# Patient Record
Sex: FEMALE | Race: WHITE | NOT HISPANIC OR LATINO | Employment: OTHER | URBAN - METROPOLITAN AREA
[De-identification: names, ages, dates, MRNs, and addresses within clinical notes are randomized per-mention and may not be internally consistent; named-entity substitution may affect disease eponyms.]

---

## 2017-03-16 ENCOUNTER — OFFICE VISIT (OUTPATIENT)
Dept: OPHTHALMOLOGY | Facility: CLINIC | Age: 82
End: 2017-03-16
Payer: COMMERCIAL

## 2017-03-16 DIAGNOSIS — H43.813 PVD (POSTERIOR VITREOUS DETACHMENT), BILATERAL: ICD-10-CM

## 2017-03-16 DIAGNOSIS — Z96.1 PSEUDOPHAKIA: ICD-10-CM

## 2017-03-16 DIAGNOSIS — H53.039 STRABISMIC AMBLYOPIA, UNSPECIFIED LATERALITY: ICD-10-CM

## 2017-03-16 DIAGNOSIS — H18.529 ANTERIOR BASEMENT MEMBRANE DYSTROPHY: Primary | ICD-10-CM

## 2017-03-16 DIAGNOSIS — H52.4 PRESBYOPIA: ICD-10-CM

## 2017-03-16 PROCEDURE — 92015 DETERMINE REFRACTIVE STATE: CPT | Performed by: STUDENT IN AN ORGANIZED HEALTH CARE EDUCATION/TRAINING PROGRAM

## 2017-03-16 PROCEDURE — 92014 COMPRE OPH EXAM EST PT 1/>: CPT | Performed by: STUDENT IN AN ORGANIZED HEALTH CARE EDUCATION/TRAINING PROGRAM

## 2017-03-16 ASSESSMENT — REFRACTION_WEARINGRX
OD_SPHERE: -0.50
OS_CYLINDER: +1.00
OD_ADD: +3.00
OS_ADD: +3.00
SPECS_TYPE: PAL
OD_CYLINDER: +1.25
OS_AXIS: 118
OD_AXIS: 120
OS_SPHERE: -0.50

## 2017-03-16 ASSESSMENT — REFRACTION_MANIFEST
OS_CYLINDER: +1.25
OD_AXIS: 117
OS_ADD: +3.00
OS_AXIS: 110
OD_ADD: +3.00
OS_SPHERE: -0.75
OD_SPHERE: -0.50
OD_CYLINDER: +1.25

## 2017-03-16 ASSESSMENT — CUP TO DISC RATIO
OD_RATIO: 0.4
OS_RATIO: 0.4

## 2017-03-16 ASSESSMENT — TONOMETRY
OD_IOP_MMHG: 17
OS_IOP_MMHG: 17

## 2017-03-16 ASSESSMENT — SLIT LAMP EXAM - LIDS
COMMENTS: DERMATOCHALASIS
COMMENTS: DERMATOCHALASIS

## 2017-03-16 ASSESSMENT — EXTERNAL EXAM - LEFT EYE: OS_EXAM: NORMAL

## 2017-03-16 ASSESSMENT — VISUAL ACUITY
METHOD: SNELLEN - LINEAR
OD_CC: 20/20
OS_CC: 20/80-1
OS_CC: J16
OD_CC: J1

## 2017-03-16 ASSESSMENT — EXTERNAL EXAM - RIGHT EYE: OD_EXAM: NORMAL

## 2017-03-16 ASSESSMENT — CONF VISUAL FIELD
OD_NORMAL: 1
OS_NORMAL: 1

## 2017-03-16 NOTE — MR AVS SNAPSHOT
"              After Visit Summary   3/16/2017    Carolin Mcbride    MRN: [id number]           Patient Information     Date Of Birth          9/6/1934        Visit Information        Provider Department      3/16/2017 10:00 AM Nevin Ricci MD [facility]        Today's Diagnoses     Presbyopia    -  1    Anterior basement membrane dystrophy, OU        PVD (posterior vitreous detachment), bilateral        Strabismic amblyopia, unspecified laterality        Pseudophakia OU c YAG OU          Care Instructions    Glasses prescription given - optional     Nevin Ricci MD  [phone]          Follow-ups after your visit        Follow-up notes from your care team     Return in about 1 year (around 3/16/2018) for Complete Exam.      Who to contact     If you have questions or need follow up information about today's clinic visit or your schedule please contact [facility] directly at [phone].  Normal or non-critical lab and imaging results will be communicated to you by MyChart, letter or phone within 4 business days after the clinic has received the results. If you do not hear from us within 7 days, please contact the clinic through Popcutshart or phone. If you have a critical or abnormal lab result, we will notify you by phone as soon as possible.  Submit refill requests through Splice or call your pharmacy and they will forward the refill request to us. Please allow 3 business days for your refill to be completed.          Additional Information About Your Visit        MyChart Information     Splice lets you send messages to your doctor, view your test results, renew your prescriptions, schedule appointments and more. To sign up, go to [email or web address] . Click on \"Log in\" on the left side of the screen, which will take you to the Welcome page. Then click on \"Sign up Now\" on the right side of the page.     You will be asked to enter the access code listed below, as " well as some personal information. Please follow the directions to create your username and password.     Your access code is: [email or web address]  Expires: 3/20/2017 11:50 AM     Your access code will  in 90 days. If you need help or a new code, please call your [place] clinic or [phone].        Care EveryWhere ID     This is your Care EveryWhere ID. This could be used by other organizations to access your [place] medical records  [id number]         Blood Pressure from Last 3 Encounters:   16 139/79   16 135/75   12/14/15 160/82    Weight from Last 3 Encounters:   16 75.2 kg (165 lb 11.2 oz)   16 73.7 kg (162 lb 6.4 oz)   12/14/15 70.7 kg (155 lb 12.8 oz)              Today, you had the following     No orders found for display       Primary Care Provider Office Phone # Fax #    Lela Sneha Townsend [phone]       [facility] [address] [facility] [address]        Thank you!     Thank you for choosing [facility]  for your care. Our goal is always to provide you with excellent care. Hearing back from our patients is one way we can continue to improve our services. Please take a few minutes to complete the written survey that you may receive in the mail after your visit with us. Thank you!             Your Updated Medication List - Protect others around you: Learn how to safely use, store and throw away your medicines at www.disposemymeds.org.          This list is accurate as of: 3/16/17 11:32 AM.  Always use your most recent med list.                   Brand Name Dispense Instructions for use    aspirin 81 MG tablet     90 tablet    Take 1 tablet (81 mg) by mouth daily       atorvastatin 20 MG tablet    LIPITOR    90 tablet    TAKE 1 TABLET EVERY DAY       CALCIUM 500 + D PO      1 tab three times a day       ciprofloxacin 500 MG tablet    CIPRO    14 tablet    Take 1 tablet (500 mg) by mouth 2 times daily       citalopram 20 MG  tablet    celeXA    45 tablet    TAKE 1/2 TABLET EVERY DAY       ipratropium 0.06 % spray    ATROVENT    3 Box    Spray 2 sprays into both nostrils 3 times daily as needed for rhinitis       losartan 50 MG tablet    COZAAR    90 tablet    TAKE 1 TABLET EVERY DAY  (WITH  BREAKFAST)       triamcinolone 0.1 % cream    KENALOG    80 g    Apply 2-3 times a day to affected area.       vitamin D 400 UNITS tablet      1 cap twice a day

## 2017-03-16 NOTE — PROGRESS NOTES
Current Eye Medications: tears prn      Subjective:  Comprehensive eye exam.   Pt reports she is seeing good. Pt states have been getting headaches over the past six months has been getting frequent headache, never really got headaches in the past. Pt also reports she has had floaters for years in her right eye ,they seem to be more  apparent to her lately ,will sometimes interfere with her vision.   Objective:  See Ophthalmology Exam.      Assessment:  Carolin Mcbride is a 82 year old female who presents with:     Anterior basement membrane dystrophy, OU      PVD (posterior vitreous detachment), bilateral      Strabismic amblyopia, left eye      Pseudophakia OU c YAG OU        Plan:  Glasses prescription given - optional     Nevin Ricci MD  [phone]

## 2017-04-10 ENCOUNTER — OFFICE VISIT (OUTPATIENT)
Dept: FAMILY MEDICINE | Facility: CLINIC | Age: 82
End: 2017-04-10
Payer: COMMERCIAL

## 2017-04-10 VITALS
TEMPERATURE: 97.4 F | SYSTOLIC BLOOD PRESSURE: 137 MMHG | HEART RATE: 60 BPM | DIASTOLIC BLOOD PRESSURE: 77 MMHG | HEIGHT: 63 IN | OXYGEN SATURATION: 94 % | WEIGHT: 168.2 LBS | BODY MASS INDEX: 29.8 KG/M2

## 2017-04-10 DIAGNOSIS — I25.10 CORONARY ARTERY DISEASE INVOLVING NATIVE CORONARY ARTERY OF NATIVE HEART WITHOUT ANGINA PECTORIS: Chronic | ICD-10-CM

## 2017-04-10 DIAGNOSIS — N18.30 CKD (CHRONIC KIDNEY DISEASE) STAGE 3, GFR 30-59 ML/MIN (H): Chronic | ICD-10-CM

## 2017-04-10 DIAGNOSIS — J30.1 SEASONAL ALLERGIC RHINITIS DUE TO POLLEN: Primary | ICD-10-CM

## 2017-04-10 DIAGNOSIS — R49.0 HOARSENESS, CHRONIC: ICD-10-CM

## 2017-04-10 DIAGNOSIS — E78.5 HYPERLIPIDEMIA WITH TARGET LDL LESS THAN 100: Chronic | ICD-10-CM

## 2017-04-10 LAB
ALBUMIN SERPL-MCNC: 3.6 G/DL (ref 3.4–5)
ANION GAP SERPL CALCULATED.3IONS-SCNC: 9 MMOL/L (ref 3–14)
BUN SERPL-MCNC: 17 MG/DL (ref 7–30)
CALCIUM SERPL-MCNC: 9.6 MG/DL (ref 8.5–10.1)
CHLORIDE SERPL-SCNC: 106 MMOL/L (ref 94–109)
CO2 SERPL-SCNC: 26 MMOL/L (ref 20–32)
CREAT SERPL-MCNC: 0.9 MG/DL (ref 0.52–1.04)
ERYTHROCYTE [DISTWIDTH] IN BLOOD BY AUTOMATED COUNT: 12.9 % (ref 10–15)
GFR SERPL CREATININE-BSD FRML MDRD: 60 ML/MIN/1.7M2
GLUCOSE SERPL-MCNC: 95 MG/DL (ref 70–99)
HCT VFR BLD AUTO: 38 % (ref 35–47)
HGB BLD-MCNC: 12.5 G/DL (ref 11.7–15.7)
MCH RBC QN AUTO: 29.6 PG (ref 26.5–33)
MCHC RBC AUTO-ENTMCNC: 32.9 G/DL (ref 31.5–36.5)
MCV RBC AUTO: 90 FL (ref 78–100)
PHOSPHATE SERPL-MCNC: 3.6 MG/DL (ref 2.5–4.5)
PLATELET # BLD AUTO: 256 10E9/L (ref 150–450)
POTASSIUM SERPL-SCNC: 4.4 MMOL/L (ref 3.4–5.3)
RBC # BLD AUTO: 4.22 10E12/L (ref 3.8–5.2)
SODIUM SERPL-SCNC: 141 MMOL/L (ref 133–144)
WBC # BLD AUTO: 6.8 10E9/L (ref 4–11)

## 2017-04-10 PROCEDURE — 99214 OFFICE O/P EST MOD 30 MIN: CPT | Performed by: FAMILY MEDICINE

## 2017-04-10 PROCEDURE — 85027 COMPLETE CBC AUTOMATED: CPT | Performed by: FAMILY MEDICINE

## 2017-04-10 PROCEDURE — 80069 RENAL FUNCTION PANEL: CPT | Performed by: FAMILY MEDICINE

## 2017-04-10 PROCEDURE — 36415 COLL VENOUS BLD VENIPUNCTURE: CPT | Performed by: FAMILY MEDICINE

## 2017-04-10 RX ORDER — IPRATROPIUM BROMIDE 42 UG/1
2 SPRAY, METERED NASAL 3 TIMES DAILY PRN
Qty: 3 BOX | Refills: 4 | Status: SHIPPED | OUTPATIENT
Start: 2017-04-10 | End: 2019-10-28

## 2017-04-10 ASSESSMENT — PAIN SCALES - GENERAL: PAINLEVEL: NO PAIN (0)

## 2017-04-10 NOTE — MR AVS SNAPSHOT
After Visit Summary   4/10/2017    Carolin Mcbride    MRN: [id number]           Patient Information     Date Of Birth          9/6/1934        Visit Information        Provider Department      4/10/2017 1:20 PM Lela Townsend MD [facility]        Today's Diagnoses     Seasonal allergic rhinitis due to pollen    -  1    Hyperlipidemia with target LDL less than 100        Coronary artery disease involving native coronary artery of native heart without angina pectoris        CKD (chronic kidney disease) stage 3, GFR 30-59 ml/min        Hoarseness, chronic          Care Instructions      Understanding Nasal Allergies  Nasal allergies (also called allergic rhinitis) are a common health problem. They may be seasonal.This means they cause symptoms only at certain times of the year. Or they may be perennial.This means they cause symptoms all year long. Other health problems, such as asthma, often occur along with allergies as well.    What Is an allergic reaction?  An allergy is a reaction to a substance called an allergen. Common allergens include:    Wind-borne pollen    Mold    Dust mites    Furry and feathered animals    Cockroaches  Normally, allergens are harmless. But when a person has allergies, their body thinks they are harmful. Their body then attacks allergens with antibodies. Antibodies are attached to special cells called mast cells. Allergens stick to the antibodies. This makes the mast cells release histamine and other chemicals. This is an allergic reaction. The chemicals irritate nearby nasal tissue. This causes nasal allergy symptoms.  Common nasal allergy symptoms  Allergies can cause nasal tissue to swell. This makes the air passages smaller. The nose may feel stuffed up. The nose may also make extra mucus, which can plug the nasal passages or drip out of the nose. Mucus can drip down the back of the throat (postnasal drip) as well. Sinus tissue can swell. This  may cause pain and headache. Common allergy symptoms include:    Runny nose with clear, watery discharge    Stuffy nose (nasal congestion)    Drainage down your throat (postnasal drip)    Sneezing    Red, watery eyes    Itchy nose, eyes, ears, and throat    Plugged-up ears (ear congestion)    Sore throat    Coughing    Sinus pain and swelling    Headache  It may not be allergies  Other health problems can cause symptoms like those of nasal allergies. These include:    Nonallergic rhinitis and viruses such as colds    Irritants and pollutants, such as strong odors or smoke    Certain medications    Changes in the weather   Treatment  Your health care provider will evaluate you to find the cause of your symptoms then recommend treatment. You may also be referred to an allergist.     2684-3067 [employer]. [address] [place] [address], [place] [address]. All rights reserved. This information is not intended as a substitute for professional medical care. Always follow your healthcare professional's instructions.              Follow-ups after your visit        Who to contact     If you have questions or need follow up information about today's clinic visit or your schedule please contact [facility] directly at [phone].  Normal or non-critical lab and imaging results will be communicated to you by Pingboardhart, letter or phone within 4 business days after the clinic has received the results. If you do not hear from us within 7 days, please contact the clinic through Spotzott or phone. If you have a critical or abnormal lab result, we will notify you by phone as soon as possible.  Submit refill requests through Redbooth or call your pharmacy and they will forward the refill request to us. Please allow 3 business days for your refill to be completed.          Additional Information About Your Visit        Redbooth Information     Redbooth lets you send messages to your doctor, view your test results,  "renew your prescriptions, schedule appointments and more. To sign up, go to [email or web address] . Click on \"Log in\" on the left side of the screen, which will take you to the Welcome page. Then click on \"Sign up Now\" on the right side of the page.     You will be asked to enter the access code listed below, as well as some personal information. Please follow the directions to create your username and password.     Your access code is: [email or web address]  Expires: 2017  1:42 PM     Your access code will  in 90 days. If you need help or a new code, please call your [place] clinic or [phone].        Care EveryWhere ID     This is your Care EveryWhere ID. This could be used by other organizations to access your [place] medical records  [id number]        Your Vitals Were     Pulse Temperature Pulse Oximetry BMI (Body Mass Index)          60 97.4  F (36.3  C) (Oral) 94% 29.33 kg/m2         Blood Pressure from Last 3 Encounters:   04/10/17 137/77   16 139/79   16 135/75    Weight from Last 3 Encounters:   04/10/17 168 lb 3.2 oz (76.3 kg)   16 165 lb 11.2 oz (75.2 kg)   16 162 lb 6.4 oz (73.7 kg)              We Performed the Following     CBC with platelets     Renal panel          Today's Medication Changes          These changes are accurate as of: 4/10/17  1:42 PM.  If you have any questions, ask your nurse or doctor.               Stop taking these medicines if you haven't already. Please contact your care team if you have questions.     ciprofloxacin 500 MG tablet   Commonly known as:  CIPRO   Stopped by:  Lela Townsend MD                Where to get your medicines      These medications were sent to [facility] - [place], [place] - [address]  [address] [facility] [address]     Phone:  [phone]     ipratropium 0.06 % spray                Primary Care Provider Office Phone # Fax #    Lela Townsend [phone]    "    [facility] [address]  [facility] [address]        Thank you!     Thank you for choosing [facility]  for your care. Our goal is always to provide you with excellent care. Hearing back from our patients is one way we can continue to improve our services. Please take a few minutes to complete the written survey that you may receive in the mail after your visit with us. Thank you!             Your Updated Medication List - Protect others around you: Learn how to safely use, store and throw away your medicines at www.disposemymeds.org.          This list is accurate as of: 4/10/17  1:42 PM.  Always use your most recent med list.                   Brand Name Dispense Instructions for use    aspirin 81 MG tablet     90 tablet    Take 1 tablet (81 mg) by mouth daily       atorvastatin 20 MG tablet    LIPITOR    90 tablet    TAKE 1 TABLET EVERY DAY       CALCIUM 500 + D PO      1 tab three times a day       citalopram 20 MG tablet    celeXA    45 tablet    TAKE 1/2 TABLET EVERY DAY       ipratropium 0.06 % spray    ATROVENT    3 Box    Spray 2 sprays into both nostrils 3 times daily as needed for rhinitis       losartan 50 MG tablet    COZAAR    90 tablet    TAKE 1 TABLET EVERY DAY  (WITH  BREAKFAST)       triamcinolone 0.1 % cream    KENALOG    80 g    Apply 2-3 times a day to affected area.       vitamin D 400 UNITS tablet      1 cap twice a day

## 2017-04-10 NOTE — PATIENT INSTRUCTIONS
Understanding Nasal Allergies  Nasal allergies (also called allergic rhinitis) are a common health problem. They may be seasonal.This means they cause symptoms only at certain times of the year. Or they may be perennial.This means they cause symptoms all year long. Other health problems, such as asthma, often occur along with allergies as well.    What Is an allergic reaction?  An allergy is a reaction to a substance called an allergen. Common allergens include:    Wind-borne pollen    Mold    Dust mites    Furry and feathered animals    Cockroaches  Normally, allergens are harmless. But when a person has allergies, their body thinks they are harmful. Their body then attacks allergens with antibodies. Antibodies are attached to special cells called mast cells. Allergens stick to the antibodies. This makes the mast cells release histamine and other chemicals. This is an allergic reaction. The chemicals irritate nearby nasal tissue. This causes nasal allergy symptoms.  Common nasal allergy symptoms  Allergies can cause nasal tissue to swell. This makes the air passages smaller. The nose may feel stuffed up. The nose may also make extra mucus, which can plug the nasal passages or drip out of the nose. Mucus can drip down the back of the throat (postnasal drip) as well. Sinus tissue can swell. This may cause pain and headache. Common allergy symptoms include:    Runny nose with clear, watery discharge    Stuffy nose (nasal congestion)    Drainage down your throat (postnasal drip)    Sneezing    Red, watery eyes    Itchy nose, eyes, ears, and throat    Plugged-up ears (ear congestion)    Sore throat    Coughing    Sinus pain and swelling    Headache  It may not be allergies  Other health problems can cause symptoms like those of nasal allergies. These include:    Nonallergic rhinitis and viruses such as colds    Irritants and pollutants, such as strong odors or smoke    Certain medications    Changes in the weather    Treatment  Your health care provider will evaluate you to find the cause of your symptoms then recommend treatment. You may also be referred to an allergist.     [id number] [employer]. [address] [place] [address], [place] [address]. All rights reserved. This information is not intended as a substitute for professional medical care. Always follow your healthcare professional's instructions.

## 2017-04-10 NOTE — LETTER
[facility]       [address]  [place] [address]      April 11, 2017      Carolin Mcbride  [address] [place] [address]              Dear Carolin,    Your test results are attached. I am happy to let you know that they are stable and your medications can stay the same    The kidney test is stable. The blood sugar is normal and you do not have diabetes. The complete blood count was normal and you do not have anemia or signs of infection. We can recheck labs in 6 months. Have a great trip and let me know if you are not feeling better.    Please call me if you have any questions about these test results or about your care.    Sincerely,    Lela Townsend MD/cassia  MRN: [id number]                                                      MRN: [id number]  Results for orders placed or performed in visit on 04/10/17   CBC with platelets   Result Value Ref Range    WBC 6.8 4.0 - 11.0 10e9/L    RBC Count 4.22 3.8 - 5.2 10e12/L    Hemoglobin 12.5 11.7 - 15.7 g/dL    Hematocrit 38.0 35.0 - 47.0 %    MCV 90 78 - 100 fl    MCH 29.6 26.5 - 33.0 pg    MCHC 32.9 31.5 - 36.5 g/dL    RDW 12.9 10.0 - 15.0 %    Platelet Count 256 150 - 450 10e9/L   Renal panel   Result Value Ref Range    Sodium 141 133 - 144 mmol/L    Potassium 4.4 3.4 - 5.3 mmol/L    Chloride 106 94 - 109 mmol/L    Carbon Dioxide 26 20 - 32 mmol/L    Anion Gap 9 3 - 14 mmol/L    Glucose 95 70 - 99 mg/dL    Urea Nitrogen 17 7 - 30 mg/dL    Creatinine 0.90 0.52 - 1.04 mg/dL    GFR Estimate 60 (L) >60 mL/min/1.7m2    GFR Estimate If Black 73 >60 mL/min/1.7m2    Calcium 9.6 8.5 - 10.1 mg/dL    Phosphorus 3.6 2.5 - 4.5 mg/dL    Albumin 3.6 3.4 - 5.0 g/dL

## 2017-04-10 NOTE — PROGRESS NOTES
SUBJECTIVE:                                                    Carolin Mcbride is a 82 year old female who presents to clinic today for the following health issues:    Concern - Lightheadedness and headaches last week with increased allergic rhinitis symptoms. Improved this week.      Onset: 2 weeks     Description:   Sick to the stomach not pain, lightheadedness and headaches- frontal area.     Intensity: moderate    Progression of Symptoms:  same and intermittent and mostly in the morning     Accompanying Signs & Symptoms:  Nausea in the morning with more sinus drainage in back of throat. Similar symptoms in past improved by nasal sprays. Tested positive for tree pollens.      Previous history of similar problem:   none    Precipitating factors:   Worsened by: mostly in the morning     Alleviating factors:  Improved by: none        Therapies Tried and outcome: water     Hyperlipidemia Follow-Up      Rate your low fat/cholesterol diet?: good    Taking statin?  Yes, no muscle aches from statin    Other lipid medications/supplements?:  none     Hypertension Follow-up      Outpatient blood pressures are not being checked.    Low Salt Diet: no added salt     Vascular Disease Follow-up:  Coronary Artery Disease (CAD)      Chest pain or pressure, left side neck or arm pain: No    Shortness of breath/increased sweats/nausea with exertion: No    Pain in calves walking 1-2 blocks: No    Worsened or new symptoms since last visit: No    Nitroglycerin use: no    Daily aspirin use: Yes     Chronic Kidney Disease Follow-up      Current NSAID use?  No      Problem list and histories reviewed & adjusted, as indicated.  Additional history: as documented    Patient Active Problem List   Diagnosis     Pseudophakia OU c YAG OU     CAD (coronary artery disease)     Advance care planning     Hyperlipidemia with target LDL less than 100     Osteoporosis     Closed fracture of base of fifth metatarsal bone - right      Metatarsal  fracture - right 4th neck     CKD (chronic kidney disease) stage 3, GFR 30-59 ml/min     Seasonal allergic rhinitis     Diagnostic skin and sensitization tests (aka ALLERGENS)     Pastrana's neuroma     Hoarseness, chronic     Strabismic amblyopia     Anterior basement membrane dystrophy, OU     PVD (posterior vitreous detachment) OU     Granuloma annulare     Major depressive disorder, recurrent episode, mild (H)     Past Surgical History:   Procedure Laterality Date     APPENDECTOMY       CHOLECYSTECTOMY, OPEN       COLONOSCOPY  2009    neg     CYSTOCELE REPAIR  2003    TVT SPARC       Social History   Substance Use Topics     Smoking status: Never Smoker     Smokeless tobacco: Never Used      Comment: Never smoked; nonsmoking household     Alcohol use No      Comment: very rare     Family History   Problem Relation Age of Onset     CANCER Mother      ovarian      HEART DISEASE Sister      CABG x 3     Neurologic Disorder Sister      Fibromyalgia     CANCER Father      stomach/interstines      Musculoskeletal Disorder Brother      back      HEART DISEASE Brother 60     CABG x 4         Current Outpatient Prescriptions   Medication Sig Dispense Refill     ipratropium (ATROVENT) 0.06 % spray Spray 2 sprays into both nostrils 3 times daily as needed for rhinitis 3 Box 4     losartan (COZAAR) 50 MG tablet TAKE 1 TABLET EVERY DAY  (WITH  BREAKFAST) 90 tablet 3     citalopram (CELEXA) 20 MG tablet TAKE 1/2 TABLET EVERY DAY 45 tablet 3     atorvastatin (LIPITOR) 20 MG tablet TAKE 1 TABLET EVERY DAY 90 tablet 3     triamcinolone (KENALOG) 0.1 % cream Apply 2-3 times a day to affected area. 80 g 6     aspirin 81 MG tablet Take 1 tablet (81 mg) by mouth daily 90 tablet 3     VITAMIN D 400 UNIT OR TABS 1 cap twice a day        CALCIUM 500 + D OR 1 tab three times a day        Allergies   Allergen Reactions     Baycol [Cerivastatin Sodium]       muscle mass loss       Crestor [Hmg-Coa-R Inhibitors]      Previously on Baycol and  "affect muscle mass loss.     Darvocet [Acetaminophen]      Severe nausea     Macrobid [Nitrofuran Derivatives]      Hives     Recent Labs   Lab Test  04/10/17   1352  12/20/16   1054   07/13/15   1054  01/12/15   1034  11/17/14   1136  05/19/14   1500  11/11/13   1151   11/06/12   1045   LDL   --   118*   --   74   --   175*   --   166*   < >  160*   HDL   --   63   --   60   --   50*   --   43*   < >  43*   TRIG   --   130   --   60   --   172*   --   171*   < >  160*   ALT   --    --    --    --   29   --   22  24   < >   --    CR  0.90  1.00   < >  0.90   --   0.98  0.99  0.91   < >   --    GFRESTIMATED  60*  53*   < >  60*   --   55*  54*  60*   < >   --    GFRESTBLACK  73  64   < >  73   --   66  65  72   < >   --    POTASSIUM  4.4  4.5   < >  4.2   --   4.6  4.7  4.5   < >   --    TSH   --    --    --    --    --    --   1.56   --    --   1.52    < > = values in this interval not displayed.      BP Readings from Last 3 Encounters:   04/10/17 137/77   12/20/16 139/79   04/18/16 135/75    Wt Readings from Last 3 Encounters:   04/10/17 168 lb 3.2 oz (76.3 kg)   12/20/16 165 lb 11.2 oz (75.2 kg)   04/18/16 162 lb 6.4 oz (73.7 kg)                  Labs reviewed in EPIC    Reviewed and updated as needed this visit by clinical staff  Tobacco  Allergies  Meds       Reviewed and updated as needed this visit by Provider         ROS:  Constitutional, HEENT, cardiovascular, pulmonary, gi and gu systems are negative, except as otherwise noted.    OBJECTIVE:                                                    /77 (BP Location: Right arm, Patient Position: Chair, Cuff Size: Adult Large)  Pulse 60  Temp 97.4  F (36.3  C) (Oral)  Ht 5' 3\" (1.6 m)  Wt 168 lb 3.2 oz (76.3 kg)  SpO2 94%  BMI 29.8 kg/m2  Body mass index is 29.8 kg/(m^2).  GENERAL: elderly, alert, well nourished, well hydrated, no distress  HENT: ear canals- normal; TMs- normal; Nose- normal; Mouth- no ulcers, no lesions, missing dentition  NECK: no " tenderness, no adenopathy, no asymmetry, no masses, no stiffness; thyroid- normal to palpation  RESP: lungs clear to auscultation - no rales, no rhonchi, no wheezes  CV: regular rates and rhythm, normal S1 S2, no S3 or S4 and no murmur, no click or rub, normal pulses  ABDOMEN: soft, no tenderness, no  hepatosplenomegaly, no masses, normal bowel sounds  MS: extremities- no gross deformities noted, no edema  SKIN: no suspicious lesions, no rashes, age related skin changes with seborrheic keratosis and no actinic keratosis.    NEURO: strength and tone- normal, sensory exam- grossly normal, reflexes- symmetric  BACK: no CVA tenderness, no paralumbar tenderness  MENTAL STATUS EXAM:  Appearance/Behavior: no apparent distress, neatly groomed, dressed appropriately for weather, appears stated age and is not frail-appearing  Speech: normal  Mood/Affect: normal affect  Insight: Excellent     Diagnostic Test Results:  Results for orders placed or performed in visit on 04/10/17   CBC with platelets   Result Value Ref Range    WBC 6.8 4.0 - 11.0 10e9/L    RBC Count 4.22 3.8 - 5.2 10e12/L    Hemoglobin 12.5 11.7 - 15.7 g/dL    Hematocrit 38.0 35.0 - 47.0 %    MCV 90 78 - 100 fl    MCH 29.6 26.5 - 33.0 pg    MCHC 32.9 31.5 - 36.5 g/dL    RDW 12.9 10.0 - 15.0 %    Platelet Count 256 150 - 450 10e9/L   Renal panel   Result Value Ref Range    Sodium 141 133 - 144 mmol/L    Potassium 4.4 3.4 - 5.3 mmol/L    Chloride 106 94 - 109 mmol/L    Carbon Dioxide 26 20 - 32 mmol/L    Anion Gap 9 3 - 14 mmol/L    Glucose 95 70 - 99 mg/dL    Urea Nitrogen 17 7 - 30 mg/dL    Creatinine 0.90 0.52 - 1.04 mg/dL    GFR Estimate 60 (L) >60 mL/min/1.7m2    GFR Estimate If Black 73 >60 mL/min/1.7m2    Calcium 9.6 8.5 - 10.1 mg/dL    Phosphorus 3.6 2.5 - 4.5 mg/dL    Albumin 3.6 3.4 - 5.0 g/dL        ASSESSMENT/PLAN:                                                              ICD-10-CM    1. Seasonal allergic rhinitis due to pollen J30.1 ipratropium  (ATROVENT) 0.06 % spray both nostrils as needed for nasal symptoms. May use over the counter steroid nasal spray if needed.    2. Hyperlipidemia with target LDL less than 100 E78.5 stable   3. Coronary artery disease involving native coronary artery of native heart without angina pectoris I25.10 Stable with no recurrent symptoms of angina or dyspnea   4. CKD (chronic kidney disease) stage 3, GFR 30-59 ml/min- renal function stable. N18.3 CBC with platelets     Renal panel   5. Hoarseness, chronic R49.0 Follow up with ENT if not improving.        FUTURE LABS:       - Schedule fasting labs in 6 months  FUTURE APPOINTMENTS:       - Follow-up visit in 3 months or sooner if any questions or concerns.   Work on weight loss  Regular exercise  See Patient Instructions    Lela Townsend MD  [facility]

## 2017-04-11 NOTE — PROGRESS NOTES
Dear Carolin Mcbride,    Your test results are attached. I am happy to let you know that they are stable and your medications can stay the same    The kidney test is stable. The blood sugar is normal and you do not have diabetes. The complete blood count was normal and you do not have anemia or signs of infection. We can recheck labs in 6 months. Have a great trip and let me know if you are not feeling better.    Please call me if you have any questions about these test results or about your care.    Sincerely,    Lela Townsend MD

## 2017-04-16 ASSESSMENT — TONOMETRY: IOP_METHOD: APPLANATION

## 2017-09-12 ENCOUNTER — TELEPHONE (OUTPATIENT)
Dept: FAMILY MEDICINE | Facility: CLINIC | Age: 82
End: 2017-09-12

## 2017-09-12 NOTE — TELEPHONE ENCOUNTER
..Reason for Call:  Other     Detailed comments: Patient would like a script sent to [facility] in [place] if possible. She said she has a cough, sore throat and a cold.    Phone Number Patient can be reached at:[phone]  Best Time: anytime    Can we leave a detailed message on this number? YES    Call taken on 9/12/2017 at 10:12 AM by Leonarda Gunn

## 2017-10-02 ENCOUNTER — ALLIED HEALTH/NURSE VISIT (OUTPATIENT)
Dept: NURSING | Facility: CLINIC | Age: 82
End: 2017-10-02
Payer: COMMERCIAL

## 2017-10-02 DIAGNOSIS — Z23 NEED FOR PROPHYLACTIC VACCINATION AND INOCULATION AGAINST INFLUENZA: Primary | ICD-10-CM

## 2017-10-02 PROCEDURE — 99207 ZZC NO CHARGE NURSE ONLY: CPT

## 2017-10-02 PROCEDURE — G0008 ADMIN INFLUENZA VIRUS VAC: HCPCS

## 2017-10-02 PROCEDURE — 90662 IIV NO PRSV INCREASED AG IM: CPT

## 2017-10-02 NOTE — MR AVS SNAPSHOT
"              After Visit Summary   10/2/2017    Carolin Mcbride    MRN: [id number]           Patient Information     Date Of Birth          9/6/1934        Visit Information        Provider Department      10/2/2017 10:00 AM AN ANCILLARY [facility]        Today's Diagnoses     Need for prophylactic vaccination and inoculation against influenza    -  1       Follow-ups after your visit        Your next 10 appointments already scheduled     Dec 26, 2017  9:30 AM CST   MA SCREENING DIGITAL BILATERAL with [id number]   [facility]    [address] [facility] [address] [facility] [address] [phone]           Do not use any powder, lotion or deodorant under your arms or on your breast. If you do, we will ask you to remove it before your exam.  Wear comfortable, two-piece clothing.  If you have any allergies, tell your care team.  Bring any previous mammograms from other facilities or have them mailed to the breast center. Three-dimensional (3D) mammograms are available at [facility] locations in [facility], [facility], [facility], and [place]. [facility] locations include [facility] in [place]. Benefits of 3D mammograms include: - Improved rate of cancer detection - Decreases your chance of having to go back for more tests, which means fewer: - \"False-positive\" results (This means that there is an abnormal area but it isn't cancer.) - Invasive testing procedures, such as a biopsy or surgery - Can provide clearer images of the breast if you have dense breast tissue. 3D mammography is an optional exam that anyone can have with a 2D mammogram. It doesn't replace or take the place of a 2D mammogram. 2D mammograms remain an effective screening test for all women.  Not all insurance companies cover the cost of a 3D mammogram. Check with your insurance.            Dec 26, 2017 10:00 AM CST " "  PHYSICAL with Lela Townsend MD   [facility]    [address] [facility] [id number] [phone]            Mar 20, 2018 10:00 AM CDT   New Visit with Nevin Ricci MD   [facility]    [address] [place] [address]  [place] [address] [phone]              Who to contact     If you have questions or need follow up information about today's clinic visit or your schedule please contact [facility] directly at [phone].  Normal or non-critical lab and imaging results will be communicated to you by MyChart, letter or phone within 4 business days after the clinic has received the results. If you do not hear from us within 7 days, please contact the clinic through MyChart or phone. If you have a critical or abnormal lab result, we will notify you by phone as soon as possible.  Submit refill requests through [employer] or call your pharmacy and they will forward the refill request to us. Please allow 3 business days for your refill to be completed.          Additional Information About Your Visit        MyChart Information     [employer] lets you send messages to your doctor, view your test results, renew your prescriptions, schedule appointments and more. To sign up, go to [email or web address] . Click on \"Log in\" on the left side of the screen, which will take you to the Welcome page. Then click on \"Sign up Now\" on the right side of the page.     You will be asked to enter the access code listed below, as well as some personal information. Please follow the directions to create your username and password.     Your access code is: [email or web address]  Expires: 2017 10:24 AM     Your access code will  in 90 days. If you need help or a new code, please call your [facility] or [phone].        Care EveryWhere ID     This is your Care EveryWhere ID. This could be used by other " organizations to access your [facility] medical records  [id number]         Blood Pressure from Last 3 Encounters:   04/10/17 137/77   12/20/16 139/79   04/18/16 135/75    Weight from Last 3 Encounters:   04/10/17 168 lb 3.2 oz (76.3 kg)   12/20/16 165 lb 11.2 oz (75.2 kg)   04/18/16 162 lb 6.4 oz (73.7 kg)              We Performed the Following     FLU VACCINE, INCREASED ANTIGEN, PRESV FREE, AGE 65+ [12071]     Vaccine Administration, Initial [68568]        Primary Care Provider Office Phone # Fax #    Lela Sneha Townsend [phone]       [address]  [facility] [address]        Equal Access to Services     [facility] [place] : Hadii rosana nunez hadasho Soomaali, waaxda luqadaha, qaybta kaalmada adeegyada, waxsusan ernandez haypool braga . So [facility] [phone].    ATENCIÓN: Si habla español, tiene a orta disposición servicios gratuitos de asistencia lingüística. Llame al [phone].    We comply with applicable federal civil rights laws and [place] laws. We do not discriminate on the basis of race, color, national origin, age, disability, sex, sexual orientation, or gender identity.            Thank you!     Thank you for choosing [facility]  [facility]. Our goal is always to provide you with excellent care. Hearing back from our patients is one way we can continue to improve our services. Please take a few minutes to complete the written survey that you may receive in the mail after your visit with us. Thank you!             Your Updated Medication List - Protect others around you: Learn how to safely use, store and throw away your medicines at www.disposemymeds.org.          This list is accurate as of: 10/2/17 10:24 AM.  Always use your most recent med list.                   Brand Name Dispense Instructions for use Diagnosis    aspirin 81 MG tablet     90 tablet    Take 1 tablet (81 mg) by mouth daily    Coronary artery disease involving native coronary artery of  native heart without angina pectoris       atorvastatin 20 MG tablet    LIPITOR    90 tablet    TAKE 1 TABLET EVERY DAY    Hyperlipidemia with target LDL less than 100       CALCIUM 500 + D PO      1 tab three times a day        citalopram 20 MG tablet    celeXA    45 tablet    TAKE 1/2 TABLET EVERY DAY    Major depressive disorder, recurrent episode, mild (H)       ipratropium 0.06 % spray    ATROVENT    3 Box    Spray 2 sprays into both nostrils 3 times daily as needed for rhinitis    Seasonal allergic rhinitis due to pollen       losartan 50 MG tablet    COZAAR    90 tablet    TAKE 1 TABLET EVERY DAY  (WITH  BREAKFAST)    Hypertension, benign essential, goal below 140/90       triamcinolone 0.1 % cream    KENALOG    80 g    Apply 2-3 times a day to affected area.    Granuloma annulare       vitamin D 400 UNITS tablet      1 cap twice a day

## 2017-10-02 NOTE — PROGRESS NOTES
Injectable Influenza Immunization Documentation    1.  Is the person to be vaccinated sick today?   No    2. Does the person to be vaccinated have an allergy to a component   of the vaccine?   No    3. Has the person to be vaccinated ever had a serious reaction   to influenza vaccine in the past?   No    4. Has the person to be vaccinated ever had Guillain-Barré syndrome?   No    Form completed by Merry Linder MA

## 2017-12-04 NOTE — NURSING NOTE
"Chief Complaint   Patient presents with     Dizziness     Headache       Initial /77 (BP Location: Right arm, Patient Position: Chair, Cuff Size: Adult Large)  Pulse 60  Temp 97.4  F (36.3  C) (Oral)  Wt 168 lb 3.2 oz (76.3 kg)  SpO2 94%  BMI 29.33 kg/m2 Estimated body mass index is 29.33 kg/(m^2) as calculated from the following:    Height as of 12/20/16: 5' 3.5\" (1.613 m).    Weight as of this encounter: 168 lb 3.2 oz (76.3 kg).  Medication Reconciliation: complete   Keri Flores      "
Abdominal pain

## 2017-12-18 DIAGNOSIS — I10 HYPERTENSION, BENIGN ESSENTIAL, GOAL BELOW 140/90: ICD-10-CM

## 2017-12-18 DIAGNOSIS — F33.0 MAJOR DEPRESSIVE DISORDER, RECURRENT EPISODE, MILD (H): ICD-10-CM

## 2017-12-18 NOTE — TELEPHONE ENCOUNTER
Requested Prescriptions   Pending Prescriptions Disp Refills     losartan (COZAAR) 50 MG tablet [Pharmacy Med Name: LOSARTAN POTASSIUM 50 MG Tablet] 90 tablet 3    Last Written Prescription Date:  12/20/16  Last Fill Quantity: 90,  # refills: 3   Last Office Visit with [facility] or [facility] prescribing provider:  4/10/2017     Future Office Visit:    Next 5 appointments (look out 90 days)     Dec 26, 2017 10:00 AM CST   [facility]    [address] [place] [id number] [phone]                  Sig: TAKE 1 TABLET EVERY DAY  (WITH  BREAKFAST)    Angiotensin-II Receptors Failed    12/18/2017  4:04 PM       Failed - Blood pressure under 140/90 in past 12 months.    BP Readings from Last 3 Encounters:   04/10/17 137/77   12/20/16 139/79   04/18/16 135/75                Passed - Recent or future visit with authorizing provider's specialty    Patient had office visit in the last year or has a visit in the next 30 days with authorizing provider.  See chart review.              Passed - Patient is age 18 or older       Passed - No active pregnancy on record       Passed - Normal serum creatinine on file in past 12 months    Recent Labs   Lab Test  04/10/17   1352   CR  0.90            Passed - Normal serum potassium on file in past 12 months    Recent Labs   Lab Test  04/10/17   1352   POTASSIUM  4.4                   Passed - No positive pregnancy test in past 12 months        citalopram (CELEXA) 20 MG tablet [Pharmacy Med Name: CITALOPRAM HYDROBROMIDE 20 MG Tablet] 45 tablet 3    Last Written Prescription Date:  12/20/16  Last Fill Quantity: 45,  # refills: 3   Last Office Visit with [facility] or [facility] prescribing provider:  4/10/2017     Future Office Visit:    Next 5 appointments (look out 90 days)     Dec 26, 2017 10:00 AM CST   PHYSICAL with Lela Townsend MD   [facility]  ([facility])    [address] [facility] [address]   [phone]                  Sig: TAKE 1/2 TABLET EVERY DAY    SSRIs Protocol Failed    12/18/2017  4:04 PM       Failed - PHQ-9 score less than 5 in past 6 months    Please review PHQ-9 score.          Passed - Medication is NOT Bupropion    If the medication is Bupropion (Wellbutrin), and the patient is taking for smoking cessation; OK to refill.         Passed - Patient is age 18 or older       Passed - No active pregnancy on record       Passed - No positive pregnancy test in last 12 months       Passed - Recent (6 mo) or future visit with authorizing provider's specialty    Patient had office visit in the last 6 months or has a visit in the next 30 days with authorizing provider.  See chart review.

## 2017-12-21 DIAGNOSIS — E78.5 HYPERLIPIDEMIA WITH TARGET LDL LESS THAN 100: Chronic | ICD-10-CM

## 2017-12-21 RX ORDER — CITALOPRAM HYDROBROMIDE 20 MG/1
TABLET ORAL
Qty: 45 TABLET | Refills: 3 | OUTPATIENT
Start: 2017-12-21

## 2017-12-21 RX ORDER — LOSARTAN POTASSIUM 50 MG/1
TABLET ORAL
Qty: 90 TABLET | Refills: 3 | OUTPATIENT
Start: 2017-12-21

## 2017-12-21 NOTE — TELEPHONE ENCOUNTER
Automated refill requests sent per pharmacy. Patient has appointment with Dr. Townsend on 12/26/17 for recheck and discuss meds. Patient uses mail order pharmacy and needing 90 day supply to be covered by insurance. RN [facility] protocol allows 30 day supply josh refill to be given as patient is past due for appointment, but does have one scheduled. 30 day supply will cost patient more money and with mail order, patient may not receive till after the holiday. Call placed to patient to discuss need for medications. Per patient, she will be out of her losartan by next Thursday. All other medications patient has enough for couple weeks. Patient previously was taking losartan 100 mg tablets and has plenty of these on hand so will just cut in half if she runs out of the 50 mg tablets she is currently on. Patient wanting to discuss medications with provider at visit and states she has enough to get her by and will have provider reorder all meds at time of appointment so that all will be delivered to her at same time.  Per patient, disregard this request for refill of losartan and citalopram.    Nedra Charles RN  [facility] Triage

## 2017-12-22 NOTE — TELEPHONE ENCOUNTER
Requested Prescriptions   Pending Prescriptions Disp Refills     atorvastatin (LIPITOR) 20 MG tablet [Pharmacy Med Name: ATORVASTATIN CALCIUM 20 MG Tablet] 90 tablet 3    Last Written Prescription Date:  12/20/16  Last Fill Quantity: 90,  # refills: 3   Last Office Visit with [facility], [facility] or [facility] prescribing provider:  4/10/2017     Future Office Visit:    Next 5 appointments (look out 90 days)     Dec 26, 2017 10:00 AM CST   PHYSICAL with Lela Townsend MD   [facility]    [address] [place] [id number] [phone]                  Sig: TAKE 1 TABLET EVERY DAY    Statins Protocol Failed    12/21/2017  2:15 PM       Failed - LDL on file in past 12 months    Recent Labs   Lab Test  12/20/16   1054   LDL  118*            Passed - No abnormal creatine kinase in past 12 months    No lab results found.         Passed - Recent or future visit with authorizing provider    Patient had office visit in the last year or has a visit in the next 30 days with authorizing provider.  See chart review.              Passed - Patient is age 18 or older       Passed - No active pregnancy on record       Passed - No positive pregnancy test in past 12 months

## 2017-12-26 ENCOUNTER — OFFICE VISIT (OUTPATIENT)
Dept: FAMILY MEDICINE | Facility: CLINIC | Age: 82
End: 2017-12-26
Payer: COMMERCIAL

## 2017-12-26 ENCOUNTER — RADIANT APPOINTMENT (OUTPATIENT)
Dept: MAMMOGRAPHY | Facility: CLINIC | Age: 82
End: 2017-12-26
Payer: COMMERCIAL

## 2017-12-26 VITALS
BODY MASS INDEX: 29.95 KG/M2 | HEIGHT: 63 IN | DIASTOLIC BLOOD PRESSURE: 74 MMHG | WEIGHT: 169 LBS | RESPIRATION RATE: 18 BRPM | OXYGEN SATURATION: 98 % | TEMPERATURE: 98.9 F | SYSTOLIC BLOOD PRESSURE: 150 MMHG | HEART RATE: 69 BPM

## 2017-12-26 DIAGNOSIS — L92.0 GRANULOMA ANNULARE: ICD-10-CM

## 2017-12-26 DIAGNOSIS — Z12.31 VISIT FOR SCREENING MAMMOGRAM: ICD-10-CM

## 2017-12-26 DIAGNOSIS — K59.01 SLOW TRANSIT CONSTIPATION: ICD-10-CM

## 2017-12-26 DIAGNOSIS — I25.10 CORONARY ARTERY DISEASE INVOLVING NATIVE CORONARY ARTERY OF NATIVE HEART WITHOUT ANGINA PECTORIS: Chronic | ICD-10-CM

## 2017-12-26 DIAGNOSIS — I10 HYPERTENSION, BENIGN ESSENTIAL, GOAL BELOW 140/90: ICD-10-CM

## 2017-12-26 DIAGNOSIS — Z91.81 AT RISK FOR FALLING: ICD-10-CM

## 2017-12-26 DIAGNOSIS — E78.5 HYPERLIPIDEMIA WITH TARGET LDL LESS THAN 100: Chronic | ICD-10-CM

## 2017-12-26 DIAGNOSIS — F33.0 MAJOR DEPRESSIVE DISORDER, RECURRENT EPISODE, MILD (H): ICD-10-CM

## 2017-12-26 DIAGNOSIS — Z00.00 NORMAL PHYSICAL EXAM, ROUTINE: Primary | ICD-10-CM

## 2017-12-26 DIAGNOSIS — M81.0 AGE-RELATED OSTEOPOROSIS WITHOUT CURRENT PATHOLOGICAL FRACTURE: Chronic | ICD-10-CM

## 2017-12-26 LAB
ALBUMIN SERPL-MCNC: 3.5 G/DL (ref 3.4–5)
ANION GAP SERPL CALCULATED.3IONS-SCNC: 5 MMOL/L (ref 3–14)
BUN SERPL-MCNC: 17 MG/DL (ref 7–30)
CALCIUM SERPL-MCNC: 9.5 MG/DL (ref 8.5–10.1)
CHLORIDE SERPL-SCNC: 106 MMOL/L (ref 94–109)
CHOLEST SERPL-MCNC: 212 MG/DL
CO2 SERPL-SCNC: 29 MMOL/L (ref 20–32)
CREAT SERPL-MCNC: 0.94 MG/DL (ref 0.52–1.04)
ERYTHROCYTE [DISTWIDTH] IN BLOOD BY AUTOMATED COUNT: 13.1 % (ref 10–15)
GFR SERPL CREATININE-BSD FRML MDRD: 57 ML/MIN/1.7M2
GLUCOSE SERPL-MCNC: 101 MG/DL (ref 70–99)
HCT VFR BLD AUTO: 38.5 % (ref 35–47)
HDLC SERPL-MCNC: 58 MG/DL
HGB BLD-MCNC: 12.3 G/DL (ref 11.7–15.7)
LDLC SERPL CALC-MCNC: 117 MG/DL
MCH RBC QN AUTO: 28.7 PG (ref 26.5–33)
MCHC RBC AUTO-ENTMCNC: 31.9 G/DL (ref 31.5–36.5)
MCV RBC AUTO: 90 FL (ref 78–100)
NONHDLC SERPL-MCNC: 154 MG/DL
PHOSPHATE SERPL-MCNC: 3.5 MG/DL (ref 2.5–4.5)
PLATELET # BLD AUTO: 291 10E9/L (ref 150–450)
POTASSIUM SERPL-SCNC: 4.3 MMOL/L (ref 3.4–5.3)
RBC # BLD AUTO: 4.29 10E12/L (ref 3.8–5.2)
SODIUM SERPL-SCNC: 140 MMOL/L (ref 133–144)
TRIGL SERPL-MCNC: 187 MG/DL
WBC # BLD AUTO: 7.2 10E9/L (ref 4–11)

## 2017-12-26 PROCEDURE — 85027 COMPLETE CBC AUTOMATED: CPT | Performed by: FAMILY MEDICINE

## 2017-12-26 PROCEDURE — 80061 LIPID PANEL: CPT | Performed by: FAMILY MEDICINE

## 2017-12-26 PROCEDURE — 80069 RENAL FUNCTION PANEL: CPT | Performed by: FAMILY MEDICINE

## 2017-12-26 PROCEDURE — G0202 SCR MAMMO BI INCL CAD: HCPCS | Mod: TC

## 2017-12-26 PROCEDURE — 36415 COLL VENOUS BLD VENIPUNCTURE: CPT | Performed by: FAMILY MEDICINE

## 2017-12-26 PROCEDURE — 99397 PER PM REEVAL EST PAT 65+ YR: CPT | Performed by: FAMILY MEDICINE

## 2017-12-26 RX ORDER — TRIAMCINOLONE ACETONIDE 1 MG/G
CREAM TOPICAL
Qty: 80 G | Refills: 6 | Status: SHIPPED | OUTPATIENT
Start: 2017-12-26 | End: 2021-03-15

## 2017-12-26 RX ORDER — POLYETHYLENE GLYCOL 3350 17 G/17G
1 POWDER, FOR SOLUTION ORAL DAILY
Qty: 510 G | Refills: 3 | Status: SHIPPED | OUTPATIENT
Start: 2017-12-26 | End: 2021-03-15

## 2017-12-26 RX ORDER — ATORVASTATIN CALCIUM 20 MG/1
TABLET, FILM COATED ORAL
Qty: 90 TABLET | Refills: 3 | Status: SHIPPED | OUTPATIENT
Start: 2017-12-26 | End: 2019-01-08

## 2017-12-26 RX ORDER — CITALOPRAM HYDROBROMIDE 10 MG/1
10 TABLET ORAL DAILY
Qty: 90 TABLET | Refills: 3 | Status: SHIPPED | OUTPATIENT
Start: 2017-12-26 | End: 2018-11-12

## 2017-12-26 RX ORDER — ATORVASTATIN CALCIUM 20 MG/1
TABLET, FILM COATED ORAL
Qty: 90 TABLET | Refills: 3 | OUTPATIENT
Start: 2017-12-26

## 2017-12-26 RX ORDER — CITALOPRAM HYDROBROMIDE 20 MG/1
TABLET ORAL
Qty: 45 TABLET | Refills: 3 | Status: SHIPPED | OUTPATIENT
Start: 2017-12-26 | End: 2017-12-26

## 2017-12-26 RX ORDER — LOSARTAN POTASSIUM 50 MG/1
TABLET ORAL
Qty: 90 TABLET | Refills: 3 | Status: SHIPPED | OUTPATIENT
Start: 2017-12-26 | End: 2018-11-12

## 2017-12-26 ASSESSMENT — ACTIVITIES OF DAILY LIVING (ADL): CURRENT_FUNCTION: NO ASSISTANCE NEEDED

## 2017-12-26 ASSESSMENT — PATIENT HEALTH QUESTIONNAIRE - PHQ9: SUM OF ALL RESPONSES TO PHQ QUESTIONS 1-9: 0

## 2017-12-26 ASSESSMENT — PAIN SCALES - GENERAL: PAINLEVEL: NO PAIN (0)

## 2017-12-26 NOTE — PROGRESS NOTES
SUBJECTIVE:   Carolin Mcbride is a 83 year old female who presents for Preventive Visit.  Are you in the first 12 months of your Medicare coverage?  No    Physical   Annual:     Getting at least 3 servings of Calcium per day::  Yes    Bi-annual eye exam::  Yes    Dental care twice a year::  Yes    Sleep apnea or symptoms of sleep apnea::  None    Diet::  Regular (no restrictions)    Frequency of exercise::  None (once in a while, but usually pretty active doing daily housework)    Taking medications regularly::  Yes    Medication side effects::  None    Additional concerns today::  YES (Discuss about complications trying to cut the Celexa  in half)    Ability to successfully perform activities of daily living: no assistance needed  Home Safety:  No safety concerns identified  Hearing Impairment: no hearing concerns      Ability to successfully perform activities of daily living: Yes, no assistance needed  Home safety:  none identified   Hearing impairment: None      Fall risk:  Fallen 2 or more times in the past year?: No  Any fall with injury in the past year?: No    click delete button to remove this line now  COGNITIVE SCREEN  1) Repeat 3 items (Banana, Sunrise, Chair)   2) Clock draw: NORMAL  3) 3 item recall: Recalls 3 objects  Results: 3 items recalled: COGNITIVE IMPAIRMENT LESS LIKELY    Mini-CogTM Copyright S Lupe. Licensed by the author for use in [facility]; reprinted with permission (avani@[facility]). All rights reserved.          Reviewed and updated as needed this visit by clinical staffTobacco  Allergies  Meds  Med Hx  Surg Hx  Fam Hx  Soc Hx        Reviewed and updated as needed this visit by Provider        Social History   Substance Use Topics     Smoking status: Never Smoker     Smokeless tobacco: Never Used      Comment: Never smoked; nonsmoking household     Alcohol use No      Comment: very rare       No flowsheet data found.No flowsheet data found.            Hyperlipidemia  Follow-Up      Rate your low fat/cholesterol diet?: good    Taking statin?  Yes, no muscle aches from statin    Other lipid medications/supplements?:  none    Hypertension Follow-up      Outpatient blood pressures are not being checked.    Low Salt Diet: no added salt    Vascular Disease Follow-up:  Coronary Artery Disease (CAD)      Chest pain or pressure, left side neck or arm pain: No    Shortness of breath/increased sweats/nausea with exertion: No    Pain in calves walking 1-2 blocks: No    Worsened or new symptoms since last visit: No    Nitroglycerin use: no    Daily aspirin use: Yes    Osteoporosis with no back pain or injuries. Took 5 years Fosamax and is at end of 2 year holiday break. Due for DEXA scan.             Today's PHQ-2 Score:   PHQ-2 ( 1999 Pfizer) 12/26/2017   Q1: Little interest or pleasure in doing things 0   Q2: Feeling down, depressed or hopeless 0   PHQ-2 Score 0       Do you feel safe in your environment - Yes    Do you have a Health Care Directive?: No: Advance care planning reviewed with patient; information given to patient to review.      Current providers sharing in care for this patient include: Patient Care Team:  Lela Townsend MD as PCP - General (Family Practice)    The following health maintenance items are reviewed in Epic and correct as of today:  Health Maintenance   Topic Date Due     PHQ-9 Q6 MONTHS  06/20/2017     FALL RISK ASSESSMENT  12/20/2017     DEPRESSION ACTION PLAN Q1 YR  12/20/2017     EYE EXAM Q1 YEAR  03/16/2018     ADVANCE DIRECTIVE PLANNING Q5 YRS  12/20/2021     TETANUS IMMUNIZATION (SYSTEM ASSIGNED)  11/06/2022     INFLUENZA VACCINE (SYSTEM ASSIGNED)  Completed     DEXA SCAN SCREENING (SYSTEM ASSIGNED)  Completed     PNEUMOCOCCAL  Completed     Labs reviewed in EPIC  BP Readings from Last 3 Encounters:   12/26/17 150/74   04/10/17 137/77   12/20/16 139/79    Wt Readings from Last 3 Encounters:   12/26/17 169 lb (76.7 kg)   04/10/17 168 lb 3.2 oz (76.3  kg)   12/20/16 165 lb 11.2 oz (75.2 kg)                  Patient Active Problem List   Diagnosis     Pseudophakia OU c YAG OU     CAD (coronary artery disease)     Advance care planning     Hyperlipidemia with target LDL less than 100     Osteoporosis     CKD (chronic kidney disease) stage 3, GFR 30-59 ml/min     Seasonal allergic rhinitis     Diagnostic skin and sensitization tests (aka ALLERGENS)     Pastrana's neuroma     Strabismic amblyopia     Anterior basement membrane dystrophy, OU     PVD (posterior vitreous detachment) OU     Granuloma annulare     Major depressive disorder, recurrent episode, mild (H)     Past Surgical History:   Procedure Laterality Date     APPENDECTOMY       CHOLECYSTECTOMY, OPEN       COLONOSCOPY  2009    neg     CYSTOCELE REPAIR  2003    TVT SPARC       Social History   Substance Use Topics     Smoking status: Never Smoker     Smokeless tobacco: Never Used      Comment: Never smoked; nonsmoking household     Alcohol use No      Comment: very rare     Family History   Problem Relation Age of Onset     CANCER Mother      ovarian      HEART DISEASE Sister      CABG x 3     Neurologic Disorder Sister      Fibromyalgia     CANCER Father      stomach/interstines      Musculoskeletal Disorder Brother      back      HEART DISEASE Brother 60     CABG x 4         Current Outpatient Prescriptions   Medication Sig Dispense Refill     losartan (COZAAR) 50 MG tablet TAKE 1 TABLET EVERY DAY  (WITH  BREAKFAST) 90 tablet 3     triamcinolone (KENALOG) 0.1 % cream Apply 2-3 times a day to affected area. 80 g 6     atorvastatin (LIPITOR) 20 MG tablet TAKE 1 TABLET EVERY DAY 90 tablet 3     aspirin 81 MG tablet Take 1 tablet (81 mg) by mouth daily 90 tablet 3     polyethylene glycol (MIRALAX) powder Take 17 g (1 capful) by mouth daily 510 g 3     citalopram (CELEXA) 10 MG tablet Take 1 tablet (10 mg) by mouth daily 90 tablet 3     ipratropium (ATROVENT) 0.06 % spray Spray 2 sprays into both nostrils 3  times daily as needed for rhinitis 3 Box 4     VITAMIN D 400 UNIT OR TABS 1 cap twice a day        CALCIUM 500 + D OR 1 tab three times a day        [DISCONTINUED] citalopram (CELEXA) 20 MG tablet TAKE 1/2 TABLET EVERY DAY 45 tablet 3     [DISCONTINUED] losartan (COZAAR) 50 MG tablet TAKE 1 TABLET EVERY DAY  (WITH  BREAKFAST) 90 tablet 3     [DISCONTINUED] citalopram (CELEXA) 20 MG tablet TAKE 1/2 TABLET EVERY DAY 45 tablet 3     [DISCONTINUED] atorvastatin (LIPITOR) 20 MG tablet TAKE 1 TABLET EVERY DAY 90 tablet 3     Allergies   Allergen Reactions     Baycol [Cerivastatin Sodium]       muscle mass loss       Crestor [Hmg-Coa-R Inhibitors]      Previously on Baycol and affect muscle mass loss.     Darvocet [Acetaminophen]      Severe nausea     Macrobid [Nitrofuran Derivatives]      Hives     Recent Labs   Lab Test  12/26/17   1030  04/10/17   1352  12/20/16   1054   07/13/15   1054  01/12/15   1034   05/19/14   1500  11/11/13   1151   11/06/12   1045   LDL  117*   --   118*   --   74   --    < >   --   166*   < >  160*   HDL  58   --   63   --   60   --    < >   --   43*   < >  43*   TRIG  187*   --   130   --   60   --    < >   --   171*   < >  160*   ALT   --    --    --    --    --   29   --   22  24   < >   --    CR  0.94  0.90  1.00   < >  0.90   --    < >  0.99  0.91   < >   --    GFRESTIMATED  57*  60*  53*   < >  60*   --    < >  54*  60*   < >   --    GFRESTBLACK  69  73  64   < >  73   --    < >  65  72   < >   --    POTASSIUM  4.3  4.4  4.5   < >  4.2   --    < >  4.7  4.5   < >   --    TSH   --    --    --    --    --    --    --   1.56   --    --   1.52    < > = values in this interval not displayed.              Pneumonia Vaccine:Adults age 65+ who received Pneumovax (PPSV23) at 65 years or older: Should be given PCV13 > 1 year after their most recent PPSV23  Mammogram Screening: Patient over age 75, has elected to continue with mammography screening.Review of Systems  Constitutional, HEENT,  "cardiovascular, pulmonary, gi and gu systems are negative, except as otherwise noted.  Has chronic constipation and normally has very hard ball stools. Needs to take 4 red pills to have stool every day. Does not take soluble fiber.       OBJECTIVE:   /74 (BP Location: Right arm, Patient Position: Chair, Cuff Size: Adult Large)  Pulse 69  Temp 98.9  F (37.2  C) (Oral)  Resp 18  Ht 5' 3.25\" (1.607 m)  Wt 169 lb (76.7 kg)  SpO2 98%  Breastfeeding? No  BMI 29.7 kg/m2 Estimated body mass index is 29.7 kg/(m^2) as calculated from the following:    Height as of this encounter: 5' 3.25\" (1.607 m).    Weight as of this encounter: 169 lb (76.7 kg).  Physical Exam  GENERAL: elderly, alert, well nourished, well hydrated, no distress  HENT: ear canals- normal; TMs- normal; Nose- normal; Mouth- no ulcers, no lesions, missing dentition  NECK: no tenderness, no adenopathy, no asymmetry, no masses, no stiffness; thyroid- normal to palpation  RESP: lungs clear to auscultation - no rales, no rhonchi, no wheezes  CV: regular rates and rhythm, normal S1 S2, no S3 or S4 and no murmur, no click or rub, normal pulses  ABDOMEN: soft, no tenderness, no  hepatosplenomegaly, no masses, normal bowel sounds  MS: extremities- no gross deformities noted, no edema  SKIN: no suspicious lesions, no rashes, age related skin changes with seborrheic keratosis and no actinic keratosis.    NEURO: strength and tone- normal, sensory exam- grossly normal, reflexes- symmetric  BACK: no CVA tenderness, no paralumbar tenderness  MENTAL STATUS EXAM:  Appearance/Behavior: no apparent distress, neatly groomed, dressed appropriately for weather, appears stated age and is not frail-appearing  Speech: normal  Mood/Affect: normal affect  Insight: Excellent     ASSESSMENT / PLAN:       ICD-10-CM    1. Normal physical exam, routine Z00.00 Chronic stable problems. Constipation.    2. Hypertension, benign essential, goal below 140/90 I10 losartan (COZAAR) 50 " "MG tablet     Renal panel     CBC with platelets   3. 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 Hyperlipidemia with target LDL less than 100 E78.5 atorvastatin (LIPITOR) 20 MG tablet     Lipid panel reflex to direct LDL Fasting   5. Major depressive disorder, recurrent episode, mild (H) F33.0 citalopram (CELEXA) 10 MG tablet     DISCONTINUED: citalopram (CELEXA) 20 MG tablet   6. Coronary artery disease involving native coronary artery of native heart without angina pectoris I25.10 aspirin 81 MG tablet   7. Age-related osteoporosis without current pathological fracture M81.0 DX Hip/Pelvis/Spine   8. Slow transit constipation K59.01 polyethylene glycol (MIRALAX) powder   9. At risk for falling Z91.81        End of Life Planning:  Patient currently has an advanced directive: Yes.  Practitioner is supportive of decision.    COUNSELING:  Reviewed preventive health counseling, as reflected in patient instructions       Regular exercise       Healthy diet/nutrition       Aspirin Prophylaxsis       Osteoporosis Prevention/Bone Health       Colon cancer screening    Return to clinic 6 months for follow up.       Estimated body mass index is 29.7 kg/(m^2) as calculated from the following:    Height as of this encounter: 5' 3.25\" (1.607 m).    Weight as of this encounter: 169 lb (76.7 kg).     reports that she has never smoked. She has never used smokeless tobacco.        Appropriate preventive services were discussed with this patient, including applicable screening as appropriate for cardiovascular disease, diabetes, osteopenia/osteoporosis, and glaucoma.  As appropriate for age/gender, discussed screening for colorectal cancer, prostate cancer, breast cancer, and cervical cancer. Checklist reviewing preventive services available has been given to the patient.    Reviewed patients plan of care and provided an AVS. The Basic Care Plan (routine screening as documented in Health Maintenance) for [place]" meets the Care Plan requirement. This Care Plan has been established and reviewed with the Patient.    Counseling Resources:  ATP IV Guidelines  Pooled Cohorts Equation Calculator  Breast Cancer Risk Calculator  FRAX Risk Assessment  ICSI Preventive Guidelines  Dietary Guidelines for Americans, 2010  USDA's MyPlate  ASA Prophylaxis  Lung CA Screening    Lela Townsend MD  [facility]

## 2017-12-26 NOTE — PATIENT INSTRUCTIONS
At [facility], we strive to deliver an exceptional experience to you, every time we see you.  If you receive a survey in the mail, please send us back your thoughts. We really do value your feedback.    Based on your medical history, these are the current health maintenance/preventive care services that you are due for (some may have been done at this visit.)  Health Maintenance Due   Topic Date Due     PHQ-9 Q6 MONTHS  06/20/2017     FALL RISK ASSESSMENT  12/20/2017     DEPRESSION ACTION PLAN Q1 YR  12/20/2017         Suggested websites for health information:  Www.[employer] : Up to date and easily searchable information on multiple topics.  Www.Crashmob.gov : medication info, interactive tutorials, watch real surgeries online  Www.familydoctor.org : good info from the Academy of Family Physicians  Www.cdc.gov : public health info, travel advisories, epidemics (H1N1)  Www.aap.org : children's health info, normal development, vaccinations  Www.health.[facility].[place].us : [place] dept of health, public health issues in [place], N1N1    Your care team:                            Family Medicine Internal Medicine   MD Edgardo Wilhelm MD Shantel Branch-Fleming, MD Katya Georgiev PA-C Nam Ho, MD Pediatrics   KYRA Graham, LUANN HOFFMAN CNP   MD Soumya Yee MD Deborah Mielke, MD Kim Thein, APRN CNP      Clinic hours: Monday - Thursday 7 am-7 pm; Fridays 7 am-5 pm.   Urgent care: Monday - Friday 11 am-9 pm; Saturday and Sunday 9 am-5 pm.  Pharmacy : Monday -Thursday 8 am-8 pm; Friday 8 am-6 pm; Saturday and Sunday 9 am-5 pm.     Clinic: [phone]   Pharmacy: [phone]    Please schedule your DEXA scan by calling [facility] at [phone].      Preventive Health Recommendations  Female Ages 65 +    Yearly exam:     See your health care provider every year in order to  o Review health changes.   o Discuss  preventive care.    o Review your medicines if your doctor has prescribed any.      You no longer need a yearly Pap test unless you've had an abnormal Pap test in the past 10 years. If you have vaginal symptoms, such as bleeding or discharge, be sure to talk with your provider about a Pap test.      Every 1 to 2 years, have a mammogram.  If you are over 69, talk with your health care provider about whether or not you want to continue having screening mammograms.      Every 10 years, have a colonoscopy. Or, have a yearly FIT test (stool test). These exams will check for colon cancer.       Have a cholesterol test every 5 years, or more often if your doctor advises it.       Have a diabetes test (fasting glucose) every three years. If you are at risk for diabetes, you should have this test more often.       At age 65, have a bone density scan (DEXA) to check for osteoporosis (brittle bone disease).    Shots:    Get a flu shot each year.    Get a tetanus shot every 10 years.    Talk to your doctor about your pneumonia vaccines. There are now two you should receive - Pneumovax (PPSV 23) and Prevnar (PCV 13).    Talk to your doctor about the shingles vaccine.    Talk to your doctor about the hepatitis B vaccine.    Nutrition:     Eat at least 5 servings of fruits and vegetables each day.      Eat whole-grain bread, whole-wheat pasta and brown rice instead of white grains and rice.      Talk to your provider about Calcium and Vitamin D.     Lifestyle    Exercise at least 150 minutes a week (30 minutes a day, 5 days a week). This will help you control your weight and prevent disease.      Limit alcohol to one drink per day.      No smoking.       Wear sunscreen to prevent skin cancer.       See your dentist twice a year for an exam and cleaning.      See your eye doctor every 1 to 2 years to screen for conditions such as glaucoma, macular degeneration, cataracts, etc

## 2017-12-26 NOTE — MR AVS SNAPSHOT
After Visit Summary   12/26/2017    Carolin Mcbride    MRN: [id number]           Patient Information     Date Of Birth          9/6/1934        Visit Information        Provider Department      12/26/2017 10:00 AM Lela Townsend MD [facility]        Today's Diagnoses     Hypertension, benign essential, goal below 140/90    -  1    Granuloma annulare        Hyperlipidemia with target LDL less than 100        Major depressive disorder, recurrent episode, mild (H)        Coronary artery disease involving native coronary artery of native heart without angina pectoris        Age-related osteoporosis without current pathological fracture        Slow transit constipation        At risk for falling          Care Instructions    At [facility], we strive to deliver an exceptional experience to you, every time we see you.  If you receive a survey in the mail, please send us back your thoughts. We really do value your feedback.    Based on your medical history, these are the current health maintenance/preventive care services that you are due for (some may have been done at this visit.)  Health Maintenance Due   Topic Date Due     PHQ-9 Q6 MONTHS  06/20/2017     FALL RISK ASSESSMENT  12/20/2017     DEPRESSION ACTION PLAN Q1 YR  12/20/2017         Suggested websites for health information:  Www.SMB Suite : Up to date and easily searchable information on multiple topics.  Www.medlineplus.gov : medication info, interactive tutorials, watch real surgeries online  Www.familydoctor.org : good info from the Academy of Family Physicians  Www.cdc.gov : public health info, travel advisories, epidemics (H1N1)  Www.aap.org : children's health info, normal development, vaccinations  Www.health.state.[place].us : [place] dept of health, public health issues in [place], N1N1    Your care team:                            Family Medicine Internal Medicine   MD Edgardo Wilhelm MD    MD Augusta Prieto PA-C, MD Pediatrics   KYRA Graham, MD Soumya Bauer CNP, MD Deborah Mielke, MD Kim Thein, APRN [facility]      Clinic hours: Monday - Thursday 7 am-7 pm; Fridays 7 am-5 pm.   Urgent care: Monday - Friday 11 am-9 pm; Saturday and Sunday 9 am-5 pm.  Pharmacy : Monday -Thursday 8 am-8 pm; Friday 8 am-6 pm; Saturday and Sunday 9 am-5 pm.     Clinic: [phone]   Pharmacy: [phone]    Please schedule your DEXA scan by calling [facility] at [phone].      Preventive Health Recommendations  Female Ages 65 +    Yearly exam:     See your health care provider every year in order to  o Review health changes.   o Discuss preventive care.    o Review your medicines if your doctor has prescribed any.      You no longer need a yearly Pap test unless you've had an abnormal Pap test in the past 10 years. If you have vaginal symptoms, such as bleeding or discharge, be sure to talk with your provider about a Pap test.      Every 1 to 2 years, have a mammogram.  If you are over 69, talk with your health care provider about whether or not you want to continue having screening mammograms.      Every 10 years, have a colonoscopy. Or, have a yearly FIT test (stool test). These exams will check for colon cancer.       Have a cholesterol test every 5 years, or more often if your doctor advises it.       Have a diabetes test (fasting glucose) every three years. If you are at risk for diabetes, you should have this test more often.       At age 65, have a bone density scan (DEXA) to check for osteoporosis (brittle bone disease).    Shots:    Get a flu shot each year.    Get a tetanus shot every 10 years.    Talk to your doctor about your pneumonia vaccines. There are now two you should receive - Pneumovax (PPSV 23) and Prevnar (PCV 13).    Talk to your doctor about the shingles vaccine.    Talk to  your doctor about the hepatitis B vaccine.    Nutrition:     Eat at least 5 servings of fruits and vegetables each day.      Eat whole-grain bread, whole-wheat pasta and brown rice instead of white grains and rice.      Talk to your provider about Calcium and Vitamin D.     Lifestyle    Exercise at least 150 minutes a week (30 minutes a day, 5 days a week). This will help you control your weight and prevent disease.      Limit alcohol to one drink per day.      No smoking.       Wear sunscreen to prevent skin cancer.       See your dentist twice a year for an exam and cleaning.      See your eye doctor every 1 to 2 years to screen for conditions such as glaucoma, macular degeneration, cataracts, etc           Follow-ups after your visit        Follow-up notes from your care team     Return in about 6 months (around 6/26/2018) for Lab Work, medication follow up, Routine Visit.      Your next 10 appointments already scheduled     Mar 20, 2018 10:00 AM CDT   New Visit with Nevin Ricci MD   [facility] ([facility]    [address] [address] [phone]              Future tests that were ordered for you today     Open Future Orders        Priority Expected Expires Ordered    DX Hip/Pelvis/Spine Routine  12/26/2018 12/26/2017            Who to contact     If you have questions or need follow up information about today's clinic visit or your schedule please contact [facility] directly at [phone].  Normal or non-critical lab and imaging results will be communicated to you by MyChart, letter or phone within 4 business days after the clinic has received the results. If you do not hear from us within 7 days, please contact the clinic through MyChart or phone. If you have a critical or abnormal lab result, we will notify you by phone as soon as possible.  Submit refill requests through [employer] or call your pharmacy and they will forward the  "refill request to us. Please allow 3 business days for your refill to be completed.          Additional Information About Your Visit        "[employer] Information     Slice lets you send messages to your doctor, view your test results, renew your prescriptions, schedule appointments and more. To sign up, go to [email or web address] . Click on \"Log in\" on the left side of the screen, which will take you to the Welcome page. Then click on \"Sign up Now\" on the right side of the page.     You will be asked to enter the access code listed below, as well as some personal information. Please follow the directions to create your username and password.     Your access code is: [email or web address]  Expires: 2017  9:24 AM     Your access code will  in 90 days. If you need help or a new code, please call your [place] clinic or [phone].        Care EveryWhere ID     This is your Care EveryWhere ID. This could be used by other organizations to access your [place] medical records  [id number]        Your Vitals Were     Pulse Temperature Respirations Height Pulse Oximetry Breastfeeding?    69 98.9  F (37.2  C) (Oral) 18 5' 3.25\" (1.607 m) 98% No    BMI (Body Mass Index)                   29.7 kg/m2            Blood Pressure from Last 3 Encounters:   17 150/74   04/10/17 137/77   16 139/79    Weight from Last 3 Encounters:   17 169 lb (76.7 kg)   04/10/17 168 lb 3.2 oz (76.3 kg)   16 165 lb 11.2 oz (75.2 kg)              We Performed the Following     CBC with platelets     Lipid panel reflex to direct LDL Fasting     Renal panel          Today's Medication Changes          These changes are accurate as of: 17 10:25 AM.  If you have any questions, ask your nurse or doctor.               Start taking these medicines.        Dose/Directions    citalopram 10 MG tablet   Commonly known as:  celeXA   Used for:  Major depressive disorder, recurrent episode, mild (H)   Started by:  [place]ary " Lela Hoover MD        Dose:  10 mg   Take 1 tablet (10 mg) by mouth daily   Quantity:  90 tablet   Refills:  3       polyethylene glycol powder   Commonly known as:  MIRALAX   Used for:  Slow transit constipation   Started by:  Lela Townsend MD        Dose:  1 capful   Take 17 g (1 capful) by mouth daily   Quantity:  510 g   Refills:  3            Where to get your medicines      These medications were sent to [facility] - [facility] [address]  [address], [facility] [address]     Phone:  [phone]     aspirin 81 MG tablet    atorvastatin 20 MG tablet    citalopram 10 MG tablet    losartan 50 MG tablet    polyethylene glycol powder    triamcinolone 0.1 % cream                Primary Care Provider Office Phone # Fax #    Lela Townsend [phone] [address] [place]  [facility] [address]        Equal Access to Services     [facility]: Hadii aad ku hadasho Soomaali, waaxda luqadaha, qaybta kaalmada adeegyada, waxay idiin hayaan adeeg kharash la'aan . So [facility] [phone].    ATENCIÓN: Si habla español, tiene a orta disposición servicios gratuitos de asistencia lingüística. [facility] [phone].    We comply with applicable federal civil rights laws and [place] laws. We do not discriminate on the basis of race, color, national origin, age, disability, sex, sexual orientation, or gender identity.            Thank you!     Thank you for choosing [facility]  for your care. Our goal is always to provide you with excellent care. Hearing back from our patients is one way we can continue to improve our services. Please take a few minutes to complete the written survey that you may receive in the mail after your visit with us. Thank you!             Your Updated Medication List - Protect others around you: Learn how to safely use, store and throw away your medicines at www.disposemymeds.org.          This list is accurate  as of: 12/26/17 10:25 AM.  Always use your most recent med list.                   Brand Name Dispense Instructions for use Diagnosis    aspirin 81 MG tablet     90 tablet    Take 1 tablet (81 mg) by mouth daily    Coronary artery disease involving native coronary artery of native heart without angina pectoris       atorvastatin 20 MG tablet    LIPITOR    90 tablet    TAKE 1 TABLET EVERY DAY    Hyperlipidemia with target LDL less than 100       CALCIUM 500 + D PO      1 tab three times a day        citalopram 10 MG tablet    celeXA    90 tablet    Take 1 tablet (10 mg) by mouth daily    Major depressive disorder, recurrent episode, mild (H)       ipratropium 0.06 % spray    ATROVENT    3 Box    Spray 2 sprays into both nostrils 3 times daily as needed for rhinitis    Seasonal allergic rhinitis due to pollen       losartan 50 MG tablet    COZAAR    90 tablet    TAKE 1 TABLET EVERY DAY  (WITH  BREAKFAST)    Hypertension, benign essential, goal below 140/90       polyethylene glycol powder    MIRALAX    510 g    Take 17 g (1 capful) by mouth daily    Slow transit constipation       triamcinolone 0.1 % cream    KENALOG    80 g    Apply 2-3 times a day to affected area.    Granuloma annulare       vitamin D 400 UNITS tablet      1 cap twice a day

## 2017-12-27 NOTE — PROGRESS NOTES
Dear Carolin Mcbride,    Your test results are attached. I am happy to let you know that they are stable and your medications can stay the same.    The blood sugar is normal and you do not have diabetes. The kidney test is stable. The cholesterol looks good also. We can recheck labs in 1 year.     Please call me if you have any questions about these test results or about your care.    Sincerely,    Lela Townsend MD

## 2018-03-21 ENCOUNTER — RADIANT APPOINTMENT (OUTPATIENT)
Dept: BONE DENSITY | Facility: CLINIC | Age: 83
End: 2018-03-21
Attending: FAMILY MEDICINE
Payer: COMMERCIAL

## 2018-03-21 ENCOUNTER — OFFICE VISIT (OUTPATIENT)
Dept: OPHTHALMOLOGY | Facility: CLINIC | Age: 83
End: 2018-03-21
Payer: COMMERCIAL

## 2018-03-21 DIAGNOSIS — H52.223 REGULAR ASTIGMATISM OF BOTH EYES: ICD-10-CM

## 2018-03-21 DIAGNOSIS — M81.0 AGE-RELATED OSTEOPOROSIS WITHOUT CURRENT PATHOLOGICAL FRACTURE: Chronic | ICD-10-CM

## 2018-03-21 DIAGNOSIS — H43.813 PVD (POSTERIOR VITREOUS DETACHMENT), BILATERAL: ICD-10-CM

## 2018-03-21 DIAGNOSIS — H53.039 STRABISMIC AMBLYOPIA, UNSPECIFIED LATERALITY: Primary | ICD-10-CM

## 2018-03-21 DIAGNOSIS — H18.529 ANTERIOR BASEMENT MEMBRANE DYSTROPHY: ICD-10-CM

## 2018-03-21 DIAGNOSIS — Z96.1 PSEUDOPHAKIA: ICD-10-CM

## 2018-03-21 DIAGNOSIS — H52.4 PRESBYOPIA: ICD-10-CM

## 2018-03-21 PROCEDURE — 92015 DETERMINE REFRACTIVE STATE: CPT | Performed by: STUDENT IN AN ORGANIZED HEALTH CARE EDUCATION/TRAINING PROGRAM

## 2018-03-21 PROCEDURE — 77080 DXA BONE DENSITY AXIAL: CPT | Performed by: INTERNAL MEDICINE

## 2018-03-21 PROCEDURE — 92014 COMPRE OPH EXAM EST PT 1/>: CPT | Performed by: STUDENT IN AN ORGANIZED HEALTH CARE EDUCATION/TRAINING PROGRAM

## 2018-03-21 ASSESSMENT — REFRACTION_WEARINGRX
OS_SPHERE: -0.50
SPECS_TYPE: PAL
OS_SPHERE: -0.50
OS_ADD: +3.00
OD_ADD: +2.75
SPECS_TYPE: PAL
OS_ADD: +2.75
OD_SPHERE: -0.50
OD_SPHERE: -1.00
OD_ADD: +3.00
OS_AXIS: 111
OD_CYLINDER: +1.25
OD_AXIS: 120
OS_CYLINDER: +1.25
OS_CYLINDER: +1.00
OD_CYLINDER: +1.25
OS_AXIS: 118
OD_AXIS: 125

## 2018-03-21 ASSESSMENT — CONF VISUAL FIELD
OD_NORMAL: 1
METHOD: COUNTING FINGERS
OS_NORMAL: 1

## 2018-03-21 ASSESSMENT — EXTERNAL EXAM - LEFT EYE: OS_EXAM: NORMAL

## 2018-03-21 ASSESSMENT — VISUAL ACUITY
OS_PH_CC: 20/80-1
OD_CC+: -2
OS_CC: 20/100
CORRECTION_TYPE: GLASSES
OD_CC: 20/30
METHOD: SNELLEN - LINEAR

## 2018-03-21 ASSESSMENT — REFRACTION_MANIFEST
OD_SPHERE: -0.75
OS_CYLINDER: +1.50
OS_AXIS: 107
OS_ADD: +3.00
OS_SPHERE: -1.00
OD_AXIS: 097
OD_CYLINDER: +1.00
OD_ADD: +3.00

## 2018-03-21 ASSESSMENT — CUP TO DISC RATIO
OS_RATIO: 0.4
OD_RATIO: 0.4

## 2018-03-21 ASSESSMENT — TONOMETRY
OD_IOP_MMHG: 17
OS_IOP_MMHG: 17
IOP_METHOD: APPLANATION

## 2018-03-21 ASSESSMENT — EXTERNAL EXAM - RIGHT EYE: OD_EXAM: NORMAL

## 2018-03-21 ASSESSMENT — SLIT LAMP EXAM - LIDS
COMMENTS: DERMATOCHALASIS
COMMENTS: DERMATOCHALASIS

## 2018-03-21 NOTE — LETTER
2018      Carolin Mcbride  [address] [facility] [address]        Dear Carolin JOSUE Reed,    Your test results are attached. I am happy to let you know that they are stable and your medications can stay the same.    The DEXA scan shows osteopenia and this is low calcium in the bones but not osteoporosis. You do not need medication for this. Please continue to take vitamin D 1000 units a day and eat 3-4 servings of dairy a day or take supplements for calcium once a day. Exercise also helps keep bones strong.      Please call me if you have any questions about these test results or about your care.    Sincerely,      Lela Townsend MD/janeth    Resulted Orders   DX Hip/Pelvis/Spine    Narrative    BONE DENSITOMETRY  [address] [address]  3/21/2018      PATIENT: Carolin Mcbride  CHART: [id number]   :  1934  AGE:  83 year old  SEX:  female   REFERRING PROVIDER:  Lela Townsend MD     PROCEDURE:  Bone density scanning was performed using DXA technology of   the lumbar spine and hip.  Scanning was performed on a Lunar Prodigy   scanner.  Reporting is completed in the form of a T-score.  The T-score   represents the standard deviation from peak bone mass based on a young   healthy adult.     REFERENCE T-SCORES:       Normal                -1.0 and greater                                 Osteopenia         Between -1.0 and -2.5                                             Osteoporosis     -2.5 and less                                       RISK FACTORS:  Post-menopausal, Fracture of wrist, follow up osteopenia    CURRENT TREATMENT:  Calcium, Vitamin D, previous Fosamax, stopped      FINDINGS:               Lumbar Spine (L1-L4) T-score:  -0.9, degenerative changes   present               Left Femoral Neck T-score:  -1.9               Right Femoral Neck T-score:  -1.8                              Lumbar (L1-L4) BMD: 1.074            Previous:   0.944         "                                      Total Hip Mean BMD: 0.776            Previous:   0.728     Comparison is made to another DXA performed on the same Lunar Prodigy    machine on 06/11/2013.      IMPRESSION  Osteopenia., Degenerative changes of the lumbar spine which may falsely   elevate results.    There has been significant increase in bone density of the lumbar spine.   There has been significant increase in bone density of the hip(s).    Recommendations include ensuring adequate Calcium and Vitamin D.    The current NOF Guidelines recommend treatment for patients with prior hip   or vertebral fracture, T-score -2.5 or below, or 10 year risk of any major   osteoporotic fracture >20% or 10 year risk of hip fracture >3%, as   calculated using the FRAX calculator (www.shef.ac.uk/FRAX or you can   google \"FRAX\").      This patient's risks based on available information, with the use of FRAX,   are 21 % for major osteoporotic fracture and 5.9 % for hip fracture.   Based on these guidelines, treatment (in addition to calcium and vitamin   D) is recommended for this patient, after ruling out other causes of   osteoporosis.  This is meant as an aid to clinical decision making; one must still use   clinical judgement.      Follow up can be considered in 2 years.   ___________________  Keri Cardoza M.D.  Electronically signed           "

## 2018-03-21 NOTE — PATIENT INSTRUCTIONS
Continue artificial tears up to four times a day both eyes  Consider cleaning eyelashes gently with baby shampoo daily  Glasses prescription given - optional    Nevin Ricci MD  [phone]

## 2018-03-21 NOTE — MR AVS SNAPSHOT
"              After Visit Summary   3/21/2018    Carolin Mcbride    MRN: [id number]           Patient Information     Date Of Birth          9/6/1934        Visit Information        Provider Department      3/21/2018 2:00 PM Nevin Ricci MD [facility]        Today's Diagnoses     Anterior basement membrane dystrophy, OU    -  1    Presbyopia        Regular astigmatism of both eyes        Strabismic amblyopia OS        Pseudophakia OU c YAG OU        PVD (posterior vitreous detachment), bilateral          Care Instructions    Continue artificial tears up to four times a day both eyes  Consider cleaning eyelashes gently with baby shampoo daily  Glasses prescription given - optional    Nevin Ricci MD  [phone]            Follow-ups after your visit        Follow-up notes from your care team     Return in about 1 year (around 3/21/2019) for Complete Exam.      Who to contact     If you have questions or need follow up information about today's clinic visit or your schedule please contact [facility] directly at [phone].  Normal or non-critical lab and imaging results will be communicated to you by [employer], letter or phone within 4 business days after the clinic has received the results. If you do not hear from us within 7 days, please contact the clinic through [employer] or phone. If you have a critical or abnormal lab result, we will notify you by phone as soon as possible.  Submit refill requests through [employer]ity or call your pharmacy and they will forward the refill request to us. Please allow 3 business days for your refill to be completed.          Additional Information About Your Visit        [employer] Information     [employer]ity lets you send messages to your doctor, view your test results, renew your prescriptions, schedule appointments and more. To sign up, go to [email or web address] . Click on \"Log in\" on the left side of the screen, which will take you to the " "Welcome page. Then click on \"Sign up Now\" on the right side of the page.     You will be asked to enter the access code listed below, as well as some personal information. Please follow the directions to create your username and password.     Your access code is: [email or web address]  Expires: 2018  3:03 PM     Your access code will  in 90 days. If you need help or a new code, please call your [place] clinic or [phone].        Care EveryWhere ID     This is your Care EveryWhere ID. This could be used by other organizations to access your [place] medical records  [id number]         Blood Pressure from Last 3 Encounters:   17 150/74   04/10/17 137/77   16 139/79    Weight from Last 3 Encounters:   17 76.7 kg (169 lb)   04/10/17 76.3 kg (168 lb 3.2 oz)   16 75.2 kg (165 lb 11.2 oz)              Today, you had the following     No orders found for display       Primary Care Provider Office Phone # Fax #    Lela Sneha Townsend [phone]       [address] [place]  [facility] [address]        Equal Access to Services     [facility] : Hadii aad ku hadasho Soomaali, waaxda luqadaha, qaybta kaalmada adeegyada, waxay idiin hayedwardon trey braga . So [facility] [phone].    ATENCIÓN: Si habla español, tiene a orta disposición servicios gratuitos de asistencia lingüística. Llame al [phone].    We comply with applicable federal civil rights laws and [place] laws. We do not discriminate on the basis of race, color, national origin, age, disability, sex, sexual orientation, or gender identity.            Thank you!     Thank you for choosing [facility]  for your care. Our goal is always to provide you with excellent care. Hearing back from our patients is one way we can continue to improve our services. Please take a few minutes to complete the written survey that you may receive in the mail after your visit with us. Thank you!             Your Updated " Medication List - Protect others around you: Learn how to safely use, store and throw away your medicines at www.disposemymeds.org.          This list is accurate as of 3/21/18  3:03 PM.  Always use your most recent med list.                   Brand Name Dispense Instructions for use Diagnosis    ARTIFICIAL TEARS OP      Apply 1 drop to eye as needed Both eyes.        aspirin 81 MG tablet     90 tablet    Take 1 tablet (81 mg) by mouth daily    Coronary artery disease involving native coronary artery of native heart without angina pectoris       atorvastatin 20 MG tablet    LIPITOR    90 tablet    TAKE 1 TABLET EVERY DAY    Hyperlipidemia with target LDL less than 100       CALCIUM 500 + D PO      1 tab three times a day        citalopram 10 MG tablet    celeXA    90 tablet    Take 1 tablet (10 mg) by mouth daily    Major depressive disorder, recurrent episode, mild (H)       ipratropium 0.06 % spray    ATROVENT    3 Box    Spray 2 sprays into both nostrils 3 times daily as needed for rhinitis    Seasonal allergic rhinitis due to pollen       losartan 50 MG tablet    COZAAR    90 tablet    TAKE 1 TABLET EVERY DAY  (WITH  BREAKFAST)    Hypertension, benign essential, goal below 140/90       polyethylene glycol powder    MIRALAX    510 g    Take 17 g (1 capful) by mouth daily    Slow transit constipation       triamcinolone 0.1 % cream    KENALOG    80 g    Apply 2-3 times a day to affected area.    Granuloma annulare       vitamin D 400 UNITS tablet      1 cap twice a day

## 2018-03-21 NOTE — LETTER
3/21/2018         RE: Carolin Mcbride  [address] [address]        Dear Colleague,    Thank you for referring your patient, Carolin Mcbride, to the [facility]. Her eye exam is stable.  Please see a copy of my visit note below.     Current Eye Medications:  Artificial tears both eyes.     Subjective:  Complete eye exam. Vision is fine both eyes distance and near, but patient feels it is worse then last year. Patient having crusty matter both eyes, sometimes stuck shut in the morning for this past year - not that bothersome to her. Zero pain both eyes.     Objective:  See Ophthalmology Exam.       Assessment:  Carolin Mcbride is a 83 year old female who presents with:   Encounter Diagnoses   Name Primary?     Strabismic amblyopia OS      Anterior basement membrane dystrophy, OU      Pseudophakia OU c YAG OU      PVD (posterior vitreous detachment), bilateral        Plan:  Continue artificial tears up to four times a day both eyes  Consider cleaning eyelashes gently with baby shampoo daily  Glasses prescription given - optional    Nevin Ricci MD  [phone]        Again, thank you for allowing me to participate in the care of your patient.        Sincerely,        Nevin Ricci MD

## 2018-03-21 NOTE — PROGRESS NOTES
Current Eye Medications:  Artificial tears both eyes.     Subjective:  Complete eye exam. Vision is fine both eyes distance and near, but patient feels it is worse then last year. Patient having crusty matter both eyes, sometimes stuck shut in the morning for this past year - not that bothersome to her. Zero pain both eyes.     Objective:  See Ophthalmology Exam.       Assessment:  Carolin Mcbride is a 83 year old female who presents with:   Encounter Diagnoses   Name Primary?     Strabismic amblyopia OS      Anterior basement membrane dystrophy, OU      Pseudophakia OU c YAG OU      PVD (posterior vitreous detachment), bilateral        Plan:  Continue artificial tears up to four times a day both eyes  Consider cleaning eyelashes gently with baby shampoo daily  Glasses prescription given - optional    Nevin Ricci MD  [phone]

## 2018-03-24 NOTE — PROGRESS NOTES
Dear Carolin Mcbride,    Your test results are attached. I am happy to let you know that they are stable and your medications can stay the same.    The DEXA scan shows osteopenia and this is low calcium in the bones but not osteoporosis. You do not need medication for this. Please continue to take vitamin D 1000 units a day and eat 3-4 servings of dairy a day or take supplements for calcium once a day. Exercise also helps keep bones strong.      Please call me if you have any questions about these test results or about your care.    Sincerely,    Lela Townsend MD

## 2018-08-21 ENCOUNTER — OFFICE VISIT (OUTPATIENT)
Dept: FAMILY MEDICINE | Facility: CLINIC | Age: 83
End: 2018-08-21
Payer: COMMERCIAL

## 2018-08-21 VITALS
BODY MASS INDEX: 28.7 KG/M2 | HEART RATE: 72 BPM | RESPIRATION RATE: 18 BRPM | TEMPERATURE: 98.4 F | HEIGHT: 63 IN | WEIGHT: 162 LBS | DIASTOLIC BLOOD PRESSURE: 72 MMHG | SYSTOLIC BLOOD PRESSURE: 138 MMHG | OXYGEN SATURATION: 97 %

## 2018-08-21 DIAGNOSIS — L30.9 ECZEMA, UNSPECIFIED TYPE: Primary | ICD-10-CM

## 2018-08-21 DIAGNOSIS — I25.10 CORONARY ARTERY DISEASE INVOLVING NATIVE CORONARY ARTERY OF NATIVE HEART WITHOUT ANGINA PECTORIS: ICD-10-CM

## 2018-08-21 DIAGNOSIS — E78.5 HYPERLIPIDEMIA WITH TARGET LDL LESS THAN 100: ICD-10-CM

## 2018-08-21 DIAGNOSIS — N18.30 CKD (CHRONIC KIDNEY DISEASE) STAGE 3, GFR 30-59 ML/MIN (H): ICD-10-CM

## 2018-08-21 PROCEDURE — 99214 OFFICE O/P EST MOD 30 MIN: CPT | Performed by: FAMILY MEDICINE

## 2018-08-21 ASSESSMENT — PAIN SCALES - GENERAL: PAINLEVEL: NO PAIN (0)

## 2018-08-21 NOTE — MR AVS SNAPSHOT
"              After Visit Summary   8/21/2018    Carolin Mcbride    MRN: [id number]           Patient Information     Date Of Birth          9/6/1934        Visit Information        Provider Department      8/21/2018 2:40 PM Lela Townsend MD [facility]        Today's Diagnoses     Eczema, unspecified type    -  1    CKD (chronic kidney disease) stage 3, GFR 30-59 ml/min        Hyperlipidemia with target LDL less than 100        Coronary artery disease involving native coronary artery of native heart without angina pectoris           Follow-ups after your visit        Follow-up notes from your care team     Return in about 6 months (around 2/21/2019) for Lab Work, Physical Exam, BP Recheck, medication follow up.      Who to contact     If you have questions or need follow up information about today's clinic visit or your schedule please contact [facility] directly at [phone].  Normal or non-critical lab and imaging results will be communicated to you by MyChart, letter or phone within 4 business days after the clinic has received the results. If you do not hear from us within 7 days, please contact the clinic through [email or web address] or phone. If you have a critical or abnormal lab result, we will notify you by phone as soon as possible.  Submit refill requests through [employer] or call your pharmacy and they will forward the refill request to us. Please allow 3 business days for your refill to be completed.          Additional Information About Your Visit        MyChart Information     [employer] lets you send messages to your doctor, view your test results, renew your prescriptions, schedule appointments and more. To sign up, go to [email or web address] . Click on \"Log in\" on the left side of the screen, which will take you to the Welcome page. Then click on \"Sign up Now\" on the right side of the page.     You will be asked to enter the access code listed below, as well as " "some personal information. Please follow the directions to create your username and password.     Your access code is: [email or web address]  Expires: 2018  9:00 PM     Your access code will  in 90 days. If you need help or a new code, please call your [place] clinic or [phone].        Care EveryWhere ID     This is your Care EveryWhere ID. This could be used by other organizations to access your [place] medical records  [id number]        Your Vitals Were     Pulse Temperature Respirations Height Pulse Oximetry Breastfeeding?    72 98.4  F (36.9  C) (Oral) 18 5' 3.25\" (1.607 m) 97% No    BMI (Body Mass Index)                   28.47 kg/m2            Blood Pressure from Last 3 Encounters:   18 138/72   17 150/74   04/10/17 137/77    Weight from Last 3 Encounters:   18 162 lb (73.5 kg)   17 169 lb (76.7 kg)   04/10/17 168 lb 3.2 oz (76.3 kg)              Today, you had the following     No orders found for display       Primary Care Provider Office Phone # Fax #    Lela Sneha Townsend [phone]       [address]  [facility] [address]        Equal Access to Services     [facility] Center[facility]AM[facility] : Hadii aad ku hadasho Soomaali, waaxda luqadaha, qaybta kaalmada adeegyada, yefri braga . So [facility] [phone].    ATENCIÓN: Si habla español, tiene a orta disposición servicios gratuitos de asistencia lingüística. Llame al [phone].    We comply with applicable federal civil rights laws and [place] laws. We do not discriminate on the basis of race, color, national origin, age, disability, sex, sexual orientation, or gender identity.            Thank you!     Thank you for choosing [facility]  for your care. Our goal is always to provide you with excellent care. Hearing back from our patients is one way we can continue to improve our services. Please take a few minutes to complete the written survey that you may receive in the " mail after your visit with us. Thank you!             Your Updated Medication List - Protect others around you: Learn how to safely use, store and throw away your medicines at www.disposemymeds.org.          This list is accurate as of 8/21/18  9:00 PM.  Always use your most recent med list.                   Brand Name Dispense Instructions for use Diagnosis    ARTIFICIAL TEARS OP      Apply 1 drop to eye as needed Both eyes.        aspirin 81 MG tablet     90 tablet    Take 1 tablet (81 mg) by mouth daily    Coronary artery disease involving native coronary artery of native heart without angina pectoris       atorvastatin 20 MG tablet    LIPITOR    90 tablet    TAKE 1 TABLET EVERY DAY    Hyperlipidemia with target LDL less than 100       CALCIUM 500 + D PO      1 tab three times a day        citalopram 10 MG tablet    celeXA    90 tablet    Take 1 tablet (10 mg) by mouth daily    Major depressive disorder, recurrent episode, mild (H)       ipratropium 0.06 % spray    ATROVENT    3 Box    Spray 2 sprays into both nostrils 3 times daily as needed for rhinitis    Seasonal allergic rhinitis due to pollen       losartan 50 MG tablet    COZAAR    90 tablet    TAKE 1 TABLET EVERY DAY  (WITH  BREAKFAST)    Hypertension, benign essential, goal below 140/90       polyethylene glycol powder    MIRALAX    510 g    Take 17 g (1 capful) by mouth daily    Slow transit constipation       triamcinolone 0.1 % cream    KENALOG    80 g    Apply 2-3 times a day to affected area.    Granuloma annulare       vitamin D 400 units tablet      1 cap twice a day

## 2018-08-21 NOTE — PROGRESS NOTES
SUBJECTIVE:   Carolin Mcbride is a 83 year old female who presents to clinic today for the following health issues:    Rash  Onset: Seen dermatologist Dr Reece Rodríguez in [place] 1 year ago and was given a 2 Rx but told the stronger medication could thin out skin so stopped using that one. Patient was told by  to return back and see PCP because it keeps coming back and doesn't like the looks of it on her leg    Description:   Location: Right lower leg shin  Character: flakey, red  Itching (Pruritis): YES    Progression of Symptoms:  waxing and waning    Accompanying Signs & Symptoms:  Fever: no   Body aches or joint pain: no   Sore throat symptoms: no   Recent cold symptoms: no     History:   Previous similar rash: YES    Precipitating factors:   Exposure to similar rash: no   New exposures: None   Recent travel: no     Alleviating factors:  Tea tree oil, aloe vera, Triamcinolone cream 1%    Therapies Tried and outcome: Tea tree oil, aloe vera, Triamcinolone cream 1%    Hyperlipidemia Follow-Up      Rate your low fat/cholesterol diet?: good    Taking statin?  Yes, no muscle aches from statin    Other lipid medications/supplements?:  none    Vascular Disease Follow-up:  Coronary Artery Disease (CAD)      Chest pain or pressure, left side neck or arm pain: No    Shortness of breath/increased sweats/nausea with exertion: No    Pain in calves walking 1-2 blocks: No    Worsened or new symptoms since last visit: No    Nitroglycerin use: no    Daily aspirin use: Yes    Chronic Kidney Disease Follow-up      Current NSAID use?  No      Problem list and histories reviewed & adjusted, as indicated.  Additional history: as documented    Patient Active Problem List   Diagnosis     Pseudophakia OU c YAG OU     CAD (coronary artery disease)     Advance care planning     Hyperlipidemia with target LDL less than 100     Osteoporosis     CKD (chronic kidney disease) stage 3, GFR 30-59 ml/min     Seasonal allergic rhinitis      Diagnostic skin and sensitization tests (aka ALLERGENS)     Pastrana's neuroma     Strabismic amblyopia     Anterior basement membrane dystrophy, OU     PVD (posterior vitreous detachment) OU     Granuloma annulare     Past Surgical History:   Procedure Laterality Date     APPENDECTOMY       CHOLECYSTECTOMY, OPEN       COLONOSCOPY  2009    neg     CYSTOCELE REPAIR  2003    TVT SPARC       Social History   Substance Use Topics     Smoking status: Never Smoker     Smokeless tobacco: Never Used      Comment: Never smoked; nonsmoking household     Alcohol use No      Comment: very rare     Family History   Problem Relation Age of Onset     Cancer Mother      ovarian      HEART DISEASE Sister      CABG x 3     Neurologic Disorder Sister      Fibromyalgia     Cancer Father      stomach/interstines      Musculoskeletal Disorder Brother      back      HEART DISEASE Brother 60     CABG x 4     Glaucoma No family hx of      Macular Degeneration No family hx of          Current Outpatient Prescriptions   Medication Sig Dispense Refill     aspirin 81 MG tablet Take 1 tablet (81 mg) by mouth daily 90 tablet 3     atorvastatin (LIPITOR) 20 MG tablet TAKE 1 TABLET EVERY DAY 90 tablet 3     CALCIUM 500 + D OR 1 tab three times a day        citalopram (CELEXA) 10 MG tablet Take 1 tablet (10 mg) by mouth daily 90 tablet 3     Hypromellose (ARTIFICIAL TEARS OP) Apply 1 drop to eye as needed Both eyes.       ipratropium (ATROVENT) 0.06 % spray Spray 2 sprays into both nostrils 3 times daily as needed for rhinitis 3 Box 4     losartan (COZAAR) 50 MG tablet TAKE 1 TABLET EVERY DAY  (WITH  BREAKFAST) 90 tablet 3     polyethylene glycol (MIRALAX) powder Take 17 g (1 capful) by mouth daily 510 g 3     triamcinolone (KENALOG) 0.1 % cream Apply 2-3 times a day to affected area. 80 g 6     VITAMIN D 400 UNIT OR TABS 1 cap twice a day        Allergies   Allergen Reactions     Baycol [Cerivastatin Sodium]       muscle mass loss       Crestor  "[Hmg-Coa-R Inhibitors]      Previously on Baycol and affect muscle mass loss.     Darvocet [Acetaminophen]      Severe nausea     Macrobid [Nitrofuran Derivatives]      Hives     Recent Labs   Lab Test  12/26/17   1030  04/10/17   1352  12/20/16   1054   07/13/15   1054  01/12/15   1034   05/19/14   1500  11/11/13   1151   11/06/12   1045   LDL  117*   --   118*   --   74   --    < >   --   166*   < >  160*   HDL  58   --   63   --   60   --    < >   --   43*   < >  43*   TRIG  187*   --   130   --   60   --    < >   --   171*   < >  160*   ALT   --    --    --    --    --   29   --   22  24   < >   --    CR  0.94  0.90  1.00   < >  0.90   --    < >  0.99  0.91   < >   --    GFRESTIMATED  57*  60*  53*   < >  60*   --    < >  54*  60*   < >   --    GFRESTBLACK  69  73  64   < >  73   --    < >  65  72   < >   --    POTASSIUM  4.3  4.4  4.5   < >  4.2   --    < >  4.7  4.5   < >   --    TSH   --    --    --    --    --    --    --   1.56   --    --   1.52    < > = values in this interval not displayed.      BP Readings from Last 3 Encounters:   08/21/18 138/72   12/26/17 150/74   04/10/17 137/77    Wt Readings from Last 3 Encounters:   08/21/18 162 lb (73.5 kg)   12/26/17 169 lb (76.7 kg)   04/10/17 168 lb 3.2 oz (76.3 kg)                  Labs reviewed in EPIC    Reviewed and updated as needed this visit by clinical staff       Reviewed and updated as needed this visit by Provider         ROS:  Constitutional, HEENT, cardiovascular, pulmonary, gi and gu systems are negative, except as otherwise noted.    OBJECTIVE:     /72 (BP Location: Left arm, Patient Position: Chair, Cuff Size: Adult Regular)  Pulse 72  Temp 98.4  F (36.9  C) (Oral)  Resp 18  Ht 5' 3.25\" (1.607 m)  Wt 162 lb (73.5 kg)  SpO2 97%  Breastfeeding? No  BMI 28.47 kg/m2  Body mass index is 28.47 kg/(m^2).  GENERAL: healthy, alert and no distress  EYES: Eyes grossly normal to inspection, conjunctivae and sclerae normal  MS: no gross " musculoskeletal defects noted, no edema  SKIN: no suspicious lesions, age related skin changes, 5 by 7 cm patch of erythema and lichenification right anterior tibia. Some smaller patches on left leg but very small.   NEURO: Normal strength and tone, gait and speech normal  PSYCH: mentation appears normal, affect normal/bright     Diagnostic Test Results:  Results for orders placed or performed in visit on 18   DX Hip/Pelvis/Spine    Narrative    BONE DENSITOMETRY  [facility]  [address] [place] [id number]  3/21/2018      PATIENT: Carolin Mcbride  CHART: [id number]   :  1934  AGE:  83 year old  SEX:  female   REFERRING PROVIDER:  Lela Townsend MD     PROCEDURE:  Bone density scanning was performed using DXA technology of   the lumbar spine and hip.  Scanning was performed on a Lunar Prodigy   scanner.  Reporting is completed in the form of a T-score.  The T-score   represents the standard deviation from peak bone mass based on a young   healthy adult.     REFERENCE T-SCORES:       Normal                -1.0 and greater                                 Osteopenia         Between -1.0 and -2.5                                             Osteoporosis     -2.5 and less                                       RISK FACTORS:  Post-menopausal, Fracture of wrist, follow up osteopenia    CURRENT TREATMENT:  Calcium, Vitamin D, previous Fosamax, stopped      FINDINGS:               Lumbar Spine (L1-L4) T-score:  -0.9, degenerative changes   present               Left Femoral Neck T-score:  -1.9               Right Femoral Neck T-score:  -1.8                              Lumbar (L1-L4) BMD: 1.074            Previous:   0.944                                              Total Hip Mean BMD: 0.776            Previous:   0.728     Comparison is made to another DXA performed on the same Lunar Prodigy    machine on 2013.      IMPRESSION  Osteopenia., Degenerative changes of the lumbar  "spine which may falsely   elevate results.    There has been significant increase in bone density of the lumbar spine.   There has been significant increase in bone density of the hip(s).    Recommendations include ensuring adequate Calcium and Vitamin D.    The current NOF Guidelines recommend treatment for patients with prior hip   or vertebral fracture, T-score -2.5 or below, or 10 year risk of any major   osteoporotic fracture >20% or 10 year risk of hip fracture >3%, as   calculated using the FRAX calculator (www.shef.ac.uk/FRAX or you can   google \"FRAX\").      This patient's risks based on available information, with the use of FRAX,   are 21 % for major osteoporotic fracture and 5.9 % for hip fracture.   Based on these guidelines, treatment (in addition to calcium and vitamin   D) is recommended for this patient, after ruling out other causes of   osteoporosis.  This is meant as an aid to clinical decision making; one must still use   clinical judgement.      Follow up can be considered in 2 years.   ___________________  Keri Cardoza M.D.  Electronically signed             ASSESSMENT/PLAN:         Tobacco Cessation:   reports that she has never smoked. She has never used smokeless tobacco.      BMI:   Estimated body mass index is 28.47 kg/(m^2) as calculated from the following:    Height as of this encounter: 5' 3.25\" (1.607 m).    Weight as of this encounter: 162 lb (73.5 kg).           ICD-10-CM    1. Eczema, unspecified type L30.9 Large patch of eczema and will resume use of stronger steroid cream given by dermatologist last year twice a day for 2 weeks. Call with name of the steroid cream. Follow up with dermatologist if not improving.    2. CKD (chronic kidney disease) stage 3, GFR 30-59 ml/min N18.3 stable   3. Hyperlipidemia with target LDL less than 100 E78.5 Well controlled on medications    4. Coronary artery disease involving native coronary artery of native heart without angina pectoris I25.10 " No recurrent chest pain.        FUTURE LABS:       - Schedule fasting labs in 6 months  FUTURE APPOINTMENTS:       - Follow-up visit in 6 months or sooner if any questions or concerns.   See Patient Instructions    Lela Townsend MD  [facility]

## 2018-08-22 ASSESSMENT — PATIENT HEALTH QUESTIONNAIRE - PHQ9: SUM OF ALL RESPONSES TO PHQ QUESTIONS 1-9: 0

## 2018-09-13 ENCOUNTER — ALLIED HEALTH/NURSE VISIT (OUTPATIENT)
Dept: NURSING | Facility: CLINIC | Age: 83
End: 2018-09-13
Payer: COMMERCIAL

## 2018-09-13 ENCOUNTER — OFFICE VISIT (OUTPATIENT)
Dept: CARDIOLOGY | Facility: CLINIC | Age: 83
End: 2018-09-13
Payer: COMMERCIAL

## 2018-09-13 VITALS
OXYGEN SATURATION: 97 % | BODY MASS INDEX: 28.47 KG/M2 | HEART RATE: 69 BPM | WEIGHT: 162 LBS | SYSTOLIC BLOOD PRESSURE: 136 MMHG | DIASTOLIC BLOOD PRESSURE: 81 MMHG

## 2018-09-13 DIAGNOSIS — Z23 NEED FOR PROPHYLACTIC VACCINATION AND INOCULATION AGAINST INFLUENZA: Primary | ICD-10-CM

## 2018-09-13 DIAGNOSIS — R94.30 ABNORMAL CARDIOVASCULAR FUNCTION STUDY: Primary | ICD-10-CM

## 2018-09-13 PROCEDURE — 99207 ZZC NO CHARGE NURSE ONLY: CPT

## 2018-09-13 PROCEDURE — G0008 ADMIN INFLUENZA VIRUS VAC: HCPCS

## 2018-09-13 PROCEDURE — 90662 IIV NO PRSV INCREASED AG IM: CPT

## 2018-09-13 PROCEDURE — 99214 OFFICE O/P EST MOD 30 MIN: CPT | Performed by: INTERNAL MEDICINE

## 2018-09-13 NOTE — PROGRESS NOTES
Injectable Influenza Immunization Documentation    1.  Is the person to be vaccinated sick today?   No    2. Does the person to be vaccinated have an allergy to a component   of the vaccine?   No  Egg Allergy Algorithm Link    3. Has the person to be vaccinated ever had a serious reaction   to influenza vaccine in the past?   No    4. Has the person to be vaccinated ever had Guillain-Barré syndrome?   No    Form completed by Elana Bishop RN     Prior to injection verified patient identity using patient's name and date of birth.  Due to injection administration, patient instructed to remain in clinic for 15 minutes  afterwards, and to report any adverse reaction to me immediately.

## 2018-09-13 NOTE — LETTER
9/13/2018      RE: Carolin Mcbride  [address] [facility] [address]       Dear Colleague,    Thank you for the opportunity to participate in the care of your patient, Carolin Mcbride, at the [facility]. Please see a copy of my visit note below.       SUBJECTIVE:  Carolin Mcbride is a 84 year old female who presents for CAD evaluation.    In 2009 had a coronary CTA. Minimal disease. Total calcium score 169.    Very active. No cardiac symptoms.    HTN+. On statin. No other risk factors apart from age.    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 minimally only for Tree pollens (all other environmental allergens NEGATIVE)       Diagnostic skin and sensitization tests (aka ALLERGENS)      Priority: Medium     CKD (chronic kidney disease) stage 3, GFR 30-59 ml/min 05/22/2014     Priority: Medium     Hyperlipidemia with target LDL less than 100 04/08/2013     Priority: Medium     Diagnosis updated by automated process. Provider to review and confirm.       Osteoporosis      Priority: Medium     5 years Fosamax 1-1-2011 to 12-. Medication holiday recommended.         Advance care planning 03/13/2012     Priority: Medium     Advance Care Planning 12/20/2016: ACP Review of Chart / Resources Provided:  Reviewed chart for advance care plan.  Carolin Mcbride has been provided information and resources to begin or update their advance care plan.  Advance Care Planning 03/13/2012: Discussed advance care planning with  patient; information given to patient to review. 3/13/2012 . Lara Roque MA                  CAD (coronary artery disease)      Priority: Medium     asymptomatic, on CT scan       Pseudophakia OU c YAG OU 06/02/2011     Priority: Medium    .  Current Outpatient Prescriptions   Medication Sig     aspirin 81 MG tablet Take 1 tablet (81 mg) by mouth daily     atorvastatin (LIPITOR) 20 MG tablet TAKE 1 TABLET EVERY DAY     CALCIUM 500 + D OR 1 tab three times a day      citalopram (CELEXA) 10 MG tablet Take 1 tablet (10 mg) by mouth daily     ipratropium (ATROVENT) 0.06 % spray Spray 2 sprays into both nostrils 3 times daily as needed for rhinitis     losartan (COZAAR) 50 MG tablet TAKE 1 TABLET EVERY DAY  (WITH  BREAKFAST)     polyethylene glycol (MIRALAX) powder Take 17 g (1 capful) by mouth daily     triamcinolone (KENALOG) 0.1 % cream Apply 2-3 times a day to affected area.     VITAMIN D 400 UNIT OR TABS 1 cap twice a day      Hypromellose (ARTIFICIAL TEARS OP) Apply 1 drop to eye as needed Both eyes.     No current facility-administered medications for this visit.      Past Medical History:   Diagnosis Date     Adjustment reaction with anxiety and depression 2001     CAD (coronary artery disease) 2009    asymptomatic, on CT scan     Cyst, ovarian      Diagnostic skin and sensitization tests (aka ALLERGENS) 9/29/14 IgE tests pos. minimally only for Tree pollens (all other environmental allergens NEGATIVE)     HTN (hypertension)      Hyperlipidemia      Myositis 2001    related to past Baycol use- resolved     Osteoporosis      Seasonal allergic rhinitis     9/29/14 IgE tests pos. minimally only for Tree pollens (all other environmental allergens NEGATIVE)     Past Surgical History:   Procedure Laterality Date     APPENDECTOMY       CHOLECYSTECTOMY, OPEN       COLONOSCOPY  2009    neg     CYSTOCELE REPAIR  2003    TVT SPARC     Allergies   Allergen Reactions     Baycol [Cerivastatin Sodium]       muscle mass loss        Crestor [Hmg-Coa-R Inhibitors]      Previously on Baycol and affect muscle mass loss.     Darvocet [Acetaminophen]      Severe nausea     Macrobid [Nitrofuran Derivatives]      Hives     Social History     Social History     Marital status:      Spouse name: Shashank     Number of children: 3     Years of education: N/A     Occupational History      Retired     Social History Main Topics     Smoking status: Never Smoker     Smokeless tobacco: Never Used      Comment: Never smoked; nonsmoking household     Alcohol use No      Comment: very rare     Drug use: No      Comment: never     Sexual activity: Yes     Partners: Male     Birth control/ protection: None     Other Topics Concern     Parent/Sibling W/ Cabg, Mi Or Angioplasty Before 65f 55m? No      Service No     Blood Transfusions No     Caffeine Concern No     Occupational Exposure No     Hobby Hazards No     Sleep Concern No     Stress Concern No     Weight Concern Yes     Special Diet No     Back Care No     Exercise Yes     Curves     Bike Helmet No     Seat Belt Yes     Self-Exams Yes     Social History Narrative     Family History   Problem Relation Age of Onset     Cancer Mother      ovarian      HEART DISEASE Sister      CABG x 3     Neurologic Disorder Sister      Fibromyalgia     Cancer Father      stomach/interstines      Musculoskeletal Disorder Brother      back      HEART DISEASE Brother 60     CABG x 4     Glaucoma No family hx of      Macular Degeneration No family hx of           REVIEW OF SYSTEMS:  General: negative, fever, chills, night sweats  Skin: negative, acne, rash and scaling  Eyes: negative, double vision, eye pain and contacts  Ears/Nose/Throat: negative, nasal congestion and purulent rhinorrhea  Respiratory: No dyspnea on exertion, No cough, No hemoptysis and negative  Cardiovascular: negative, palpitations, tachycardia, irregular heart beat, chest pain, exertional chest pain or pressure, paroxysmal nocturnal  dyspnea, dyspnea on exertion and orthopnea  Gastrointestinal: negative, dysphagia, nausea and vomiting  Genitourinary: negative, nocturia, dysuria and frequency  Musculoskeletal: negative, fracture, back pain, neck pain and arthritis  Neurologic: negative, headaches, syncope, stroke and seizures  Psychiatric: negative, nervous breakdown, thoughts of self-harm and thoughts of hurting someone else  Hematologic/Lymphatic/Immunologic: negative, bleeding disorder, chills and fever  Endocrine: negative, cold intolerance, heat intolerance and hot flashes       OBJECTIVE:  Blood pressure 136/81, pulse 69, weight 73.5 kg (162 lb), SpO2 97 %, not currently breastfeeding.  General Appearance: healthy, alert, active and no distress  Head: Normocephalic. No masses, lesions, tenderness or abnormalities  Eyes: conjuctiva clear, PERRL, EOM intact  Ears: External ears normal. Canals clear. TM's normal.  Nose: Nares normal  Mouth: normal  Neck: Supple, no cervical adenopathy, no thyromegaly  Lungs: clear to auscultation  Cardiac: regular rate and rhythm, normal S1 and S2, no murmur  Abdomen: Soft, nontender.  Normal bowel sounds.  No hepatosplenomegaly or abnormal masses  Extremities: no peripheral edema, peripheral pulses normal  Musculoskeletal: negative  Neurological: Cranial nerves 2-12 intact, motor strength intact       ASSESSMENT/PLAN:   Very healthy,active asymptomatic 84 year old female.  Minimal CAD by CTA and minimal Calcium score in 2009.  Remain active and asymptomatic.  HTN+. On statin. No other risk factors apart from age.  Will continue with current meds.  Prior echo/EKG reviewed. Normal.  No testing needed at this time as she is active and asymptomatic.  Per orders.   Return to Clinic 2 years.    Please do not hesitate to contact me if you have any questions/concerns.     Sincerely,     VAISHALI Juan MD

## 2018-09-13 NOTE — PATIENT INSTRUCTIONS
Thank you for coming to the [facility]; please note the following instructions:    1. No medication change   2. 2 years follow-up with Dr Peña, clinic will call as time gets closer        If you have any questions regarding your visit please contact your care team:     Cardiology  Telephone Number   Calista CAMACHO, RN  Beau GARCIA, RN   Valeria HARTMANN, EVERTON SERRATO MA   [phone]    *After hours: [phone]   For scheduling appts:     [phone] or    [phone] (select option [phone])    *After hours: [phone]     For the Device Clinic (Pacemakers and ICD's)  RN's :  Arin Cantu   During business hours: [phone]    *After business hours:  [phone] (select option 4)      Normal test result notifications will be released via [employer] or mailed within 7 business days.  All other test results, will be communicated via telephone once reviewed by your cardiologist.    If you need a medication refill please contact your pharmacy.  Please allow 3 business days for your refill to be completed.    As always, thank you for trusting us with your health care needs!

## 2018-09-13 NOTE — PROGRESS NOTES
SUBJECTIVE:  Carolin Mcbride is a 84 year old female who presents for CAD evaluation.    In 2009 had a coronary CTA. Minimal disease. Total calcium score 169.    Very active. No cardiac symptoms.    HTN+. On statin. No other risk factors apart from age.    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 minimally only for Tree pollens (all other environmental allergens NEGATIVE)       Diagnostic skin and sensitization tests (aka ALLERGENS)      Priority: Medium     CKD (chronic kidney disease) stage 3, GFR 30-59 ml/min 05/22/2014     Priority: Medium     Hyperlipidemia with target LDL less than 100 04/08/2013     Priority: Medium     Diagnosis updated by automated process. Provider to review and confirm.       Osteoporosis      Priority: Medium     5 years Fosamax 1-1-2011 to 12-. Medication holiday recommended.         Advance care planning 03/13/2012     Priority: Medium     Advance Care Planning 12/20/2016: ACP Review of Chart / Resources Provided:  Reviewed chart for advance care plan.  Carolin Mcbride has been provided information and resources to begin or update their advance care plan.  Advance Care Planning 03/13/2012: Discussed advance care planning with patient; information given to patient to review. 3/13/2012 . Lara Roque MA                  CAD (coronary artery disease)      Priority: Medium     asymptomatic, on CT scan       Pseudophakia OU c YAG OU 06/02/2011     Priority: Medium    .  Current Outpatient Prescriptions   Medication Sig     aspirin 81 MG tablet Take 1 tablet (81 mg) by mouth  daily     atorvastatin (LIPITOR) 20 MG tablet TAKE 1 TABLET EVERY DAY     CALCIUM 500 + D OR 1 tab three times a day      citalopram (CELEXA) 10 MG tablet Take 1 tablet (10 mg) by mouth daily     ipratropium (ATROVENT) 0.06 % spray Spray 2 sprays into both nostrils 3 times daily as needed for rhinitis     losartan (COZAAR) 50 MG tablet TAKE 1 TABLET EVERY DAY  (WITH  BREAKFAST)     polyethylene glycol (MIRALAX) powder Take 17 g (1 capful) by mouth daily     triamcinolone (KENALOG) 0.1 % cream Apply 2-3 times a day to affected area.     VITAMIN D 400 UNIT OR TABS 1 cap twice a day      Hypromellose (ARTIFICIAL TEARS OP) Apply 1 drop to eye as needed Both eyes.     No current facility-administered medications for this visit.      Past Medical History:   Diagnosis Date     Adjustment reaction with anxiety and depression 2001     CAD (coronary artery disease) 2009    asymptomatic, on CT scan     Cyst, ovarian      Diagnostic skin and sensitization tests (aka ALLERGENS) 9/29/14 IgE tests pos. minimally only for Tree pollens (all other environmental allergens NEGATIVE)     HTN (hypertension)      Hyperlipidemia      Myositis 2001    related to past Baycol use- resolved     Osteoporosis      Seasonal allergic rhinitis     9/29/14 IgE tests pos. minimally only for Tree pollens (all other environmental allergens NEGATIVE)     Past Surgical History:   Procedure Laterality Date     APPENDECTOMY       CHOLECYSTECTOMY, OPEN       COLONOSCOPY  2009    neg     CYSTOCELE REPAIR  2003    TVT SPARC     Allergies   Allergen Reactions     Baycol [Cerivastatin Sodium]       muscle mass loss       Crestor [Hmg-Coa-R Inhibitors]      Previously on Baycol and affect muscle mass loss.     Darvocet [Acetaminophen]      Severe nausea     Macrobid [Nitrofuran Derivatives]      Hives     Social History     Social History     Marital status:      Spouse name: Shashank     Number of children: 3     Years of education: N/A     Occupational  History      Retired     Social History Main Topics     Smoking status: Never Smoker     Smokeless tobacco: Never Used      Comment: Never smoked; nonsmoking household     Alcohol use No      Comment: very rare     Drug use: No      Comment: never     Sexual activity: Yes     Partners: Male     Birth control/ protection: None     Other Topics Concern     Parent/Sibling W/ Cabg, Mi Or Angioplasty Before 65f 55m? No      Service No     Blood Transfusions No     Caffeine Concern No     Occupational Exposure No     Hobby Hazards No     Sleep Concern No     Stress Concern No     Weight Concern Yes     Special Diet No     Back Care No     Exercise Yes     Curves     Bike Helmet No     Seat Belt Yes     Self-Exams Yes     Social History Narrative     Family History   Problem Relation Age of Onset     Cancer Mother      ovarian      HEART DISEASE Sister      CABG x 3     Neurologic Disorder Sister      Fibromyalgia     Cancer Father      stomach/interstines      Musculoskeletal Disorder Brother      back      HEART DISEASE Brother 60     CABG x 4     Glaucoma No family hx of      Macular Degeneration No family hx of           REVIEW OF SYSTEMS:  General: negative, fever, chills, night sweats  Skin: negative, acne, rash and scaling  Eyes: negative, double vision, eye pain and contacts  Ears/Nose/Throat: negative, nasal congestion and purulent rhinorrhea  Respiratory: No dyspnea on exertion, No cough, No hemoptysis and negative  Cardiovascular: negative, palpitations, tachycardia, irregular heart beat, chest pain, exertional chest pain or pressure, paroxysmal nocturnal dyspnea, dyspnea on exertion and orthopnea  Gastrointestinal: negative, dysphagia, nausea and vomiting  Genitourinary: negative, nocturia, dysuria and frequency  Musculoskeletal: negative, fracture, back pain, neck pain and arthritis  Neurologic: negative, headaches, syncope, stroke and seizures  Psychiatric: negative, nervous breakdown, thoughts of  self-harm and thoughts of hurting someone else  Hematologic/Lymphatic/Immunologic: negative, bleeding disorder, chills and fever  Endocrine: negative, cold intolerance, heat intolerance and hot flashes       OBJECTIVE:  Blood pressure 136/81, pulse 69, weight 73.5 kg (162 lb), SpO2 97 %, not currently breastfeeding.  General Appearance: healthy, alert, active and no distress  Head: Normocephalic. No masses, lesions, tenderness or abnormalities  Eyes: conjuctiva clear, PERRL, EOM intact  Ears: External ears normal. Canals clear. TM's normal.  Nose: Nares normal  Mouth: normal  Neck: Supple, no cervical adenopathy, no thyromegaly  Lungs: clear to auscultation  Cardiac: regular rate and rhythm, normal S1 and S2, no murmur  Abdomen: Soft, nontender.  Normal bowel sounds.  No hepatosplenomegaly or abnormal masses  Extremities: no peripheral edema, peripheral pulses normal  Musculoskeletal: negative  Neurological: Cranial nerves 2-12 intact, motor strength intact       ASSESSMENT/PLAN:   Very healthy,active asymptomatic 84 year old female.  Minimal CAD by CTA and minimal Calcium score in 2009.  Remain active and asymptomatic.  HTN+. On statin. No other risk factors apart from age.  Will continue with current meds.  Prior echo/EKG reviewed. Normal.  No testing needed at this time as she is active and asymptomatic.  Per orders.   Return to Clinic 2 years.

## 2018-09-13 NOTE — MR AVS SNAPSHOT
"              After Visit Summary   9/13/2018    Carolin Mcbride    MRN: [id number]           Patient Information     Date Of Birth          9/6/1934        Visit Information        Provider Department      9/13/2018 10:00 AM [facility] ANCILLARY [facility]        Today's Diagnoses     Need for prophylactic vaccination and inoculation against influenza    -  1       Follow-ups after your visit        Your next 10 appointments already scheduled     Sep 13, 2018 10:00 AM CDT   Nurse Only with [facility] ANCILLARY   [facility] ([facility])    [address] [address] [phone]              Future tests that were ordered for you today     Open Future Orders        Priority Expected Expires Ordered    Follow-Up with Cardiologist Routine 9/12/2020 12/11/2020 9/13/2018            Who to contact     If you have questions or need follow up information about today's clinic visit or your schedule please contact [facility] directly at [phone].  Normal or non-critical lab and imaging results will be communicated to you by [employer], letter or phone within 4 business days after the clinic has received the results. If you do not hear from us within 7 days, please contact the clinic through [employer] or phone. If you have a critical or abnormal lab result, we will notify you by phone as soon as possible.  Submit refill requests through [employer] or call your pharmacy and they will forward the refill request to us. Please allow 3 business days for your refill to be completed.          Additional Information About Your Visit        [employer] Information     [employer] lets you send messages to your doctor, view your test results, renew your prescriptions, schedule appointments and more. To sign up, go to [email or web address] . Click on \"Log in\" on the left side of the screen, which will take you to the Welcome page. Then click on \"Sign up Now\" on the right side of the " page.     You will be asked to enter the access code listed below, as well as some personal information. Please follow the directions to create your username and password.     Your access code is: [email or web address]  Expires: 2018  9:00 PM     Your access code will  in 90 days. If you need help or a new code, please call your [place] clinic or [phone].        Care EveryWhere ID     This is your Care EveryWhere ID. This could be used by other organizations to access your [place] medical records  [id number]         Blood Pressure from Last 3 Encounters:   18 136/81   18 138/72   17 150/74    Weight from Last 3 Encounters:   18 73.5 kg (162 lb)   18 73.5 kg (162 lb)   17 76.7 kg (169 lb)              We Performed the Following     ADMIN INFLUENZA (For MEDICARE Patients ONLY) []     FLU VACCINE, INCREASED ANTIGEN, PRESV FREE, AGE 65+ [21041]     Vaccine Administration, Initial [83350]        Primary Care Provider Office Phone # Fax #    Lela Sneha Townsend [phone]       [address] [facility] [address]        Equal Access to Services     AZALEA GARIBAY : Hadii rosana ku hadasho Soomaali, waaxda luqadaha, qaybta kaalmada adeegyada, yefri barnard. So [facility] [phone].    ATENCIÓN: Si habla español, tiene a orta disposición servicios gratuitos de asistencia lingüística. Llame al [phone].    We comply with applicable federal civil rights laws and [place] laws. We do not discriminate on the basis of race, color, national origin, age, disability, sex, sexual orientation, or gender identity.            Thank you!     Thank you for choosing [facility]  for your care. Our goal is always to provide you with excellent care. Hearing back from our patients is one way we can continue to improve our services. Please take a few minutes to complete the written survey that you may receive in the mail after your  visit with us. Thank you!             Your Updated Medication List - Protect others around you: Learn how to safely use, store and throw away your medicines at www.disposemymeds.org.          This list is accurate as of 9/13/18  9:12 AM.  Always use your most recent med list.                   Brand Name Dispense Instructions for use Diagnosis    ARTIFICIAL TEARS OP      Apply 1 drop to eye as needed Both eyes.        aspirin 81 MG tablet     90 tablet    Take 1 tablet (81 mg) by mouth daily    Coronary artery disease involving native coronary artery of native heart without angina pectoris       atorvastatin 20 MG tablet    LIPITOR    90 tablet    TAKE 1 TABLET EVERY DAY    Hyperlipidemia with target LDL less than 100       CALCIUM 500 + D PO      1 tab three times a day        citalopram 10 MG tablet    celeXA    90 tablet    Take 1 tablet (10 mg) by mouth daily    Major depressive disorder, recurrent episode, mild (H)       ipratropium 0.06 % spray    ATROVENT    3 Box    Spray 2 sprays into both nostrils 3 times daily as needed for rhinitis    Seasonal allergic rhinitis due to pollen       losartan 50 MG tablet    COZAAR    90 tablet    TAKE 1 TABLET EVERY DAY  (WITH  BREAKFAST)    Hypertension, benign essential, goal below 140/90       polyethylene glycol powder    MIRALAX    510 g    Take 17 g (1 capful) by mouth daily    Slow transit constipation       triamcinolone 0.1 % cream    KENALOG    80 g    Apply 2-3 times a day to affected area.    Granuloma annulare       vitamin D 400 units tablet      1 cap twice a day

## 2018-09-13 NOTE — MR AVS SNAPSHOT
After Visit Summary   9/13/2018    Carolin Mcbride    MRN: [id number]           Patient Information     Date Of Birth          9/6/1934        Visit Information        Provider Department      9/13/2018 9:00 AM VAISHALI Juan MD [facility]        Care Instructions    Thank you for coming to the [facility]; please note the following instructions:    1. No medication change   2. 2 years follow-up with Dr Peña, clinic will call as time gets closer        If you have any questions regarding your visit please contact your care team:     Cardiology  Telephone Number   Calista CAMACHO, RN  Beau GARCIA, RN   Valeria HARTMANN, EVERTON SERRATO MA   [phone]    *After hours: [phone]   For scheduling appts:     [phone] or    [phone] (select option 1)    *After hours: [phone]     For the Device Clinic (Pacemakers and ICD's)  RN's :  Arin Cantu   During business hours: [phone]    *After business hours:  [phone] (select option 4)      Normal test result notifications will be released via [employer] or mailed within 7 business days.  All other test results, will be communicated via telephone once reviewed by your cardiologist.    If you need a medication refill please contact your pharmacy.  Please allow 3 business days for your refill to be completed.    As always, thank you for trusting us with your health care needs!              Follow-ups after your visit        Your next 10 appointments already scheduled     Sep 13, 2018  9:00 AM CDT   New Visit with VAISHALI Juan MD   [facility] ([facility])    [address] [place] [id number] [phone]              Who to contact     If you have questions or need follow up information about today's clinic visit or your schedule please contact [facility]  "[facility] HEART AT [place] directly at [phone].  Normal or non-critical lab and imaging results will be communicated to you by MyChart, letter or phone within 4 business days after the clinic has received the results. If you do not hear from us within 7 days, please contact the clinic through MyChart or phone. If you have a critical or abnormal lab result, we will notify you by phone as soon as possible.  Submit refill requests through [employer] or call your pharmacy and they will forward the refill request to us. Please allow 3 business days for your refill to be completed.          Additional Information About Your Visit        [employer] Information     [employer] lets you send messages to your doctor, view your test results, renew your prescriptions, schedule appointments and more. To sign up, go to [email or web address] . Click on \"Log in\" on the left side of the screen, which will take you to the Welcome page. Then click on \"Sign up Now\" on the right side of the page.     You will be asked to enter the access code listed below, as well as some personal information. Please follow the directions to create your username and password.     Your access code is: [email or web address]  Expires: 2018  9:00 PM     Your access code will  in 90 days. If you need help or a new code, please call your [place] clinic or [phone].        Care EveryWhere ID     This is your Care EveryWhere ID. This could be used by other organizations to access your [place] medical records  [id number]        Your Vitals Were     Pulse Pulse Oximetry BMI (Body Mass Index)             69 97% 28.47 kg/m2          Blood Pressure from Last 3 Encounters:   18 136/81   18 138/72   17 150/74    Weight from Last 3 Encounters:   18 73.5 kg (162 lb)   18 73.5 kg (162 lb)   17 76.7 kg (169 lb)              Today, you had the following     No orders found for display       Primary Care Provider Office " Phone # Fax #    Lela Sneha Townsend [phone] [address]  [place] [facility] [address]        Equal Access to Services     LUISITO GARIBAY : Hadii aad ku hadismaelo Soomaali, waaxda luqadaha, qaybta kaalmada adeegyada, yefri cox laBreannepool barnard. So [facility] [phone].    ATENCIÓN: Si habla español, tiene a orta disposición servicios gratuitos de asistencia lingüística. Llame al [phone].    We comply with applicable federal civil rights laws and [place] laws. We do not discriminate on the basis of race, color, national origin, age, disability, sex, sexual orientation, or gender identity.            Thank you!     Thank you for choosing [facility] PHYSIC[facility]ANS HEART AT [facility]  for your care. Our goal is always to provide you with excellent care. Hearing back from our patients is one way we can continue to improve our services. Please take a few minutes to complete the written survey that you may receive in the mail after your visit with us. Thank you!             Your Updated Medication List - Protect others around you: Learn how to safely use, store and throw away your medicines at www.disposemymeds.org.          This list is accurate as of 9/13/18  8:53 AM.  Always use your most recent med list.                   Brand Name Dispense Instructions for use Diagnosis    ARTIFICIAL TEARS OP      Apply 1 drop to eye as needed Both eyes.        aspirin 81 MG tablet     90 tablet    Take 1 tablet (81 mg) by mouth daily    Coronary artery disease involving native coronary artery of native heart without angina pectoris       atorvastatin 20 MG tablet    LIPITOR    90 tablet    TAKE 1 TABLET EVERY DAY    Hyperlipidemia with target LDL less than 100       CALCIUM 500 + D PO      1 tab three times a day        citalopram 10 MG tablet    celeXA    90 tablet    Take 1 tablet (10 mg) by mouth daily    Major depressive disorder, recurrent episode, mild (H)       ipratropium  0.06 % spray    ATROVENT    3 Box    Spray 2 sprays into both nostrils 3 times daily as needed for rhinitis    Seasonal allergic rhinitis due to pollen       losartan 50 MG tablet    COZAAR    90 tablet    TAKE 1 TABLET EVERY DAY  (WITH  BREAKFAST)    Hypertension, benign essential, goal below 140/90       polyethylene glycol powder    MIRALAX    510 g    Take 17 g (1 capful) by mouth daily    Slow transit constipation       triamcinolone 0.1 % cream    KENALOG    80 g    Apply 2-3 times a day to affected area.    Granuloma annulare       vitamin D 400 units tablet      1 cap twice a day

## 2018-11-12 DIAGNOSIS — F33.0 MAJOR DEPRESSIVE DISORDER, RECURRENT EPISODE, MILD (H): ICD-10-CM

## 2018-11-12 DIAGNOSIS — I10 HYPERTENSION, BENIGN ESSENTIAL, GOAL BELOW 140/90: ICD-10-CM

## 2018-11-12 NOTE — TELEPHONE ENCOUNTER
"Requested Prescriptions   Pending Prescriptions Disp Refills     losartan (COZAAR) 50 MG tablet [Pharmacy Med Name: LOSARTAN POTASSIUM 50 MG Tablet] 90 tablet 3    Last Written Prescription Date:  12/26/17  Last Fill Quantity: 90,  # refills: 3   Last Office Visit with [facility], [facility] or [facility] prescribing provider:  8/21/2018     Future Office Visit:    Next 5 appointments (look out 90 days)     Jan 04, 2019 11:40 AM CST   PHYSICAL with Lela Townsend MD   [facility] ([facility])    [address] [place] [id number] [phone]                  Sig: TAKE 1 TABLET EVERY DAY  (WITH  BREAKFAST)    Angiotensin-II Receptors Passed    11/12/2018  3:59 PM       Passed - Blood pressure under 140/90 in past 12 months    BP Readings from Last 3 Encounters:   09/13/18 136/81   08/21/18 138/72   12/26/17 150/74                Passed - Recent (12 mo) or future (30 days) visit within the authorizing provider's specialty    Patient had office visit in the last 12 months or has a visit in the next 30 days with authorizing provider or within the authorizing provider's specialty.  See \"Patient Info\" tab in inbasket, or \"Choose Columns\" in Meds & Orders section of the refill encounter.             Passed - Patient is age 18 or older       Passed - No active pregnancy on record       Passed - Normal serum creatinine on file in past 12 months    Recent Labs   Lab Test  12/26/17   1030   CR  0.94            Passed - Normal serum potassium on file in past 12 months    Recent Labs   Lab Test  12/26/17   1030   POTASSIUM  4.3                   Passed - No positive pregnancy test in past 12 months        citalopram (CELEXA) 10 MG tablet [Pharmacy Med Name: CITALOPRAM HYDROBROMIDE 10 MG Tablet] 90 tablet 3    Last Written Prescription Date:  12/26/17  Last Fill Quantity: 90,  # refills: 3   Last Office Visit with [facility], [facility] or [facility] prescribing provider:  8/21/2018     Future " "Office Visit:    Next 5 appointments (look out 90 days)     Jan 04, 2019 11:40 AM CST   PHYSICAL with Lela Townsend MD   [facility]    [address] [place] [id number] [phone]                  Sig: TAKE 1 TABLET (10 MG) BY MOUTH DAILY    SSRIs Protocol Passed    11/12/2018  3:59 PM       Passed - PHQ-9 score less than 5 in past 6 months    Please review last PHQ-9 score.          Passed - Patient is age 18 or older       Passed - No active pregnancy on record       Passed - No positive pregnancy test in last 12 months       Passed - Recent (6 mo) or future (30 days) visit within the authorizing provider's specialty    Patient had office visit in the last 6 months or has a visit in the next 30 days with authorizing provider or within the authorizing provider's specialty.  See \"Patient Info\" tab in inbasket, or \"Choose Columns\" in Meds & Orders section of the refill encounter.              "

## 2018-11-15 RX ORDER — CITALOPRAM HYDROBROMIDE 10 MG/1
TABLET ORAL
Qty: 90 TABLET | Refills: 0 | Status: SHIPPED | OUTPATIENT
Start: 2018-11-15 | End: 2019-03-26

## 2018-11-15 RX ORDER — LOSARTAN POTASSIUM 50 MG/1
TABLET ORAL
Qty: 90 TABLET | Refills: 0 | Status: SHIPPED | OUTPATIENT
Start: 2018-11-15 | End: 2019-06-04

## 2018-11-16 NOTE — TELEPHONE ENCOUNTER
Medication is being filled for 1 time refill only due to:  Patient needs to be seen because due for yearly physical in December 2018.     Next 5 appointments (look out 90 days)     Jan 04, 2019 11:40 AM CST   PHYSICAL with Lela Townsend MD   [facility] ([facility])    [address] [place] [id number]   [phone]                  Edison Connors RN, BSN

## 2019-01-08 DIAGNOSIS — E78.5 HYPERLIPIDEMIA WITH TARGET LDL LESS THAN 100: Chronic | ICD-10-CM

## 2019-01-08 NOTE — TELEPHONE ENCOUNTER
"Requested Prescriptions   Pending Prescriptions Disp Refills     atorvastatin (LIPITOR) 20 MG tablet [Pharmacy Med Name: ATORVASTATIN CALCIUM 20 MG Tablet] 90 tablet 3          Last Written Prescription Date:  12/26/17  Last Fill Quantity: 90,  # refills: 3   Last Office Visit with [facility], P or [facility] prescribing provider:  8/21/18   Future Office Visit:      Sig: TAKE 1 TABLET EVERY DAY    Statins Protocol Failed - 1/8/2019  3:16 PM       Failed - LDL on file in past 12 months    Recent Labs   Lab Test 12/26/17  1030   *            Passed - No abnormal creatine kinase in past 12 months    Recent Labs   Lab Test 01/12/15  1034                  Passed - Recent (12 mo) or future (30 days) visit within the authorizing provider's specialty    Patient had office visit in the last 12 months or has a visit in the next 30 days with authorizing provider or within the authorizing provider's specialty.  See \"Patient Info\" tab in inbasket, or \"Choose Columns\" in Meds & Orders section of the refill encounter.             Passed - Medication is active on med list       Passed - Patient is age 18 or older       Passed - No active pregnancy on record       Passed - No positive pregnancy test in past 12 months              Jonatan Faarax  [facility]  "

## 2019-01-10 RX ORDER — ATORVASTATIN CALCIUM 20 MG/1
TABLET, FILM COATED ORAL
Qty: 90 TABLET | Refills: 0 | Status: SHIPPED | OUTPATIENT
Start: 2019-01-10 | End: 2019-07-09 | Stop reason: SINTOL

## 2019-01-10 NOTE — TELEPHONE ENCOUNTER
Medication is being filled for 1 time refill only due to:  Patient due for FLP and needs appt with PCP by February, per provider plan and last OV.   Lorene refill sent to mail order pharmacy. Needs appt for further refills. (Has one scheduled on 1/22/19)    Nedra Charles RN  [facility]

## 2019-01-31 ENCOUNTER — DOCUMENTATION ONLY (OUTPATIENT)
Dept: OPHTHALMOLOGY | Facility: CLINIC | Age: 84
End: 2019-01-31

## 2019-03-25 ENCOUNTER — OFFICE VISIT (OUTPATIENT)
Dept: OPHTHALMOLOGY | Facility: CLINIC | Age: 84
End: 2019-03-25
Payer: COMMERCIAL

## 2019-03-25 DIAGNOSIS — H52.4 PRESBYOPIA: ICD-10-CM

## 2019-03-25 DIAGNOSIS — H43.813 PVD (POSTERIOR VITREOUS DETACHMENT), BILATERAL: ICD-10-CM

## 2019-03-25 DIAGNOSIS — Z96.1 PSEUDOPHAKIA: ICD-10-CM

## 2019-03-25 DIAGNOSIS — H53.039 STRABISMIC AMBLYOPIA, UNSPECIFIED LATERALITY: Primary | ICD-10-CM

## 2019-03-25 DIAGNOSIS — H18.529 ANTERIOR BASEMENT MEMBRANE DYSTROPHY: ICD-10-CM

## 2019-03-25 PROCEDURE — 92014 COMPRE OPH EXAM EST PT 1/>: CPT | Performed by: STUDENT IN AN ORGANIZED HEALTH CARE EDUCATION/TRAINING PROGRAM

## 2019-03-25 PROCEDURE — 92015 DETERMINE REFRACTIVE STATE: CPT | Performed by: STUDENT IN AN ORGANIZED HEALTH CARE EDUCATION/TRAINING PROGRAM

## 2019-03-25 ASSESSMENT — CUP TO DISC RATIO
OD_RATIO: 0.4
OS_RATIO: 0.4

## 2019-03-25 ASSESSMENT — REFRACTION_WEARINGRX
OD_ADD: +2.75
OD_SPHERE: -0.50
OS_SPHERE: -0.50
OS_ADD: +2.75
OD_CYLINDER: +1.25
OS_CYLINDER: +0.75
OD_AXIS: 127
SPECS_TYPE: PAL
OS_AXIS: 128

## 2019-03-25 ASSESSMENT — CONF VISUAL FIELD
OS_NORMAL: 1
OD_NORMAL: 1

## 2019-03-25 ASSESSMENT — VISUAL ACUITY
OS_CC+: -2
OD_CC: 20/20
OD_CC: J1+-
OS_CC: 20/70
CORRECTION_TYPE: GLASSES
OS_CC: J10
METHOD: SNELLEN - LINEAR

## 2019-03-25 ASSESSMENT — REFRACTION_MANIFEST
OS_SPHERE: -1.00
OS_ADD: +3.00
OS_AXIS: 120
OD_CYLINDER: +1.25
OD_ADD: +3.00
OD_SPHERE: -1.00
OD_AXIS: 080
OS_CYLINDER: +0.50

## 2019-03-25 ASSESSMENT — TONOMETRY
OS_IOP_MMHG: 16
OD_IOP_MMHG: 17
IOP_METHOD: APPLANATION

## 2019-03-25 ASSESSMENT — EXTERNAL EXAM - LEFT EYE: OS_EXAM: NORMAL

## 2019-03-25 ASSESSMENT — EXTERNAL EXAM - RIGHT EYE: OD_EXAM: NORMAL

## 2019-03-25 NOTE — PATIENT INSTRUCTIONS
Glasses prescription given     Continue artificial tears up to four times a day as needed    Nevin Ricci MD  [phone]

## 2019-03-25 NOTE — PROGRESS NOTES
" Current Eye Medications:  AT's as needed daily     Subjective:  Complete eye exam     Patient complains that her eyes matter frequently.  \"Hasn't had glasses changed in a while and feels that they may need a change\".     Objective:  See Ophthalmology Exam.       Assessment:  Carolin Mcbride is a 84 year old female who presents with:   Encounter Diagnoses   Name Primary?     Strabismic amblyopia OS      Pseudophakia OU c YAG OU      Anterior basement membrane dystrophy, OU      PVD (posterior vitreous detachment), bilateral        Plan:  Glasses prescription given   Continue artificial tears up to four times a day as needed    Nevin Ricci MD  [phone]      "

## 2019-03-25 NOTE — LETTER
"  3/25/2019       RE: Carolin Mcbride  [address] [facility] [address]      Dear Dr. Townsend,    Thank you for referring your patient, Carolin Mcbride, to the [facility].     Her eye exam is stable.  Please see a copy of my visit note below.     Current Eye Medications:  AT's as needed daily     Subjective:  Complete eye exam     Patient complains that her eyes matter frequently.  \"Hasn't had glasses changed in a while and feels that they may need a change\".     Objective:  See Ophthalmology Exam.       Assessment:  Carolin Mcbride is a 84 year old female who presents with:   Encounter Diagnoses   Name Primary?     Strabismic amblyopia OS      Pseudophakia OU c YAG OU      Anterior basement membrane dystrophy, OU      PVD (posterior vitreous detachment), bilateral        Plan:  Glasses prescription given   Continue artificial tears up to four times a day as needed    Nevin Ricci MD  [phone]        Again, thank you for allowing me to participate in the care of your patient.        Sincerely,        Nevin Ricci MD    "

## 2019-03-26 DIAGNOSIS — F33.0 MAJOR DEPRESSIVE DISORDER, RECURRENT EPISODE, MILD (H): ICD-10-CM

## 2019-03-26 ASSESSMENT — SLIT LAMP EXAM - LIDS
COMMENTS: 1+ DERMATOCHALASIS
COMMENTS: 1+ DERMATOCHALASIS

## 2019-03-26 NOTE — TELEPHONE ENCOUNTER
"Requested Prescriptions   Pending Prescriptions Disp Refills     citalopram (CELEXA) 10 MG tablet [Pharmacy Med Name: CITALOPRAM HYDROBROMIDE 10 MG Tablet] 90 tablet 3    Last Written Prescription Date:  11/15/18  Last Fill Quantity: 90,  # refills: 0   Last Office Visit with [facility], P or [facility] prescribing provider:  8/21/18   Future Office Visit:      Sig: TAKE 1 TABLET (10 MG) BY MOUTH DAILY    SSRIs Protocol Failed - 3/26/2019  2:46 PM       Failed - PHQ-9 score less than 5 in past 6 months    Please review last PHQ-9 score.          Failed - Recent (6 mo) or future (30 days) visit within the authorizing provider's specialty    Patient had office visit in the last 6 months or has a visit in the next 30 days with authorizing provider or within the authorizing provider's specialty.  See \"Patient Info\" tab in inbasket, or \"Choose Columns\" in Meds & Orders section of the refill encounter.           Passed - Medication is active on med list       Passed - Patient is age 18 or older       Passed - No active pregnancy on record       Passed - No positive pregnancy test in last 12 months          "

## 2019-03-27 RX ORDER — CITALOPRAM HYDROBROMIDE 10 MG/1
TABLET ORAL
Qty: 90 TABLET | Refills: 3 | Status: SHIPPED | OUTPATIENT
Start: 2019-03-27 | End: 2020-03-17

## 2019-03-27 NOTE — TELEPHONE ENCOUNTER
Routing refill request to provider for review/approval because:  Protocol failed  Jennifer Ball RN

## 2019-05-31 ENCOUNTER — TELEPHONE (OUTPATIENT)
Dept: FAMILY MEDICINE | Facility: CLINIC | Age: 84
End: 2019-05-31

## 2019-05-31 NOTE — TELEPHONE ENCOUNTER
Are you able to fit patient in for same day for ear wash?    Routing to PCP to address.  Devin Carmichael MA on 5/31/2019 at 2:17 PM

## 2019-05-31 NOTE — TELEPHONE ENCOUNTER
Reason for Call:  Same Day Appointment, Requested Provider:  Dr. Rose    PCP: Lela Townsend    Reason for visit: ear wax buildup    Duration of symptoms: on going    Have you been treated for this in the past? N/A    Additional comments: Pt calling for she has been dealing with ear wax buildup that has been causing discomfort and would like to see if Dr. Townsend can fit Pt into her 06/04/19 schedule for an ear wash as soon as possible.    Can we leave a detailed message on this number? YES    Phone number patient can be reached at: Home number on file [phone] (home)    Best Time: anytime    Call taken on 5/31/2019 at 2:05 PM by Delvis Hansen

## 2019-06-03 NOTE — TELEPHONE ENCOUNTER
Carolin returned call    Best number to reach caller: Home number on file [phone] (home)    Is it ok to leave a detailed message: YES     Appointment scheduled. 11:20am 06/04

## 2019-06-03 NOTE — TELEPHONE ENCOUNTER
This writer attempted to contact patient  on 06/03/19      Reason for call Dr. Townsend is okay for patient to take a slot for tomorrow, patient will need to call back and schedule the appointment  and left message.      If patient calls back:   Schedule Office Visit appointment tomorrow in any open slot  with PCP for ear wash, document that pt called and close encounter         Tom Calvin

## 2019-06-04 ENCOUNTER — OFFICE VISIT (OUTPATIENT)
Dept: FAMILY MEDICINE | Facility: CLINIC | Age: 84
End: 2019-06-04
Payer: COMMERCIAL

## 2019-06-04 VITALS
TEMPERATURE: 98.4 F | HEART RATE: 70 BPM | DIASTOLIC BLOOD PRESSURE: 80 MMHG | WEIGHT: 172 LBS | RESPIRATION RATE: 16 BRPM | HEIGHT: 63 IN | BODY MASS INDEX: 30.48 KG/M2 | SYSTOLIC BLOOD PRESSURE: 161 MMHG | OXYGEN SATURATION: 94 %

## 2019-06-04 DIAGNOSIS — I10 HYPERTENSION, BENIGN ESSENTIAL, GOAL BELOW 140/90: ICD-10-CM

## 2019-06-04 DIAGNOSIS — H61.23 BILATERAL IMPACTED CERUMEN: Primary | ICD-10-CM

## 2019-06-04 PROCEDURE — 99213 OFFICE O/P EST LOW 20 MIN: CPT | Performed by: FAMILY MEDICINE

## 2019-06-04 RX ORDER — LOSARTAN POTASSIUM 50 MG/1
TABLET ORAL
Qty: 90 TABLET | Refills: 3 | Status: SHIPPED | OUTPATIENT
Start: 2019-06-04 | End: 2019-07-25

## 2019-06-04 ASSESSMENT — MIFFLIN-ST. JEOR: SCORE: 1203.28

## 2019-06-04 ASSESSMENT — PAIN SCALES - GENERAL: PAINLEVEL: NO PAIN (0)

## 2019-06-04 NOTE — PROGRESS NOTES
Subjective     Carolin Mcbride is a 84 year old female who presents to clinic today for the following health issues:    HPI   Concern - clogged ears- felt like water in ears. No tinnitus. Decreased hearing  Onset: 3 weeks     Description:   Patient states that she has been having clogged ears for the past 3 weeks. Feels like when you ride an airplane and experience clogged ear.    Intensity: moderate      Therapies Tried and outcome: none      Patient Active Problem List   Diagnosis     Pseudophakia OU c YAG OU     CAD (coronary artery disease)     Advance care planning     Hyperlipidemia with target LDL less than 100     Osteoporosis     CKD (chronic kidney disease) stage 3, GFR 30-59 ml/min (H)     Seasonal allergic rhinitis     Diagnostic skin and sensitization tests (aka ALLERGENS)     Pastrana's neuroma     Strabismic amblyopia     Anterior basement membrane dystrophy, OU     PVD (posterior vitreous detachment) OU     Granuloma annulare     Past Surgical History:   Procedure Laterality Date     APPENDECTOMY       CHOLECYSTECTOMY, OPEN       COLONOSCOPY  2009    neg     CYSTOCELE REPAIR  2003    TVT SPARC       Social History     Tobacco Use     Smoking status: Never Smoker     Smokeless tobacco: Never Used     Tobacco comment: Never smoked; nonsmoking household   Substance Use Topics     Alcohol use: No     Comment: very rare     Family History   Problem Relation Age of Onset     Cancer Mother         ovarian      Heart Disease Sister         CABG x 3     Neurologic Disorder Sister         Fibromyalgia     Cancer Father         stomach/interstines      Musculoskeletal Disorder Brother         back      Heart Disease Brother 60        CABG x 4     Glaucoma No family hx of      Macular Degeneration No family hx of          Current Outpatient Medications   Medication Sig Dispense Refill     aspirin 81 MG tablet Take 1 tablet (81 mg) by mouth daily 90 tablet 3     atorvastatin (LIPITOR) 20 MG tablet TAKE 1 TABLET  EVERY DAY. ++NEED APPOINTMENT AND LABS FOR FURTHER REFILLS++ 90 tablet 0     CALCIUM 500 + D OR 1 tab three times a day        citalopram (CELEXA) 10 MG tablet TAKE 1 TABLET (10 MG) BY MOUTH DAILY 90 tablet 3     Hypromellose (ARTIFICIAL TEARS OP) Apply 1 drop to eye as needed Both eyes.       ipratropium (ATROVENT) 0.06 % spray Spray 2 sprays into both nostrils 3 times daily as needed for rhinitis 3 Box 4     losartan (COZAAR) 50 MG tablet TAKE 1 TABLET EVERY DAY  (WITH  BREAKFAST) 90 tablet 3     polyethylene glycol (MIRALAX) powder Take 17 g (1 capful) by mouth daily 510 g 3     triamcinolone (KENALOG) 0.1 % cream Apply 2-3 times a day to affected area. 80 g 6     VITAMIN D 400 UNIT OR TABS 1 cap twice a day        Allergies   Allergen Reactions     Baycol [Cerivastatin Sodium]       muscle mass loss       Crestor [Hmg-Coa-R Inhibitors]      Previously on Baycol and affect muscle mass loss.     Darvocet [Acetaminophen]      Severe nausea     Macrobid [Nitrofuran Derivatives]      Hives     Recent Labs   Lab Test 12/26/17  1030 04/10/17  1352 12/20/16  1054  07/13/15  1054 01/12/15  1034  05/19/14  1500 11/11/13  1151  11/06/12  1045   *  --  118*  --  74  --    < >  --  166*   < > 160*   HDL 58  --  63  --  60  --    < >  --  43*   < > 43*   TRIG 187*  --  130  --  60  --    < >  --  171*   < > 160*   ALT  --   --   --   --   --  29  --  22 24   < >  --    CR 0.94 0.90 1.00   < > 0.90  --    < > 0.99 0.91   < >  --    GFRESTIMATED 57* 60* 53*   < > 60*  --    < > 54* 60*   < >  --    GFRESTBLACK 69 73 64   < > 73  --    < > 65 72   < >  --    POTASSIUM 4.3 4.4 4.5   < > 4.2  --    < > 4.7 4.5   < >  --    TSH  --   --   --   --   --   --   --  1.56  --   --  1.52    < > = values in this interval not displayed.      BP Readings from Last 3 Encounters:   06/04/19 161/80   09/13/18 136/81   08/21/18 138/72    Wt Readings from Last 3 Encounters:   06/04/19 78 kg (172 lb)   09/13/18 73.5 kg (162 lb)   08/21/18  "73.5 kg (162 lb)                      Reviewed and updated as needed this visit by Provider         Review of Systems   ROS COMP: Constitutional, HEENT, cardiovascular, pulmonary, gi and gu systems are negative, except as otherwise noted.      Objective    /80 (BP Location: Left arm, Patient Position: Chair, Cuff Size: Adult Regular)   Pulse 70   Temp 98.4  F (36.9  C) (Oral)   Resp 16   Ht 1.607 m (5' 3.25\")   Wt 78 kg (172 lb)   SpO2 94%   BMI 30.23 kg/m    Body mass index is 30.23 kg/m .  Physical Exam   GENERAL: healthy, alert and no distress  EYES: Eyes grossly normal to inspection, conjunctivae and sclerae normal  EARS: occluded with wax bilaterally. Attempted to irrigate both sides unsuccessfully and hearing worse after washing. Unable to remove with curette- too deep.   MS: no gross musculoskeletal defects noted, no edema  SKIN: no suspicious lesions or rashes  NEURO: Normal strength and tone, gait and speech normal  PSYCH: mentation appears normal, affect normal/bright             Assessment & Plan       ICD-10-CM    1. Bilateral impacted cerumen H61.23 OTOLARYNGOLOGY REFERRAL for ear wax removal and ear check hearing loss due to cerumen impaction   2. Hypertension, benign essential, goal below 140/90 I10 losartan (COZAAR) 50 MG tablet refilled.         BMI:   Estimated body mass index is 30.23 kg/m  as calculated from the following:    Height as of this encounter: 1.607 m (5' 3.25\").    Weight as of this encounter: 78 kg (172 lb).           CONSULTATION/REFERRAL to ENT for cerum removal  FUTURE LABS:       - Schedule a fasting blood draw 2-3 weeks  FUTURE APPOINTMENTS:       - Follow-up for annual visit or as needed in 2-3 weeks.   See Patient Instructions    Return in about 2 weeks (around 6/18/2019) for Physical Exam, Lab Work.    Lela Townsend MD  [facility]        "

## 2019-06-04 NOTE — PATIENT INSTRUCTIONS
At [facility], we strive to deliver an exceptional experience to you, every time we see you.  If you receive a survey in the mail, please send us back your thoughts. We really do value your feedback.    Based on your medical history, these are the current health maintenance/preventive care services that you are due for (some may have been done at this visit.)  Health Maintenance Due   Topic Date Due     ZOSTER IMMUNIZATION (2 of 3) 12/15/2009     MICROALBUMIN  12/20/2017     MEDICARE ANNUAL WELLNESS VISIT  12/26/2018     BMP  12/26/2018     LIPID  12/26/2018     PHQ-9  02/21/2019         Suggested websites for health information:  Www.[email or web address].org : Up to date and easily searchable information on multiple topics.  Www.medlineplus.gov : medication info, interactive tutorials, watch real surgeries online  Www.familydoctor.org : good info from the Academy of Family Physicians  Www.cdc.gov : public health info, travel advisories, epidemics (H1N1)  Www.aap.org : children's health info, normal development, vaccinations  Www.health.[facility].[place].us : [place] dept of health, public health issues in [place], N1N1    Your care team:                            Family Medicine Internal Medicine   MD Edgardo Wilhelm MD Shantel Branch-Fleming, MD Katya Georgiev PA-C Nam Ho, MD Pediatrics   KYRA Graham, MD Soumya Bauer CNP, MD Deborah Mielke, MD Kim Thein, APRN [facility]      Clinic hours: Monday - Thursday 7 am-7 pm; Fridays 7 am-5 pm.   Urgent care: Monday - Friday 11 am-9 pm; Saturday and Sunday 9 am-5 pm.  Pharmacy : Monday -Thursday 8 am-8 pm; Friday 8 am-6 pm; Saturday and Sunday 9 am-5 pm.     Clinic: [phone]   Pharmacy: [phone]    Patient Education       Earwax, Home Treatment    Everyone produces earwax from the lining of the ear canal. It serves to lubricate and protect the ear. The wax that forms in the canal  naturally moves toward the outside of the ear and falls out. Sometimes the ear canal may contain too much wax. This can cause a blockage and loss of hearing. Directions are given below for home treatment.  Home care  If your doctor has advised you to remove a wax blockage yourself, follow these directions:    Unless a medicine was prescribed, you may use an over-the-counter product made for clearing earwax. These contain carbamide peroxide. Lie down with the blocked ear facing upward. Apply one dropper full of medicine and wait a few minutes. Grasp the outer ear and wiggle it to help the solution enter the canal.    Lean over a sink or basin with the blocked ear facing downward. Use a bulb syringe filled with warm (not hot or cold) water to rinse the ear several times. Use gentle pressure only.    If you are having trouble draining the water out of your ear canal, put a few drops of rubbing alcohol (isopropyl alcohol) into the ear canal. This will help remove the remaining water.    Repeat this procedure once a day for up to three days, or until your hearing is back to normal. Do not use this treatment for more than three days in a row.  Don ts    Don t use cold water to rinse the ear. This will make you dizzy.    Don t perform this procedure if you have an ear infection.    Don t perform this procedure if you have a ruptured eardrum.    Don t use cotton swabs, matches, hairpins, keys, or other objects to  clean  the ear canal. This can cause infection of the ear canal or rupture the eardrum. Because of their size and shape, cotton swabs can push earwax deeper into the ear canal instead of removing it.  Follow-up care  Follow up with your health care provider if you are not improving after three cleaning attempts, or as advised.  When to seek medical advice  Call your health care provider right away if any of these occur:    Worsening ear pain    Fever of 101 F (38.3 C) or higher, or as directed by your health care  provider    Hearing does not return to normal after three days of treatment    Fluid drainage or bleeding from the ear canal    Swelling, redness, or tenderness of the outer ear    Headache, neck pain, or stiff neck    5742-8399 [employer]. [address] [place] [address], [place] [address]. All rights reserved. This information is not intended as a substitute for professional medical care. Always follow your healthcare professional's instructions.

## 2019-06-04 NOTE — NURSING NOTE
Patient identified using two patient identifiers.  Ear exam showing wax occlusion completed by provider.  H202/H20 was placed in the bilateral ear(s) via irrigation tool: elephant ear. Right ear aborted after 3 attempts. Left ear aborted due to patient complained of pain. Provider verbally informed and went back to see patient before discharge.    Rehan Yin CMA

## 2019-06-13 ENCOUNTER — OFFICE VISIT (OUTPATIENT)
Dept: OTOLARYNGOLOGY | Facility: CLINIC | Age: 84
End: 2019-06-13
Payer: COMMERCIAL

## 2019-06-13 VITALS
HEART RATE: 72 BPM | RESPIRATION RATE: 16 BRPM | DIASTOLIC BLOOD PRESSURE: 79 MMHG | HEIGHT: 63 IN | WEIGHT: 172 LBS | BODY MASS INDEX: 30.48 KG/M2 | SYSTOLIC BLOOD PRESSURE: 156 MMHG | OXYGEN SATURATION: 97 %

## 2019-06-13 DIAGNOSIS — J38.7 PRESBYLARYNGES: ICD-10-CM

## 2019-06-13 DIAGNOSIS — R49.0 HOARSENESS: Primary | ICD-10-CM

## 2019-06-13 DIAGNOSIS — H61.23 BILATERAL IMPACTED CERUMEN: ICD-10-CM

## 2019-06-13 PROCEDURE — 99203 OFFICE O/P NEW LOW 30 MIN: CPT | Mod: 25 | Performed by: OTOLARYNGOLOGY

## 2019-06-13 PROCEDURE — 69210 REMOVE IMPACTED EAR WAX UNI: CPT | Performed by: OTOLARYNGOLOGY

## 2019-06-13 ASSESSMENT — MIFFLIN-ST. JEOR: SCORE: 1203.28

## 2019-06-13 NOTE — PROGRESS NOTES
Chief Complaint - hoarseness and here for ear cleaning.       History of Present Illness - Carolin Mcbride is a 84 year old female who presents with a history of hoarseness since many years ago. It is worsening. She saw Dr. Kitchen who noted presbylarynges on scope exam in 2014, records reviewed. Voice has worsened since. Voice worsens with use. She tried voice therapy, but she didn't think it helped. No pain, hemoptysis. No lumps in the head or neck. Never smoker. The patient notes no reflux symptoms. Denies ear pain, drainage, and infection. Tried debrox prior to visit.     Past Medical History -   Patient Active Problem List   Diagnosis     Pseudophakia OU c YAG OU     CAD (coronary artery disease)     Advance care planning     Hyperlipidemia with target LDL less than 100     Osteoporosis     CKD (chronic kidney disease) stage 3, GFR 30-59 ml/min (H)     Seasonal allergic rhinitis     Diagnostic skin and sensitization tests (aka ALLERGENS)     Pastrana's neuroma     Strabismic amblyopia     Anterior basement membrane dystrophy, OU     PVD (posterior vitreous detachment) OU     Granuloma annulare       Current Medications -   Current Outpatient Medications:      aspirin 81 MG tablet, Take 1 tablet (81 mg) by mouth daily, Disp: 90 tablet, Rfl: 3     atorvastatin (LIPITOR) 20 MG tablet, TAKE 1 TABLET EVERY DAY. ++NEED APPOINTMENT AND LABS FOR FURTHER REFILLS++, Disp: 90 tablet, Rfl: 0     CALCIUM 500 + D OR, 1 tab three times a day , Disp: , Rfl:      citalopram (CELEXA) 10 MG tablet, TAKE 1 TABLET (10 MG) BY MOUTH DAILY, Disp: 90 tablet, Rfl: 3     Hypromellose (ARTIFICIAL TEARS OP), Apply 1 drop to eye as needed Both eyes., Disp: , Rfl:      ipratropium (ATROVENT) 0.06 % spray, Spray 2 sprays into both nostrils 3 times daily as needed for rhinitis, Disp: 3 Box, Rfl: 4     losartan (COZAAR) 50 MG tablet, TAKE 1 TABLET EVERY DAY  (WITH  BREAKFAST), Disp: 90 tablet, Rfl: 3     polyethylene glycol (MIRALAX) powder,  Take 17 g (1 capful) by mouth daily, Disp: 510 g, Rfl: 3     triamcinolone (KENALOG) 0.1 % cream, Apply 2-3 times a day to affected area., Disp: 80 g, Rfl: 6     VITAMIN D 400 UNIT OR TABS, 1 cap twice a day , Disp: , Rfl:     Allergies -   Allergies   Allergen Reactions     Baycol [Cerivastatin Sodium]       muscle mass loss       Crestor [Hmg-Coa-R Inhibitors]      Previously on Baycol and affect muscle mass loss.     Darvocet [Acetaminophen]      Severe nausea     Macrobid [Nitrofuran Derivatives]      Hives       Social History -   Social History     Socioeconomic History     Marital status:      Spouse name: Shashank     Number of children: 3     Years of education: None     Highest education level: None   Occupational History     Employer: RETIRED   Social Needs     Financial resource strain: None     Food insecurity:     Worry: None     Inability: None     Transportation needs:     Medical: None     Non-medical: None   Tobacco Use     Smoking status: Never Smoker     Smokeless tobacco: Never Used     Tobacco comment: Never smoked; nonsmoking household   Substance and Sexual Activity     Alcohol use: No     Comment: very rare     Drug use: No     Comment: never     Sexual activity: Yes     Partners: Male     Birth control/protection: None   Lifestyle     Physical activity:     Days per week: None     Minutes per session: None     Stress: None   Relationships     Social connections:     Talks on phone: None     Gets together: None     Attends Restorationism service: None     Active member of club or organization: None     Attends meetings of clubs or organizations: None     Relationship status: None     Intimate partner violence:     Fear of current or ex partner: None     Emotionally abused: None     Physically abused: None     Forced sexual activity: None   Other Topics Concern     Parent/sibling w/ CABG, MI or angioplasty before 65F 55M? No      Service No     Blood Transfusions No     Caffeine  "Concern No     Occupational Exposure No     Hobby Hazards No     Sleep Concern No     Stress Concern No     Weight Concern Yes     Special Diet No     Back Care No     Exercise Yes     Comment: Curves     Bike Helmet No     Seat Belt Yes     Self-Exams Yes   Social History Narrative     None       Family History -   Family History   Problem Relation Age of Onset     Cancer Mother         ovarian      Heart Disease Sister         CABG x 3     Neurologic Disorder Sister         Fibromyalgia     Cancer Father         stomach/interstines      Musculoskeletal Disorder Brother         back      Heart Disease Brother 60        CABG x 4     Glaucoma No family hx of      Macular Degeneration No family hx of      Review of Systems - As per HPI and PMHx, otherwise 7 system review of the head and neck is negative.    Physical Exam  /79   Pulse 72   Resp 16   Ht 1.607 m (5' 3.25\")   Wt 78 kg (172 lb)   SpO2 97%   BMI 30.23 kg/m    General - The patient is nontoxic.  Alert and oriented to person and place, answers questions and cooperates with examination appropriately.   Voice and Breathing - The patient was breathing comfortably without the use of accessory muscles. There was no wheezing, stridor, or stertor.  The patients voice was coarse. Normal volume.  Eyes - Extraocular movements intact.  Sclera were not icteric or injected, conjunctiva were pink and moist.  Mouth - Examination of the oral cavity showed pink, healthy oral mucosa. No lesions or ulcerations noted.  The tongue was mobile and midline, and the dentition were in good condition.    Throat - The walls of the oropharynx were smooth, pink, moist, symmetric, and had no lesions or ulcerations.  The tonsillar pillars and soft palate were symmetric.  The uvula was midline on elevation.   Neck -  Palpation of the occipital, submental, submandibular, internal jugular chain, and supraclavicular nodes did not demonstrate any abnormal lymph nodes or masses. " Parotids without masses. Palpation of the thyroid was soft and smooth, with no nodules or goiter appreciated.  The trachea was mobile and midline. No pain of the anterior neck.  Neurologic - CN II-XII are grossly intact. No focal neurologic deficits.     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 Scope exam showed this 5 years ago. Has a little worsening due to further aging. We discussed more voice therapy versus vocal cord injection. She prefers therapy.    Also bilateral cerumen impaction. Both ears cleaned today in clinic. Return prn.     Physical Exam and Procedure  Ears - On examination of the ears, I found that both ears were impacted with cerumen.  Therefore, I positioned the patient in the examination chair in a semi-supine position. I used the binocular surgical microscope to perform cerumen removal.  On the right side, I began by using a suction to gently lift the edges of the cerumen mass away from the walls of the external canal.  Once I did this, I was able to suction away fragments of wax and debris. I removed all the wax and debri.  The tympanic membrane was intact, no sign of perforation or middle ear effusion.    I turned my attention to the left side once again using the binocular surgical microscope to perform cerumen removal.  I began by using a suction to gently lift the edges of the cerumen mass away from the walls of the external canal.  Once I did this, I was able to suction away fragments of wax and debris. I removed all the wax and debri. The tympanic membrane was intact, no sign of perforation or middle ear effusion.

## 2019-06-13 NOTE — LETTER
6/13/2019         RE: Carolin Mcbride  [address] [address]        Dear Colleague,    Thank you for referring your patient, Carolin Mcbride, to the [facility]. Please see a copy of my visit note below.    Chief Complaint - hoarseness and here for ear cleaning.       History of Present Illness - Carolin Mcbride is a 84 year old female who presents with a history of hoarseness since many years ago. It is worsening. She saw Dr. Kitchen who noted presbylarynges on scope exam in 2014, records reviewed. Voice has worsened since. Voice worsens with use. She tried voice therapy, but she didn't think it helped. No pain, hemoptysis. No lumps in the head or neck. Never smoker. The patient notes no reflux symptoms. Denies ear pain, drainage, and infection. Tried debrox prior to visit.     Past Medical History -   Patient Active Problem List   Diagnosis     Pseudophakia OU c YAG OU     CAD (coronary artery disease)     Advance care planning     Hyperlipidemia with target LDL less than 100     Osteoporosis     CKD (chronic kidney disease) stage 3, GFR 30-59 ml/min (H)     Seasonal allergic rhinitis     Diagnostic skin and sensitization tests (aka ALLERGENS)     Pastrana's neuroma     Strabismic amblyopia     Anterior basement membrane dystrophy, OU     PVD (posterior vitreous detachment) OU     Granuloma annulare       Current Medications -   Current Outpatient Medications:      aspirin 81 MG tablet, Take 1 tablet (81 mg) by mouth daily, Disp: 90 tablet, Rfl: 3     atorvastatin (LIPITOR) 20 MG tablet, TAKE 1 TABLET EVERY DAY. ++NEED APPOINTMENT AND LABS FOR FURTHER REFILLS++, Disp: 90 tablet, Rfl: 0     CALCIUM 500 + D OR, 1 tab three times a day , Disp: , Rfl:      citalopram (CELEXA) 10 MG tablet, TAKE 1 TABLET (10 MG) BY MOUTH DAILY, Disp: 90 tablet, Rfl: 3     Hypromellose (ARTIFICIAL TEARS OP), Apply 1 drop to eye as needed Both eyes., Disp: , Rfl:      ipratropium (ATROVENT) 0.06 % spray, Spray  2 sprays into both nostrils 3 times daily as needed for rhinitis, Disp: 3 Box, Rfl: 4     losartan (COZAAR) 50 MG tablet, TAKE 1 TABLET EVERY DAY  (WITH  BREAKFAST), Disp: 90 tablet, Rfl: 3     polyethylene glycol (MIRALAX) powder, Take 17 g (1 capful) by mouth daily, Disp: 510 g, Rfl: 3     triamcinolone (KENALOG) 0.1 % cream, Apply 2-3 times a day to affected area., Disp: 80 g, Rfl: 6     VITAMIN D 400 UNIT OR TABS, 1 cap twice a day , Disp: , Rfl:     Allergies -   Allergies   Allergen Reactions     Baycol [Cerivastatin Sodium]       muscle mass loss       Crestor [Hmg-Coa-R Inhibitors]      Previously on Baycol and affect muscle mass loss.     Darvocet [Acetaminophen]      Severe nausea     Macrobid [Nitrofuran Derivatives]      Hives       Social History -   Social History     Socioeconomic History     Marital status:      Spouse name: Shashank     Number of children: 3     Years of education: None     Highest education level: None   Occupational History     Employer: RETIRED   Social Needs     Financial resource strain: None     Food insecurity:     Worry: None     Inability: None     Transportation needs:     Medical: None     Non-medical: None   Tobacco Use     Smoking status: Never Smoker     Smokeless tobacco: Never Used     Tobacco comment: Never smoked; nonsmoking household   Substance and Sexual Activity     Alcohol use: No     Comment: very rare     Drug use: No     Comment: never     Sexual activity: Yes     Partners: Male     Birth control/protection: None   Lifestyle     Physical activity:     Days per week: None     Minutes per session: None     Stress: None   Relationships     Social connections:     Talks on phone: None     Gets together: None     Attends Baptist service: None     Active member of club or organization: None     Attends meetings of clubs or organizations: None     Relationship status: None     Intimate partner violence:     Fear of current or ex partner: None      "Emotionally abused: None     Physically abused: None     Forced sexual activity: None   Other Topics Concern     Parent/sibling w/ CABG, MI or angioplasty before 65F 55M? No      Service No     Blood Transfusions No     Caffeine Concern No     Occupational Exposure No     Hobby Hazards No     Sleep Concern No     Stress Concern No     Weight Concern Yes     Special Diet No     Back Care No     Exercise Yes     Comment: Curves     Bike Helmet No     Seat Belt Yes     Self-Exams Yes   Social History Narrative     None       Family History -   Family History   Problem Relation Age of Onset     Cancer Mother         ovarian      Heart Disease Sister         CABG x 3     Neurologic Disorder Sister         Fibromyalgia     Cancer Father         stomach/interstines      Musculoskeletal Disorder Brother         back      Heart Disease Brother 60        CABG x 4     Glaucoma No family hx of      Macular Degeneration No family hx of      Review of Systems - As per HPI and PMHx, otherwise 7 system review of the head and neck is negative.    Physical Exam  /79   Pulse 72   Resp 16   Ht 1.607 m (5' 3.25\")   Wt 78 kg (172 lb)   SpO2 97%   BMI 30.23 kg/m     General - The patient is nontoxic.  Alert and oriented to person and place, answers questions and cooperates with examination appropriately.   Voice and Breathing - The patient was breathing comfortably without the use of accessory muscles. There was no wheezing, stridor, or stertor.  The patients voice was coarse. Normal volume.  Eyes - Extraocular movements intact.  Sclera were not icteric or injected, conjunctiva were pink and moist.  Mouth - Examination of the oral cavity showed pink, healthy oral mucosa. No lesions or ulcerations noted.  The tongue was mobile and midline, and the dentition were in good condition.    Throat - The walls of the oropharynx were smooth, pink, moist, symmetric, and had no lesions or ulcerations.  The tonsillar pillars and " soft palate were symmetric.  The uvula was midline on elevation.   Neck -  Palpation of the occipital, submental, submandibular, internal jugular chain, and supraclavicular nodes did not demonstrate any abnormal lymph nodes or masses. Parotids without masses. Palpation of the thyroid was soft and smooth, with no nodules or goiter appreciated.  The trachea was mobile and midline. No pain of the anterior neck.  Neurologic - CN II-XII are grossly intact. No focal neurologic deficits.     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 Scope exam showed this 5 years ago. Has a little worsening due to further aging. We discussed more voice therapy versus vocal cord injection. She prefers therapy.    Also bilateral cerumen impaction. Both ears cleaned today in clinic. Return prn.     Physical Exam and Procedure  Ears - On examination of the ears, I found that both ears were impacted with cerumen.  Therefore, I positioned the patient in the examination chair in a semi-supine position. I used the binocular surgical microscope to perform cerumen removal.  On the right side, I began by using a suction to gently lift the edges of the cerumen mass away from the walls of the external canal.  Once I did this, I was able to suction away fragments of wax and debris. I removed all the wax and debri.  The tympanic membrane was intact, no sign of perforation or middle ear effusion.    I turned my attention to the left side once again using the binocular surgical microscope to perform cerumen removal.  I began by using a suction to gently lift the edges of the cerumen mass away from the walls of the external canal.  Once I did this, I was able to suction away fragments of wax and debris. I removed all the wax and debri. The tympanic membrane was intact, no sign of perforation or middle ear effusion.          Again, thank you for allowing me to participate in the care of your patient.         Sincerely,        Garfield Castillo MD

## 2019-06-13 NOTE — PATIENT INSTRUCTIONS
General Scheduling Information  To schedule your CT/MRI scan, please contact [place] [facility] at [phone]   [address] [place] [address]    To schedule your Surgery, please contact our Specialty Schedulers at [phone]    ENT Clinic Locations Clinic Hours Telephone Number     [facility]  [address] [place] [address]   Tuesday:       8:00am -- 4:00pm    Wednesday:  8:00am - 4:00pm   To schedule an appointment with   Dr. Castillo,   please contact our   Specialty Scheduling Department at:     [phone]       [facility]  [address] [place] [address]   Friday:          8:00am - 4:00pm         Urgent Care Locations Clinic Hours Telephone Numbers     [facility]manda Campos  [address] [place] [address]     Monday-Friday:     11:00pm - 9:00pm    Saturday-Sunday:  9:00am - 5:00pm   [phone]     [facility]manda Ching  [address] [place] [address]     Monday-Friday:      5:00pm - 9:00pm     Saturday-Sunday:  9:00am - 5:00pm   [phone]

## 2019-07-05 ENCOUNTER — OFFICE VISIT (OUTPATIENT)
Dept: URGENT CARE | Facility: URGENT CARE | Age: 84
End: 2019-07-05
Payer: COMMERCIAL

## 2019-07-05 ENCOUNTER — TRANSFERRED RECORDS (OUTPATIENT)
Dept: HEALTH INFORMATION MANAGEMENT | Facility: CLINIC | Age: 84
End: 2019-07-05

## 2019-07-05 VITALS
BODY MASS INDEX: 29.74 KG/M2 | TEMPERATURE: 97.7 F | SYSTOLIC BLOOD PRESSURE: 163 MMHG | OXYGEN SATURATION: 97 % | DIASTOLIC BLOOD PRESSURE: 77 MMHG | HEART RATE: 69 BPM | WEIGHT: 169.2 LBS

## 2019-07-05 DIAGNOSIS — R47.9 DIFFICULTY SPEAKING: Primary | ICD-10-CM

## 2019-07-05 LAB
CREAT SERPL-MCNC: 0.95 MG/DL (ref 0.57–1.11)
GFR SERPL CREATININE-BSD FRML MDRD: 56 ML/MIN/1.73M2
GLUCOSE SERPL-MCNC: 97 MG/DL (ref 65–100)
POTASSIUM SERPL-SCNC: 4.6 MMOL/L (ref 3.5–5)

## 2019-07-09 ENCOUNTER — OFFICE VISIT (OUTPATIENT)
Dept: FAMILY MEDICINE | Facility: CLINIC | Age: 84
End: 2019-07-09
Payer: COMMERCIAL

## 2019-07-09 VITALS
DIASTOLIC BLOOD PRESSURE: 82 MMHG | TEMPERATURE: 98.6 F | HEIGHT: 63 IN | SYSTOLIC BLOOD PRESSURE: 138 MMHG | RESPIRATION RATE: 18 BRPM | BODY MASS INDEX: 29.59 KG/M2 | WEIGHT: 167 LBS | OXYGEN SATURATION: 92 % | HEART RATE: 68 BPM

## 2019-07-09 DIAGNOSIS — E78.5 HYPERLIPIDEMIA WITH TARGET LDL LESS THAN 100: ICD-10-CM

## 2019-07-09 DIAGNOSIS — N18.30 CKD (CHRONIC KIDNEY DISEASE) STAGE 3, GFR 30-59 ML/MIN (H): ICD-10-CM

## 2019-07-09 DIAGNOSIS — I25.10 CORONARY ARTERY DISEASE INVOLVING NATIVE CORONARY ARTERY OF NATIVE HEART WITHOUT ANGINA PECTORIS: ICD-10-CM

## 2019-07-09 DIAGNOSIS — G45.9 TIA (TRANSIENT ISCHEMIC ATTACK): Primary | ICD-10-CM

## 2019-07-09 PROCEDURE — 99214 OFFICE O/P EST MOD 30 MIN: CPT | Performed by: FAMILY MEDICINE

## 2019-07-09 ASSESSMENT — PAIN SCALES - GENERAL: PAINLEVEL: NO PAIN (1)

## 2019-07-09 ASSESSMENT — MIFFLIN-ST. JEOR: SCORE: 1180.6

## 2019-07-09 NOTE — PATIENT INSTRUCTIONS
At [facility], we strive to deliver an exceptional experience to you, every time we see you.  If you receive a survey in the mail, please send us back your thoughts. We really do value your feedback.    Based on your medical history, these are the current health maintenance/preventive care services that you are due for (some may have been done at this visit.)  Health Maintenance Due   Topic Date Due     ZOSTER IMMUNIZATION (2 of 3) 12/15/2009     MICROALBUMIN  12/20/2017     MEDICARE ANNUAL WELLNESS VISIT  12/26/2018     BMP  12/26/2018     LIPID  12/26/2018     PHQ-9  02/21/2019         Suggested websites for health information:  Www.[facility].org : Up to date and easily searchable information on multiple topics.  Www.medlineplus.gov : medication info, interactive tutorials, watch real surgeries online  Www.familydoctor.org : good info from the Academy of Family Physicians  Www.cdc.gov : public health info, travel advisories, epidemics (H1N1)  Www.aap.org : children's health info, normal development, vaccinations  Www.health.[facility].[place].us : [place] dept of health, public health issues in [place], N1N1    Your care team:                            Family Medicine Internal Medicine   MD Edgardo Wilhelm MD Shantel Branch-Fleming, MD Katya Georgiev PA-C Nam Ho, MD Pediatrics   KYRA Graham, MD Soumya Bauer CNP, MD Deborah Mielke, MD Kim Thein, APRN [facility]      Clinic hours: Monday - Thursday 7 am-7 pm; Fridays 7 am-5 pm.   Urgent care: Monday - Friday 11 am-9 pm; Saturday and Sunday 9 am-5 pm.  Pharmacy : Monday -Thursday 8 am-8 pm; Friday 8 am-6 pm; Saturday and Sunday 9 am-5 pm.     Clinic: [phone]   Pharmacy: [phone]      Patient Education     Transient Ischemic Attack (TIA)   Your symptoms were caused by a TIA, or mini-stroke. Even though your symptoms have gone away, this condition is as serious as a  full stroke. It means you are more likely to have a full stroke. About 1 in 3 people who have a TIA go on to have a full stroke. And 4% to 10% of those people will have the stroke within 2 days.  A TIA is caused when something decreases or blocks blood flow to a part of your brain. A TIA often happens when a blood clot travels to a blood vessel in the brain. The clot reduces or blocks blood flow. This causes the symptoms you had. After a short while, the clot dissolves. Blood flows again, and the symptoms go away. People with hardening of the arteries (atherosclerosis) are at higher risk for a TIA. So are people who have an irregular heartbeat called atrial fibrillation.  A TIA causes symptoms similar to a stroke, but they last less than 24 hours. A full stroke causes symptoms that last more than 24 hours and may be permanent. But even if your symptoms only lasted a short time, the TIA may have damaged your brain tissue. Once you have had a TIA, you are at risk of having a full stroke. You will need tests to look at the blood flow to your brain. The tests can also rule out other causes of your symptoms. The tests may include an ultrasound of the arteries in your neck and an evaluation of your heart. They may also include a CT scan of your brain, an MRI scan of your brain, or both. If your healthcare provider finds problems, he or she will recommend treatment with medicines, procedures, or both.  Your provider may prescribe medicines to reduce your chance of having another TIA and stroke. These may include medicines that prevent blood clots, such as antiplatelet medicines and blood thinners (anticoagulants). Your doctor may recommend other treatment. This may include a procedure to open up a blocked artery in your neck.  Home care  The following guidelines will help you take care of yourself at home:    Take any medicines your doctor has prescribed as directed. These may include antiplatelet medicines or medicines  for other conditions, such as high blood pressure or high cholesterol.    A TIA is a serious event that puts you at risk of having a full stroke. Because of this, it is important to take steps to help prevent a stroke from happening. Your doctor will look at all of your risk factors when deciding on what other treatment you may need.  Ways to reduce your risk for stroke  High blood pressure, diabetes, high cholesterol, heavy drinking, and smoking are risk factors for stroke and heart disease. You can control these by taking medicines and making diet and lifestyle changes. One way to help prevent a stroke is to take aspirin or a similar medicine every day. But don't take daily aspirin unless your healthcare provider tells you to.  Your provider will work with you to make lifestyle changes to help prevent a stroke.  Diet  Your healthcare provider will give you information about changes you may need to make to your diet. You may need to see a registered dietitian for help with diet changes. Changes may include:    Eating less fat and cholesterol    Eating less salt (sodium). This is especially important if you have high blood pressure.    Eating more fresh fruits and vegetables    Eating lean proteins, such as fish, poultry, beans, and peas    Eating less red meat and processed meats    Using low-fat dairy products    Using vegetable and nut oils in limited amounts    Limiting how many sweets and processed foods such as chips, cookies, and baked goods you eat    Limiting how much alcohol you drink  Physical activity  Your healthcare provider may recommend that you get more exercise if you have not been as active as possible. He or she may suggest that you get 40 minutes of moderate to vigorous physical activity each day. You should do this at least 3 to 4 days a week. A few examples of moderate to vigorous exercise are:    Walking at a brisk pace, about 3 to 4 miles per hour    Jogging or running    Swimming or water  aerobics    Hiking    Dancing    Martial arts    Tennis    Riding a bike  Other ways to reduce your risk    Weight management. If you are overweight or obese, your healthcare provider will work with you to lose weight and lower your body mass index (BMI) to a normal or near-normal level. Making diet changes and increasing physical activity can help.    Smoking. If you smoke, break the habit. Enroll in a stop smoking program to improve your chances of success.    Stress. Learn how to manage your stress. This will help you deal with stress at home and at work.  Follow-up care  Call your doctor for an appointment in the next few days for another evaluation, or as advised. This is to make a plan for preventing another TIA or stroke. You may need to see a neurologist to follow up on your TIA. A neurologist is a doctor who specializes in treating brain and nervous system problems. You may need other tests or procedures.  If you had an X-ray, CT scan, MRI scan, or ECG (electrocardiogram), a specialist will review it. You ll be told of any new findings that will affect your care.  Call 911  Call 911 if any of these occur:    Any of your TIA symptoms return    New problems with speech, vision, walking, or weakness or numbness of the face or on one side of the body    Severe headache, fainting spell, dizziness, or seizure  Date Last Reviewed: 10/1/2016    0550-4837 [employer]. [address] [place] [address]. All rights reserved. This information is not intended as a substitute for professional medical care. Always follow your healthcare professional's instructions.

## 2019-07-09 NOTE — PROGRESS NOTES
Subjective     Carolin Mcbride is a 84 year old female who presents to clinic today for the following health issues:    HPI   ED/UC Followup:    Facility:  [facility]  Date of visit: 7/5/19  Reason for visit: TIA  Current Status: Patient complains of headaches that start in the back going into the frontal. Patient states was told at discharge that PCP will determine if needs to see heart specialist. Patient wondering if able to put a rush so she can see neurology appointment sooner than scheduled. Wondering if okay to drive or wait 1 month and volunteer tomorrow?       Impression and Plan:  Carolin Mcbride is a 84 y.o. female with a largely unremarkable history aside from hypertension who presents emergency department today for evaluation of a less than 5-minute episode of inability to find her words left this evening. See HPI. Patient also complains of a mild headache that is been persistent for 3 days. It is not gotten worse. CBC, BMP, EKG, CT head, MR of the head and neck stroke protocol unremarkable. Patient's vital signs and exam are unremarkable as well. I suspect that patient's inability to find words last night was possibly a transient ischemic attack. There is no evidence of ischemic disease or any acute abnormality on imaging today. Due to the patient being nearly asymptomatic, with normal vital signs, and exhibiting a normal exam, with a normal work-up today, I feel that the patient can be managed on an outpatient basis for the rest of her TIA/stroke work-up. Patient will follow-up Monday morning with her primary care provider and/or neurology. Case management to find an appointment for neurology and contact the patient. I do not feel that it is necessary to investigate further etiologies of her headache due to it being mild, without focal deficits, within normal scans, and without fever. Discussed signs and symptoms that warrant further evaluation emergency department which must return. She showed  good understanding of this. She has no further questions and agrees with plan. She will follow-up with neurology and/or her primary care provider. She was mildly hypertensive in the emergency department, however this resolved upon discharge.    Diagnosis:   ENCOUNTER DIAGNOSES   ICD-10-CM   1. TIA (transient ischemic attack) G45.9 AMB CONSULT TO NEUROLOGY     Imaging:  Ct Head Brain Wo    Result Date: 7/5/2019  Clinical History: Headache. Technique: Noncontrast axial CT images are obtained of the brain. This CT Scan used dose modulation, iterative reconstruction, and/or weight based dosing when appropriate to reduce radiation dose to as low as reasonably achievable. Comparison: None. Findings: There is cortical sulci within normal limits considering patient age. There is low-attenuation of the white matter consistent with moderate to marked chronic small vessel disease. No hemorrhage or blood products. Intracranial structures are otherwise negative. Bone window settings are unremarkable. Sinuses are clear without mucosal thickening or sinus fluid. Impression: 1. Moderate to marked changes of chronic small vessel disease. 2. Clear sinuses. [facility]    MRA  Vertebral Basilar: There is a mild, smooth-appearing stenosis in the distal aspects of the right vertebral artery, the V4 segment. The vertebral arteries are codominant. Impression: 1. No evidence of hemodynamic significant extracranial or intracranial stenosis. No dissection or vascular malformation. 2. Mild left MCA stenoses noted in the M1 segment with similar mild stenoses noted throughout the right posterior cerebral artery. Mild smooth appearing stenosis in the distal aspect of the right vertebral artery. [facility]  1. Brain without acute MR abnormality. 2. Extensive nonspecific T2/FLAIR hyperintensities most likely related to chronic small vessel vascular change. [facility]        Hyperlipidemia Follow-Up      Are you  having any of the following symptoms? (Select all that apply)  No complaints of shortness of breath, chest pain or pressure.  No increased sweating or nausea with activity.  No left-sided neck or arm pain.  No complaints of pain in calves when walking 1-2 blocks.    Are you regularly taking any medication or supplement to lower your cholesterol?   Yes- But this is causing leg cramps and weakness.     Are you having muscle aches or other side effects that you think could be caused by your cholesterol lowering medication?  Yes- would like to do a trial off medication      Hypertension Follow-up      Do you check your blood pressure regularly outside of the clinic? No     Are you following a low salt diet? Yes    Are your blood pressures ever more than 140 on the top number (systolic) OR more   than 90 on the bottom number (diastolic), for example 140/90? No  Cerebrovascular Follow-up      Patient history: Suspected TIA with normal imaging. 5 minute episode of aphasia    Residual symptoms: None    Worsened or new symptoms since last visit: No    Daily aspirin use: Yes    Hypertension controlled: Yes    Depression and Anxiety Follow-Up    How are you doing with your depression since your last visit? No change    How are you doing with your anxiety since your last visit?  No change    Are you having other symptoms that might be associated with depression or anxiety? No    Have you had a significant life event? No     Do you have any concerns with your use of alcohol or other drugs? No    Social History     Tobacco Use     Smoking status: Never Smoker     Smokeless tobacco: Never Used     Tobacco comment: Never smoked; nonsmoking household   Substance Use Topics     Alcohol use: No     Comment: very rare     Drug use: No     Comment: never     PHQ 12/20/2016 12/26/2017 8/21/2018   PHQ-9 Total Score 0 0 0   Q9: Thoughts of better off dead/self-harm past 2 weeks Not at all Not at all Not at all     DELFINO-7 SCORE 3/13/2012  11/11/2013 12/20/2016   Total Score 0 0 -   Total Score - - 0           Suicide Assessment Five-step Evaluation and Treatment (SAFE-T)  Chronic Kidney Disease Follow-up      Current NSAID use?  No      Patient Active Problem List   Diagnosis     Pseudophakia OU c YAG OU     CAD (coronary artery disease)     Advance care planning     Hyperlipidemia with target LDL less than 100     Osteoporosis     CKD (chronic kidney disease) stage 3, GFR 30-59 ml/min (H)     Seasonal allergic rhinitis     Diagnostic skin and sensitization tests (aka ALLERGENS)     Pastrana's neuroma     Strabismic amblyopia     Anterior basement membrane dystrophy, OU     PVD (posterior vitreous detachment) OU     Granuloma annulare     Past Surgical History:   Procedure Laterality Date     APPENDECTOMY       CHOLECYSTECTOMY, OPEN       COLONOSCOPY  2009    neg     CYSTOCELE REPAIR  2003    TVT SPARC       Social History     Tobacco Use     Smoking status: Never Smoker     Smokeless tobacco: Never Used     Tobacco comment: Never smoked; nonsmoking household   Substance Use Topics     Alcohol use: No     Comment: very rare     Family History   Problem Relation Age of Onset     Cancer Mother         ovarian      Heart Disease Sister         CABG x 3     Neurologic Disorder Sister         Fibromyalgia     Cancer Father         stomach/interstines      Musculoskeletal Disorder Brother         back      Heart Disease Brother 60        CABG x 4     Glaucoma No family hx of      Macular Degeneration No family hx of          Current Outpatient Medications   Medication Sig Dispense Refill     aspirin 81 MG tablet Take 1 tablet (81 mg) by mouth daily 90 tablet 3     CALCIUM 500 + D OR 1 tab three times a day        citalopram (CELEXA) 10 MG tablet TAKE 1 TABLET (10 MG) BY MOUTH DAILY 90 tablet 3     Hypromellose (ARTIFICIAL TEARS OP) Apply 1 drop to eye as needed Both eyes.       ipratropium (ATROVENT) 0.06 % spray Spray 2 sprays into both nostrils 3 times  daily as needed for rhinitis 3 Box 4     losartan (COZAAR) 50 MG tablet TAKE 1 TABLET EVERY DAY  (WITH  BREAKFAST) 90 tablet 3     polyethylene glycol (MIRALAX) powder Take 17 g (1 capful) by mouth daily 510 g 3     triamcinolone (KENALOG) 0.1 % cream Apply 2-3 times a day to affected area. 80 g 6     VITAMIN D 400 UNIT OR TABS 1 cap twice a day        Allergies   Allergen Reactions     Baycol [Cerivastatin Sodium]       muscle mass loss       Crestor [Hmg-Coa-R Inhibitors]      Previously on Baycol and affect muscle mass loss.     Darvocet [Acetaminophen]      Severe nausea     Macrobid [Nitrofuran Derivatives]      Hives     Recent Labs   Lab Test 12/26/17  1030 04/10/17  1352 12/20/16  1054  07/13/15  1054 01/12/15  1034  05/19/14  1500 11/11/13  1151  11/06/12  1045   *  --  118*  --  74  --    < >  --  166*   < > 160*   HDL 58  --  63  --  60  --    < >  --  43*   < > 43*   TRIG 187*  --  130  --  60  --    < >  --  171*   < > 160*   ALT  --   --   --   --   --  29  --  22 24   < >  --    CR 0.94 0.90 1.00   < > 0.90  --    < > 0.99 0.91   < >  --    GFRESTIMATED 57* 60* 53*   < > 60*  --    < > 54* 60*   < >  --    GFRESTBLACK 69 73 64   < > 73  --    < > 65 72   < >  --    POTASSIUM 4.3 4.4 4.5   < > 4.2  --    < > 4.7 4.5   < >  --    TSH  --   --   --   --   --   --   --  1.56  --   --  1.52    < > = values in this interval not displayed.      BP Readings from Last 3 Encounters:   07/09/19 138/82   07/05/19 163/77   06/13/19 156/79    Wt Readings from Last 3 Encounters:   07/09/19 75.8 kg (167 lb)   07/05/19 76.7 kg (169 lb 3.2 oz)   06/13/19 78 kg (172 lb)                    Reviewed and updated as needed this visit by Provider         Review of Systems   ROS COMP: Constitutional, HEENT, cardiovascular, pulmonary, gi and gu systems are negative, except as otherwise noted.      Objective    /82 (BP Location: Right arm, Patient Position: Chair, Cuff Size: Adult Large)   Pulse 68   Temp 98.6  F  "(37  C) (Oral)   Resp 18   Ht 1.607 m (5' 3.25\")   Wt 75.8 kg (167 lb)   SpO2 92%   BMI 29.35 kg/m    Body mass index is 29.35 kg/m .  Physical Exam   GENERAL: elderly, alert, well nourished, well hydrated, no distress  HENT: ear canals- normal; TMs- normal; Nose- normal; Mouth- no ulcers, no lesions, missing dentition  NECK: no tenderness, no adenopathy, no asymmetry, no masses, no stiffness; thyroid- normal to palpation  RESP: lungs clear to auscultation - no rales, no rhonchi, no wheezes  CV: regular rates and rhythm, normal S1 S2, no S3 or S4 and no murmur, no click or rub, normal pulses  ABDOMEN: soft, no tenderness, no  hepatosplenomegaly, no masses, normal bowel sounds  MS: extremities- no gross deformities noted, no edema  SKIN: no suspicious lesions, no rashes, age related skin changes with seborrheic keratosis and no actinic keratosis.    NEURO: strength and tone- normal, sensory exam- grossly normal, reflexes- symmetric  BACK: no CVA tenderness, no paralumbar tenderness  MENTAL STATUS EXAM:  Appearance/Behavior: no apparent distress, neatly groomed, dressed appropriately for weather, appears stated age and is not frail-appearing  Speech: normal  Mood/Affect: normal affect  Insight: Excellent     Diagnostic Test Results:  Labs reviewed in Epic      Results for orders placed or performed during the hospital encounter of 07/05/19   BASIC METABOLIC PANEL   Result Value Ref Range Status   SODIUM 141 135 - 145 mmol/L Final   POTASSIUM 4.6 3.5 - 5.0 mmol/L Final   CHLORIDE 108 98 - 110 mmol/L Final   CO2,TOTAL 24 21 - 31 mmol/L Final   ANION GAP 9 5 - 18 Final   GLUCOSE 97 65 - 100 mg/dL Final   CALCIUM 9.7 8.5 - 10.5 mg/dL Final   BUN 19 8 - 25 mg/dL Final   CREATININE 0.95 0.57 - 1.11 mg/dL Final   BUN/CREAT RATIO 20 10 - 20 Final   GFR if African American >60 >60 ml/min/1.73m2 Final   GFR if not  56 (L) >60 ml/min/1.73m2 Final   CBC WITH AUTO DIFFERENTIAL   Result Value Ref Range Status " "  WHITE BLOOD COUNT 6.4 4.5 - 11.0 thou/cu mm Final   RED BLOOD COUNT 4.32 4.00 - 5.20 mil/cu mm Final   HEMOGLOBIN 12.5 12.0 - 16.0 g/dL Final   HEMATOCRIT 38.4 33.0 - 51.0 % Final   MCV 89 80 - 100 fL Final   MCH 28.9 26.0 - 34.0 pg Final   MCHC 32.6 32.0 - 36.0 g/dL Final   RDW 12.7 11.5 - 15.5 % Final   PLATELET COUNT 269 140 - 440 thou/cu mm Final   MPV 9.5 6.5 - 11.0 fL Final   NEUTROPHILS 62.0 % Final   LYMPHOCYTES 22.3 % Final   MONOCYTES 9.3 % Final   EOSINOPHILS 5.3 % Final   BASOPHILS 0.8 % Final   IMMATURE GRANULOCYTES(METAS,MYELOS,PROS) 0.3 % Final   ABSOLUTE NEUTROPHILS 4.0 1.7 - 7.0 thou/cu mm Final   ABSOLUTE LYMPHOCYTES 1.4 0.9 - 2.9 thou/cu mm Final   ABSOLUTE MONOCYTES 0.6 <0.9 thou/cu mm Final   ABSOLUTE EOSINOPHILS 0.3 <0.5 thou/cu mm Final   ABSOLUTE BASOPHILS 0.1 <0.3 thou/cu mm Final   ABSOLUTE IMMATURE GRANULOCYTES(METAS,MYELOS,PROS) 0.0 <0.3 thou/cu mm Final   NUCLEATED RED BLOOD CELLS AS PERCENT OF BLOOD LEUKOCYTES   Result Value Ref Range Status   NRBC 0.0 % Final   ABS NRBC 0.0 thou /cu mm Final   EKG 12-LEAD   Result Value Ref Range Status   Interpretation Preliminary   Sinus bradycardia with 1st degree A-V block with Premature atrial complexes  Otherwise normal ECG      Assessment & Plan       ICD-10-CM    1. TIA (transient ischemic attack) G45.9 NEUROLOGY ADULT REFERRAL   2. CKD (chronic kidney disease) stage 3, GFR 30-59 ml/min (H) N18.3 GFR 56 on 7-5-2019   3. Coronary artery disease involving native coronary artery of native heart without angina pectoris I25.10 No recurrent chest pain or dyspnea     4. Hyperlipidemia with target LDL less than 100 E78.5 Should be on a statin but feels that she is not tolerating this and will take a drug holiday to see if her leg weakness and cramps improve. May need evaluation for PAD.         BMI:   Estimated body mass index is 29.35 kg/m  as calculated from the following:    Height as of this encounter: 1.607 m (5' 3.25\").    Weight as of this " encounter: 75.8 kg (167 lb).           CONSULTATION/REFERRAL to neurology  FUTURE LABS:       - Schedule fasting labs in 3 months  FUTURE APPOINTMENTS:       - Follow-up visit in 3 months or sooner if any questions or concerns.   Work on weight loss  Regular exercise  See Patient Instructions    Return in about 3 months (around 10/9/2019) for medication follow up, Lab Work.    Lela Townsend MD  [facility]

## 2019-07-10 ASSESSMENT — PATIENT HEALTH QUESTIONNAIRE - PHQ9: SUM OF ALL RESPONSES TO PHQ QUESTIONS 1-9: 0

## 2019-07-15 ENCOUNTER — TELEPHONE (OUTPATIENT)
Dept: CARDIOLOGY | Facility: CLINIC | Age: 84
End: 2019-07-15

## 2019-07-15 NOTE — TELEPHONE ENCOUNTER
[facility] Call Center    Phone Message    May a detailed message be left on voicemail: yes    Reason for Call: Other: per pt- was in the ED for a TIA- and pt stated  sent information over to  for hime to review if he wants to see the pt or not, please call the pt back thanks!     Action Taken: Message routed to:  [facility] ([facility]): cardiology

## 2019-07-18 ENCOUNTER — TELEPHONE (OUTPATIENT)
Dept: CARDIOLOGY | Facility: CLINIC | Age: 84
End: 2019-07-18

## 2019-07-18 DIAGNOSIS — G45.9 TIA (TRANSIENT ISCHEMIC ATTACK): Primary | ICD-10-CM

## 2019-07-18 NOTE — TELEPHONE ENCOUNTER
Pt called, stated neurologist requested a bubble study be done with cardiology consult.   See prev note: TIA 7/4/19. Order placed.

## 2019-07-18 NOTE — TELEPHONE ENCOUNTER
Coordinate test prior to DR Guzman visit  , possible cancellation  mon. For echo., pending for review per RN.  Dilcia Brandon L.P.N.

## 2019-07-18 NOTE — TELEPHONE ENCOUNTER
Reason for call:  Other   Patient called regarding (reason for call): call back  Additional comments: Patient is calling because she needs a ECHO order, please call back     Phone number to reach patient:  Home number on file [phone] (home)    Best Time:  any    Can we leave a detailed message on this number?  YES

## 2019-07-22 ENCOUNTER — ANCILLARY PROCEDURE (OUTPATIENT)
Dept: CARDIOLOGY | Facility: CLINIC | Age: 84
End: 2019-07-22
Attending: INTERNAL MEDICINE
Payer: COMMERCIAL

## 2019-07-22 DIAGNOSIS — G45.9 TIA (TRANSIENT ISCHEMIC ATTACK): ICD-10-CM

## 2019-07-22 PROCEDURE — 93306 TTE W/DOPPLER COMPLETE: CPT | Mod: GC | Performed by: INTERNAL MEDICINE

## 2019-07-22 RX ORDER — ACYCLOVIR 200 MG/1
20 CAPSULE ORAL ONCE
Status: COMPLETED | OUTPATIENT
Start: 2019-07-22 | End: 2019-07-22

## 2019-07-22 RX ADMIN — ACYCLOVIR 20 ML: 200 CAPSULE ORAL at 11:24

## 2019-07-22 NOTE — TELEPHONE ENCOUNTER
"Requested Prescriptions   Pending Prescriptions Disp Refills     atorvastatin (LIPITOR) 20 MG tablet [Pharmacy Med Name: ATORVASTATIN CALCIUM 20 MG Tablet]  Last Written Prescription Date:  01/10/19  Last Fill Quantity: 90,  # refills: 0   Last Office Visit with [facility] or [facility] prescribing provider:  07/09/19      Routing refill request to provider for review/approval because:  Drug not active on patient's medication list     Future Office Visit:    Next 5 appointments (look out 90 days)    Jul 23, 2019  3:00 PM CDT  [facility] [address] [facility] [id number]  [phone]   Jul 25, 2019  9:30 AM CDT  Return Visit with VAISHALI Juan MD  [facility] [address] [place] [id number] [phone]        90 tablet 0     Sig: TAKE 1 TABLET ONE TIME DAILY (NEED MD APPOINTMENT)       Statins Protocol Failed - 7/22/2019  1:53 PM        Failed - LDL on file in past 12 months     Recent Labs   Lab Test 12/26/17  1030   *             Failed - Medication is active on med list        Passed - No abnormal creatine kinase in past 12 months     Recent Labs   Lab Test 01/12/15  1034                   Passed - Recent (12 mo) or future (30 days) visit within the authorizing provider's specialty     Patient had office visit in the last 12 months or has a visit in the next 30 days with authorizing provider or within the authorizing provider's specialty.  See \"Patient Info\" tab in inbasket, or \"Choose Columns\" in Meds & Orders section of the refill encounter.              Passed - Patient is age 18 or older        Passed - No active pregnancy on record        Passed - No positive pregnancy test in past 12 months          "

## 2019-07-23 ENCOUNTER — ANCILLARY PROCEDURE (OUTPATIENT)
Dept: MAMMOGRAPHY | Facility: CLINIC | Age: 84
End: 2019-07-23
Payer: COMMERCIAL

## 2019-07-23 ENCOUNTER — OFFICE VISIT (OUTPATIENT)
Dept: FAMILY MEDICINE | Facility: CLINIC | Age: 84
End: 2019-07-23
Payer: COMMERCIAL

## 2019-07-23 ENCOUNTER — ANCILLARY PROCEDURE (OUTPATIENT)
Dept: GENERAL RADIOLOGY | Facility: CLINIC | Age: 84
End: 2019-07-23
Attending: FAMILY MEDICINE
Payer: COMMERCIAL

## 2019-07-23 ENCOUNTER — DOCUMENTATION ONLY (OUTPATIENT)
Dept: LAB | Facility: CLINIC | Age: 84
End: 2019-07-23

## 2019-07-23 VITALS
DIASTOLIC BLOOD PRESSURE: 83 MMHG | TEMPERATURE: 98.2 F | HEART RATE: 87 BPM | BODY MASS INDEX: 29.95 KG/M2 | WEIGHT: 169 LBS | OXYGEN SATURATION: 98 % | HEIGHT: 63 IN | SYSTOLIC BLOOD PRESSURE: 148 MMHG

## 2019-07-23 DIAGNOSIS — Z12.31 VISIT FOR SCREENING MAMMOGRAM: ICD-10-CM

## 2019-07-23 DIAGNOSIS — N18.30 CKD (CHRONIC KIDNEY DISEASE) STAGE 3, GFR 30-59 ML/MIN (H): ICD-10-CM

## 2019-07-23 DIAGNOSIS — I25.10 CORONARY ARTERY DISEASE INVOLVING NATIVE CORONARY ARTERY OF NATIVE HEART WITHOUT ANGINA PECTORIS: ICD-10-CM

## 2019-07-23 DIAGNOSIS — E78.5 HYPERLIPIDEMIA WITH TARGET LDL LESS THAN 100: ICD-10-CM

## 2019-07-23 DIAGNOSIS — N18.30 CKD (CHRONIC KIDNEY DISEASE) STAGE 3, GFR 30-59 ML/MIN (H): Primary | ICD-10-CM

## 2019-07-23 DIAGNOSIS — Z00.00 ENCOUNTER FOR MEDICARE ANNUAL WELLNESS EXAM: Primary | ICD-10-CM

## 2019-07-23 DIAGNOSIS — S92.354A CLOSED NONDISPLACED FRACTURE OF FIFTH METATARSAL BONE OF RIGHT FOOT, INITIAL ENCOUNTER: ICD-10-CM

## 2019-07-23 DIAGNOSIS — G45.0: ICD-10-CM

## 2019-07-23 DIAGNOSIS — S90.31XA CONTUSION OF RIGHT FOOT, INITIAL ENCOUNTER: ICD-10-CM

## 2019-07-23 LAB
ALT SERPL W P-5'-P-CCNC: 23 U/L (ref 0–50)
ANION GAP SERPL CALCULATED.3IONS-SCNC: 8 MMOL/L (ref 3–14)
BUN SERPL-MCNC: 17 MG/DL (ref 7–30)
CALCIUM SERPL-MCNC: 9.1 MG/DL (ref 8.5–10.1)
CHLORIDE SERPL-SCNC: 109 MMOL/L (ref 94–109)
CHOLEST SERPL-MCNC: 230 MG/DL
CO2 SERPL-SCNC: 24 MMOL/L (ref 20–32)
CREAT SERPL-MCNC: 0.88 MG/DL (ref 0.52–1.04)
CREAT UR-MCNC: 52 MG/DL
GFR SERPL CREATININE-BSD FRML MDRD: 60 ML/MIN/{1.73_M2}
GLUCOSE SERPL-MCNC: 119 MG/DL (ref 70–99)
HDLC SERPL-MCNC: 47 MG/DL
LDLC SERPL CALC-MCNC: 149 MG/DL
MICROALBUMIN UR-MCNC: 135 MG/L
MICROALBUMIN/CREAT UR: 257.14 MG/G CR (ref 0–25)
NONHDLC SERPL-MCNC: 183 MG/DL
POTASSIUM SERPL-SCNC: 4.3 MMOL/L (ref 3.4–5.3)
SODIUM SERPL-SCNC: 141 MMOL/L (ref 133–144)
TRIGL SERPL-MCNC: 170 MG/DL

## 2019-07-23 PROCEDURE — 99397 PER PM REEVAL EST PAT 65+ YR: CPT | Performed by: FAMILY MEDICINE

## 2019-07-23 PROCEDURE — 80048 BASIC METABOLIC PNL TOTAL CA: CPT | Performed by: FAMILY MEDICINE

## 2019-07-23 PROCEDURE — 73630 X-RAY EXAM OF FOOT: CPT | Mod: RT

## 2019-07-23 PROCEDURE — 36415 COLL VENOUS BLD VENIPUNCTURE: CPT | Performed by: FAMILY MEDICINE

## 2019-07-23 PROCEDURE — 77063 BREAST TOMOSYNTHESIS BI: CPT | Mod: TC

## 2019-07-23 PROCEDURE — 99214 OFFICE O/P EST MOD 30 MIN: CPT | Mod: 25 | Performed by: FAMILY MEDICINE

## 2019-07-23 PROCEDURE — 77067 SCR MAMMO BI INCL CAD: CPT | Mod: TC

## 2019-07-23 PROCEDURE — 84460 ALANINE AMINO (ALT) (SGPT): CPT | Performed by: FAMILY MEDICINE

## 2019-07-23 PROCEDURE — 80061 LIPID PANEL: CPT | Performed by: FAMILY MEDICINE

## 2019-07-23 PROCEDURE — 82043 UR ALBUMIN QUANTITATIVE: CPT | Performed by: FAMILY MEDICINE

## 2019-07-23 RX ORDER — CLOPIDOGREL BISULFATE 75 MG/1
75 TABLET ORAL DAILY
Qty: 90 TABLET | Refills: 2 | Status: SHIPPED | OUTPATIENT
Start: 2019-07-23 | End: 2019-10-28

## 2019-07-23 RX ORDER — CLOPIDOGREL BISULFATE 75 MG/1
75 TABLET ORAL DAILY
Refills: 2 | COMMUNITY
Start: 2019-07-18 | End: 2019-07-23

## 2019-07-23 RX ORDER — ATORVASTATIN CALCIUM 20 MG/1
20 TABLET, FILM COATED ORAL DAILY
Qty: 90 TABLET | Refills: 3
Start: 2019-07-23 | End: 2019-07-29

## 2019-07-23 RX ORDER — ATORVASTATIN CALCIUM 20 MG/1
TABLET, FILM COATED ORAL
COMMUNITY
Start: 2019-01-10 | End: 2019-07-23

## 2019-07-23 RX ORDER — ATORVASTATIN CALCIUM 20 MG/1
TABLET, FILM COATED ORAL
Qty: 90 TABLET | Refills: 0 | OUTPATIENT
Start: 2019-07-23

## 2019-07-23 ASSESSMENT — MIFFLIN-ST. JEOR: SCORE: 1189.67

## 2019-07-23 NOTE — PROGRESS NOTES
Patient has a lab appointment on 7/23/2019. Please sign pended orders and place any other future orders that are needed.    Thank you,   Yadira Nice

## 2019-07-23 NOTE — LETTER
2019      Carolin Mcbride  [address] [facility] [address]              Dear Carolin Mcbride        Your test results are attached.    Follow up with your foot fracture as needed.      Enclosed is a copy of the results.  It was a pleasure to see you at your last appointment.    If you have any questions or concerns, please call myself or my nurse at [phone].      Sincerely,      Lela Townsend MD, MPH /ANA MARIA. VY Sullivan  [employer]      Results for orders placed or performed in visit on 19   XR Foot Right G/E 3 Views    Narrative    FOOT RIGHT THREE OR MORE VIEWS   2019 3:45 PM     HISTORY:  Contusion of right foot, initial encounter.    COMPARISON: 2014       Impression    IMPRESSION:   1. Oblique fracture of the fifth metatarsal distal diaphysis, with  slight displacement (seen on the lateral view).  2. 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    JOANNA DUONG MD                 RE: Carolin Mcbride   MRN: [id number]  : 1934  ENC DATE: 2019

## 2019-07-23 NOTE — PATIENT INSTRUCTIONS
Preventive Health Recommendations    See your health care provider every year to    Review health changes.     Discuss preventive care.      Review your medicines if your doctor has prescribed any.      You no longer need a yearly Pap test unless you've had an abnormal Pap test in the past 10 years. If you have vaginal symptoms, such as bleeding or discharge, be sure to talk with your provider about a Pap test.      Every 1 to 2 years, have a mammogram.  If you are over 69, talk with your health care provider about whether or not you want to continue having screening mammograms.      Every 10 years, have a colonoscopy. Or, have a yearly FIT test (stool test). These exams will check for colon cancer.       Have a cholesterol test every 5 years, or more often if your doctor advises it.       Have a diabetes test (fasting glucose) every three years. If you are at risk for diabetes, you should have this test more often.       At age 65, have a bone density scan (DEXA) to check for osteoporosis (brittle bone disease).    Shots:    Get a flu shot each year.    Get a tetanus shot every 10 years.    Talk to your doctor about your pneumonia vaccines. There are now two you should receive - Pneumovax (PPSV 23) and Prevnar (PCV 13).    Talk to your pharmacist about the shingles vaccine.    Talk to your doctor about the hepatitis B vaccine.    Nutrition:     Eat at least 5 servings of fruits and vegetables each day.      Eat whole-grain bread, whole-wheat pasta and brown rice instead of white grains and rice.      Get adequate about Calcium and Vitamin D.     Lifestyle    Exercise at least 150 minutes a week (30 minutes a day, 5 days a week). This will help you control your weight and prevent disease.      Limit alcohol to one drink per day.      No smoking.       Wear sunscreen to prevent skin cancer.       See your dentist twice a year for an exam and cleaning.      See your eye doctor every 1 to 2 years to screen for  conditions such as glaucoma, macular degeneration, cataracts, etc.    Personalized Prevention Plan  You are due for the preventive services outlined below.  Your care team is available to assist you in scheduling these services.  If you have already completed any of these items, please share that information with your care team to update in your medical record.    Health Maintenance Due   Topic Date Due     Zoster (Shingles) Vaccine (2 of 3) 12/15/2009     Kidney Microalbumin Urine Test  12/20/2017     Annual Wellness Visit  12/26/2018     Basic Metabolic Panel  12/26/2018     Cholesterol Lab  12/26/2018     Patient Education   Personalized Prevention Plan  You are due for the preventive services outlined below.  Your care team is available to assist you in scheduling these services.  If you have already completed any of these items, please share that information with your care team to update in your medical record.  Health Maintenance Due   Topic Date Due     Zoster (Shingles) Vaccine (2 of 3) 12/15/2009     Kidney Microalbumin Urine Test  12/20/2017     Annual Wellness Visit  12/26/2018     Basic Metabolic Panel  12/26/2018     Cholesterol Lab  12/26/2018        Patient Education     Foot Fracture  You have a broken bone (fracture) in your foot. This will cause pain, swelling, and often bruising. It will usually take about 4 to 8 weeks to heal. A foot fracture may be treated with a special shoe, splint, cast, or boot.  Home care  Follow these guidelines when caring for yourself at home:    You may be given a splint, cast, shoe, or boot to keep the injured area from moving. Unless you were told otherwise, use crutches or a walker. Don t put weight on the injured foot until your health care provider says you can do so. (You can rent crutches and a walker at many pharmacies and surgical or orthopedic supply stores.) Don t put weight on a splint, or it will break.    Keep your leg elevated to reduce pain and swelling.  When sleeping, put a pillow under the injured leg. When sitting, support the injured leg so it is above your waist. This is very important during the first 2 days (48 hours).    Put an ice pack on the injured area. Do this for 20 minutes every 1 to 2 hours the first day for pain relief. You can make an ice pack by wrapping a plastic bag of ice cubes in a thin towel. As the ice melts, be careful that the splint, cast, boot, or shoe doesn t get wet. You can place the ice pack directly over the splint or cast. Unless told otherwise, you can open the boot or shoe to apply the ice pack. Continue using the ice pack 3 to 4 times a day for the next 2 days. Then use the ice pack as needed to ease pain and swelling.    Keep the splint, cast, boot, or shoe dry. When bathing, protect it with a large plastic bag, rubber-banded at the top end. If a fiberglass splint or cast or boot gets wet, you can dry it with a hair dryer. Unless told otherwise, you can take off the boot or shoe to bathe.    You may use acetaminophen or ibuprofen to control pain, unless another pain medicine was prescribed. If you have chronic liver or kidney disease, talk with your healthcare provider before using these medicines. Also talk with your provider if you ve had a stomach ulcer or gastrointestinal bleeding.    Don t put creams or objects under the cast if you have itching.  Follow-up care  Follow up with your healthcare provider, or as advised. This is to make sure the bone is healing the way it should. If you were given a splint, it may be changed to a cast or boot at your follow-up visit.  X-rays may be taken. You will be told of any new findings that may affect your care.  When to seek medical advice  Call your healthcare provider right away if any of these occur:    The cast or splint cracks    The plaster cast or splint becomes wet or soft    The fiberglass cast or splint stays wet for more than 24 hours    Bad odor from the cast or wound fluid  stains the cast    Tightness or pain under the cast or splint gets worse    Toes become swollen, cold, blue, numb, or tingly    You can t move your toes    Skin around cast or splint becomes red    Fever of 100.4 F (38 C) or higher, or as directed by your healthcare provider  Date Last Reviewed: 2/1/2017 2000-2018 [employer]. [address] [place] [address]. All rights reserved. This information is not intended as a substitute for professional medical care. Always follow your healthcare professional's instructions.           Patient Education     What Is a TIA?    A TIA (transient ischemic attack) is an early warning that a stroke (also called a brain attack) is coming. A TIA is a temporary stroke. It causes no lasting damage. But the effects of a stroke, if it happens, can be very serious and lasting. If you think you are having symptoms of a TIA or stroke--even if they don t last--get medical help right away.  Symptoms of TIA and stroke  Symptoms may come on suddenly and last for a few seconds or a few hours. You may have symptoms only once. Or they may come and go for days. If you notice any of the following symptoms, don t wait. Call 911 or emergency services right away.    Weakness, numbness, tingling, or loss of feeling in your face, arm, or leg    Trouble seeing in one or both eyes; double vision    Slurred speech, trouble talking, or problems understanding others when they speak    Sudden, severe headache    Dizziness or a feeling of spinning    Loss of balance or falling    Blackouts  F.A.S.T. is an easy way to remember the signs of a stroke. When you see the signs, you will know what you need to call 911 fast.   F.A.S.T. stands for:     F is for face drooping. One side of the face is drooping or numb. When the person smiles, the smile is uneven.    A is for arm weakness. One arm is weak or numb. When the person lifts both arms and the same time, one arm may drift  downward.    S is for speech difficulty. You may notice slurred speech or trouble speaking. The person can't repeat a simple sentence correctly when asked.    T is for time to call 911. If someone shows any of these symptoms, even if they go away, call 911 right away. Make note of the time the symptoms first appeared.   Date Last Reviewed: 7/1/2017 2000-2018 [employer]. [address] [place] [address], [place] [address]. All rights reserved. This information is not intended as a substitute for professional medical care. Always follow your healthcare professional's instructions.

## 2019-07-23 NOTE — PROGRESS NOTES
"  SUBJECTIVE:   Carolin Mcbride is a 84 year old female who presents for Preventive Visit.  Are you in the first 12 months of your Medicare Part B coverage?  No    Physical Health:    In general, how would you rate your overall physical health? excellent    Outside of work, how many days during the week do you exercise? none    Outside of work, approximately how many minutes a day do you exercise?not applicable    If you drink alcohol do you typically have >3 drinks per day or >7 drinks per week? No    Do you usually eat at least 4 servings of fruit and vegetables a day, include whole grains & fiber and avoid regularly eating high fat or \"junk\" foods? Yes    Do you have any problems taking medications regularly?  No    Do you have any side effects from medications? none    Needs assistance for the following daily activities: no assistance needed    Which of the following safety concerns are present in your home?  none identified     Hearing impairment: No    In the past 6 months, have you been bothered by leaking of urine? yes    Mental Health:    In general, how would you rate your overall mental or emotional health? excellent  PHQ-2 Score:      Do you feel safe in your environment? Yes    Do you have a Health Care Directive? No: Advance care planning was reviewed with patient; patient declined at this time.    Additional concerns to address?  YES, right foot pain     Fall risk:  Fallen 2 or more times in the past year?: No  Any fall with injury in the past year?: No    Cognitive Screenin) Repeat 3 items (Leader, Season, Table)    2) Clock draw: NORMAL  3) 3 item recall: Recalls 3 objects  Results: 3 items recalled: COGNITIVE IMPAIRMENT LESS LIKELY    Mini-CogTM Copyright WENDI Andrade. Licensed by the author for use in [facility]; reprinted with permission (avani@.[facility]). All rights reserved.      Do you have sleep apnea, excessive snoring or daytime drowsiness?: no        Hyperlipidemia " Follow-Up      Are you having any of the following symptoms? (Select all that apply)  No complaints of shortness of breath, chest pain or pressure.  No increased sweating or nausea with activity.  No left-sided neck or arm pain.  No complaints of pain in calves when walking 1-2 blocks.    Are you regularly taking any medication or supplement to lower your cholesterol?   Yes- statin    Are you having muscle aches or other side effects that you think could be caused by your cholesterol lowering medication?  No      Hypertension Follow-up      Do you check your blood pressure regularly outside of the clinic? Yes     Are you following a low salt diet? Yes    Are your blood pressures ever more than 140 on the top number (systolic) OR more   than 90 on the bottom number (diastolic), for example 140/90? No  Vascular Disease Follow-up      Are you having any of the following symptoms? (Select all that apply) No complaints of shortness of breath, chest pain or pressure.  No increased sweating or nausea with activity.  No left-sided neck or arm pain.  No complaints of pain in calves when walking 1-2 blocks.    How often do you take nitroglycerin? Never    Do you take an aspirin every day? Yes    Cerebrovascular Follow-up      Patient history: TIA- recent of vertebral artery system. Seen by neurologist at [facility]. Increased dose of atorvastatin to 20 mg daily, started on Plavix. Order for echocardiogram with bubble study.     Residual symptoms: headaches    Worsened or new symptoms since last visit: No    Daily aspirin use: Yes    Hypertension controlled: Yes     Chronic Kidney Disease Follow-up      Current NSAID use?  No      Reviewed and updated as needed this visit by clinical staff  Tobacco  Allergies  Meds  Problems         Reviewed and updated as needed this visit by Provider  Allergies  Meds  Problems        Social History     Tobacco Use     Smoking status: Never Smoker     Smokeless  tobacco: Never Used     Tobacco comment: Never smoked; nonsmoking household   Substance Use Topics     Alcohol use: No     Comment: very rare                           Current providers sharing in care for this patient include:   Patient Care Team:  Lela Townsend MD as PCP - General (Family Practice)  Lela Townsend MD as Assigned PCP    The following health maintenance items are reviewed in Epic and correct as of today:  Health Maintenance   Topic Date Due     ZOSTER IMMUNIZATION (2 of 3) 12/15/2009     MEDICARE ANNUAL WELLNESS VISIT  12/26/2018     INFLUENZA VACCINE (1) 09/01/2019     PHQ-9  01/09/2020     EYE EXAM  03/25/2020     FALL RISK ASSESSMENT  06/04/2020     BMP  07/23/2020     LIPID  07/23/2020     MICROALBUMIN  07/23/2020     ADVANCE CARE PLANNING  12/20/2021     DTAP/TDAP/TD IMMUNIZATION (3 - Td) 11/06/2022     DEXA  Completed     DEPRESSION ACTION PLAN  Completed     PNEUMOCOCCAL IMMUNIZATION 65+ LOW/MEDIUM RISK  Completed     IPV IMMUNIZATION  Aged Out     MENINGITIS IMMUNIZATION  Aged Out     Lab work is in process  Labs reviewed in EPIC  BP Readings from Last 3 Encounters:   07/23/19 148/83   07/09/19 138/82   07/05/19 163/77    Wt Readings from Last 3 Encounters:   07/23/19 76.7 kg (169 lb)   07/09/19 75.8 kg (167 lb)   07/05/19 76.7 kg (169 lb 3.2 oz)                  Patient Active Problem List   Diagnosis     Pseudophakia OU c YAG OU     CAD (coronary artery disease)     Advance care planning     Hyperlipidemia with target LDL less than 100     Osteoporosis     CKD (chronic kidney disease) stage 3, GFR 30-59 ml/min (H)     Seasonal allergic rhinitis     Diagnostic skin and sensitization tests (aka ALLERGENS)     Pastrana's neuroma     Strabismic amblyopia     Anterior basement membrane dystrophy, OU     PVD (posterior vitreous detachment) OU     Granuloma annulare     Past Surgical History:   Procedure Laterality Date     APPENDECTOMY       CHOLECYSTECTOMY, OPEN       COLONOSCOPY   2009    neg     CYSTOCELE REPAIR  2003    TVT SPARC       Social History     Tobacco Use     Smoking status: Never Smoker     Smokeless tobacco: Never Used     Tobacco comment: Never smoked; nonsmoking household   Substance Use Topics     Alcohol use: No     Comment: very rare     Family History   Problem Relation Age of Onset     Cancer Mother         ovarian      Heart Disease Sister         CABG x 3     Neurologic Disorder Sister         Fibromyalgia     Cancer Father         stomach/interstines      Musculoskeletal Disorder Brother         back      Heart Disease Brother 60        CABG x 4     Glaucoma No family hx of      Macular Degeneration No family hx of          Current Outpatient Medications   Medication Sig Dispense Refill     aspirin 81 MG tablet Take 1 tablet (81 mg) by mouth daily 90 tablet 3     atorvastatin (LIPITOR) 20 MG tablet Take 1 tablet (20 mg) by mouth daily 90 tablet 3     citalopram (CELEXA) 10 MG tablet TAKE 1 TABLET (10 MG) BY MOUTH DAILY 90 tablet 3     clopidogrel (PLAVIX) 75 MG tablet Take 1 tablet (75 mg) by mouth daily 90 tablet 2     Hypromellose (ARTIFICIAL TEARS OP) Apply 1 drop to eye as needed Both eyes.       losartan (COZAAR) 50 MG tablet TAKE 1 TABLET EVERY DAY  (WITH  BREAKFAST) 90 tablet 3     order for DME Equipment being ordered: walking cast boot 1 Device 0     polyethylene glycol (MIRALAX) powder Take 17 g (1 capful) by mouth daily 510 g 3     triamcinolone (KENALOG) 0.1 % cream Apply 2-3 times a day to affected area. 80 g 6     VITAMIN D 400 UNIT OR TABS 1 cap twice a day        CALCIUM 500 + D OR 1 tab three times a day        ipratropium (ATROVENT) 0.06 % spray Spray 2 sprays into both nostrils 3 times daily as needed for rhinitis (Patient not taking: Reported on 7/23/2019) 3 Box 4     Allergies   Allergen Reactions     Baycol [Cerivastatin Sodium]       muscle mass loss       Crestor [Hmg-Coa-R Inhibitors]      Previously on Baycol and affect muscle mass loss.      "Darvocet [Acetaminophen]      Severe nausea     Macrobid [Nitrofuran Derivatives]      Hives     Recent Labs   Lab Test 07/23/19  1040 07/05/19 12/26/17  1030  12/20/16  1054  01/12/15  1034  05/19/14  1500  11/06/12  1045   *  --  117*  --  118*   < >  --    < >  --    < > 160*   HDL 47*  --  58  --  63   < >  --    < >  --    < > 43*   TRIG 170*  --  187*  --  130   < >  --    < >  --    < > 160*   ALT 23  --   --   --   --   --  29  --  22   < >  --    CR 0.88 0.95 0.94   < > 1.00   < >  --    < > 0.99   < >  --    GFRESTIMATED 60* 56* 57*   < > 53*   < >  --    < > 54*   < >  --    GFRESTBLACK 70 >60 69   < > 64   < >  --    < > 65   < >  --    POTASSIUM 4.3 4.6 4.3   < > 4.5   < >  --    < > 4.7   < >  --    TSH  --   --   --   --   --   --   --   --  1.56  --  1.52    < > = values in this interval not displayed.      Pneumonia Vaccine:Adults age 65+ who received Pneumovax (PPSV23) at 65 years or older: Should be given PCV13 > 1 year after their most recent PPSV23  Mammogram Screening: Patient over age 75, has elected to continue with mammography screening.    ROS:  Constitutional, HEENT, cardiovascular, pulmonary, gi and gu systems are negative, except as otherwise noted.    OBJECTIVE:   /83 (BP Location: Right arm, Patient Position: Chair, Cuff Size: Adult Regular)   Pulse 87   Temp 98.2  F (36.8  C) (Oral)   Ht 1.607 m (5' 3.25\")   Wt 76.7 kg (169 lb)   LMP  (LMP Unknown)   SpO2 98%   Breastfeeding? No   BMI 29.70 kg/m   Estimated body mass index is 29.7 kg/m  as calculated from the following:    Height as of this encounter: 1.607 m (5' 3.25\").    Weight as of this encounter: 76.7 kg (169 lb).  EXAM:   GENERAL: elderly, alert, well nourished, well hydrated, no distress, antalgic gait favoring left side due to foot pain  HENT: ear canals- normal; TMs- normal; Nose- normal; Mouth- no ulcers, no lesions, missing dentition  NECK: no tenderness, no adenopathy, no asymmetry, no masses, no " stiffness; thyroid- normal to palpation  RESP: lungs clear to auscultation - no rales, no rhonchi, no wheezes  CV: regular rates and rhythm, normal S1 S2, no S3 or S4 and no murmur, no click or rub, normal pulses  ABDOMEN: soft, no tenderness, no  hepatosplenomegaly, no masses, normal bowel sounds  MS: extremities- no gross deformities noted, no edema, tenderness with some swelling and ecchymosis in the mid foot area.   SKIN: no suspicious lesions, no rashes, age related skin changes with seborrheic keratosis and no actinic keratosis.    NEURO: strength and tone- decreased, sensory exam- grossly normal, reflexes- symmetric  BACK: no CVA tenderness, no paralumbar tenderness  MENTAL STATUS EXAM:  Appearance/Behavior: no apparent distress, neatly groomed, dressed appropriately for weather, appears stated age and is not frail-appearing  Speech: normal  Mood/Affect: normal affect  Insight: Good     Diagnostic Test Results:  Labs reviewed in Epic  Results for orders placed or performed in visit on 07/23/19   MA Screen Bilateral w/Harjeet    Narrative    SCREENING MAMMOGRAM, BILATERAL, DIGITAL w/CAD AND TOMOSYNTHESIS -  7/23/2019 2:18 PM    BREAST SYMPTOMS: No current breast complaints.     COMPARISON:  12/26/2017, 12/20/2016, 11/19/2015, 07/22/2014.    BREAST DENSITY: Scattered fibroglandular densities.    COMMENTS: No findings of suspicion for malignancy.       Impression    IMPRESSION: BI-RADS CATEGORY: 1 -  Negative    RECOMMENDED FOLLOW-UP: Annual Mammography.    Exam results letter mailed to patient.      WILLIAN HOLGUIN MD     FOOT RIGHT THREE OR MORE VIEWS   7/23/2019 3:45 PM      HISTORY:  Contusion of right foot, initial encounter.     COMPARISON: 2/18/2014                                                                       IMPRESSION:   1. Oblique fracture of the fifth metatarsal distal diaphysis, with  slight displacement (seen on the lateral view).  2. Old ununited fracture at the base of the fifth metatarsal  again  seen.     JOANNA DUONG MD    ASSESSMENT / PLAN:       ICD-10-CM    1. Encounter for Medicare annual wellness exam Z00.00 Multiple problems today addressed   2. Hyperlipidemia with target LDL less than 100 E78.5 atorvastatin (LIPITOR) 20 MG tablet- refilled and LDL is too high but has not restarted new dose. Recheck 3-4 months   3. Transient ischemic attack involving vertebral artery G45.0 clopidogrel (PLAVIX) 75 MG tablet- will be on this for 6 months per neurology. Reviewed risk and benefits, watch for signs of bleeding. Follow up with neurology as recommended or if symptoms recurrent.   4. Contusion of right foot, initial encounter- injury 1 week ago and has been walking on this foot.  S90.31XA XR Foot Right G/E 3 Views- new foot fracture   5. Closed nondisplaced fracture of fifth metatarsal bone of right foot, initial encounter S92.354A order for DME- walking cast boot- patient has one at home from fracture in 2014 and prefers to use this. Declines crutches. Agrees to follow up with orthopedics.      ORTHOPEDICS ADULT REFERRAL   6. Coronary artery disease involving native coronary artery of native heart without angina pectoris I25.10 No recurrent chest pain. Neurologist recommends follow up with cardiology due to recent TIA.   7. CKD (chronic kidney disease) stage 3, GFR 30-59 ml/min (H) N18.3 Renal function stable GFR 60 but protein has increased and blood pressure not in good control. Needs increased losartan dose. Patient wants to wait due to low blood pressure at home.       End of Life Planning:  Patient currently has an advanced directive: Yes.  Practitioner is supportive of decision.    COUNSELING:  Reviewed preventive health counseling, as reflected in patient instructions       Regular exercise       Healthy diet/nutrition       Vision screening       Hearing screening       Dental care       Bladder control       Fall risk prevention       Osteoporosis Prevention/Bone Health    Estimated body mass  "index is 29.7 kg/m  as calculated from the following:    Height as of this encounter: 1.607 m (5' 3.25\").    Weight as of this encounter: 76.7 kg (169 lb).         reports that she has never smoked. She has never used smokeless tobacco.      Appropriate preventive services were discussed with this patient, including applicable screening as appropriate for cardiovascular disease, diabetes, osteopenia/osteoporosis, and glaucoma.  As appropriate for age/gender, discussed screening for colorectal cancer, prostate cancer, breast cancer, and cervical cancer. Checklist reviewing preventive services available has been given to the patient.    Reviewed patients plan of care and provided an AVS. The Basic Care Plan (routine screening as documented in Health Maintenance) for [place] meets the Care Plan requirement. This Care Plan has been established and reviewed with the Patient.    Counseling Resources:  ATP IV Guidelines  Pooled Cohorts Equation Calculator  Breast Cancer Risk Calculator  FRAX Risk Assessment  ICSI Preventive Guidelines  Dietary Guidelines for Americans, 2010  USDA's MyPlate  ASA Prophylaxis  Lung CA Screening    Lela Townsend MD  [facility]  "

## 2019-07-23 NOTE — LETTER
2019      Carolin Mcbride  [address] [facility] [address]              Dear Carolin A Reed        Your test results are attached. I am happy to let you know that they are stable and your medications can stay the same.    The kidneys are healthy. The blood sugar is normal and you do not have diabetes. The urine albumin is increased and we should recheck this in 1 year. The cholesterol is also a bit higher. We can recheck labs in 6 months.      Enclosed is a copy of the results.  It was a pleasure to see you at your last appointment.    If you have any questions or concerns, please call myself or my nurse at [phone].      Sincerely,      Lela Townsend MD, MPH /LIZA Sullivan MA  [employer]      Results for orders placed or performed in visit on 19   **ALT FUTURE anytime   Result Value Ref Range    ALT 23 0 - 50 U/L   Lipid panel reflex to direct LDL Fasting   Result Value Ref Range    Cholesterol 230 (H) <200 mg/dL    Triglycerides 170 (H) <150 mg/dL    HDL Cholesterol 47 (L) >49 mg/dL    LDL Cholesterol Calculated 149 (H) <100 mg/dL    Non HDL Cholesterol 183 (H) <130 mg/dL   **Basic metabolic panel FUTURE anytime   Result Value Ref Range    Sodium 141 133 - 144 mmol/L    Potassium 4.3 3.4 - 5.3 mmol/L    Chloride 109 94 - 109 mmol/L    Carbon Dioxide 24 20 - 32 mmol/L    Anion Gap 8 3 - 14 mmol/L    Glucose 119 (H) 70 - 99 mg/dL    Urea Nitrogen 17 7 - 30 mg/dL    Creatinine 0.88 0.52 - 1.04 mg/dL    GFR Estimate 60 (L) >60 mL/min/[1.73_m2]    GFR Estimate If Black 70 >60 mL/min/[1.73_m2]    Calcium 9.1 8.5 - 10.1 mg/dL   Albumin Random Urine Quantitative with Creat Ratio   Result Value Ref Range    Creatinine Urine 52 mg/dL    Albumin Urine mg/L 135 mg/L    Albumin Urine mg/g Cr 257.14 (H) 0 - 25 mg/g Cr                 RE: Carolin Mcbride   MRN: [id number]  : 1934  ENC DATE: 2019

## 2019-07-25 ENCOUNTER — ANCILLARY PROCEDURE (OUTPATIENT)
Dept: CARDIOLOGY | Facility: CLINIC | Age: 84
End: 2019-07-25
Attending: INTERNAL MEDICINE
Payer: COMMERCIAL

## 2019-07-25 ENCOUNTER — OFFICE VISIT (OUTPATIENT)
Dept: CARDIOLOGY | Facility: CLINIC | Age: 84
End: 2019-07-25
Payer: COMMERCIAL

## 2019-07-25 VITALS — OXYGEN SATURATION: 98 % | HEART RATE: 74 BPM | DIASTOLIC BLOOD PRESSURE: 77 MMHG | SYSTOLIC BLOOD PRESSURE: 151 MMHG

## 2019-07-25 DIAGNOSIS — I10 HYPERTENSION, BENIGN ESSENTIAL, GOAL BELOW 140/90: ICD-10-CM

## 2019-07-25 DIAGNOSIS — G45.9 TIA (TRANSIENT ISCHEMIC ATTACK): ICD-10-CM

## 2019-07-25 DIAGNOSIS — G45.9 TIA (TRANSIENT ISCHEMIC ATTACK): Primary | ICD-10-CM

## 2019-07-25 PROCEDURE — 99214 OFFICE O/P EST MOD 30 MIN: CPT | Performed by: INTERNAL MEDICINE

## 2019-07-25 PROCEDURE — 0296T ZIO PATCH HOLTER ADULT PEDIATRIC GREATER THAN 48 HRS: CPT | Performed by: INTERNAL MEDICINE

## 2019-07-25 PROCEDURE — 0298T ZZC EXT ECG > 48HR TO 21 DAY REVIEW AND INTERPRETATN: CPT | Performed by: INTERNAL MEDICINE

## 2019-07-25 RX ORDER — LOSARTAN POTASSIUM 100 MG/1
100 TABLET ORAL DAILY
Qty: 90 TABLET | Refills: 3 | Status: SHIPPED | OUTPATIENT
Start: 2019-07-25 | End: 2020-08-04

## 2019-07-25 NOTE — PROGRESS NOTES
SUBJECTIVE:  Carolin Mcbride is a 84 year old female who presents for follow-up.    On fifth of this month patient attended emergency room with TIA-like symptoms.  Mainly headache and difficulty in finding words.  Patient had CT MRI and MRA.  There were no significant findings in any of these test.  Patient was started on Plavix.  Now patient states she has a headache which is frontal, stiff neck and feels sick to her stomach.  Denied any specific cardiac symptoms.    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 minimally only for Tree pollens (all other environmental allergens NEGATIVE)       Diagnostic skin and sensitization tests (aka ALLERGENS)      Priority: Medium     CKD (chronic kidney disease) stage 3, GFR 30-59 ml/min (H) 05/22/2014     Priority: Medium     Hyperlipidemia with target LDL less than 100 04/08/2013     Priority: Medium     Diagnosis updated by automated process. Provider to review and confirm.       Osteoporosis      Priority: Medium     5 years Fosamax 1-1-2011 to 12-. Medication holiday recommended.         Advance care planning 03/13/2012     Priority: Medium     Advance Care Planning 12/20/2016: ACP Review of Chart / Resources Provided:  Reviewed chart for advance care plan.  Carolin Mcbride has been provided information and resources to begin or update their advance care plan.  Advance Care Planning 03/13/2012: Discussed advance care planning with patient; information given to patient to review. 3/13/2012 . Lara Roque MA                  CAD (coronary artery disease)       Priority: Medium     asymptomatic, on CT scan       Pseudophakia OU c YAG OU 06/02/2011     Priority: Medium    .  Current Outpatient Medications   Medication Sig     aspirin 81 MG tablet Take 1 tablet (81 mg) by mouth daily     atorvastatin (LIPITOR) 20 MG tablet Take 1 tablet (20 mg) by mouth daily     citalopram (CELEXA) 10 MG tablet TAKE 1 TABLET (10 MG) BY MOUTH DAILY     clopidogrel (PLAVIX) 75 MG tablet Take 1 tablet (75 mg) by mouth daily     Hypromellose (ARTIFICIAL TEARS OP) Apply 1 drop to eye as needed Both eyes.     losartan (COZAAR) 50 MG tablet TAKE 1 TABLET EVERY DAY  (WITH  BREAKFAST)     polyethylene glycol (MIRALAX) powder Take 17 g (1 capful) by mouth daily     triamcinolone (KENALOG) 0.1 % cream Apply 2-3 times a day to affected area.     VITAMIN D 400 UNIT OR TABS 1 cap twice a day      CALCIUM 500 + D OR 1 tab three times a day      ipratropium (ATROVENT) 0.06 % spray Spray 2 sprays into both nostrils 3 times daily as needed for rhinitis (Patient not taking: Reported on 7/23/2019)     order for DME Equipment being ordered: walking cast boot     No current facility-administered medications for this visit.      Past Medical History:   Diagnosis Date     Adjustment reaction with anxiety and depression 2001     CAD (coronary artery disease) 2009    asymptomatic, on CT scan     Cyst, ovarian      Diagnostic skin and sensitization tests (aka ALLERGENS) 9/29/14 IgE tests pos. minimally only for Tree pollens (all other environmental allergens NEGATIVE)     HTN (hypertension)      Hyperlipidemia      Myositis 2001    related to past Baycol use- resolved     Osteoporosis      Seasonal allergic rhinitis     9/29/14 IgE tests pos. minimally only for Tree pollens (all other environmental allergens NEGATIVE)     Past Surgical History:   Procedure Laterality Date     APPENDECTOMY       CHOLECYSTECTOMY, OPEN       COLONOSCOPY  2009    neg     CYSTOCELE REPAIR  2003    TVT SPARC     Allergies   Allergen  Reactions     Baycol [Cerivastatin Sodium]       muscle mass loss       Crestor [Hmg-Coa-R Inhibitors]      Previously on Baycol and affect muscle mass loss.     Darvocet [Acetaminophen]      Severe nausea     Macrobid [Nitrofuran Derivatives]      Hives     Social History     Socioeconomic History     Marital status:      Spouse name: Shashank     Number of children: 3     Years of education: Not on file     Highest education level: Not on file   Occupational History     Employer: RETIRED   Social Needs     Financial resource strain: Not on file     Food insecurity:     Worry: Not on file     Inability: Not on file     Transportation needs:     Medical: Not on file     Non-medical: Not on file   Tobacco Use     Smoking status: Never Smoker     Smokeless tobacco: Never Used     Tobacco comment: Never smoked; nonsmoking household   Substance and Sexual Activity     Alcohol use: No     Comment: very rare     Drug use: No     Comment: never     Sexual activity: Yes     Partners: Male     Birth control/protection: None   Lifestyle     Physical activity:     Days per week: Not on file     Minutes per session: Not on file     Stress: Not on file   Relationships     Social connections:     Talks on phone: Not on file     Gets together: Not on file     Attends Congregational service: Not on file     Active member of club or organization: Not on file     Attends meetings of clubs or organizations: Not on file     Relationship status: Not on file     Intimate partner violence:     Fear of current or ex partner: Not on file     Emotionally abused: Not on file     Physically abused: Not on file     Forced sexual activity: Not on file   Other Topics Concern     Parent/sibling w/ CABG, MI or angioplasty before 65F 55M? No      Service No     Blood Transfusions No     Caffeine Concern No     Occupational Exposure No     Hobby Hazards No     Sleep Concern No     Stress Concern No     Weight Concern Yes     Special Diet No      Back Care No     Exercise Yes     Comment: Curves     Bike Helmet No     Seat Belt Yes     Self-Exams Yes   Social History Narrative     Not on file     Family History   Problem Relation Age of Onset     Cancer Mother         ovarian      Heart Disease Sister         CABG x 3     Neurologic Disorder Sister         Fibromyalgia     Cancer Father         stomach/interstines      Musculoskeletal Disorder Brother         back      Heart Disease Brother 60        CABG x 4     Glaucoma No family hx of      Macular Degeneration No family hx of           REVIEW OF SYSTEMS:  General: negative, fever, chills, night sweats  Skin: negative, acne, rash and scaling  Eyes: negative, double vision, eye pain and photophobia  Ears/Nose/Throat: negative, nasal congestion and purulent rhinorrhea  Respiratory: No dyspnea on exertion, No cough, No hemoptysis and negative  Cardiovascular: negative, palpitations, tachycardia, irregular heart beat, chest pain, exertional chest pain or pressure, paroxysmal nocturnal dyspnea, dyspnea on exertion and orthopnea         OBJECTIVE:  Blood pressure 151/77, pulse 74, SpO2 98 %, not currently breastfeeding.  General Appearance: healthy, alert, active and no distress  Head: Normocephalic. No masses, lesions, tenderness or abnormalities  Eyes: conjuctiva clear, PERRL, EOM intact  Ears: External ears normal. Canals clear. TM's normal.  Nose: Nares normal  Mouth: normal  Neck: Supple, no cervical adenopathy, no thyromegaly  Lungs: clear to auscultation  Cardiac: regular rate and rhythm, normal S1 and S2, no murmur         ASSESSMENT/PLAN:  Patient is here for follow-up after an ED visit with TIA-like symptoms.  Patient had a headache and difficulty finding words.  Currently have some frontal headache, stiff neck and is sick feeling in her stomach.  Denied any cardiac symptoms.  Patient also concerned that her blood pressure is slightly high.  Denied any palpitation or any other cardiac  symptoms.  Reviewed records from [facility].  She had near normal MRI MRA evaluation of her neck and brain.  Recently had an echocardiogram with bubble study.  This was also normal without any evidence of shunting.    Overall at this time there is no cardiac cause for her TIA-like symptoms.  The EKG in [facility] showed sinus rhythm with PACs.  At her age atrial fibrillation is a possibility but she denied any symptoms.  We will plan for a 2-week ZIO patch to see for any asymptomatic atrial fibrillation.  Patient is advised to continue her current medications.  Due to the concern of elevated blood pressure we will increase the dose of losartan to 100 mg daily.  Per orders.   Return to Clinic PRN.

## 2019-07-25 NOTE — PATIENT INSTRUCTIONS
Thank you for coming to the [facility]; please note the following instructions:    1.  We have applied a heart monitor (ZIO Patch) for you to wear for 2 weeks.  You may remove the heart monitor on Thursday, August 8th at 10:15 am.  Please see the instruction booklet for further information, as well as instructions for removal of the heart monitor and return mailing directions.  If you should have questions regarding your monitor, please call [employer] at [phone].  We will contact you with your results (please see result notification details at bottom of summary).    *PLEASE DO NOT SHOWER OR INDUCE EXCESSIVE SWEATING IN THE FIRST 24 HOURS OF WEARING*    2.  Increase losartan 100 mg daily.  A new prescription has been sent to your pharmacy.    3.  Results will determine the next step.        If you have any questions regarding your visit please contact your care team:     Cardiology  Telephone Number   Calista FONG., RN  Leslee YORK, RN   Valeria HARTMANN, EVERTON SERRATO, EVERTON HARPER, [facility]   [phone] (option 1)   For scheduling appts:     [phone] (select option 1)       For the Device Clinic (Pacemakers and ICD's)  RN's :  Arin Cantu   During business hours: [phone]    *After business hours:  [phone] (select option 4)      Normal test result notifications will be released via [employer] or mailed within 7 business days.  All other test results, will be communicated via telephone once reviewed by your cardiologist.    If you need a medication refill please contact your pharmacy.  Please allow 3 business days for your refill to be completed.    As always, thank you for trusting us with your health care needs!

## 2019-07-25 NOTE — NURSING NOTE
"Chief Complaint   Patient presents with     RECHECK     OV with Dr. INOCENTE Peña for f/u s/p ED TIA. Pt reports a stiff neck, lightheaded, sick to stomach.       Initial /77 (BP Location: Left arm, Patient Position: Chair, Cuff Size: Adult Large)   Pulse 74   LMP  (LMP Unknown)   SpO2 98%  Estimated body mass index is 29.7 kg/m  as calculated from the following:    Height as of 7/23/19: 1.607 m (5' 3.25\").    Weight as of 7/23/19: 76.7 kg (169 lb)..  BP completed using cuff size: toño Oden MA    "

## 2019-07-25 NOTE — LETTER
7/25/2019      RE: Carolin Mcbride  [address] [facility] [address]       Dear Colleague,    Thank you for the opportunity to participate in the care of your patient, Carolin Mcbride, at the [facility] AT [facility]. Please see a copy of my visit note below.       SUBJECTIVE:  Carolin Mcbride is a 84 year old female who presents for follow-up.    On fifth of this month patient attended emergency room with TIA-like symptoms.  Mainly headache and difficulty in finding words.  Patient had CT MRI and MRA.  There were no significant findings in any of these test.  Patient was started on Plavix.  Now patient states she has a headache which is frontal, stiff neck and feels sick to her stomach.  Denied any specific cardiac symptoms.    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 minimally only for Tree pollens (all other environmental allergens NEGATIVE)       Diagnostic skin and sensitization tests (aka ALLERGENS)      Priority: Medium     CKD (chronic kidney disease) stage 3, GFR 30-59 ml/min (H) 05/22/2014     Priority: Medium     Hyperlipidemia with target LDL less than 100 04/08/2013     Priority: Medium     Diagnosis updated by automated process. Provider to review and confirm.       Osteoporosis      Priority: Medium     5 years Fosamax 1-1-2011 to 12-. Medication holiday recommended.         Advance care planning 03/13/2012     Priority: Medium     Advance Care Planning 12/20/2016: ACP Review of Chart / Resources Provided:   Reviewed chart for advance care plan.  Carolin Mcbride has been provided information and resources to begin or update their advance care plan.  Advance Care Planning 03/13/2012: Discussed advance care planning with patient; information given to patient to review. 3/13/2012 . Lara Roque MA                  CAD (coronary artery disease)      Priority: Medium     asymptomatic, on CT scan       Pseudophakia OU c YAG OU 06/02/2011     Priority: Medium    .  Current Outpatient Medications   Medication Sig     aspirin 81 MG tablet Take 1 tablet (81 mg) by mouth daily     atorvastatin (LIPITOR) 20 MG tablet Take 1 tablet (20 mg) by mouth daily     citalopram (CELEXA) 10 MG tablet TAKE 1 TABLET (10 MG) BY MOUTH DAILY     clopidogrel (PLAVIX) 75 MG tablet Take 1 tablet (75 mg) by mouth daily     Hypromellose (ARTIFICIAL TEARS OP) Apply 1 drop to eye as needed Both eyes.     losartan (COZAAR) 50 MG tablet TAKE 1 TABLET EVERY DAY  (WITH  BREAKFAST)     polyethylene glycol (MIRALAX) powder Take 17 g (1 capful) by mouth daily     triamcinolone (KENALOG) 0.1 % cream Apply 2-3 times a day to affected area.     VITAMIN D 400 UNIT OR TABS 1 cap twice a day      CALCIUM 500 + D OR 1 tab three times a day      ipratropium (ATROVENT) 0.06 % spray Spray 2 sprays into both nostrils 3 times daily as needed for rhinitis (Patient not taking: Reported on 7/23/2019)     order for DME Equipment being ordered: walking cast boot     No current facility-administered medications for this visit.      Past Medical History:   Diagnosis Date     Adjustment reaction with anxiety and depression 2001     CAD (coronary artery disease) 2009    asymptomatic, on CT scan     Cyst, ovarian      Diagnostic skin and sensitization tests (aka ALLERGENS) 9/29/14 IgE tests pos. minimally only for Tree pollens (all other environmental allergens NEGATIVE)     HTN (hypertension)      Hyperlipidemia      Myositis 2001    related to past Baycol use- resolved      Osteoporosis      Seasonal allergic rhinitis     9/29/14 IgE tests pos. minimally only for Tree pollens (all other environmental allergens NEGATIVE)     Past Surgical History:   Procedure Laterality Date     APPENDECTOMY       CHOLECYSTECTOMY, OPEN       COLONOSCOPY  2009    neg     CYSTOCELE REPAIR  2003    TVT SPARC     Allergies   Allergen Reactions     Baycol [Cerivastatin Sodium]       muscle mass loss       Crestor [Hmg-Coa-R Inhibitors]      Previously on Baycol and affect muscle mass loss.     Darvocet [Acetaminophen]      Severe nausea     Macrobid [Nitrofuran Derivatives]      Hives     Social History     Socioeconomic History     Marital status:      Spouse name: Shashank     Number of children: 3     Years of education: Not on file     Highest education level: Not on file   Occupational History     Employer: RETIRED   Social Needs     Financial resource strain: Not on file     Food insecurity:     Worry: Not on file     Inability: Not on file     Transportation needs:     Medical: Not on file     Non-medical: Not on file   Tobacco Use     Smoking status: Never Smoker     Smokeless tobacco: Never Used     Tobacco comment: Never smoked; nonsmoking household   Substance and Sexual Activity     Alcohol use: No     Comment: very rare     Drug use: No     Comment: never     Sexual activity: Yes     Partners: Male     Birth control/protection: None   Lifestyle     Physical activity:     Days per week: Not on file     Minutes per session: Not on file     Stress: Not on file   Relationships     Social connections:     Talks on phone: Not on file     Gets together: Not on file     Attends [employer] service: Not on file     Active member of club or organization: Not on file     Attends meetings of clubs or organizations: Not on file     Relationship status: Not on file     Intimate partner violence:     Fear of current or ex partner: Not on file     Emotionally abused: Not on file     Physically abused: Not on  file     Forced sexual activity: Not on file   Other Topics Concern     Parent/sibling w/ CABG, MI or angioplasty before 65F 55M? No      Service No     Blood Transfusions No     Caffeine Concern No     Occupational Exposure No     Hobby Hazards No     Sleep Concern No     Stress Concern No     Weight Concern Yes     Special Diet No     Back Care No     Exercise Yes     Comment: Curves     Bike Helmet No     Seat Belt Yes     Self-Exams Yes   Social History Narrative     Not on file     Family History   Problem Relation Age of Onset     Cancer Mother         ovarian      Heart Disease Sister         CABG x 3     Neurologic Disorder Sister         Fibromyalgia     Cancer Father         stomach/interstines      Musculoskeletal Disorder Brother         back      Heart Disease Brother 60        CABG x 4     Glaucoma No family hx of      Macular Degeneration No family hx of           REVIEW OF SYSTEMS:  General: negative, fever, chills, night sweats  Skin: negative, acne, rash and scaling  Eyes: negative, double vision, eye pain and photophobia  Ears/Nose/Throat: negative, nasal congestion and purulent rhinorrhea  Respiratory: No dyspnea on exertion, No cough, No hemoptysis and negative  Cardiovascular: negative, palpitations, tachycardia, irregular heart beat, chest pain, exertional chest pain or pressure, paroxysmal nocturnal dyspnea, dyspnea on exertion and orthopnea         OBJECTIVE:  Blood pressure 151/77, pulse 74, SpO2 98 %, not currently breastfeeding.  General Appearance: healthy, alert, active and no distress  Head: Normocephalic. No masses, lesions, tenderness or abnormalities  Eyes: conjuctiva clear, PERRL, EOM intact  Ears: External ears normal. Canals clear. TM's normal.  Nose: Nares normal  Mouth: normal  Neck: Supple, no cervical adenopathy, no thyromegaly  Lungs: clear to auscultation  Cardiac: regular rate and rhythm, normal S1 and S2, no murmur         ASSESSMENT/PLAN:  Patient is here for  follow-up after an ED visit with TIA-like symptoms.  Patient had a headache and difficulty finding words.  Currently have some frontal headache, stiff neck and is sick feeling in her stomach.  Denied any cardiac symptoms.  Patient also concerned that her blood pressure is slightly high.  Denied any palpitation or any other cardiac symptoms.  Reviewed records from [facility].  She had near normal MRI MRA evaluation of her neck and brain.  Recently had an echocardiogram with bubble study.  This was also normal without any evidence of shunting.    Overall at this time there is no cardiac cause for her TIA-like symptoms.  The EKG in [facility] showed sinus rhythm with PACs.  At her age atrial fibrillation is a possibility but she denied any symptoms.  We will plan for a 2-week ZIO patch to see for any asymptomatic atrial fibrillation.  Patient is advised to continue her current medications.  Due to the concern of elevated blood pressure we will increase the dose of losartan to 100 mg daily.  Per orders.   Return to Clinic PRN.    Please do not hesitate to contact me if you have any questions/concerns.     Sincerely,     VAISHALI Juan MD

## 2019-07-25 NOTE — PROGRESS NOTES
2 week ZioXT applied to patient today. Instructions on use and removal given and mail back instructions discussed. All questions answered. Consent form signed. Device registered. Form faxed to [employer].  Device number: [id number].    Raquel Oden MA

## 2019-07-27 NOTE — RESULT ENCOUNTER NOTE
Dear Carolin Mcbride,    Your test results are attached. I am happy to let you know that they are stable and your medications can stay the same.    The kidneys are healthy. The blood sugar is normal and you do not have diabetes. The urine albumin is increased and we should recheck this in 1 year. The cholesterol is also a bit higher. We can recheck labs in 6 months.     Please call me if you have any questions about these test results or about your care.    Sincerely,    Lela Townsend MD

## 2019-07-27 NOTE — RESULT ENCOUNTER NOTE
Dear Carolin Mcbride,    Your test results are attached.    Follow up with your foot fracture as needed.     Please call me if you have any questions about these test results or about your care.    Sincerely,    Lela Townsend MD

## 2019-07-29 ENCOUNTER — TELEPHONE (OUTPATIENT)
Dept: FAMILY MEDICINE | Facility: CLINIC | Age: 84
End: 2019-07-29

## 2019-07-29 DIAGNOSIS — E78.5 HYPERLIPIDEMIA WITH TARGET LDL LESS THAN 100: ICD-10-CM

## 2019-07-29 RX ORDER — ATORVASTATIN CALCIUM 20 MG/1
20 TABLET, FILM COATED ORAL DAILY
Qty: 90 TABLET | Refills: 3 | Status: SHIPPED | OUTPATIENT
Start: 2019-07-29 | End: 2020-03-02 | Stop reason: SINTOL

## 2019-07-29 NOTE — TELEPHONE ENCOUNTER
Please contact patient and let her know that atorvastatin prescription was re-sent to [facility] today.      telephone encounter from 7/22/19 related to this encounter. Could not pend and re- send through that encounter so created this one.      This writer called and spoke to [facility] tech at [facility] and they did not receive the atorvastatin prescription sent on 7/23/19 for 90 tabs with 3 refills. This RN resent script for same quantity.       Bhavana Ji RN

## 2019-07-29 NOTE — TELEPHONE ENCOUNTER
Reason for Call:  Other prescription    Detailed comments: Carolin just spoke with [facility] 07/29 around 4pm and they never received this request. Please resend as soon as possible and give Carolin a call to let her know that it has been done. Thank you     Phone Number Patient can be reached at: Home number on file [phone] (home)    Best Time: Any    Can we leave a detailed message on this number? YES    Call taken on 7/29/2019 at 4:06 PM by Greg Nair

## 2019-08-22 ENCOUNTER — TELEPHONE (OUTPATIENT)
Dept: CARDIOLOGY | Facility: CLINIC | Age: 84
End: 2019-08-22

## 2019-08-22 NOTE — TELEPHONE ENCOUNTER
Preliminary report reviewed by Dr. Peña.  No afib found.  SVT was present.  Patient is asymptomatic.  Per Dr. Peña, continue same medication regimen.  Patient is agreeable with the plan and verbalized understanding.  States she will follow up with neurology.    Calista Li RN

## 2019-08-22 NOTE — TELEPHONE ENCOUNTER
[facility] Call Center    Phone Message    May a detailed message be left on voicemail: no    Reason for Call: Requesting Results   Name/type of test: Zio Patch  Date of test: 7/25/19  Was test done at a location other than [facility] (Please fill in the location if not [facility])?: Yes: [facility]      Action Taken: Other: [facility]

## 2019-10-28 ENCOUNTER — OFFICE VISIT (OUTPATIENT)
Dept: FAMILY MEDICINE | Facility: CLINIC | Age: 84
End: 2019-10-28
Payer: COMMERCIAL

## 2019-10-28 VITALS
OXYGEN SATURATION: 98 % | SYSTOLIC BLOOD PRESSURE: 142 MMHG | BODY MASS INDEX: 30.12 KG/M2 | HEIGHT: 63 IN | DIASTOLIC BLOOD PRESSURE: 70 MMHG | HEART RATE: 78 BPM | TEMPERATURE: 98.3 F | RESPIRATION RATE: 18 BRPM | WEIGHT: 170 LBS

## 2019-10-28 DIAGNOSIS — Z86.73 HISTORY OF TIA (TRANSIENT ISCHEMIC ATTACK) AND STROKE: Primary | ICD-10-CM

## 2019-10-28 DIAGNOSIS — R80.9 PROTEINURIA, UNSPECIFIED TYPE: ICD-10-CM

## 2019-10-28 DIAGNOSIS — Z23 NEED FOR PROPHYLACTIC VACCINATION AND INOCULATION AGAINST INFLUENZA: ICD-10-CM

## 2019-10-28 DIAGNOSIS — N18.30 CKD (CHRONIC KIDNEY DISEASE) STAGE 3, GFR 30-59 ML/MIN (H): ICD-10-CM

## 2019-10-28 DIAGNOSIS — G45.0: ICD-10-CM

## 2019-10-28 DIAGNOSIS — I10 HYPERTENSION GOAL BP (BLOOD PRESSURE) < 140/90: ICD-10-CM

## 2019-10-28 DIAGNOSIS — E78.5 HYPERLIPIDEMIA WITH TARGET LDL LESS THAN 100: ICD-10-CM

## 2019-10-28 DIAGNOSIS — N02.9 RECURRENT AND PERSISTENT HEMATURIA: ICD-10-CM

## 2019-10-28 DIAGNOSIS — J30.1 SEASONAL ALLERGIC RHINITIS DUE TO POLLEN: ICD-10-CM

## 2019-10-28 DIAGNOSIS — I25.10 CORONARY ARTERY DISEASE INVOLVING NATIVE CORONARY ARTERY OF NATIVE HEART WITHOUT ANGINA PECTORIS: ICD-10-CM

## 2019-10-28 LAB
ALBUMIN UR-MCNC: 30 MG/DL
APPEARANCE UR: CLEAR
BACTERIA #/AREA URNS HPF: ABNORMAL /HPF
BILIRUB UR QL STRIP: NEGATIVE
COLOR UR AUTO: YELLOW
GLUCOSE UR STRIP-MCNC: NEGATIVE MG/DL
HGB UR QL STRIP: ABNORMAL
KETONES UR STRIP-MCNC: NEGATIVE MG/DL
LEUKOCYTE ESTERASE UR QL STRIP: NEGATIVE
MUCOUS THREADS #/AREA URNS LPF: PRESENT /LPF
NITRATE UR QL: NEGATIVE
NON-SQ EPI CELLS #/AREA URNS LPF: ABNORMAL /LPF
PH UR STRIP: 6 PH (ref 5–7)
RBC #/AREA URNS AUTO: ABNORMAL /HPF
SOURCE: ABNORMAL
SP GR UR STRIP: 1.02 (ref 1–1.03)
UROBILINOGEN UR STRIP-ACNC: 0.2 EU/DL (ref 0.2–1)
WBC #/AREA URNS AUTO: ABNORMAL /HPF

## 2019-10-28 PROCEDURE — 81001 URINALYSIS AUTO W/SCOPE: CPT | Performed by: FAMILY MEDICINE

## 2019-10-28 PROCEDURE — G0008 ADMIN INFLUENZA VIRUS VAC: HCPCS | Performed by: FAMILY MEDICINE

## 2019-10-28 PROCEDURE — 90662 IIV NO PRSV INCREASED AG IM: CPT | Performed by: FAMILY MEDICINE

## 2019-10-28 PROCEDURE — 99214 OFFICE O/P EST MOD 30 MIN: CPT | Mod: 25 | Performed by: FAMILY MEDICINE

## 2019-10-28 RX ORDER — CLOPIDOGREL BISULFATE 75 MG/1
75 TABLET ORAL DAILY
Qty: 30 TABLET | Refills: 0 | Status: SHIPPED | OUTPATIENT
Start: 2019-10-28 | End: 2020-02-21

## 2019-10-28 ASSESSMENT — MIFFLIN-ST. JEOR: SCORE: 1189.2

## 2019-10-28 ASSESSMENT — PAIN SCALES - GENERAL: PAINLEVEL: NO PAIN (0)

## 2019-10-28 NOTE — PROGRESS NOTES
Subjective     Carolin Mcbride is a 85 year old female who presents to clinic today for the following health issues:    HPI   Cerebrovascular Follow-up      Patient history: TIA    Residual symptoms: matter in the morning, stiff neck started after TIA and all of the testing.     Worsened or new symptoms since last visit: No    Daily aspirin use: Yes    Hypertension controlled: Yes at home      Hyperlipidemia Follow-Up      Are you having any of the following symptoms? (Select all that apply)  No complaints of shortness of breath, chest pain or pressure.  No increased sweating or nausea with activity.  No left-sided neck or arm pain.  No complaints of pain in calves when walking 1-2 blocks.    Are you regularly taking any medication or supplement to lower your cholesterol?   Yes- atorvastatin    Are you having muscle aches or other side effects that you think could be caused by your cholesterol lowering medication?  No      Vascular Disease Follow-up      Are you having any of the following symptoms? (Select all that apply) No complaints of shortness of breath, chest pain or pressure.  No increased sweating or nausea with activity.  No left-sided neck or arm pain.  No complaints of pain in calves when walking 1-2 blocks.    How often do you take nitroglycerin? Never    Do you take an aspirin every day? Yes    Cerebrovascular Follow-up      Patient history: TIA    Residual symptoms: None    Worsened or new symptoms since last visit: No    Daily aspirin use: Yes    Hypertension controlled: Yes    Chronic Kidney Disease Follow-up      Current NSAID use?  No      Patient Active Problem List   Diagnosis     Pseudophakia OU c YAG OU     CAD (coronary artery disease)     Advance care planning     Hyperlipidemia with target LDL less than 100     Osteoporosis     CKD (chronic kidney disease) stage 3, GFR 30-59 ml/min (H)     Seasonal allergic rhinitis     Diagnostic skin and sensitization tests (aka ALLERGENS)     Beverley  neuroma     Strabismic amblyopia     Anterior basement membrane dystrophy, OU     PVD (posterior vitreous detachment) OU     Granuloma annulare     Past Surgical History:   Procedure Laterality Date     APPENDECTOMY       CHOLECYSTECTOMY, OPEN       COLONOSCOPY  2009    neg     CYSTOCELE REPAIR  2003    TVT SPARC       Social History     Tobacco Use     Smoking status: Never Smoker     Smokeless tobacco: Never Used     Tobacco comment: Never smoked; nonsmoking household   Substance Use Topics     Alcohol use: No     Comment: very rare     Family History   Problem Relation Age of Onset     Cancer Mother         ovarian      Heart Disease Sister         CABG x 3     Neurologic Disorder Sister         Fibromyalgia     Cancer Father         stomach/interstines      Musculoskeletal Disorder Brother         back      Heart Disease Brother 60        CABG x 4     Glaucoma No family hx of      Macular Degeneration No family hx of          Current Outpatient Medications   Medication Sig Dispense Refill     aspirin 81 MG tablet Take 1 tablet (81 mg) by mouth daily 90 tablet 3     atorvastatin (LIPITOR) 20 MG tablet Take 1 tablet (20 mg) by mouth daily 90 tablet 3     CALCIUM 500 + D OR 1 tab three times a day        citalopram (CELEXA) 10 MG tablet TAKE 1 TABLET (10 MG) BY MOUTH DAILY 90 tablet 3     clopidogrel (PLAVIX) 75 MG tablet Take 1 tablet (75 mg) by mouth daily 90 tablet 2     Hypromellose (ARTIFICIAL TEARS OP) Apply 1 drop to eye as needed Both eyes.       losartan (COZAAR) 100 MG tablet Take 1 tablet (100 mg) by mouth daily TAKE 1 TABLET EVERY DAY  (WITH  BREAKFAST) 90 tablet 3     polyethylene glycol (MIRALAX) powder Take 17 g (1 capful) by mouth daily 510 g 3     VITAMIN D 400 UNIT OR TABS 1 cap twice a day        triamcinolone (KENALOG) 0.1 % cream Apply 2-3 times a day to affected area. (Patient not taking: Reported on 10/28/2019) 80 g 6     Allergies   Allergen Reactions     Baycol [Cerivastatin Sodium]        "muscle mass loss       Crestor [Hmg-Coa-R Inhibitors]      Previously on Baycol and affect muscle mass loss.     Darvocet [Acetaminophen]      Severe nausea     Macrobid [Nitrofuran Derivatives]      Hives     Recent Labs   Lab Test 07/23/19  1040 07/05/19 12/26/17  1030  12/20/16  1054  01/12/15  1034  05/19/14  1500  11/06/12  1045   *  --  117*  --  118*   < >  --    < >  --    < > 160*   HDL 47*  --  58  --  63   < >  --    < >  --    < > 43*   TRIG 170*  --  187*  --  130   < >  --    < >  --    < > 160*   ALT 23  --   --   --   --   --  29  --  22   < >  --    CR 0.88 0.95 0.94   < > 1.00   < >  --    < > 0.99   < >  --    GFRESTIMATED 60* 56* 57*   < > 53*   < >  --    < > 54*   < >  --    GFRESTBLACK 70 >60 69   < > 64   < >  --    < > 65   < >  --    POTASSIUM 4.3 4.6 4.3   < > 4.5   < >  --    < > 4.7   < >  --    TSH  --   --   --   --   --   --   --   --  1.56  --  1.52    < > = values in this interval not displayed.      BP Readings from Last 3 Encounters:   10/28/19 (!) 152/70   07/25/19 151/77   07/23/19 148/83    Wt Readings from Last 3 Encounters:   10/28/19 77.1 kg (170 lb)   07/23/19 76.7 kg (169 lb)   07/09/19 75.8 kg (167 lb)                    Reviewed and updated as needed this visit by Provider         Review of Systems   ROS COMP: Constitutional, HEENT, cardiovascular, pulmonary, gi and gu systems are negative, except as otherwise noted.      Objective    BP (!) 152/70 (BP Location: Left arm, Patient Position: Chair, Cuff Size: Adult Large)   Pulse 78   Temp 98.3  F (36.8  C) (Oral)   Resp 18   Ht 1.607 m (5' 3.25\")   Wt 77.1 kg (170 lb)   LMP  (LMP Unknown)   SpO2 98%   BMI 29.88 kg/m    Body mass index is 29.88 kg/m .  Physical Exam   GENERAL APPEARANCE: healthy, alert and no distress  EYES: Eyes grossly normal to inspection, PERRL and conjunctivae and sclerae normal  HENT: ear canals and TM's normal, nose and mouth without ulcers or lesions, oropharynx clear and oral mucous " membranes moist  NECK: no adenopathy, no asymmetry, masses, or scars and thyroid normal to palpation  RESP: lungs clear to auscultation - no rales, rhonchi or wheezes  CV: regular rates and rhythm, normal S1 S2, no S3 or S4, no murmur, click or rub, no peripheral edema and peripheral pulses strong  ABDOMEN: soft, nontender, no hepatosplenomegaly, no masses and bowel sounds normal  MS: no musculoskeletal defects are noted and gait is age appropriate without ataxia  SKIN: no suspicious lesions or rashes  NEURO: Normal strength and tone, sensory exam grossly normal, mentation intact and speech normal  PSYCH: mentation appears normal and affect normal/bright     Diagnostic Test Results:  Labs reviewed in Epic  No results found for this or any previous visit (from the past 24 hour(s)).        Assessment & Plan       ICD-10-CM    1. History of TIA (transient ischemic attack) and stroke Z86.73 On Plavix since July. Cardiology recommends stopping this end of November. One more refill of # 30 needed.    2. Hyperlipidemia with target LDL less than 100 E78.5 Well controlled on medications    3. CKD (chronic kidney disease) stage 3, GFR 30-59 ml/min (H) N18.3 Stable at eGFR 60 with albuminuria. Hypertension not in good control in office but blood pressure at home 112/60 and would like to avoid low blood pressures    4. Coronary artery disease involving native coronary artery of native heart without angina pectoris I25.10 Stable    5. Need for prophylactic vaccination and inoculation against influenza Z23 INFLUENZA (HIGH DOSE) 3 VALENT VACCINE [22623]     ADMIN INFLUENZA  (For MEDICARE Patients ONLY) []   6. Transient ischemic attack involving vertebral artery G45.0 clopidogrel (PLAVIX) 75 MG tablet daily, end November 30. Some bruising but no other bleeding episode.    7. Proteinuria, unspecified type R80.9 UA reflex to Microscopic and Culture- recheck per patient.         BMI:   Estimated body mass index is 29.88 kg/m  as  "calculated from the following:    Height as of this encounter: 1.607 m (5' 3.25\").    Weight as of this encounter: 77.1 kg (170 lb).           CONSULTATION/REFERRAL to neurology as needed.   FUTURE LABS:       - Schedule fasting labs in 3 months  FUTURE APPOINTMENTS:       - Follow-up visit in 3-4 months or sooner if any questions or concerns.   Regular exercise  See Patient Instructions    No follow-ups on file.    Lela Townsend MD  [facility]    "

## 2019-10-28 NOTE — TELEPHONE ENCOUNTER
ipratropium (ATROVENT) 0.06 % nasal spray (Discontinued)      Last Written Prescription Date:  9/29/14  Last Fill Quantity: 3,   # refills: 4  Last Office Visit: 10/28/19  Future Office visit:       Routing refill request to provider for review/approval because:  Drug not active on patient's medication list

## 2019-10-28 NOTE — TELEPHONE ENCOUNTER
Routing refill request to provider for review/approval because:  Drug not active on patient's medication list  Patient seen in office today by PCP    Nedra Charles RN

## 2019-10-28 NOTE — PATIENT INSTRUCTIONS
At [facility], we strive to deliver an exceptional experience to you, every time we see you.  If you receive a survey in the mail, please send us back your thoughts. We really do value your feedback.    Based on your medical history, these are the current health maintenance/preventive care services that you are due for (some may have been done at this visit.)  Health Maintenance Due   Topic Date Due     ZOSTER IMMUNIZATION (2 of 3) 12/15/2009     INFLUENZA VACCINE (1) 09/01/2019         Suggested websites for health information:  Www.[employer].org : Up to date and easily searchable information on multiple topics.  Www.[employer].gov : medication info, interactive tutorials, watch real surgeries online  Www.familydoctor.org : good info from the Academy of Family Physicians  Www.cdc.gov : public health info, travel advisories, epidemics (H1N1)  Www.aap.org : children's health info, normal development, vaccinations  Www.health.[facility].[place].us : [place] dept of health, public health issues in [place], N1N1    Your care team:                            Family Medicine Internal Medicine   MD Edgardo Wilhelm MD Shantel Branch-Fleming, MD Katya Georgiev PA-C Nam Ho, MD Pediatrics   KYRA Graham, LUANN HOFFMAN CNP   MD Soumya Yee MD Deborah Mielke, MD Kim Thein, APRN [facility]      Clinic hours: Monday - Thursday 7 am-7 pm; Fridays 7 am-5 pm.   Urgent care: Monday - Friday 11 am-9 pm; Saturday and Sunday 9 am-5 pm.  Pharmacy : Monday -Thursday 8 am-8 pm; Friday 8 am-6 pm; Saturday and Sunday 9 am-5 pm.     Clinic: [phone]   Pharmacy: [phone]      Patient Education     Transient Ischemic Attack (TIA)   Your symptoms were caused by a TIA, or mini-stroke. Even though your symptoms have gone away, this condition is as serious as a full stroke. It means you are more likely to have a full stroke. About 1 in 3 people who have a TIA go  on to have a full stroke. And 4% to 10% of those people will have the stroke within 2 days.  A TIA is caused when something decreases or blocks blood flow to a part of your brain. A TIA often happens when a blood clot travels to a blood vessel in the brain. The clot reduces or blocks blood flow. This causes the symptoms you had. After a short while, the clot dissolves. Blood flows again, and the symptoms go away. People with hardening of the arteries (atherosclerosis) are at higher risk for a TIA. So are people who have an irregular heartbeat called atrial fibrillation.  A TIA causes symptoms similar to a stroke, but they last less than 24 hours. A full stroke causes symptoms that last more than 24 hours and may be permanent. But even if your symptoms only lasted a short time, the TIA may have damaged your brain tissue. Once you have had a TIA, you are at risk of having a full stroke. You will need tests to look at the blood flow to your brain. The tests can also rule out other causes of your symptoms. The tests may include an ultrasound of the arteries in your neck and an evaluation of your heart. They may also include a CT scan of your brain, an MRI scan of your brain, or both. If your healthcare provider finds problems, he or she will recommend treatment with medicines, procedures, or both.  Your provider may prescribe medicines to reduce your chance of having another TIA and stroke. These may include medicines that prevent blood clots, such as antiplatelet medicines and blood thinners (anticoagulants). Your doctor may recommend other treatment. This may include a procedure to open up a blocked artery in your neck.  Home care  The following guidelines will help you take care of yourself at home:    Take any medicines your doctor has prescribed as directed. These may include antiplatelet medicines or medicines for other conditions, such as high blood pressure or high cholesterol.    A TIA is a serious event that  puts you at risk of having a full stroke. Because of this, it is important to take steps to help prevent a stroke from happening. Your doctor will look at all of your risk factors when deciding on what other treatment you may need.  Ways to reduce your risk for stroke  High blood pressure, diabetes, high cholesterol, heavy drinking, and smoking are risk factors for stroke and heart disease. You can control these by taking medicines and making diet and lifestyle changes. One way to help prevent a stroke is to take aspirin or a similar medicine every day. But don't take daily aspirin unless your healthcare provider tells you to.  Your provider will work with you to make lifestyle changes to help prevent a stroke.  Diet  Your healthcare provider will give you information about changes you may need to make to your diet. You may need to see a registered dietitian for help with diet changes. Changes may include:    Eating less fat and cholesterol    Eating less salt (sodium). This is especially important if you have high blood pressure.    Eating more fresh fruits and vegetables    Eating lean proteins, such as fish, poultry, beans, and peas    Eating less red meat and processed meats    Using low-fat dairy products    Using vegetable and nut oils in limited amounts    Limiting how many sweets and processed foods such as chips, cookies, and baked goods you eat    Limiting how much alcohol you drink  Physical activity  Your healthcare provider may recommend that you get more exercise if you have not been as active as possible. He or she may suggest that you get 40 minutes of moderate to vigorous physical activity each day. You should do this at least 3 to 4 days a week. A few examples of moderate to vigorous exercise are:    Walking at a brisk pace, about 3 to 4 miles per hour    Jogging or running    Swimming or water aerobics    Hiking    Dancing    Martial arts    Tennis    Riding a bike  Other ways to reduce your  risk    Weight management. If you are overweight or obese, your healthcare provider will work with you to lose weight and lower your body mass index (BMI) to a normal or near-normal level. Making diet changes and increasing physical activity can help.    Smoking. If you smoke, break the habit. Enroll in a stop smoking program to improve your chances of success.    Stress. Learn how to manage your stress. This will help you deal with stress at home and at work.  Follow-up care  Call your doctor for an appointment in the next few days for another evaluation, or as advised. This is to make a plan for preventing another TIA or stroke. You may need to see a neurologist to follow up on your TIA. A neurologist is a doctor who specializes in treating brain and nervous system problems. You may need other tests or procedures.  If you had an X-ray, CT scan, MRI scan, or ECG (electrocardiogram), a specialist will review it. You ll be told of any new findings that will affect your care.  Call 911  Call 911 if any of these occur:    Any of your TIA symptoms return    New problems with speech, vision, walking, or weakness or numbness of the face or on one side of the body    Severe headache, fainting spell, dizziness, or seizure  Date Last Reviewed: 10/1/2016    0751-7270 [employer]. [address] [place] [address], [place] [address]. All rights reserved. This information is not intended as a substitute for professional medical care. Always follow your healthcare professional's instructions.

## 2019-10-28 NOTE — LETTER
November 1, 2019      Carolin Mcbride  [address] [address]        Dear Carolin Mcbride    The urine does not show any infection. I continues to show blood in the urine and there is a small amount of protein in the urine also. I put in an order for a kidney and bladder ultrasound as well as a referral to urology for follow up.     Please schedule your DEXA scan or ultrasound by calling [phone].       Please call me if you have any questions about these test results or about your care.      Enclosed is a copy of the results.  It was a pleasure to see you at your last appointment.    If you have any questions or concerns, please call myself or my nurse at [phone].      Sincerely,      Lela Townsend MD, MPH /KOSTA Long MA      Results for orders placed or performed in visit on 10/28/19   UA reflex to Microscopic and Culture     Status: Abnormal   Result Value Ref Range    Color Urine Yellow     Appearance Urine Clear     Glucose Urine Negative NEG^Negative mg/dL    Bilirubin Urine Negative NEG^Negative    Ketones Urine Negative NEG^Negative mg/dL    Specific Gravity Urine 1.020 1.003 - 1.035    Blood Urine Moderate (A) NEG^Negative    pH Urine 6.0 5.0 - 7.0 pH    Protein Albumin Urine 30 (A) NEG^Negative mg/dL    Urobilinogen Urine 0.2 0.2 - 1.0 EU/dL    Nitrite Urine Negative NEG^Negative    Leukocyte Esterase Urine Negative NEG^Negative    Source Midstream Urine    Urine Microscopic     Status: Abnormal   Result Value Ref Range    WBC Urine 0 - 5 OTO5^0 - 5 /HPF    RBC Urine 2-5 (A) OTO2^O - 2 /HPF    Squamous Epithelial /LPF Urine Few FEW^Few /LPF    Bacteria Urine Few (A) NEG^Negative /HPF    Mucous Urine Present (A) NEG^Negative /LPF

## 2019-10-29 RX ORDER — IPRATROPIUM BROMIDE 42 UG/1
2 SPRAY, METERED NASAL 3 TIMES DAILY PRN
Qty: 3 BOX | Refills: 4 | Status: SHIPPED | OUTPATIENT
Start: 2019-10-29 | End: 2020-11-20

## 2019-10-29 NOTE — TELEPHONE ENCOUNTER
This writer attempted to contact patient on 10/29/19      Reason for call clarify if patient still taking nasal spray and left detailed message.      If patient calls back:   1st floor [facility] Care Team (MA/TC) called. Inform patient that someone from the team will contact them, document that pt called and route to care team.         Gaurang Sullivan MA

## 2019-10-29 NOTE — TELEPHONE ENCOUNTER
Reason for Call:  Other     Detailed comments: Returned call. Still takes nose spray.    Phone Number Patient can be reached at: Home number on file [phone] (home)    Best Time: any    Can we leave a detailed message on this number? YES    Call taken on 10/29/2019 at 10:37 AM by Carley Joyner

## 2019-10-31 PROBLEM — N02.9 RECURRENT AND PERSISTENT HEMATURIA: Status: ACTIVE | Noted: 2019-10-31

## 2019-11-01 NOTE — RESULT ENCOUNTER NOTE
Dear Carolin Mcbride,    Your test results are attached.    The urine does not show any infection. I continues to show blood in the urine and there is a small amount of protein in the urine also. I put in an order for a kidney and bladder ultrasound as well as a referral to urology for follow up.     Please schedule your DEXA scan or ultrasound by calling [phone].      Please call me if you have any questions about these test results or about your care.    Sincerely,    Lela Townsend MD

## 2019-11-08 ENCOUNTER — ANCILLARY PROCEDURE (OUTPATIENT)
Dept: ULTRASOUND IMAGING | Facility: CLINIC | Age: 84
End: 2019-11-08
Attending: FAMILY MEDICINE
Payer: COMMERCIAL

## 2019-11-08 DIAGNOSIS — N02.9 RECURRENT AND PERSISTENT HEMATURIA: ICD-10-CM

## 2019-11-08 PROCEDURE — 76770 US EXAM ABDO BACK WALL COMP: CPT

## 2019-11-09 NOTE — RESULT ENCOUNTER NOTE
Dear Carolin Mcbride,    Your test results are attached.    The ultrasound is normal and does not show any source for blood in the urine. I would like you to schedule follow up with urology for further evaluation.    Your provider has referred you to: [facility]: [facility] [phone]   [email or web address]    Please call me at [phone] if you have any questions about these test results or about your care.    Sincerely,    Lela Townsend MD

## 2019-12-09 ENCOUNTER — OFFICE VISIT (OUTPATIENT)
Dept: UROLOGY | Facility: CLINIC | Age: 84
End: 2019-12-09
Payer: COMMERCIAL

## 2019-12-09 VITALS — SYSTOLIC BLOOD PRESSURE: 139 MMHG | DIASTOLIC BLOOD PRESSURE: 72 MMHG

## 2019-12-09 DIAGNOSIS — N02.9 RECURRENT AND PERSISTENT HEMATURIA: Primary | ICD-10-CM

## 2019-12-09 LAB
ALBUMIN UR-MCNC: 100 MG/DL
APPEARANCE UR: CLEAR
BILIRUB UR QL STRIP: NEGATIVE
COLOR UR AUTO: YELLOW
GLUCOSE UR STRIP-MCNC: NEGATIVE MG/DL
HGB UR QL STRIP: ABNORMAL
HYALINE CASTS #/AREA URNS LPF: ABNORMAL /LPF
KETONES UR STRIP-MCNC: NEGATIVE MG/DL
LEUKOCYTE ESTERASE UR QL STRIP: ABNORMAL
NITRATE UR QL: NEGATIVE
NON-SQ EPI CELLS #/AREA URNS LPF: ABNORMAL /LPF
PH UR STRIP: 6 PH (ref 5–7)
RBC #/AREA URNS AUTO: ABNORMAL /HPF
SOURCE: ABNORMAL
SP GR UR STRIP: 1.02 (ref 1–1.03)
UROBILINOGEN UR STRIP-ACNC: 0.2 EU/DL (ref 0.2–1)
WBC #/AREA URNS AUTO: ABNORMAL /HPF

## 2019-12-09 PROCEDURE — 81001 URINALYSIS AUTO W/SCOPE: CPT | Performed by: UROLOGY

## 2019-12-09 PROCEDURE — 99203 OFFICE O/P NEW LOW 30 MIN: CPT | Performed by: UROLOGY
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
## 2020-02-21 ENCOUNTER — OFFICE VISIT (OUTPATIENT)
Dept: FAMILY MEDICINE | Facility: CLINIC | Age: 85
End: 2020-02-21
Payer: COMMERCIAL

## 2020-02-21 VITALS
HEART RATE: 74 BPM | HEIGHT: 63 IN | DIASTOLIC BLOOD PRESSURE: 72 MMHG | TEMPERATURE: 98.6 F | BODY MASS INDEX: 30.12 KG/M2 | RESPIRATION RATE: 18 BRPM | WEIGHT: 170 LBS | OXYGEN SATURATION: 97 % | SYSTOLIC BLOOD PRESSURE: 146 MMHG

## 2020-02-21 DIAGNOSIS — N18.30 CKD (CHRONIC KIDNEY DISEASE) STAGE 3, GFR 30-59 ML/MIN (H): ICD-10-CM

## 2020-02-21 DIAGNOSIS — J01.90 ACUTE NON-RECURRENT SINUSITIS, UNSPECIFIED LOCATION: Primary | ICD-10-CM

## 2020-02-21 DIAGNOSIS — J22 LOWER RESPIRATORY TRACT INFECTION: ICD-10-CM

## 2020-02-21 PROCEDURE — 99213 OFFICE O/P EST LOW 20 MIN: CPT | Performed by: NURSE PRACTITIONER

## 2020-02-21 RX ORDER — DOXYCYCLINE 100 MG/1
100 CAPSULE ORAL 2 TIMES DAILY
Qty: 14 CAPSULE | Refills: 0 | Status: SHIPPED | OUTPATIENT
Start: 2020-02-21 | End: 2020-03-02

## 2020-02-21 ASSESSMENT — PAIN SCALES - GENERAL: PAINLEVEL: NO PAIN (0)

## 2020-02-21 ASSESSMENT — MIFFLIN-ST. JEOR: SCORE: 1189.2
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
## 2020-02-24 ENCOUNTER — TELEPHONE (OUTPATIENT)
Dept: FAMILY MEDICINE | Facility: CLINIC | Age: 85
End: 2020-02-24

## 2020-02-24 NOTE — TELEPHONE ENCOUNTER
Reason for Call:  Same Day Appointment, Requested Provider:  Dr. Lela oTwnsend  PCP: Lela Townsend    Reason for visit: urgent care follow up     Duration of symptoms: on going    Have you been treated for this in the past? Yes    Additional comments: Pt had a recent urgent care visit and was instructed to follow up with Dr. Townsend as soon as possible on 03/03/20 .     Can we leave a detailed message on this number? YES    Phone number patient can be reached at: Home number on file [phone] (home)    Best Time: anytime    Call taken on 2/24/2020 at 11:33 AM by Delvis Hansen

## 2020-02-25 NOTE — TELEPHONE ENCOUNTER
The call asked for approval for 3-3-2020. OK to add on to schedule another day or see another provider when available.  Lela Townsend MD

## 2020-02-25 NOTE — TELEPHONE ENCOUNTER
Pt returned call    Best number to reach caller: Home number on file [phone] (home)    Is it ok to leave a detailed message: Not Applicable     Patient is scheduled for 03-

## 2020-02-26 NOTE — TELEPHONE ENCOUNTER
Called and spoke to the patient and confirmed that the 3/2/2020 appointment is what she wanted. Patient fine with that appt date with Dr Townsend.  Priya Soriano [facility]  2nd Floor  Primary Care

## 2020-03-02 ENCOUNTER — OFFICE VISIT (OUTPATIENT)
Dept: FAMILY MEDICINE | Facility: CLINIC | Age: 85
End: 2020-03-02
Payer: COMMERCIAL

## 2020-03-02 ENCOUNTER — ANCILLARY PROCEDURE (OUTPATIENT)
Dept: GENERAL RADIOLOGY | Facility: CLINIC | Age: 85
End: 2020-03-02
Attending: FAMILY MEDICINE
Payer: COMMERCIAL

## 2020-03-02 VITALS
WEIGHT: 171 LBS | OXYGEN SATURATION: 98 % | DIASTOLIC BLOOD PRESSURE: 78 MMHG | BODY MASS INDEX: 30.3 KG/M2 | HEIGHT: 63 IN | HEART RATE: 74 BPM | TEMPERATURE: 98.6 F | SYSTOLIC BLOOD PRESSURE: 142 MMHG | RESPIRATION RATE: 18 BRPM

## 2020-03-02 DIAGNOSIS — I10 HYPERTENSION GOAL BP (BLOOD PRESSURE) < 140/90: ICD-10-CM

## 2020-03-02 DIAGNOSIS — N18.30 CKD (CHRONIC KIDNEY DISEASE) STAGE 3, GFR 30-59 ML/MIN (H): ICD-10-CM

## 2020-03-02 DIAGNOSIS — R93.89 ABNORMAL IMAGING OF THYROID: ICD-10-CM

## 2020-03-02 DIAGNOSIS — R93.89 ABNORMAL FINDING ON CHEST XRAY: ICD-10-CM

## 2020-03-02 DIAGNOSIS — I25.10 CORONARY ARTERY DISEASE INVOLVING NATIVE CORONARY ARTERY OF NATIVE HEART WITHOUT ANGINA PECTORIS: ICD-10-CM

## 2020-03-02 DIAGNOSIS — J06.9 VIRAL URI: Primary | ICD-10-CM

## 2020-03-02 DIAGNOSIS — J06.9 VIRAL URI: ICD-10-CM

## 2020-03-02 DIAGNOSIS — E78.5 HYPERLIPIDEMIA WITH TARGET LDL LESS THAN 100: ICD-10-CM

## 2020-03-02 PROCEDURE — 99214 OFFICE O/P EST MOD 30 MIN: CPT | Performed by: FAMILY MEDICINE

## 2020-03-02 PROCEDURE — 71046 X-RAY EXAM CHEST 2 VIEWS: CPT

## 2020-03-02 ASSESSMENT — MIFFLIN-ST. JEOR: SCORE: 1193.74

## 2020-03-02 ASSESSMENT — PAIN SCALES - GENERAL: PAINLEVEL: MODERATE PAIN (5)

## 2020-03-02 NOTE — PATIENT INSTRUCTIONS
At [facility], we strive to deliver an exceptional experience to you, every time we see you.  If you receive a survey in the mail, please send us back your thoughts. We really do value your feedback.    Based on your medical history, these are the current health maintenance/preventive care services that you are due for (some may have been done at this visit.)  Health Maintenance Due   Topic Date Due     PHQ-9  01/09/2020     EYE EXAM  03/25/2020         Suggested websites for health information:  Www.Everplaces.org : Up to date and easily searchable information on multiple topics.  Www.[employer]ovio.gov : medication info, interactive tutorials, watch real surgeries online  Www.familydoctor.org : good info from the Academy of Family Physicians  Www.cdc.gov : public health info, travel advisories, epidemics (H1N1)  Www.aap.org : children's health info, normal development, vaccinations  Www.health.[facility].[place].us : [place] dept of health, public health issues in [place], N1N1    Your care team:                            Family Medicine Internal Medicine   MD Edgardo Wilhelm MD Shantel Branch-Fleming, MD Katya Georgiev PA-C Nam Ho, MD Pediatrics   Kris Meyers, KYRA Xavier, CNP MD Soumya Trujillo CNP, MD Deborah Mielke, MD Kim Thein, APRN [facility]      Clinic hours: Monday - Thursday 7 am-7 pm; Fridays 7 am-5 pm.   Urgent care: Monday - Friday 11 am-9 pm; Saturday and Sunday 9 am-5 pm.  Pharmacy : Monday -Thursday 8 am-8 pm; Friday 8 am-6 pm; Saturday and Sunday 9 am-5 pm.     Clinic: [phone]   Pharmacy: [phone]      Patient Education     High Cholesterol  High cholesterol is also called hypercholesterolemia. Cholesterol and dietary fat are not the same thing. But, it s important to understand how the fat in your diet affects your cholesterol level.  Your body needs cholesterol to build new cells and make certain hormones. There are  2 kinds of cholesterol in your body:    HDL ( good ) cholesterol stops fatty deposits (plaque) from building up in your arteries. In this way it protects you against heart disease and stroke.    LDL ( bad ) cholesterol stays in your body and sticks to artery walls. It may later block blood flow to your heart and brain. This can cause a heart attack (acute myocardial infarction) or stroke.  Your body makes all the cholesterol it needs. But you also get cholesterol from many of the foods you eat. This is why you want to limit how much cholesterol you get in your diet  and how much fat you eat. That s because the cholesterol your body makes from the fat you eat creates the most risk for disease. The type of fat you eat has the biggest influence on how much cholesterol your body makes.   Fats come in 2 kinds:    Good fats are the unsaturated fats. These are also called monounsaturated and polyunsaturated fats. They raise the level of good cholesterol and lower the level of bad cholesterol. Good fats are found in vegetable oils like olive, sunflower, corn, and soybean oils, and in nuts and seeds.    Bad fats are saturated fats and trans fats. These raise the risk for disease. They lower the good cholesterol and raise the level of bad cholesterol. Bad fats are found in all red meat and whole-milk dairy products. Some plants also have a lot of saturated fats such as coconut and palm plants. Trans fats are found in stick margarines and many fast foods and commercially baked goods. Soft margarine sold in tubs has less trans fat and is safer to use. Trans fat in particular raise bad cholesterol and lowers good cholesterol.  You can have high blood cholesterol if you eat a diet high in saturated fat and don t get much exercise. In some cases, your family history plays a role. Your health care provider can diagnose high cholesterol with blood tests. Treatment consists of a diet low in saturated fat, weight loss, and exercise.  If these efforts don t lower your cholesterol, your provider may prescribe medicines. They must be taken daily to keep your cholesterol levels low. Being overweight also raises the risk for high cholesterol and heart disease. Losing even a small amount of weight can help lower your risk.  High risk groups  Certain groups of people should talk to their healthcare provider about using cholesterol-lowering statin medicines for controlling their cholesterol to stay healthy or to prevent future heart attacks or stroke. It may be beneficial to take a medicine in addition to eating a healthy diet and exercising regularly for these groups. The major groups include:    Adults who have had a heart attack or stroke or some other atherosclerotic disease (such as peripheral vascular disease), a transient ischemic attack, stable or unstable angina, and anyone who has had a procedure to restore blood flow through a blocked artery such as percutaneous coronary intervention, angioplasty, stent, open-heart bypass surgery.    Adults who have diabetes or an elevated calculated risk of having a heart attack or stroke (7.5%) and an elevated level of LDL cholesterol  mg/dL.    People who are 21 years of age and older who have an elevated LDL cholesterol level of 190 mg/dL or higher  Home care  Follow these guidelines when caring for yourself at home:    Talk with your health care provider before starting a low-cholesterol diet or weight-loss program.    In general, a low-cholesterol diet means that you eat less saturated fat (red meat and regular dairy) and less cholesterol each day. You may eat foods with unsaturated fats (vegetable oils, nuts, and seeds). Eat more fruits, vegetables, fish, and whole grains, or other high-fiber foods.    Learn to read food labels so you know what you are eating.    A registered dietitian can teach you how to plan meals and change your diet. You can ask your provider for a referral.    Aim for 40  minutes of moderate to vigorous physical activity 3 to 4 times a week. Pick activities you enjoy. Walking is a good choice if you want to lose weight. If you have diabetes, hypertension, or heart disease, talk with your provider to see what activities he or she recommends.    If your provider has prescribed medicines, take them as directed.    If you smoke, talk with your provider about how to quit smoking. Smoking lowers good cholesterol levels and can increase damage done by bad cholesterol.    Limit how much alcohol you drink.    If you have diabetes, talk with your provider and a dietitian about other food and lifestyle changes you can make to lower your risk for heart disease and stroke.  Follow-up care  Follow up with your healthcare provider, or as advised. It takes at least 3 months for dietary changes to show a result in your blood cholesterol. Have repeat blood testing as advised by your provider.  If an X-ray or ECG (electrocardiogram) was done, a specialist will look at it. You will be told of any new findings that may affect your care.  When to seek medical advice  Call your healthcare provider right away if any of these occur:    Chest, arm, shoulder, neck, or upper back pain    Shortness of breath    Weakness or numbness of an arm, leg, or one side of the face    Trouble with speech or vision    Weakness, dizziness, or fainting  Talk to your healthcare provider about your treatment goals. Make sure you understand how cholesterol impacts you based on your personal health history and family history of heart disease or high cholesterol. Plan to have regular monitoring and follow up on any side effects that you may develop to the cholesterol-lowering medicines. Be aware that sometimes you may need more than one medicine to reach your cholesterol goals. Also make sure you understand how to prepare for your cholesterol testing which may or may not require fasting.  Date Last Reviewed: 1/1/2017 2000-2019  [employer], [address] [place] [address], [place] [address]. All rights reserved. This information is not intended as a substitute for professional medical care. Always follow your healthcare professional's instructions.

## 2020-03-02 NOTE — PROGRESS NOTES
Subjective     Carolin Mcbride is a 85 year old female who presents to clinic today for the following health issues:    HPI   Acute Illness   Acute illness concerns: Urgent care follow up cold symptoms not better. Patient wants to rule out statin medication problem-dosage increase last year  Onset: 2 weeks    Fever: no     Chills/Sweats: no     Headache (location?): YES with intermittent lightheadedness    Sinus Pressure:YES- below eyes    Conjunctivitis:  YES: bilateral matter in the morning    Ear Pain: YES: bilateral, left worst than right    Rhinorrhea: YES    Congestion: no     Sore Throat: no subsided     Cough: YES - little    Wheeze: no     Decreased Appetite: no     Nausea: no     Vomiting: no     Diarrhea:  YES    Dysuria/Freq.: no     Fatigue/Achiness: YES    Sick/Strep Exposure: no      Therapies Tried and outcome: occasional Tylenol in the evening, Doxycycline abx completed    Hyperlipidemia Follow-Up      Are you regularly taking any medication or supplement to lower your cholesterol?   Yes- atorvastatin    Are you having muscle aches or other side effects that you think could be caused by your cholesterol lowering medication?  Yes- muscle aches and pains, not feeling well since restarted    Hypertension Follow-up      Do you check your blood pressure regularly outside of the clinic? Yes     Are you following a low salt diet? Yes    Are your blood pressures ever more than 140 on the top number (systolic) OR more   than 90 on the bottom number (diastolic), for example 140/90? No    Vascular Disease Follow-up      How often do you take nitroglycerin? Never    Do you take an aspirin every day? Yes    Chronic Kidney Disease Follow-up      Do you take any over the counter pain medicine?: No      Patient Active Problem List   Diagnosis     Pseudophakia OU c YAG OU     CAD (coronary artery disease)     Advance care planning     Hypertension goal BP (blood pressure) < 140/90     Hyperlipidemia with target LDL  less than 100     Osteoporosis     CKD (chronic kidney disease) stage 3, GFR 30-59 ml/min (H)     Seasonal allergic rhinitis     Diagnostic skin and sensitization tests (aka ALLERGENS)     Pastrana's neuroma     Strabismic amblyopia     Anterior basement membrane dystrophy, OU     PVD (posterior vitreous detachment) OU     Granuloma annulare     Recurrent and persistent hematuria     Past Surgical History:   Procedure Laterality Date     APPENDECTOMY       CHOLECYSTECTOMY, OPEN       COLONOSCOPY  2009    neg     CYSTOCELE REPAIR  2003    TVT SPARC       Social History     Tobacco Use     Smoking status: Never Smoker     Smokeless tobacco: Never Used     Tobacco comment: Never smoked; nonsmoking household   Substance Use Topics     Alcohol use: No     Comment: very rare     Family History   Problem Relation Age of Onset     Cancer Mother         ovarian      Heart Disease Sister         CABG x 3     Neurologic Disorder Sister         Fibromyalgia     Cancer Father         stomach/interstines      Musculoskeletal Disorder Brother         back      Heart Disease Brother 60        CABG x 4     Glaucoma No family hx of      Macular Degeneration No family hx of          Current Outpatient Medications   Medication Sig Dispense Refill     aspirin 81 MG tablet Take 1 tablet (81 mg) by mouth daily 90 tablet 3     CALCIUM 500 + D OR 1 tab three times a day        citalopram (CELEXA) 10 MG tablet TAKE 1 TABLET (10 MG) BY MOUTH DAILY 90 tablet 3     Hypromellose (ARTIFICIAL TEARS OP) Apply 1 drop to eye as needed Both eyes.       ipratropium (ATROVENT) 0.06 % nasal spray Spray 2 sprays into both nostrils 3 times daily as needed for rhinitis 3 Box 4     losartan (COZAAR) 100 MG tablet Take 1 tablet (100 mg) by mouth daily TAKE 1 TABLET EVERY DAY  (WITH  BREAKFAST) 90 tablet 3     polyethylene glycol (MIRALAX) powder Take 17 g (1 capful) by mouth daily 510 g 3     triamcinolone (KENALOG) 0.1 % cream Apply 2-3 times a day to affected  "area. 80 g 6     VITAMIN D 400 UNIT OR TABS 1 cap twice a day        Allergies   Allergen Reactions     Baycol [Cerivastatin Sodium]       muscle mass loss       Crestor [Hmg-Coa-R Inhibitors]      Previously on Baycol and affect muscle mass loss.     Darvocet [Acetaminophen]      Severe nausea     Macrobid [Nitrofuran Derivatives]      Hives     Recent Labs   Lab Test 07/23/19  1040 07/05/19 12/26/17  1030  12/20/16  1054  01/12/15  1034  05/19/14  1500  11/06/12  1045   *  --  117*  --  118*   < >  --    < >  --    < > 160*   HDL 47*  --  58  --  63   < >  --    < >  --    < > 43*   TRIG 170*  --  187*  --  130   < >  --    < >  --    < > 160*   ALT 23  --   --   --   --   --  29  --  22   < >  --    CR 0.88 0.95 0.94   < > 1.00   < >  --    < > 0.99   < >  --    GFRESTIMATED 60* 56* 57*   < > 53*   < >  --    < > 54*   < >  --    GFRESTBLACK 70 >60 69   < > 64   < >  --    < > 65   < >  --    POTASSIUM 4.3 4.6 4.3   < > 4.5   < >  --    < > 4.7   < >  --    TSH  --   --   --   --   --   --   --   --  1.56  --  1.52    < > = values in this interval not displayed.      BP Readings from Last 3 Encounters:   03/02/20 (!) 142/78   02/21/20 (!) 146/72   12/09/19 139/72    Wt Readings from Last 3 Encounters:   03/02/20 77.6 kg (171 lb)   02/21/20 77.1 kg (170 lb)   10/28/19 77.1 kg (170 lb)                    Reviewed and updated as needed this visit by Provider         Review of Systems   ROS COMP: Constitutional, HEENT, cardiovascular, pulmonary, gi and gu systems are negative, except as otherwise noted.      Objective    BP (!) 142/78 (BP Location: Right arm, Patient Position: Chair, Cuff Size: Adult Large)   Pulse 74   Temp 98.6  F (37  C) (Oral)   Resp 18   Ht 1.607 m (5' 3.25\")   Wt 77.6 kg (171 lb)   LMP  (LMP Unknown)   SpO2 98%   BMI 30.05 kg/m    Body mass index is 30.05 kg/m .  Physical Exam   GENERAL: elderly, alert, well nourished, well hydrated, no distress  HENT: ear canals- normal; TMs- " normal; Nose- normal; Mouth- no ulcers, no lesions, missing dentition  NECK: no tenderness, no adenopathy, no asymmetry, no masses, no stiffness; thyroid- normal to palpation  RESP: lungs clear to auscultation - no rales, no rhonchi, no wheezes  CV: regular rates and rhythm, normal S1 S2, no S3 or S4 and no murmur, no click or rub, normal pulses  ABDOMEN: soft, no tenderness, no  hepatosplenomegaly, no masses, normal bowel sounds  MS: extremities- no gross deformities noted, no edema  SKIN: no suspicious lesions, no rashes, age related skin changes with seborrheic keratosis and no actinic keratosis.    NEURO: strength and tone- decreased, sensory exam- grossly normal, reflexes- symmetric  BACK: no CVA tenderness, no paralumbar tenderness  MENTAL STATUS EXAM:  Appearance/Behavior: no apparent distress, neatly groomed, dressed appropriately for weather, appears stated age and is frail-appearing  Speech: normal  Mood/Affect: normal affect  Insight: Good     onal):[id number]  Diagnostic Test Results:  Labs reviewed in Epic  Results for orders placed or performed in visit on 03/02/20   XR Chest 2 Views     Status: None    Narrative    CHEST TWO VIEWS  3/2/2020 4:59 PM     HISTORY:  Viral URI.    COMPARISON: 2/18/2014.      Impression    IMPRESSION: Hazy opacities in the left mid and lower lung and at the  right lung base medially are nonspecific, and could be related to  infection or edema. No pleural effusions are identified. Cardiac  enlargement. Pulmonary vascularity is within normal limits. Aortic  calcification.    DIAMANTE RIVERA MD           Assessment & Plan       ICD-10-CM    1. Viral upper respiratory infection with persistent cough and left dry rales J06.9 XR Chest 2 Views- opacity on left lower lobe, recommend follow up CT scan   2. Hyperlipidemia with target LDL less than 100 E78.5 May be intolerant of statin and will take a break to see if some of the symptoms improve.    3. Hypertension goal BP (blood  pressure) < 140/90 I10 Well controlled on medications    4. CKD (chronic kidney disease) stage 3, GFR 30-59 ml/min (H) N18.3 Stable    5. Coronary artery disease involving native coronary artery of native heart without angina pectoris I25.10 Needs to have prevention medication but will take a breat for now.           FURTHER TESTING:       - CT chest for follow up abnormal chest xray   CONSULTATION/REFERRAL to cardiology and pulmonary if CT abnormal  FUTURE LABS:       - Schedule fasting labs in 3 months  FUTURE APPOINTMENTS:       - Follow-up visit in 3 months or sooner if any questions or concerns.   Regular exercise  See Patient Instructions    No follow-ups on file.    Lela Townsend MD  [facility]

## 2020-03-05 ENCOUNTER — ANCILLARY PROCEDURE (OUTPATIENT)
Dept: CT IMAGING | Facility: CLINIC | Age: 85
End: 2020-03-05
Attending: FAMILY MEDICINE
Payer: COMMERCIAL

## 2020-03-05 DIAGNOSIS — R93.89 ABNORMAL FINDING ON CHEST XRAY: ICD-10-CM

## 2020-03-05 DIAGNOSIS — J06.9 VIRAL URI: ICD-10-CM

## 2020-03-05 PROCEDURE — 71250 CT THORAX DX C-: CPT | Performed by: RADIOLOGY

## 2020-03-05 NOTE — RESULT ENCOUNTER NOTE
Please call patient with results (If unable to reach patient, this may be sent as a letter instead):    Dear Carolin Mcbride,     The chest xray shows some non-specific changes in the left mid and lower lung. This is where the crackles are heard on exam. I recommend a follow up CT scan of the lungs to figure out what is causing this. It could be sign of continuing infection or fluid in the lungs.       Lela Townsend MD

## 2020-03-05 NOTE — LETTER
March 6, 2020        Carolin Mcbride  [address] [address]        Dear Carolin,    Your test results are attached.     The lungs show signs of a previous infection or scaring that may have been viral or inflammatory. This was also seen in 2009.     You have calcification in the left thyroid gland that needs follow up with a thyroid ultrasound.     Please schedule your DEXA scan or ultrasound by calling [phone].     The coronary arteries have calcification and lowering your cholesterol would be ideal but you do not tolerate statin medication. We will recheck your cholesterol in 3 months.     Please call me if you have any questions about these test results or about your care.    Sincerely,      Lela Townsend MD/janeth            Resulted Orders   CT Chest w/o Contrast    Narrative    EXAMINATION: CT CHEST W/O CONTRAST, 3/5/2020 6:13 PM    TECHNIQUE:  Helical CT images from the thoracic inlet through the  upper abdomen were obtained without intravenous contrast.  Images are  displayed at 1 and 5 mm intervals. Images reviewed in lung, soft  tissue, and bone windows.    Radiation Dose (DLP): 130 mGy*cm    COMPARISON: 3/2/2020 chest x-ray. CT dated 8/31/2009.    HISTORY: Pneumonia, unresolved or complicated; Viral URI; Abnormal  finding on chest xray    FINDINGS:  Calcification within the left lobe of the thyroid. There is no  axillary, mediastinal, or hilar adenopathy. Heart is mildly enlarged.  There is no pericardial effusion. Heavy coronary artery  calcifications. Normal configuration and diameter of the great  vessels. Mild atheromatous plaque throughout the aortic arch and its  major branches. Thoracic esophagus is mildly patulous.    The central tracheobronchial tree is patent. No pleural effusion. No  pneumothorax. Mild biapical scarring. Basilar predominant peripheral  fibrotic/reticular opacities. No honeycombing. Mild interlobular  septal thickening, most predominant in the lung bases. Mild  bronchial  wall thickening and bronchiectasis. Faint mosaic attenuation within  the left lung base.    Limited views of the abdomen reveal no acute pathology. No suspicious  bony or soft tissue lesions. Degenerative changes visualized spine.  Exaggerated kyphotic curve of the thoracic spine. Multilevel disc  osteophyte formation.      Impression    IMPRESSION:   1. There is basilar predominant, peripheral, subpleural reticular  opacities in the lung bases without honeycombing. 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 Slightly advanced since 2009.  Superimposed viral infection seems less likely.  2. Calcification in the left thyroid lobe, may consider thyroid  ultrasound on an emergent basis  3. Heavy coronary artery calcifications    I have personally reviewed the examination and initial interpretation  and I agree with the findings.    BRITTANY MOODY MD

## 2020-03-06 NOTE — RESULT ENCOUNTER NOTE
Dear Carolin Mcbride,    Your test results are attached.     The lungs show signs of a previous infection or scaring that may have been viral or inflammatory. This was also seen in 2009.     You have calcification in the left thyroid gland that needs follow up with a thyroid ultrasound.     Please schedule your DEXA scan or ultrasound by calling [phone].     The coronary arteries have calcification and lowering your cholesterol would be ideal but you do not tolerate statin medication. We will recheck your cholesterol in 3 months.     Please call me if you have any questions about these test results or about your care.    Sincerely,    Lela Townsend MD

## 2020-03-09 ENCOUNTER — ANCILLARY PROCEDURE (OUTPATIENT)
Dept: ULTRASOUND IMAGING | Facility: CLINIC | Age: 85
End: 2020-03-09
Attending: FAMILY MEDICINE
Payer: COMMERCIAL

## 2020-03-09 DIAGNOSIS — R93.89 ABNORMAL IMAGING OF THYROID: ICD-10-CM

## 2020-03-09 PROCEDURE — 76536 US EXAM OF HEAD AND NECK: CPT

## 2020-03-09 NOTE — LETTER
March 11, 2020      Carolin Mcbride  [address] [address]        Dear Carolin Mcbride,     Your test results are attached. I am happy to let you know that they are stable and your medications can stay the same.     The ultrasound of your thyroid was reassuring. You do have cysts and a nodule but these all look benign. We should repeat the ultrasound in 1 year to see if there are any changes.     Please call me if you have any questions about these test results or about your care.     Sincerely,     Lela Townsend MD     Resulted Orders   US Thyroid    Narrative    ULTRASOUND THYROID March 9, 2020 10:34 AM     HISTORY: Abnormal imaging of thyroid.    COMPARISON: CT 3/5/2020.    FINDINGS: The right lobe measures 3.7 x 1.6 x 1.0 cm. The left lobe  measures 3.8 x 1.7 x 1.3 cm. The isthmus is normal in thickness.  Thyroid parenchyma is heterogeneous in echotexture.    Thyroid nodules as follows:   1. Hypoechoic nodule mid right lobe measures 0.7 x 0.5 x 0.3 cm.  2. Cyst in the inferior left lobe measures 0.8 x 0.6 x 0.7 cm.  3. Cyst in the inferior left lobe measures 0.5 x 0.4 x 0.5 cm.  4. Peripherally calcified nodule in the inferior left lobe measures  0.7 x 0.7 x 0.8 cm.      Impression    IMPRESSION: Thyroid nodules described above are without worrisome  feature. Consider annual follow-up.    PAULETTE TAPIA MD       If you have any questions or concerns, please call the clinic at the number listed above.       Sincerely,        [facility] ULTRASOUND ROOM 1

## 2020-03-11 NOTE — RESULT ENCOUNTER NOTE
Dear Carolin Mcbride,    Your test results are attached. I am happy to let you know that they are stable and your medications can stay the same.    The ultrasound of your thyroid was reassuring. You do have cysts and a nodule but these all look benign. We should repeat the ultrasound in 1 year to see if there are any changes.     Please call me if you have any questions about these test results or about your care.    Sincerely,    Lela Townsend MD

## 2020-03-13 ENCOUNTER — TELEPHONE (OUTPATIENT)
Dept: FAMILY MEDICINE | Facility: CLINIC | Age: 85
End: 2020-03-13

## 2020-03-13 DIAGNOSIS — F33.0 MAJOR DEPRESSIVE DISORDER, RECURRENT EPISODE, MILD (H): ICD-10-CM

## 2020-03-13 NOTE — TELEPHONE ENCOUNTER
"Requested Prescriptions   Pending Prescriptions Disp Refills     citalopram (CELEXA) 10 MG tablet [Pharmacy Med Name: CITALOPRAM HYDROBROMIDE 10 MG Tablet]  Last Written Prescription Date:  03/27/19  Last Fill Quantity: 90,  # refills: 3   Last Office Visit with G, P or [facility] prescribing provider:  03/02/2020-Cary   Future Office Visit:    90 tablet 3     Sig: TAKE 1 TABLET (10 MG) BY MOUTH DAILY       SSRIs Protocol Failed - 3/13/2020  2:49 PM        Failed - PHQ-9 score less than 5 in past 6 months     Please review last PHQ-9 score.           Passed - Medication is active on med list        Passed - Patient is age 18 or older        Passed - No active pregnancy on record        Passed - No positive pregnancy test in last 12 months        Passed - Recent (6 mo) or future (30 days) visit within the authorizing provider's specialty     Patient had office visit in the last 6 months or has a visit in the next 30 days with authorizing provider or within the authorizing provider's specialty.  See \"Patient Info\" tab in inbasket, or \"Choose Columns\" in Meds & Orders section of the refill encounter.                 "

## 2020-03-13 NOTE — TELEPHONE ENCOUNTER
..Reason for Call:  Next step    Detailed comments: Patient had ultrasound and CT scan//thyroid - discovered scarring in the lungs/Dr Townsend was going to consult with other physicians, per Patient and wondering what has been decided;    Phone Number Patient can be reached at: Home number on file [phone] (home)    Best Time: anytime    Can we leave a detailed message on this number? YES    Call taken on 3/13/2020 at 10:28 AM by Mervat Navarrete

## 2020-03-13 NOTE — TELEPHONE ENCOUNTER
Called and read letter to the patient.    Dear Carolin Mcbride,     Your test results are attached. I am happy to let you know that they are stable and your medications can stay the same.     The ultrasound of your thyroid was reassuring. You do have cysts and a nodule but these all look benign. We should repeat the ultrasound in 1 year to see if there are any changes.     Please call me if you have any questions about these test results or about your care.     Sincerely,     Lela Townsend MD     Patient is concerned about lungs and felt like she was told there was calcification and scar tissue. She would like to know what to be doing to follow up with that.   -----    The coronary arteries have calcification and lowering your cholesterol would be ideal but you do not tolerate statin medication. We will recheck your cholesterol in 3 months.     Patient has agreed to the 3 month follow up as she is feeling better at this time.    Anastasia Gao RN  [place]

## 2020-03-17 RX ORDER — CITALOPRAM HYDROBROMIDE 10 MG/1
TABLET ORAL
Qty: 90 TABLET | Refills: 3 | Status: SHIPPED | OUTPATIENT
Start: 2020-03-17 | End: 2020-08-10

## 2020-04-13 ENCOUNTER — VIRTUAL VISIT (OUTPATIENT)
Dept: FAMILY MEDICINE | Facility: CLINIC | Age: 85
End: 2020-04-13
Payer: COMMERCIAL

## 2020-04-13 ENCOUNTER — TELEPHONE (OUTPATIENT)
Dept: FAMILY MEDICINE | Facility: CLINIC | Age: 85
End: 2020-04-13

## 2020-04-13 DIAGNOSIS — R53.83 FATIGUE, UNSPECIFIED TYPE: Primary | ICD-10-CM

## 2020-04-13 DIAGNOSIS — R29.898 LEG WEAKNESS, BILATERAL: ICD-10-CM

## 2020-04-13 DIAGNOSIS — I25.10 CORONARY ARTERY DISEASE INVOLVING NATIVE CORONARY ARTERY OF NATIVE HEART WITHOUT ANGINA PECTORIS: ICD-10-CM

## 2020-04-13 PROCEDURE — 99213 OFFICE O/P EST LOW 20 MIN: CPT | Mod: 95 | Performed by: FAMILY MEDICINE

## 2020-04-13 ASSESSMENT — PAIN SCALES - GENERAL: PAINLEVEL: NO PAIN (0)

## 2020-04-13 NOTE — PROGRESS NOTES
"Carolin Mcbride is a 85 year old female who is being evaluated via a billable telephone visit.      The patient has been notified of following:     \"This telephone visit will be conducted via a call between you and your physician/provider. We have found that certain health care needs can be provided without the need for a physical exam.  This service lets us provide the care you need with a short phone conversation.  If a prescription is necessary we can send it directly to your pharmacy.  If lab work is needed we can place an order for that and you can then stop by our lab to have the test done at a later time.    Telephone visits are billed at different rates depending on your insurance coverage. During this emergency period, for some insurers they may be billed the same as an in-person visit.  Please reach out to your insurance provider with any questions.    If during the course of the call the physician/provider feels a telephone visit is not appropriate, you will not be charged for this service.\"    Patient has given verbal consent for Telephone visit?  Yes    How would you like to obtain your AVS? Mail a copy    Subjective     Carolin Mcbride is a 85 year old female who presents to clinic today for the following health issues:    Feeling real tired tired today. No energy. A little light headed and weakness in both legs. February 15th had the flu and stopped taking statin due to side effects. Still exercising daily and when feeling well, is back to normal. Doesn't take any days off. Has had evaluation by cardiology and neurology in past year.        Hyperlipidemia Follow-Up      Are you regularly taking any medication or supplement to lower your cholesterol?   No    Are you having muscle aches or other side effects that you think could be caused by your cholesterol lowering medication?  No    Vascular Disease Follow-up      How often do you take nitroglycerin? Never    Do you take an aspirin every day? " Yes    Cerebrovascular Follow-up      Patient history: TIA    Residual symptoms: None    Worsened or new symptoms since last visit: No    Daily aspirin use: Yes    Hypertension controlled: Yes    Chronic Kidney Disease Follow-up      Do you take any over the counter pain medicine?: No      Patient Active Problem List   Diagnosis     Pseudophakia OU c YAG OU     CAD (coronary artery disease)     Advance care planning     Hypertension goal BP (blood pressure) < 140/90     Hyperlipidemia with target LDL less than 100     Osteoporosis     CKD (chronic kidney disease) stage 3, GFR 30-59 ml/min (H)     Seasonal allergic rhinitis     Diagnostic skin and sensitization tests (aka ALLERGENS)     Pastrana's neuroma     Strabismic amblyopia     Anterior basement membrane dystrophy, OU     PVD (posterior vitreous detachment) OU     Granuloma annulare     Recurrent and persistent hematuria     Past Surgical History:   Procedure Laterality Date     APPENDECTOMY       CHOLECYSTECTOMY, OPEN       COLONOSCOPY  2009    neg     CYSTOCELE REPAIR  2003    TVT SPARC       Social History     Tobacco Use     Smoking status: Never Smoker     Smokeless tobacco: Never Used     Tobacco comment: Never smoked; nonsmoking household   Substance Use Topics     Alcohol use: No     Comment: very rare     Family History   Problem Relation Age of Onset     Cancer Mother         ovarian      Heart Disease Sister         CABG x 3     Neurologic Disorder Sister         Fibromyalgia     Cancer Father         stomach/interstines      Musculoskeletal Disorder Brother         back      Heart Disease Brother 60        CABG x 4     Glaucoma No family hx of      Macular Degeneration No family hx of          Current Outpatient Medications   Medication Sig Dispense Refill     aspirin 81 MG tablet Take 1 tablet (81 mg) by mouth daily 90 tablet 3     CALCIUM 500 + D OR 1 tab three times a day        citalopram (CELEXA) 10 MG tablet TAKE 1 TABLET (10 MG) BY MOUTH DAILY  90 tablet 3     Hypromellose (ARTIFICIAL TEARS OP) Apply 1 drop to eye as needed Both eyes.       ipratropium (ATROVENT) 0.06 % nasal spray Spray 2 sprays into both nostrils 3 times daily as needed for rhinitis 3 Box 4     losartan (COZAAR) 100 MG tablet Take 1 tablet (100 mg) by mouth daily TAKE 1 TABLET EVERY DAY  (WITH  BREAKFAST) 90 tablet 3     polyethylene glycol (MIRALAX) powder Take 17 g (1 capful) by mouth daily 510 g 3     triamcinolone (KENALOG) 0.1 % cream Apply 2-3 times a day to affected area. 80 g 6     VITAMIN D 400 UNIT OR TABS 1 cap twice a day        Allergies   Allergen Reactions     Baycol [Cerivastatin Sodium]       muscle mass loss       Crestor [Hmg-Coa-R Inhibitors]      Previously on Baycol and affect muscle mass loss.     Darvocet [Acetaminophen]      Severe nausea     Macrobid [Nitrofuran Derivatives]      Hives     Recent Labs   Lab Test 07/23/19  1040 07/05/19 12/26/17  1030  12/20/16  1054  01/12/15  1034  05/19/14  1500  11/06/12  1045   *  --  117*  --  118*   < >  --    < >  --    < > 160*   HDL 47*  --  58  --  63   < >  --    < >  --    < > 43*   TRIG 170*  --  187*  --  130   < >  --    < >  --    < > 160*   ALT 23  --   --   --   --   --  29  --  22   < >  --    CR 0.88 0.95 0.94   < > 1.00   < >  --    < > 0.99   < >  --    GFRESTIMATED 60* 56* 57*   < > 53*   < >  --    < > 54*   < >  --    GFRESTBLACK 70 >60 69   < > 64   < >  --    < > 65   < >  --    POTASSIUM 4.3 4.6 4.3   < > 4.5   < >  --    < > 4.7   < >  --    TSH  --   --   --   --   --   --   --   --  1.56  --  1.52    < > = values in this interval not displayed.      BP Readings from Last 3 Encounters:   03/02/20 (!) 142/78   02/21/20 (!) 146/72   12/09/19 139/72    Wt Readings from Last 3 Encounters:   03/02/20 77.6 kg (171 lb)   02/21/20 77.1 kg (170 lb)   10/28/19 77.1 kg (170 lb)                    Reviewed and updated as needed this visit by Provider         Review of Systems   ROS COMP: Constitutional,  HEENT, cardiovascular, pulmonary, gi and gu systems are negative, except as otherwise noted.       Objective   Reported vitals:  LMP  (LMP Unknown)      PSYCH: Alert and oriented times 3; coherent speech, normal   rate and volume, able to articulate logical thoughts, able   to abstract reason, no tangential thoughts, no hallucinations   or delusions  Her affect is normal  RESP: No cough, no audible wheezing, able to talk in full sentences  Remainder of exam unable to be completed due to telephone visits    Diagnostic Test Results:  Labs reviewed in Epic        Assessment/Plan:  1. Fatigue, unspecified type  Intermittent and has normal days in between. Recommend taking 2 days off a week from exercise for recovery and see if this helps    2. Leg weakness, bilateral  Intermittent. May have decreased circulation or neuropathy but will continue to monitor, since doing OK most of the time. If symptoms worse, will do imaging and further exam.     3. Coronary artery disease involving native coronary artery of native heart without angina pectoris  At risk but does not tolerate statins.       No follow-ups on file.      Phone call duration:  14 minutes    Lela Townsend MD

## 2020-04-13 NOTE — PATIENT INSTRUCTIONS
Patient Education     Weakness with Uncertain Cause  Based on your exam today, the exact cause of your weakness is not certain. But your weakness does not seem to be a sign of a serious illness at this time. Keep an eye on your symptoms and get medical advice as instructed below.  Home care    Rest at home today. Don't over-exert yourself.    Take any medicine as prescribed.    For the next few days, drink extra fluids (unless your healthcare provider wants you to restrict fluids for other reasons). Don't skip meals.    Unless otherwise directed, continue to take any prescription medicines.    Contact your healthcare provider if you have any questions or concerns.  Follow-up care  Follow up with your healthcare provider, or as advised.  When to seek medical advice  Call your healthcare provider right away for any of the following:    Symptoms get worse    Symptoms don't start getting better within 2 days    Fever of 100.4  F (38  C) or higher, or as directed by your healthcare provider  Call 911  Call 911 for any of these:    Chest, arm, neck, jaw, or upper back pain    Trouble breathing    Numbness or weakness of the face, one arm, or one leg    Slurred speech, confusion, or trouble speaking, walking, or seeing    Blood in vomit or stool (black or red color)    Loss of consciousness    Severe headache  Date Last Reviewed: 10/1/2017    [id number] [employer]. [address] [place] [address], [place] [address]. All rights reserved. This information is not intended as a substitute for professional medical care. Always follow your healthcare professional's instructions.

## 2020-04-13 NOTE — TELEPHONE ENCOUNTER
Reason for Call:  Other questions    Detailed comments: Patient was seen with [facility] on 3/2/2020 for not feeling good and patient states she still is not feeling her best she has finished her antibiotics but would like to speak to  please advise    Phone Number Patient can be reached at: Cell number on file:    Telephone Information:   Mobile [phone]       Best Time: Any    Can we leave a detailed message on this number? YES    Call taken on 4/13/2020 at 11:56 AM by Evan Yeung

## 2020-04-13 NOTE — TELEPHONE ENCOUNTER
"Patient was seen on 03/02/2020.    States that she is still not feeling well. \"some days I feel great and other I feel awful and really tired.\"    It seems like I get where I feel really good. By evening I feel weak and wore out.     Denies fever, SOB, nausea, vomiting or diarrhea.     Patient is scheduled to discuss with provider today at 3pm.     \"I just feel like I am up down\"    Anastasia Gao RN  [place]/[facility]              "

## 2020-07-02 ENCOUNTER — OFFICE VISIT (OUTPATIENT)
Dept: OPHTHALMOLOGY | Facility: CLINIC | Age: 85
End: 2020-07-02
Payer: COMMERCIAL

## 2020-07-02 DIAGNOSIS — H43.813 PVD (POSTERIOR VITREOUS DETACHMENT), BILATERAL: ICD-10-CM

## 2020-07-02 DIAGNOSIS — H18.529 ANTERIOR BASEMENT MEMBRANE DYSTROPHY: ICD-10-CM

## 2020-07-02 DIAGNOSIS — Z01.00 EXAMINATION OF EYES AND VISION: Primary | ICD-10-CM

## 2020-07-02 DIAGNOSIS — H53.039 STRABISMIC AMBLYOPIA, UNSPECIFIED LATERALITY: ICD-10-CM

## 2020-07-02 DIAGNOSIS — H52.12 MYOPIA OF LEFT EYE: ICD-10-CM

## 2020-07-02 DIAGNOSIS — H52.4 PRESBYOPIA OF BOTH EYES: ICD-10-CM

## 2020-07-02 DIAGNOSIS — Z96.1 PSEUDOPHAKIA: ICD-10-CM

## 2020-07-02 DIAGNOSIS — H52.223 REGULAR ASTIGMATISM OF BOTH EYES: ICD-10-CM

## 2020-07-02 PROCEDURE — 92015 DETERMINE REFRACTIVE STATE: CPT | Mod: GY | Performed by: STUDENT IN AN ORGANIZED HEALTH CARE EDUCATION/TRAINING PROGRAM

## 2020-07-02 PROCEDURE — 92014 COMPRE OPH EXAM EST PT 1/>: CPT | Performed by: STUDENT IN AN ORGANIZED HEALTH CARE EDUCATION/TRAINING PROGRAM

## 2020-07-02 ASSESSMENT — CONF VISUAL FIELD
OD_NORMAL: 1
OS_NORMAL: 1

## 2020-07-02 ASSESSMENT — REFRACTION_WEARINGRX
OS_ADD: +2.75
OS_CYLINDER: +0.75
OD_CYLINDER: +1.25
OD_AXIS: 127
OS_AXIS: 128
OD_ADD: +2.75
OS_SPHERE: -0.50
OD_SPHERE: -0.50
SPECS_TYPE: PAL

## 2020-07-02 ASSESSMENT — REFRACTION_MANIFEST
OS_AXIS: 125
OD_ADD: +3.00
OS_SPHERE: -0.50
OS_CYLINDER: +0.75
OD_SPHERE: PLANO
OD_CYLINDER: +1.00
OD_AXIS: 090
OS_ADD: +3.00

## 2020-07-02 ASSESSMENT — VISUAL ACUITY
OS_CC: J10
OD_CC: J2+
OD_CC: 20/30-2
METHOD: SNELLEN - LINEAR
CORRECTION_TYPE: GLASSES
OS_CC: 20/80

## 2020-07-02 ASSESSMENT — SLIT LAMP EXAM - LIDS
COMMENTS: 1+ DERMATOCHALASIS
COMMENTS: 1+ DERMATOCHALASIS

## 2020-07-02 ASSESSMENT — EXTERNAL EXAM - LEFT EYE: OS_EXAM: NORMAL

## 2020-07-02 ASSESSMENT — CUP TO DISC RATIO
OS_RATIO: 0.4
OD_RATIO: 0.4

## 2020-07-02 ASSESSMENT — EXTERNAL EXAM - RIGHT EYE: OD_EXAM: NORMAL

## 2020-07-02 ASSESSMENT — TONOMETRY
IOP_METHOD: APPLANATION
OS_IOP_MMHG: 17
OD_IOP_MMHG: 17

## 2020-07-02 NOTE — PROGRESS NOTES
Current Eye Medications:  Tears prn     Subjective:  Comprehensive eye exam.   Pt reports she sees quite well, however her eyes are red all the time (the inside of the lids only) and will matter.     Objective:  See Ophthalmology Exam.       Assessment:  Carolin Mcbride is a 85 year old female who presents with:   Encounter Diagnoses   Name Primary?     Examination of eyes and vision      Myopia of left eye      Regular astigmatism of both eyes      Presbyopia of both eyes        Strabismic amblyopia OS      Pseudophakia OU c YAG OU      Anterior basement membrane dystrophy, OU      PVD (posterior vitreous detachment), bilateral        Plan:  Could use a gel tear at bedtime and/or up to four times a day (like Refresh Liquigel or GenTeal Gel Tears). They will make the vision more blurry when you put the drops in, but they last longer and may help with the redness and mattering.    Glasses prescription given - optional to update    Nevin Ricci MD  [phone]

## 2020-07-02 NOTE — PATIENT INSTRUCTIONS
Could use a gel tear at bedtime and/or up to four times a day (like Refresh Liquigel or GenTeal Gel Tears). They will make the vision more blurry when you put the drops in, but they last longer and may help with the redness and mattering.    Glasses prescription given - optional to update    Nevin Ricci MD  [phone]

## 2020-07-02 NOTE — LETTER
7/2/2020         RE: Carolin Mcbride  [address] [address] [facility] [address]        Dear Colleague,    Thank you for referring your patient, Carolin Mcbride, to the [facility]. Please see a copy of my visit note below.     Current Eye Medications:  Tears prn     Subjective:  Comprehensive eye exam.   Pt reports she sees quite well, however her eyes are red all the time (the inside of the lids only) and will matter.     Objective:  See Ophthalmology Exam.       Assessment:  Carolin Mcbride is a 85 year old female who presents with:   Encounter Diagnoses   Name Primary?     Examination of eyes and vision      Myopia of left eye      Regular astigmatism of both eyes      Presbyopia of both eyes        Strabismic amblyopia OS      Pseudophakia OU c YAG OU      Anterior basement membrane dystrophy, OU      PVD (posterior vitreous detachment), bilateral        Plan:  Could use a gel tear at bedtime and/or up to four times a day (like Refresh Liquigel or GenTeal Gel Tears). They will make the vision more blurry when you put the drops in, but they last longer and may help with the redness and mattering.    Glasses prescription given - optional to update    Nevin Ricci MD  [phone]        Again, thank you for allowing me to participate in the care of your patient.        Sincerely,        Nevin Ricci MD

## 2020-07-30 DIAGNOSIS — I10 HYPERTENSION, BENIGN ESSENTIAL, GOAL BELOW 140/90: ICD-10-CM

## 2020-08-04 ENCOUNTER — ANCILLARY PROCEDURE (OUTPATIENT)
Dept: MAMMOGRAPHY | Facility: CLINIC | Age: 85
End: 2020-08-04
Attending: FAMILY MEDICINE
Payer: COMMERCIAL

## 2020-08-04 DIAGNOSIS — Z12.31 VISIT FOR SCREENING MAMMOGRAM: ICD-10-CM

## 2020-08-04 PROCEDURE — 77067 SCR MAMMO BI INCL CAD: CPT | Mod: TC

## 2020-08-04 PROCEDURE — 77063 BREAST TOMOSYNTHESIS BI: CPT | Mod: TC

## 2020-08-04 RX ORDER — LOSARTAN POTASSIUM 100 MG/1
100 TABLET ORAL DAILY
Qty: 90 TABLET | Refills: 3 | Status: SHIPPED | OUTPATIENT
Start: 2020-08-04 | End: 2020-08-10

## 2020-08-04 NOTE — TELEPHONE ENCOUNTER
LOSARTAN POTASSIUM 100 MG Tablet   Last Written Prescription Date:  7/25/2019  Last Fill Quantity: 90,   # refills: 3  Last Office Visit : 7/25/2019  Future Office visit:  None    Routing refill request to provider for review/approval because:  Labs due( BMP 7/23/2019), appt last 7/25/2019  [facility]

## 2020-08-04 NOTE — TELEPHONE ENCOUNTER
3/2/20 was seen in [place] by Family Practice for HTN.  Will route to BK refill to address refill.  She was seen by cardiology last year 7/19 and was advised to follow up on an as needed basis.    Calista Li, RN  Cardiology Care Coordinator  [facility]  [phone] option 1

## 2020-08-04 NOTE — TELEPHONE ENCOUNTER
Routing refill request to provider for review/approval because:  See message below from Cardiology dept, they have not seen patient since July 2019 and are asking PCP to take over prescription for Losartan.  RN team unable to approve at this time as medication does not have PCP name attached to prescription, last given by cardiology.    Nedra Charles RN  [facility]/ [facility]

## 2020-08-10 ENCOUNTER — OFFICE VISIT (OUTPATIENT)
Dept: FAMILY MEDICINE | Facility: CLINIC | Age: 85
End: 2020-08-10
Payer: COMMERCIAL

## 2020-08-10 VITALS
HEIGHT: 63 IN | BODY MASS INDEX: 29.59 KG/M2 | OXYGEN SATURATION: 97 % | RESPIRATION RATE: 16 BRPM | HEART RATE: 89 BPM | SYSTOLIC BLOOD PRESSURE: 140 MMHG | WEIGHT: 167 LBS | DIASTOLIC BLOOD PRESSURE: 72 MMHG

## 2020-08-10 DIAGNOSIS — R80.9 PROTEINURIA, UNSPECIFIED TYPE: ICD-10-CM

## 2020-08-10 DIAGNOSIS — I10 HYPERTENSION, BENIGN ESSENTIAL, GOAL BELOW 140/90: ICD-10-CM

## 2020-08-10 DIAGNOSIS — F33.0 MAJOR DEPRESSIVE DISORDER, RECURRENT EPISODE, MILD (H): ICD-10-CM

## 2020-08-10 DIAGNOSIS — Z00.00 ENCOUNTER FOR MEDICARE ANNUAL WELLNESS EXAM: Primary | ICD-10-CM

## 2020-08-10 DIAGNOSIS — R31.29 MICROSCOPIC HEMATURIA: ICD-10-CM

## 2020-08-10 DIAGNOSIS — E78.5 HYPERLIPIDEMIA WITH TARGET LDL LESS THAN 100: ICD-10-CM

## 2020-08-10 DIAGNOSIS — N18.30 CKD (CHRONIC KIDNEY DISEASE) STAGE 3, GFR 30-59 ML/MIN (H): ICD-10-CM

## 2020-08-10 DIAGNOSIS — I10 HYPERTENSION GOAL BP (BLOOD PRESSURE) < 140/90: ICD-10-CM

## 2020-08-10 DIAGNOSIS — I25.10 CORONARY ARTERY DISEASE INVOLVING NATIVE CORONARY ARTERY OF NATIVE HEART WITHOUT ANGINA PECTORIS: ICD-10-CM

## 2020-08-10 LAB
ALBUMIN UR-MCNC: 30 MG/DL
APPEARANCE UR: CLEAR
BACTERIA #/AREA URNS HPF: ABNORMAL /HPF
BILIRUB UR QL STRIP: NEGATIVE
COLOR UR AUTO: YELLOW
ERYTHROCYTE [DISTWIDTH] IN BLOOD BY AUTOMATED COUNT: 12.4 % (ref 10–15)
GLUCOSE UR STRIP-MCNC: NEGATIVE MG/DL
HCT VFR BLD AUTO: 39.9 % (ref 35–47)
HGB BLD-MCNC: 13 G/DL (ref 11.7–15.7)
HGB UR QL STRIP: ABNORMAL
KETONES UR STRIP-MCNC: NEGATIVE MG/DL
LEUKOCYTE ESTERASE UR QL STRIP: NEGATIVE
MCH RBC QN AUTO: 28.7 PG (ref 26.5–33)
MCHC RBC AUTO-ENTMCNC: 32.6 G/DL (ref 31.5–36.5)
MCV RBC AUTO: 88 FL (ref 78–100)
NITRATE UR QL: NEGATIVE
NON-SQ EPI CELLS #/AREA URNS LPF: ABNORMAL /LPF
PH UR STRIP: 6 PH (ref 5–7)
PLATELET # BLD AUTO: 289 10E9/L (ref 150–450)
RBC # BLD AUTO: 4.53 10E12/L (ref 3.8–5.2)
RBC #/AREA URNS AUTO: ABNORMAL /HPF
SOURCE: ABNORMAL
SP GR UR STRIP: 1.02 (ref 1–1.03)
UROBILINOGEN UR STRIP-ACNC: 0.2 EU/DL (ref 0.2–1)
WBC # BLD AUTO: 6.7 10E9/L (ref 4–11)
WBC #/AREA URNS AUTO: ABNORMAL /HPF

## 2020-08-10 PROCEDURE — 81001 URINALYSIS AUTO W/SCOPE: CPT | Performed by: FAMILY MEDICINE

## 2020-08-10 PROCEDURE — 84443 ASSAY THYROID STIM HORMONE: CPT | Performed by: FAMILY MEDICINE

## 2020-08-10 PROCEDURE — 36415 COLL VENOUS BLD VENIPUNCTURE: CPT | Performed by: FAMILY MEDICINE

## 2020-08-10 PROCEDURE — 99397 PER PM REEVAL EST PAT 65+ YR: CPT | Performed by: FAMILY MEDICINE

## 2020-08-10 PROCEDURE — 80069 RENAL FUNCTION PANEL: CPT | Performed by: FAMILY MEDICINE

## 2020-08-10 PROCEDURE — 85027 COMPLETE CBC AUTOMATED: CPT | Performed by: FAMILY MEDICINE

## 2020-08-10 PROCEDURE — 84460 ALANINE AMINO (ALT) (SGPT): CPT | Performed by: FAMILY MEDICINE

## 2020-08-10 PROCEDURE — 80061 LIPID PANEL: CPT | Performed by: FAMILY MEDICINE

## 2020-08-10 PROCEDURE — 82043 UR ALBUMIN QUANTITATIVE: CPT | Performed by: FAMILY MEDICINE

## 2020-08-10 RX ORDER — LOSARTAN POTASSIUM 100 MG/1
100 TABLET ORAL DAILY
Qty: 90 TABLET | Refills: 3 | Status: SHIPPED | OUTPATIENT
Start: 2020-08-10 | End: 2021-01-26

## 2020-08-10 RX ORDER — CITALOPRAM HYDROBROMIDE 10 MG/1
10 TABLET ORAL DAILY
Qty: 90 TABLET | Refills: 3 | Status: SHIPPED | OUTPATIENT
Start: 2020-08-10 | End: 2021-07-01

## 2020-08-10 ASSESSMENT — PAIN SCALES - GENERAL: PAINLEVEL: NO PAIN (0)

## 2020-08-10 ASSESSMENT — PATIENT HEALTH QUESTIONNAIRE - PHQ9: SUM OF ALL RESPONSES TO PHQ QUESTIONS 1-9: 0

## 2020-08-10 ASSESSMENT — MIFFLIN-ST. JEOR: SCORE: 1175.6

## 2020-08-10 NOTE — PATIENT INSTRUCTIONS
Patient Education   Personalized Prevention Plan  You are due for the preventive services outlined below.  Your care team is available to assist you in scheduling these services.  If you have already completed any of these items, please share that information with your care team to update in your medical record.  Health Maintenance Due   Topic Date Due     Depression Assessment  01/09/2020     Basic Metabolic Panel  07/23/2020     Cholesterol Lab  07/23/2020     Kidney Microalbumin Urine Test  07/23/2020     FALL RISK ASSESSMENT  07/23/2020     Annual Wellness Visit  07/23/2020         At [facility], we strive to deliver an exceptional experience to you, every time we see you.  If you receive a survey in the mail, please send us back your thoughts. We really do value your feedback.    Based on your medical history, these are the current health maintenance/preventive care services that you are due for (some may have been done at this visit.)  Health Maintenance Due   Topic Date Due     PHQ-9  01/09/2020     BMP  07/23/2020     LIPID  07/23/2020     MICROALBUMIN  07/23/2020     FALL RISK ASSESSMENT  07/23/2020     MEDICARE ANNUAL WELLNESS VISIT  07/23/2020         Suggested websites for health information:  Www.[employer] : Up to date and easily searchable information on multiple topics.  Www.medlineplus.gov : medication info, interactive tutorials, watch real surgeries online  Www.familydoctor.org : good info from the Academy of Family Physicians  Www.cdc.gov : public health info, travel advisories, epidemics (H1N1)  Www.aap.org : children's health info, normal development, vaccinations  Www.health.[facility].[place].us : [place] dept of health, public health issues in [place], N1N1    Your care team:                            Family Medicine Internal Medicine   MD Edgardo Wilhelm MD Shantel Branch-Fleming, MD Katya Georgiev PA-C Nam Ho, MD Pediatrics   KYRA Graham,  LUANN Curran APRMD Soumya Gregory CNP, MD Deborah Mielke, MD Kim Thein, APRN CNP      Clinic hours: Monday - Thursday 7 am-7 pm; Fridays 7 am-5 pm.   Urgent care: Monday - Friday 11 am-9 pm; Saturday and Sunday 9 am-5 pm.  Pharmacy : Monday -Thursday 8 am-8 pm; Friday 8 am-6 pm; Saturday and Sunday 9 am-5 pm.     Clinic: [phone]   Pharmacy: [phone]    Patient Education     Prevention Guidelines, Women Ages 65 and Older  Screening tests and vaccines are an important part of managing your health. A screening test is done to find possible disorders or diseases in people who don't have any symptoms. The goal is to find a disease early so lifestyle changes can be made and you can be watched more closely to reduce the risk of disease, or to detect it early enough to treat it most effectively. Screening tests are not considered diagnostic, but are used to determine if more testing is needed. Health counseling is essential, too. Below are guidelines for these, for women ages 65 and older. Talk with your healthcare provider to make sure you re up to date on what you need.  Screening Who needs it How often   Type 2 diabetes or prediabetes All women ages 40 to 75 who are overweight or obese At least every 3 years   Type 2 diabetes All women with prediabetes Every year   Alcohol misuse All women in this age group At routine exams   Blood pressure All women in this age group Yearly checkup if your blood pressure is normal*  Normal blood pressure is less than 120/80 mm Hg*  If your blood pressure reading is higher than normal, follow the advice of your healthcare provider   Breast cancer All women of average risk  There are no guidelines for breast cancer screening for 75 years and older.  Mammograms should be done every 1 or 2 years until age 75. At that point a woman should talk to her doctor about whether to continue screening. Talk to your doctor regarding your recommended  frequency depending on your risk factors.   Cervical cancer Only women who had abnormal screening results before age 65 Talk with your healthcare provider   Chlamydia Women at increased risk for infection At routine exams   Colorectal cancer All women over the age of 50  This screening is advised against for women over 75 or if there is a life expectancy of less than 10 years.  Multiple tests are available and are used at different times. Possible tests include: flexible sigmoidoscopy, colonoscopy, double-contrast barium enema, yearly fecal occult blood test, fecal immunochemical test, or stool DNA test as often as your healthcare provider advises. Talk with your healthcare provider about which tests are best for you.   Depression All women in this age group At routine exams   Gonorrhea Sexually active women at increased risk for infection At routine exams   Hepatitis C Anyone at increased risk; 1 time for those born between 1945 and 1965 At routine exams   High cholesterol or triglycerides All women in this age group who are at risk for coronary artery disease At least every 5 years   HIV Women at increased risk for infection-talk with your healthcare provider At routine exams   Lung cancer Adults ages 55 to 74 who have smoked with a 30-pack-a-year history and have smoked within the past 15 years  Annual low dose CT scan   Obesity All women in this age group At routine exams   Osteoporosis All women in this age group Bone density test at age 65, then follow-up as advised by your healthcare provider   Syphilis Women at increased risk for infection-talk with your healthcare provider At routine exams   Thyroid-Stimulating Hormone (TSH) All women in this age group with symptoms of thyroid dysfunction. There is not enough evidence to support TSH screening in women without symptoms.  ACOG recommendation is every 5 years; American Academy of Family Physicians concludes there is not enough evidence to support routine  screening in adults without symptoms.    Tuberculosis Women at increased risk for infection-talk with your healthcare provider Ask your healthcare provider   Vision All women in this age group Every 1 to 2 years; if you have a chronic health condition, ask your healthcare provider if you need exams more often   Vaccine Who needs it How often   Chickenpox (varicella) All women in this age group who have no record of this infection or vaccine 2 doses; second dose should be given at least 4 weeks after the first dose   Hepatitis A Women at increased risk for infection-talk with your healthcare provider 2 doses given 6 months apart   Hepatitis B Women at increased risk for infection-talk with your healthcare provider 3 doses over 6 months; second dose should be given 1 month after the first dose; the third dose should be given at least 2 months after the second dose and at least 4 months after the first dose   Haemophilus influenza Type B (HIB) Women at increased risk for infection-talk with your healthcare provider 1 to 3 doses   Influenza (flu) All women in this age group Once a year   Pneumococcal conjugate vaccine (PCV13) and pneumococcal polysaccharide vaccine (PPSV23) All women in this age group 1 dose of each vaccine   Tetanus/diphtheria/pertussis (Td/Tdap) booster All women in this age group Td every 10 years, or a one-time dose of Tdap instead of a Td booster after age 18, then Td every 10 years   Zoster All women in this age group 1 dose   Counseling Who needs it How often   Diet and exercise Women who are overweight or obese When diagnosed, and then at routine exams   Fall prevention (exercise and vitamin D supplements) All women in this age group At routine exams   Sexually transmitted infection prevention Women at increased risk for infection-talk with your healthcare provider At routine exams   Use of daily aspirin Talk to your healthcare provider about whether or not to start taking low-dose aspirin for  the prevention of cardiovascular disease (CVD) and colorectal cancer in adults ages 60 to 69 who have at least a 10% risk of getting CVD within the next 10 years.  People who are not at increased risk for bleeding, have a life expectancy of at least 10 years, and are willing to take low-dose aspirin daily for at least 10 years are more likely to benefit. When the advantages of taking low-dose aspirin outweigh the risks, people may choose to start taking a low-dose aspirin.  There is not enough data to support the use of aspirin in people over the age of 70.  When your risk is known   Use of tobacco and the health effects it can cause All women in this age group Every exam   Date Last Reviewed: 11/1/2017 2000-2019 [employer]. [address] [place] [address], [place] [address]. All rights reserved. This information is not intended as a substitute for professional medical care. Always follow your healthcare professional's instructions.

## 2020-08-10 NOTE — PROGRESS NOTES
"  SUBJECTIVE:   Carolin Mcbride is a 85 year old female who presents for Preventive Visit.    Are you in the first 12 months of your Medicare Part B coverage?  No    Physical Health:    In general, how would you rate your overall physical health? good    Outside of work, how many days during the week do you exercise? 6-7 days/week    Outside of work, approximately how many minutes a day do you exercise?15-30 minutes    If you drink alcohol do you typically have >3 drinks per day or >7 drinks per week? No    Do you usually eat at least 4 servings of fruit and vegetables a day, include whole grains & fiber and avoid regularly eating high fat or \"junk\" foods? Yes    Do you have any problems taking medications regularly?  No    Do you have any side effects from medications? none    Needs assistance for the following daily activities: no assistance needed    Which of the following safety concerns are present in your home?  none identified     Hearing impairment: No    In the past 6 months, have you been bothered by leaking of urine? No, only when coughing/laughing had the urge to go     Mental Health:    In general, how would you rate your overall mental or emotional health? excellent  PHQ-2 Score:      Do you feel safe in your environment? Yes    Have you ever done Advance Care Planning? (For example, a Health Directive, POLST, or a discussion with a medical provider or your loved ones about your wishes): Yes, advance care planning is on file.    Additional concerns to address?  No    Fall risk:  Fallen 2 or more times in the past year?: No  Any fall with injury in the past year?: No    Cognitive Screenin) Repeat 3 items (Leader, Season, Table)    2) Clock draw: NORMAL  3) 3 item recall: Recalls 2 objects   Results: NORMAL clock, 1-2 items recalled: COGNITIVE IMPAIRMENT LESS LIKELY    Mini-CogTM Copyright WENDI Andrade. Licensed by the author for use in [facility]; reprinted with permission " ([email or web address]). All rights reserved.      Do you have sleep apnea, excessive snoring or daytime drowsiness?: no        -patient states that she has a sore spot/lump on her right side buttocks area that she would like to have checked.     Reviewed and updated as needed this visit by clinical staff  Tobacco  Allergies  Meds         Reviewed and updated as needed this visit by Provider        Social History     Tobacco Use     Smoking status: Never Smoker     Smokeless tobacco: Never Used     Tobacco comment: Never smoked; nonsmoking household   Substance Use Topics     Alcohol use: No     Comment: very rare                           Current providers sharing in care for this patient include:   Patient Care Team:  Lela Townsend MD as PCP - General (Family Practice)  Lela Townsend MD as Assigned PCP    The following health maintenance items are reviewed in Epic and correct as of today:  Health Maintenance   Topic Date Due     PHQ-9  01/09/2020     BMP  07/23/2020     LIPID  07/23/2020     MICROALBUMIN  07/23/2020     FALL RISK ASSESSMENT  07/23/2020     MEDICARE ANNUAL WELLNESS VISIT  07/23/2020     INFLUENZA VACCINE (1) 09/01/2020     EYE EXAM  07/02/2021     ADVANCE CARE PLANNING  12/20/2021     DTAP/TDAP/TD IMMUNIZATION (3 - Td) 11/06/2022     DEPRESSION ACTION PLAN  Completed     PNEUMOCOCCAL IMMUNIZATION 65+ LOW/MEDIUM RISK  Completed     ZOSTER IMMUNIZATION  Completed     IPV IMMUNIZATION  Aged Out     MENINGITIS IMMUNIZATION  Aged Out     Lab work is in process  Labs reviewed in EPIC  BP Readings from Last 3 Encounters:   08/10/20 (!) 140/72   03/02/20 (!) 142/78   02/21/20 (!) 146/72    Wt Readings from Last 3 Encounters:   08/10/20 75.8 kg (167 lb)   03/02/20 77.6 kg (171 lb)   02/21/20 77.1 kg (170 lb)                  Patient Active Problem List   Diagnosis     Pseudophakia OU c YAG OU     CAD (coronary artery disease)     Advance care planning     Hypertension goal BP (blood pressure) <  140/90     Hyperlipidemia with target LDL less than 100     Osteoporosis     CKD (chronic kidney disease) stage 3, GFR 30-59 ml/min (H)     Seasonal allergic rhinitis     Diagnostic skin and sensitization tests (aka ALLERGENS)     Pastrana's neuroma     Strabismic amblyopia     Anterior basement membrane dystrophy, OU     PVD (posterior vitreous detachment) OU     Granuloma annulare     Recurrent and persistent hematuria     Leg weakness, bilateral     Past Surgical History:   Procedure Laterality Date     APPENDECTOMY       CHOLECYSTECTOMY, OPEN       COLONOSCOPY  2009    neg     CYSTOCELE REPAIR  2003    TVT SPARC       Social History     Tobacco Use     Smoking status: Never Smoker     Smokeless tobacco: Never Used     Tobacco comment: Never smoked; nonsmoking household   Substance Use Topics     Alcohol use: No     Comment: very rare     Family History   Problem Relation Age of Onset     Cancer Mother         ovarian      Heart Disease Sister         CABG x 3     Neurologic Disorder Sister         Fibromyalgia     Cancer Father         stomach/interstines      Musculoskeletal Disorder Brother         back      Heart Disease Brother 60        CABG x 4     Glaucoma No family hx of      Macular Degeneration No family hx of          Current Outpatient Medications   Medication Sig Dispense Refill     aspirin 81 MG tablet Take 1 tablet (81 mg) by mouth daily 90 tablet 3     CALCIUM 500 + D OR 1 tab three times a day        citalopram (CELEXA) 10 MG tablet TAKE 1 TABLET (10 MG) BY MOUTH DAILY 90 tablet 3     Hypromellose (ARTIFICIAL TEARS OP) Apply 1 drop to eye as needed Both eyes.       ipratropium (ATROVENT) 0.06 % nasal spray Spray 2 sprays into both nostrils 3 times daily as needed for rhinitis 3 Box 4     losartan (COZAAR) 100 MG tablet Take 1 tablet (100 mg) by mouth daily 90 tablet 3     polyethylene glycol (MIRALAX) powder Take 17 g (1 capful) by mouth daily 510 g 3     triamcinolone (KENALOG) 0.1 % cream Apply  "2-3 times a day to affected area. 80 g 6     VITAMIN D 400 UNIT OR TABS 1 cap twice a day        Allergies   Allergen Reactions     Baycol [Cerivastatin Sodium]       muscle mass loss       Crestor [Hmg-Coa-R Inhibitors]      Previously on Baycol and affect muscle mass loss.     Darvocet [Acetaminophen]      Severe nausea     Macrobid [Nitrofuran Derivatives]      Hives     Recent Labs   Lab Test 07/23/19  1040 07/05/19 12/26/17  1030  12/20/16  1054  01/12/15  1034  05/19/14  1500  11/06/12  1045   *  --  117*  --  118*   < >  --    < >  --    < > 160*   HDL 47*  --  58  --  63   < >  --    < >  --    < > 43*   TRIG 170*  --  187*  --  130   < >  --    < >  --    < > 160*   ALT 23  --   --   --   --   --  29  --  22   < >  --    CR 0.88 0.95 0.94   < > 1.00   < >  --    < > 0.99   < >  --    GFRESTIMATED 60* 56* 57*   < > 53*   < >  --    < > 54*   < >  --    GFRESTBLACK 70 >60 69   < > 64   < >  --    < > 65   < >  --    POTASSIUM 4.3 4.6 4.3   < > 4.5   < >  --    < > 4.7   < >  --    TSH  --   --   --   --   --   --   --   --  1.56  --  1.52    < > = values in this interval not displayed.      Pneumonia Vaccine:Adults age 65+ who received Pneumovax (PPSV23) at 65 years or older: Should be given PCV13 > 1 year after their most recent PPSV23  Mammogram Screening: Mammo discussed, not appropriate for or declined by this patient.    ROS:  Constitutional, HEENT, cardiovascular, pulmonary, gi and gu systems are negative, except as otherwise noted.    OBJECTIVE:   BP (!) 140/72 (BP Location: Left arm, Patient Position: Chair, Cuff Size: Adult Regular)   Pulse 89   Resp 16   Ht 1.607 m (5' 3.25\")   Wt 75.8 kg (167 lb)   LMP  (LMP Unknown)   SpO2 97%   BMI 29.35 kg/m   Estimated body mass index is 29.35 kg/m  as calculated from the following:    Height as of this encounter: 1.607 m (5' 3.25\").    Weight as of this encounter: 75.8 kg (167 lb).  EXAM:   GENERAL: elderly, alert, well nourished, well hydrated, " no distress  HENT: ear canals- normal; TMs- normal; Nose- normal; Mouth- no ulcers, no lesions, missing dentition  NECK: no tenderness, no adenopathy, no asymmetry, no masses, no stiffness; thyroid- normal to palpation  RESP: lungs clear to auscultation - no rales, no rhonchi, no wheezes  CV: regular rates and rhythm, normal S1 S2, no S3 or S4 and no murmur, no click or rub, normal pulses  ABDOMEN: soft, no tenderness, no  hepatosplenomegaly, no masses, normal bowel sounds  MS: extremities- no gross deformities noted, no edema, area of tenderness in right buttocks that seems to be more muscle spasm and can be massaged to relax. No actual lump.   SKIN: no suspicious lesions, no rashes, age related skin changes with seborrheic keratosis and no actinic keratosis.    NEURO: strength and tone- normal, sensory exam- grossly normal, reflexes- symmetric  BACK: no CVA tenderness, no paralumbar tenderness  MENTAL STATUS EXAM:  Appearance/Behavior: no apparent distress, neatly groomed, dressed appropriately for weather, appears stated age and is not frail-appearing  Speech: normal  Mood/Affect: normal affect  Insight: Good     Diagnostic Test Results:  Labs reviewed in Epic  Results for orders placed or performed in visit on 08/10/20   UA reflex to Microscopic and Culture     Status: Abnormal    Specimen: Midstream Urine   Result Value Ref Range    Color Urine Yellow     Appearance Urine Clear     Glucose Urine Negative NEG^Negative mg/dL    Bilirubin Urine Negative NEG^Negative    Ketones Urine Negative NEG^Negative mg/dL    Specific Gravity Urine 1.025 1.003 - 1.035    Blood Urine Moderate (A) NEG^Negative    pH Urine 6.0 5.0 - 7.0 pH    Protein Albumin Urine 30 (A) NEG^Negative mg/dL    Urobilinogen Urine 0.2 0.2 - 1.0 EU/dL    Nitrite Urine Negative NEG^Negative    Leukocyte Esterase Urine Negative NEG^Negative    Source Midstream Urine    CBC with platelets     Status: None   Result Value Ref Range    WBC 6.7 4.0 - 11.0  10e9/L    RBC Count 4.53 3.8 - 5.2 10e12/L    Hemoglobin 13.0 11.7 - 15.7 g/dL    Hematocrit 39.9 35.0 - 47.0 %    MCV 88 78 - 100 fl    MCH 28.7 26.5 - 33.0 pg    MCHC 32.6 31.5 - 36.5 g/dL    RDW 12.4 10.0 - 15.0 %    Platelet Count 289 150 - 450 10e9/L   Renal panel     Status: Abnormal   Result Value Ref Range    Sodium 141 133 - 144 mmol/L    Potassium 4.1 3.4 - 5.3 mmol/L    Chloride 109 94 - 109 mmol/L    Carbon Dioxide 25 20 - 32 mmol/L    Anion Gap 7 3 - 14 mmol/L    Glucose 100 (H) 70 - 99 mg/dL    Urea Nitrogen 26 7 - 30 mg/dL    Creatinine 1.01 0.52 - 1.04 mg/dL    GFR Estimate 50 (L) >60 mL/min/[1.73_m2]    GFR Estimate If Black 58 (L) >60 mL/min/[1.73_m2]    Calcium 8.9 8.5 - 10.1 mg/dL    Phosphorus 3.5 2.5 - 4.5 mg/dL    Albumin 3.8 3.4 - 5.0 g/dL   TSH with free T4 reflex     Status: None   Result Value Ref Range    TSH 1.43 0.40 - 4.00 mU/L   Lipid panel reflex to direct LDL Fasting     Status: Abnormal   Result Value Ref Range    Cholesterol 335 (H) <200 mg/dL    Triglycerides 217 (H) <150 mg/dL    HDL Cholesterol 51 >49 mg/dL    LDL Cholesterol Calculated 241 (H) <100 mg/dL    Non HDL Cholesterol 284 (H) <130 mg/dL   ALT     Status: None   Result Value Ref Range    ALT 26 0 - 50 U/L   Urine Microscopic     Status: Abnormal   Result Value Ref Range    WBC Urine 0 - 5 OTO5^0 - 5 /HPF    RBC Urine 2-5 (A) OTO2^O - 2 /HPF    Squamous Epithelial /LPF Urine Few FEW^Few /LPF    Bacteria Urine Few (A) NEG^Negative /HPF       ASSESSMENT / PLAN:       ICD-10-CM    1. Encounter for Medicare annual wellness exam  Z00.00 Urine Microscopic   2. Major depressive disorder, recurrent episode, mild (H) - stable and tries to maintain a positive attitude F33.0 citalopram (CELEXA) 10 MG tablet     TSH with free T4 reflex   3. Hypertension, benign essential, goal below 140/90 - well controlled on medications  I10 losartan (COZAAR) 100 MG tablet     Albumin Random Urine Quantitative with Creat Ratio     CBC with  "platelets     Renal panel   4. Coronary artery disease involving native coronary artery of native heart without angina pectoris  I25.10 Elevated cholesterol and LDL. Allergic reaction to statin and does not tolerate.    5. CKD (chronic kidney disease) stage 3, GFR 30-59 ml/min (H)  N18.3 UA reflex to Microscopic and Culture- urine shows blood and protein. Will recheck in 1-2 months. No infection.    6. Hyperlipidemia with target LDL less than 100  E78.5 Lipid panel reflex to direct LDL Fasting     ALT   7. Hypertension goal BP (blood pressure) < 140/90  I10 stable   8. Microscopic hematuria  R31.29 Recheck 1 month   9. Proteinuria, unspecified type  R80.9 Recheck 1 month       COUNSELING:  Reviewed preventive health counseling, as reflected in patient instructions       Regular exercise       Healthy diet/nutrition       Bladder control       Fall risk prevention       Osteoporosis Prevention/Bone Health    Estimated body mass index is 29.35 kg/m  as calculated from the following:    Height as of this encounter: 1.607 m (5' 3.25\").    Weight as of this encounter: 75.8 kg (167 lb).         reports that she has never smoked. She has never used smokeless tobacco.      Appropriate preventive services were discussed with this patient, including applicable screening as appropriate for cardiovascular disease, diabetes, osteopenia/osteoporosis, and glaucoma.  As appropriate for age/gender, discussed screening for colorectal cancer, prostate cancer, breast cancer, and cervical cancer. Checklist reviewing preventive services available has been given to the patient.    Reviewed patients plan of care and provided an AVS. The Basic Care Plan (routine screening as documented in Health Maintenance) for Carolin meets the Care Plan requirement. This Care Plan has been established and reviewed with the Patient.    Counseling Resources:  ATP IV Guidelines  Pooled Cohorts Equation Calculator  Breast Cancer Risk Calculator  FRAX Risk " Assessment  ICSI Preventive Guidelines  Dietary Guidelines for Americans, 2010  USDA's MyPlate  ASA Prophylaxis  Lung CA Screening    Lela Townsend MD  [facility]

## 2020-08-11 PROBLEM — R31.29 MICROSCOPIC HEMATURIA: Status: ACTIVE | Noted: 2020-08-11

## 2020-08-11 PROBLEM — R80.9 PROTEINURIA, UNSPECIFIED TYPE: Status: ACTIVE | Noted: 2020-08-11

## 2020-08-11 LAB
ALBUMIN SERPL-MCNC: 3.8 G/DL (ref 3.4–5)
ALT SERPL W P-5'-P-CCNC: 26 U/L (ref 0–50)
ANION GAP SERPL CALCULATED.3IONS-SCNC: 7 MMOL/L (ref 3–14)
BUN SERPL-MCNC: 26 MG/DL (ref 7–30)
CALCIUM SERPL-MCNC: 8.9 MG/DL (ref 8.5–10.1)
CHLORIDE SERPL-SCNC: 109 MMOL/L (ref 94–109)
CHOLEST SERPL-MCNC: 335 MG/DL
CO2 SERPL-SCNC: 25 MMOL/L (ref 20–32)
CREAT SERPL-MCNC: 1.01 MG/DL (ref 0.52–1.04)
CREAT UR-MCNC: 149 MG/DL
GFR SERPL CREATININE-BSD FRML MDRD: 50 ML/MIN/{1.73_M2}
GLUCOSE SERPL-MCNC: 100 MG/DL (ref 70–99)
HDLC SERPL-MCNC: 51 MG/DL
LDLC SERPL CALC-MCNC: 241 MG/DL
MICROALBUMIN UR-MCNC: 191 MG/L
MICROALBUMIN/CREAT UR: 128.19 MG/G CR (ref 0–25)
NONHDLC SERPL-MCNC: 284 MG/DL
PHOSPHATE SERPL-MCNC: 3.5 MG/DL (ref 2.5–4.5)
POTASSIUM SERPL-SCNC: 4.1 MMOL/L (ref 3.4–5.3)
SODIUM SERPL-SCNC: 141 MMOL/L (ref 133–144)
TRIGL SERPL-MCNC: 217 MG/DL
TSH SERPL DL<=0.005 MIU/L-ACNC: 1.43 MU/L (ref 0.4–4)

## 2020-08-12 NOTE — RESULT ENCOUNTER NOTE
Dear Carolin Mcbride,    Your test results are attached. I am happy to let you know that they are stable.    Your kidney tests are stable. You continue to show some blood and protein in the urine but it is not increasing. The blood sugar is normal and you do not have diabetes. The thyroid test is normal.     Your cholesterol is very high, but you do not tolerate taking medication for this. I recommend follow up with cardiology to make sure that your heart stays healthy.     We can recheck labs in 6 months.     Please call me if you have any questions about these test results or about your care.    Sincerely,    Lela Townsend MD

## 2020-08-14 ENCOUNTER — TELEPHONE (OUTPATIENT)
Dept: CARDIOLOGY | Facility: CLINIC | Age: 85
End: 2020-08-14

## 2020-08-14 NOTE — TELEPHONE ENCOUNTER
[facility] Call Center    Phone Message    May a detailed message be left on voicemail: yes     Reason for Call: Other:     Carolin had her yearly physical with her PCP and her cholesterol was at 335.  PCP wants her to follow up with her cardiologist.     Dr Juan does not have anything for a few months.     Please call pt back to discuss next steps.  Maybe she can just get put on meds again?        Action Taken: Message routed to:  Other: [facility]    Travel Screening: Not Applicable

## 2020-08-20 ENCOUNTER — TELEPHONE (OUTPATIENT)
Dept: CARDIOLOGY | Facility: CLINIC | Age: 85
End: 2020-08-20

## 2020-08-20 ENCOUNTER — VIRTUAL VISIT (OUTPATIENT)
Dept: CARDIOLOGY | Facility: CLINIC | Age: 85
End: 2020-08-20
Payer: COMMERCIAL

## 2020-08-20 DIAGNOSIS — E78.5 HYPERLIPIDEMIA LDL GOAL <100: Primary | ICD-10-CM

## 2020-08-20 DIAGNOSIS — G45.9 TIA (TRANSIENT ISCHEMIC ATTACK): ICD-10-CM

## 2020-08-20 PROCEDURE — 99213 OFFICE O/P EST LOW 20 MIN: CPT | Mod: 95 | Performed by: INTERNAL MEDICINE

## 2020-08-20 RX ORDER — ATORVASTATIN CALCIUM 20 MG/1
20 TABLET, FILM COATED ORAL DAILY
Qty: 90 TABLET | Refills: 3 | Status: SHIPPED | OUTPATIENT
Start: 2020-08-20 | End: 2020-10-22

## 2020-08-20 NOTE — PROGRESS NOTES
"Carolin Mcbride is a 85 year old female who is being evaluated via a billable telephone visit.      The patient has been notified of following:     \"This telephone visit will be conducted via a call between you and your physician/provider. We have found that certain health care needs can be provided without the need for a physical exam.  This service lets us provide the care you need with a short phone conversation.  If a prescription is necessary we can send it directly to your pharmacy.  If lab work is needed we can place an order for that and you can then stop by our lab to have the test done at a later time.    Telephone visits are billed at different rates depending on your insurance coverage. During this emergency period, for some insurers they may be billed the same as an in-person visit.  Please reach out to your insurance provider with any questions.    If during the course of the call the physician/provider feels a telephone visit is not appropriate, you will not be charged for this service.\"    Patient has given verbal consent for Telephone visit?  Yes    How would you like to obtain your AVS? Torsten    Phone call duration: 13 minutes         SUBJECTIVE:  Carolin Mcbride is a 85 year old female who presents for management of hyperlipidemia.  Previously patient was seen for TIA-like symptoms.  All evaluations including CT MRI echo was negative.  Also had a ZI0 patch which did not show any significant arrhythmia and no atrial fibrillation.  Patient was on a brief course of Plavix.  Patient was on atorvastatin 20 mg daily.  This was stopped because of lower extremity weakness.  Years ago patient was prescribed Baycol and this called severe muscle weakness and atrophy.  Patient is in fact very scared to take a statin.  Patient had no cardiac complaints.  She is very very active.  Still mows the law at age 85.     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 minimally only for Tree pollens (all other environmental allergens NEGATIVE)       Diagnostic skin and sensitization tests (aka ALLERGENS)      Priority: Medium     CKD (chronic kidney disease) stage 3, GFR 30-59 ml/min (H) 05/22/2014     Priority: Medium     Hyperlipidemia with target LDL less than 100 04/08/2013     Priority: Medium     Diagnosis updated by automated process. Provider to review and confirm.       Osteoporosis      Priority: Medium     5 years Fosamax 1-1-2011 to 12-. Medication holiday recommended.         Hypertension goal BP (blood pressure) < 140/90 06/26/2012     Priority: Medium     Advance care planning 03/13/2012     Priority: Medium     Advance Care Planning 12/20/2016: ACP Review of Chart / Resources Provided:  Reviewed chart for advance care plan.  Carolin Mcbride has been provided information and resources to begin or update their advance care plan.  Advance Care Planning 03/13/2012: Discussed advance care planning with patient; information given to patient to review. 3/13/2012 . Lara Roque MA                  CAD (coronary artery disease)      Priority: Medium     asymptomatic, on CT scan       Pseudophakia OU c YAG OU 06/02/2011     Priority: Medium    .  Current Outpatient Medications   Medication Sig     aspirin 81 MG tablet Take 1 tablet (81 mg) by mouth daily     CALCIUM  500 + D OR 1 tab three times a day      citalopram (CELEXA) 10 MG tablet Take 1 tablet (10 mg) by mouth daily     Hypromellose (ARTIFICIAL TEARS OP) Apply 1 drop to eye as needed Both eyes.     ipratropium (ATROVENT) 0.06 % nasal spray Spray 2 sprays into both nostrils 3 times daily as needed for rhinitis     losartan (COZAAR) 100 MG tablet Take 1 tablet (100 mg) by mouth daily     polyethylene glycol (MIRALAX) powder Take 17 g (1 capful) by mouth daily     triamcinolone (KENALOG) 0.1 % cream Apply 2-3 times a day to affected area.     VITAMIN D 400 UNIT OR TABS 1 cap twice a day      No current facility-administered medications for this visit.      Past Medical History:   Diagnosis Date     Adjustment reaction with anxiety and depression 2001     CAD (coronary artery disease) 2009    asymptomatic, on CT scan     Cyst, ovarian      Diagnostic skin and sensitization tests (aka ALLERGENS) 9/29/14 IgE tests pos. minimally only for Tree pollens (all other environmental allergens NEGATIVE)     HTN (hypertension)      Hyperlipidemia      Myositis 2001    related to past Baycol use- resolved     Osteoporosis      Seasonal allergic rhinitis     9/29/14 IgE tests pos. minimally only for Tree pollens (all other environmental allergens NEGATIVE)     Past Surgical History:   Procedure Laterality Date     APPENDECTOMY       CHOLECYSTECTOMY, OPEN       COLONOSCOPY  2009    neg     CYSTOCELE REPAIR  2003    TVT SPARC     Allergies   Allergen Reactions     Baycol [Cerivastatin Sodium]       muscle mass loss       Crestor [Hmg-Coa-R Inhibitors]      Previously on Baycol and affect muscle mass loss.     Darvocet [Acetaminophen]      Severe nausea     Macrobid [Nitrofuran Derivatives]      Hives     Social History     Socioeconomic History     Marital status:      Spouse name: Shashank     Number of children: 3     Years of education: Not on file     Highest education level: Not on file   Occupational History     Employer:  RETIRED   Social Needs     Financial resource strain: Not on file     Food insecurity     Worry: Not on file     Inability: Not on file     Transportation needs     Medical: Not on file     Non-medical: Not on file   Tobacco Use     Smoking status: Never Smoker     Smokeless tobacco: Never Used     Tobacco comment: Never smoked; nonsmoking household   Substance and Sexual Activity     Alcohol use: No     Comment: very rare     Drug use: No     Comment: never     Sexual activity: Yes     Partners: Male     Birth control/protection: None   Lifestyle     Physical activity     Days per week: Not on file     Minutes per session: Not on file     Stress: Not on file   Relationships     Social connections     Talks on phone: Not on file     Gets together: Not on file     Attends Congregational service: Not on file     Active member of club or organization: Not on file     Attends meetings of clubs or organizations: Not on file     Relationship status: Not on file     Intimate partner violence     Fear of current or ex partner: Not on file     Emotionally abused: Not on file     Physically abused: Not on file     Forced sexual activity: Not on file   Other Topics Concern     Parent/sibling w/ CABG, MI or angioplasty before 65F 55M? No      Service No     Blood Transfusions No     Caffeine Concern No     Occupational Exposure No     Hobby Hazards No     Sleep Concern No     Stress Concern No     Weight Concern Yes     Special Diet No     Back Care No     Exercise Yes     Comment: Curves     Bike Helmet No     Seat Belt Yes     Self-Exams Yes   Social History Narrative     Not on file     Family History   Problem Relation Age of Onset     Cancer Mother         ovarian      Heart Disease Sister         CABG x 3     Neurologic Disorder Sister         Fibromyalgia     Cancer Father         stomach/interstines      Musculoskeletal Disorder Brother         back      Heart Disease Brother 60        CABG x 4     Glaucoma No family  hx of      Macular Degeneration No family hx of           REVIEW OF SYSTEMS:  General: negative, fever, chills, night sweats  Skin: negative, acne, rash and scaling  Eyes: negative, double vision, eye pain and photophobia  Ears/Nose/Throat: negative, nasal congestion and purulent rhinorrhea  Respiratory: No dyspnea on exertion, No cough, No hemoptysis and negative  Cardiovascular: negative, palpitations, tachycardia, irregular heart beat, chest pain, exertional chest pain or pressure, paroxysmal nocturnal dyspnea, dyspnea on exertion and orthopnea       ASSESSMENT/PLAN:  Patient here for management of hyperlipidemia.  Recently evaluated for TIA.  CT MRI echo and ZIO patch were normal and no atrial fibrillation was documented.  Patient was on a brief course of Plavix.  Today she is here for hyperlipidemia.  In the past patient was on Baycol for a short time.  This caused severe myalgia and muscle atrophy.  Subsequently she was started on atorvastatin 20 mg daily.  This was discontinued as she had flulike symptoms and weakness of the lower extremities.  Currently her LDL is 241.  Patient is very scared to take a statin because of prior history.  Discussed the options.  She do have some atorvastatin 20 mg tablets.  Patient was advised to take this tablet for 2 months.  If she is tolerating it and no side effects will continue.  If not will change to a PCSK9 inhibitor.  Patient stated that she is going to be 86 soon and why all these treatment.  Group of the fact is she is very healthy without any significant comorbidities.  Since last episode of TIA and normal symptoms.  Overall patient is doing well.  Return to Clinic in 2 months with a lipid profile LFT and CK.

## 2020-08-20 NOTE — TELEPHONE ENCOUNTER
----- Message from VAISHALI Juan MD sent at 8/20/2020 10:55 AM CDT -----  Carolin is going to start atorvastatin 20 mg daily.  She will return to clinic in 2 months time with a lipid profile, LFT and a CK  Thanks INOCENTE

## 2020-08-20 NOTE — PATIENT INSTRUCTIONS
Thank you for coming to the [facility]; please note the following instructions:    1. Start atorvastatin 20 mg daily.     2. Follow up in 2 months with Dr Peña and fasting labs 1-2 days prior           If you have any questions regarding your visit please contact your care team:     Cardiology  Telephone Number   Calista CAMACHO, RN  Leslee YORK, RN   Valeria HARTMANN, RMA  Raquel SERRATO, RMA  Dilcia HARPER, LPN   [phone] (option 1)   For scheduling appts:     [phone] (select option 1)       For the Device Clinic (Pacemakers and ICD's)  RN's :  Arin Cantu   During business hours: [phone]    *After business hours:  [phone] (select option 4)      Normal test result notifications will be released via [employer] or mailed within 7 business days.  All other test results, will be communicated via telephone once reviewed by your cardiologist.    If you need a medication refill please contact your pharmacy.  Please allow 3 business days for your refill to be completed.    As always, thank you for trusting us with your health care needs!

## 2020-08-20 NOTE — LETTER
"8/20/2020      RE: Carolin Mcbride  [address] [place] [address]       Dear Colleague,    Thank you for the opportunity to participate in the care of your patient, Carolin Mcbride, at the [facility] Please see a copy of my visit note below.    Carolin Mcbride is a 85 year old female who is being evaluated via a billable telephone visit.      The patient has been notified of following:     \"This telephone visit will be conducted via a call between you and your physician/provider. We have found that certain health care needs can be provided without the need for a physical exam.  This service lets us provide the care you need with a short phone conversation.  If a prescription is necessary we can send it directly to your pharmacy.  If lab work is needed we can place an order for that and you can then stop by our lab to have the test done at a later time.    Telephone visits are billed at different rates depending on your insurance coverage. During this emergency period, for some insurers they may be billed the same as an in-person visit.  Please reach out to your insurance provider with any questions.    If during the course of the call the physician/provider feels a telephone visit is not appropriate, you will not be charged for this service.\"    Patient has given verbal consent for Telephone visit?  Yes    How would you like to obtain your AVS? Torsten    Phone call duration: 13 minutes         SUBJECTIVE:  Carolin Mcbride is a 85 year old female who presents for management of hyperlipidemia.  Previously patient was seen for TIA-like symptoms.  All evaluations including CT MRI echo was negative.  Also had a ZI0 patch which did not show any significant arrhythmia and no atrial fibrillation.  Patient was on a brief course of Plavix.  Patient was on atorvastatin 20 mg daily.  This was stopped because of lower " extremity weakness.  Years ago patient was prescribed Baycol and this called severe muscle weakness and atrophy.  Patient is in fact very scared to take a statin.  Patient had no cardiac complaints.  She is very very active.  Still mows the law at age 85.     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 minimally only for Tree pollens (all other environmental allergens NEGATIVE)       Diagnostic skin and sensitization tests (aka ALLERGENS)      Priority: Medium     CKD (chronic kidney disease) stage 3, GFR 30-59 ml/min (H) 05/22/2014     Priority: Medium     Hyperlipidemia with target LDL less than 100 04/08/2013     Priority: Medium     Diagnosis updated by automated process. Provider to review and confirm.       Osteoporosis      Priority: Medium     5 years Fosamax 1-1-2011 to 12-. Medication holiday recommended.         Hypertension goal BP (blood pressure) < 140/90 06/26/2012     Priority: Medium     Advance care planning 03/13/2012     Priority: Medium     Advance Care Planning 12/20/2016: ACP Review of Chart / Resources Provided:  Reviewed chart for advance care plan.  Carolin Mcbride has been provided information and resources to begin or update their advance care plan.  Advance Care Planning 03/13/2012: Discussed advance care planning with patient;  information given to patient to review. 3/13/2012 . Lara Roque MA                  CAD (coronary artery disease)      Priority: Medium     asymptomatic, on CT scan       Pseudophakia OU c YAG OU 06/02/2011     Priority: Medium    .  Current Outpatient Medications   Medication Sig     aspirin 81 MG tablet Take 1 tablet (81 mg) by mouth daily     CALCIUM 500 + D OR 1 tab three times a day      citalopram (CELEXA) 10 MG tablet Take 1 tablet (10 mg) by mouth daily     Hypromellose (ARTIFICIAL TEARS OP) Apply 1 drop to eye as needed Both eyes.     ipratropium (ATROVENT) 0.06 % nasal spray Spray 2 sprays into both nostrils 3 times daily as needed for rhinitis     losartan (COZAAR) 100 MG tablet Take 1 tablet (100 mg) by mouth daily     polyethylene glycol (MIRALAX) powder Take 17 g (1 capful) by mouth daily     triamcinolone (KENALOG) 0.1 % cream Apply 2-3 times a day to affected area.     VITAMIN D 400 UNIT OR TABS 1 cap twice a day      No current facility-administered medications for this visit.      Past Medical History:   Diagnosis Date     Adjustment reaction with anxiety and depression 2001     CAD (coronary artery disease) 2009    asymptomatic, on CT scan     Cyst, ovarian      Diagnostic skin and sensitization tests (aka ALLERGENS) 9/29/14 IgE tests pos. minimally only for Tree pollens (all other environmental allergens NEGATIVE)     HTN (hypertension)      Hyperlipidemia      Myositis 2001    related to past Baycol use- resolved     Osteoporosis      Seasonal allergic rhinitis     9/29/14 IgE tests pos. minimally only for Tree pollens (all other environmental allergens NEGATIVE)     Past Surgical History:   Procedure Laterality Date     APPENDECTOMY       CHOLECYSTECTOMY, OPEN       COLONOSCOPY  2009    neg     CYSTOCELE REPAIR  2003    TVT SPARC     Allergies   Allergen Reactions     Baycol [Cerivastatin Sodium]       muscle mass loss       Crestor [Hmg-Coa-R Inhibitors]      Previously on Baycol and affect  muscle mass loss.     Darvocet [Acetaminophen]      Severe nausea     Macrobid [Nitrofuran Derivatives]      Hives     Social History     Socioeconomic History     Marital status:      Spouse name: Shashank     Number of children: 3     Years of education: Not on file     Highest education level: Not on file   Occupational History     Employer: RETIRED   Social Needs     Financial resource strain: Not on file     Food insecurity     Worry: Not on file     Inability: Not on file     Transportation needs     Medical: Not on file     Non-medical: Not on file   Tobacco Use     Smoking status: Never Smoker     Smokeless tobacco: Never Used     Tobacco comment: Never smoked; nonsmoking household   Substance and Sexual Activity     Alcohol use: No     Comment: very rare     Drug use: No     Comment: never     Sexual activity: Yes     Partners: Male     Birth control/protection: None   Lifestyle     Physical activity     Days per week: Not on file     Minutes per session: Not on file     Stress: Not on file   Relationships     Social connections     Talks on phone: Not on file     Gets together: Not on file     Attends Jewish service: Not on file     Active member of club or organization: Not on file     Attends meetings of clubs or organizations: Not on file     Relationship status: Not on file     Intimate partner violence     Fear of current or ex partner: Not on file     Emotionally abused: Not on file     Physically abused: Not on file     Forced sexual activity: Not on file   Other Topics Concern     Parent/sibling w/ CABG, MI or angioplasty before 65F 55M? No      Service No     Blood Transfusions No     Caffeine Concern No     Occupational Exposure No     Hobby Hazards No     Sleep Concern No     Stress Concern No     Weight Concern Yes     Special Diet No     Back Care No     Exercise Yes     Comment: Curves     Bike Helmet No     Seat Belt Yes     Self-Exams Yes   Social History Narrative     Not  on file     Family History   Problem Relation Age of Onset     Cancer Mother         ovarian      Heart Disease Sister         CABG x 3     Neurologic Disorder Sister         Fibromyalgia     Cancer Father         stomach/interstines      Musculoskeletal Disorder Brother         back      Heart Disease Brother 60        CABG x 4     Glaucoma No family hx of      Macular Degeneration No family hx of           REVIEW OF SYSTEMS:  General: negative, fever, chills, night sweats  Skin: negative, acne, rash and scaling  Eyes: negative, double vision, eye pain and photophobia  Ears/Nose/Throat: negative, nasal congestion and purulent rhinorrhea  Respiratory: No dyspnea on exertion, No cough, No hemoptysis and negative  Cardiovascular: negative, palpitations, tachycardia, irregular heart beat, chest pain, exertional chest pain or pressure, paroxysmal nocturnal dyspnea, dyspnea on exertion and orthopnea       ASSESSMENT/PLAN:  Patient here for management of hyperlipidemia.  Recently evaluated for TIA.  CT MRI echo and ZIO patch were normal and no atrial fibrillation was documented.  Patient was on a brief course of Plavix.  Today she is here for hyperlipidemia.  In the past patient was on Baycol for a short time.  This caused severe myalgia and muscle atrophy.  Subsequently she was started on atorvastatin 20 mg daily.  This was discontinued as she had flulike symptoms and weakness of the lower extremities.  Currently her LDL is 241.  Patient is very scared to take a statin because of prior history.  Discussed the options.  She do have some atorvastatin 20 mg tablets.  Patient was advised to take this tablet for 2 months.  If she is tolerating it and no side effects will continue.  If not will change to a PCSK9 inhibitor.  Patient stated that she is going to be 86 soon and why all these treatment.  Group of the fact is she is very healthy without any significant comorbidities.  Since last episode of TIA and normal symptoms.   Overall patient is doing well.  Return to Clinic in 2 months with a lipid profile LFT and CK.      VAISHALI Juan MD

## 2020-10-14 ENCOUNTER — OFFICE VISIT (OUTPATIENT)
Dept: URGENT CARE | Facility: URGENT CARE | Age: 85
End: 2020-10-14
Payer: COMMERCIAL

## 2020-10-14 ENCOUNTER — TELEPHONE (OUTPATIENT)
Dept: FAMILY MEDICINE | Facility: CLINIC | Age: 85
End: 2020-10-14

## 2020-10-14 VITALS
HEART RATE: 81 BPM | SYSTOLIC BLOOD PRESSURE: 146 MMHG | RESPIRATION RATE: 16 BRPM | BODY MASS INDEX: 29.77 KG/M2 | OXYGEN SATURATION: 96 % | WEIGHT: 169.4 LBS | TEMPERATURE: 97.4 F | DIASTOLIC BLOOD PRESSURE: 77 MMHG

## 2020-10-14 DIAGNOSIS — R82.90 NONSPECIFIC FINDING ON EXAMINATION OF URINE: ICD-10-CM

## 2020-10-14 DIAGNOSIS — R30.0 DYSURIA: Primary | ICD-10-CM

## 2020-10-14 DIAGNOSIS — N30.90 BLADDER INFECTION: ICD-10-CM

## 2020-10-14 LAB
ALBUMIN UR-MCNC: 30 MG/DL
APPEARANCE UR: ABNORMAL
BACTERIA #/AREA URNS HPF: ABNORMAL /HPF
BILIRUB UR QL STRIP: NEGATIVE
COLOR UR AUTO: YELLOW
GLUCOSE UR STRIP-MCNC: NEGATIVE MG/DL
HGB UR QL STRIP: ABNORMAL
KETONES UR STRIP-MCNC: NEGATIVE MG/DL
LEUKOCYTE ESTERASE UR QL STRIP: ABNORMAL
NITRATE UR QL: NEGATIVE
NON-SQ EPI CELLS #/AREA URNS LPF: ABNORMAL /LPF
PH UR STRIP: 6 PH (ref 5–7)
RBC #/AREA URNS AUTO: ABNORMAL /HPF
SOURCE: ABNORMAL
SP GR UR STRIP: <=1.005 (ref 1–1.03)
TRANS CELLS #/AREA URNS HPF: ABNORMAL /HPF
UROBILINOGEN UR STRIP-ACNC: 0.2 EU/DL (ref 0.2–1)
WBC #/AREA URNS AUTO: >100 /HPF

## 2020-10-14 PROCEDURE — 99213 OFFICE O/P EST LOW 20 MIN: CPT | Performed by: FAMILY MEDICINE

## 2020-10-14 PROCEDURE — 87086 URINE CULTURE/COLONY COUNT: CPT | Performed by: FAMILY MEDICINE

## 2020-10-14 PROCEDURE — 87088 URINE BACTERIA CULTURE: CPT | Performed by: FAMILY MEDICINE

## 2020-10-14 PROCEDURE — 81001 URINALYSIS AUTO W/SCOPE: CPT | Performed by: FAMILY MEDICINE

## 2020-10-14 PROCEDURE — 87186 SC STD MICRODIL/AGAR DIL: CPT | Performed by: FAMILY MEDICINE

## 2020-10-14 RX ORDER — CIPROFLOXACIN 250 MG/1
250 TABLET, FILM COATED ORAL 2 TIMES DAILY
Qty: 6 TABLET | Refills: 0 | Status: SHIPPED | OUTPATIENT
Start: 2020-10-14 | End: 2020-10-17

## 2020-10-14 ASSESSMENT — ENCOUNTER SYMPTOMS
ABDOMINAL PAIN: 1
SORE THROAT: 0
RHINORRHEA: 0
VOMITING: 0
CHILLS: 0
SHORTNESS OF BREATH: 0
DIARRHEA: 0
HEADACHES: 0
FEVER: 0
COUGH: 0
NAUSEA: 0

## 2020-10-14 NOTE — TELEPHONE ENCOUNTER
"Called and spoke with patient regarding her symptoms.    Having burning sensation with urinating, is very bothersome, almost hurts to sit down.  Is noticing blood in urine and when wiping.  Feels like needs to urinate frequently but then small amount comes out. Urgency with scant amount.  Denies fever, abdominal pain, nausea and/or vomiting, flank pain.  Symptoms started this morning, when woke up.  Has been drinking \"lots of water\" since symptoms started, has not done anything else for symptoms as of yet.    Writer recommended a form of a visit today for further evaluation of symptoms and for any prescription treatment. Options for virtual visit or coming in to the Urgent Care department here at [facility], given. No primary care provider openings today. Do not feel patient should wait till after today for a visit. First opening for virtual visit is not until 1:40 pm today. Patient feels this is too long to wait and writer agrees due to her discomfort.  Patient will walk in to Urgent Care today and be evaluated. Hours of operation given. Patient intends to come in at opening time today, which is 11:00 am.  Writer agreed with this plan.    Nedra Charles RN  [facility]      "

## 2020-10-14 NOTE — TELEPHONE ENCOUNTER
Reason for Call:  Other call back and prescription    Detailed comments: Carolin states she has a bladder infection, it burns and would like to know if you can prescribe something for her. Please call to advise.  Thank you     Phone Number Patient can be reached at: Home number on file [phone] (home)    Best Time: Any    Can we leave a detailed message on this number? YES    Call taken on 10/14/2020 at 9:13 AM by Greg Nair

## 2020-10-14 NOTE — PROGRESS NOTES
SUBJECTIVE:   Carolin Mcbride is a 86 year old female presenting with a chief complaint of   Chief Complaint   Patient presents with     UTI     Burning & blood when she wipes- woke up with sx this morning        She is an established patient of [facility].      Dysuria and frequency.   Rare bladder infection in the past 2 over her lifetime.        Review of Systems   Constitutional: Negative for chills and fever.   HENT: Negative for congestion, ear pain, rhinorrhea and sore throat.    Respiratory: Negative for cough and shortness of breath.    Gastrointestinal: Positive for abdominal pain (Suprapubic area). Negative for diarrhea, nausea and vomiting.   Neurological: Negative for headaches.       Past Medical History:   Diagnosis Date     Adjustment reaction with anxiety and depression 2001     CAD (coronary artery disease) 2009    asymptomatic, on CT scan     Cyst, ovarian      Diagnostic skin and sensitization tests (aka ALLERGENS) 9/29/14 IgE tests pos. minimally only for Tree pollens (all other environmental allergens NEGATIVE)     HTN (hypertension)      Hyperlipidemia      Myositis 2001    related to past Baycol use- resolved     Osteoporosis      Seasonal allergic rhinitis     9/29/14 IgE tests pos. minimally only for Tree pollens (all other environmental allergens NEGATIVE)     Family History   Problem Relation Age of Onset     Cancer Mother         ovarian      Heart Disease Sister         CABG x 3     Neurologic Disorder Sister         Fibromyalgia     Cancer Father         stomach/interstines      Musculoskeletal Disorder Brother         back      Heart Disease Brother 60        CABG x 4     Glaucoma No family hx of      Macular Degeneration No family hx of      Current Outpatient Medications   Medication Sig Dispense Refill     aspirin 81 MG tablet Take 1 tablet (81 mg) by mouth daily 90 tablet 3     atorvastatin (LIPITOR) 20 MG tablet Take 1 tablet (20 mg) by mouth daily 90 tablet 3     CALCIUM 500 + D  OR 1 tab three times a day        citalopram (CELEXA) 10 MG tablet Take 1 tablet (10 mg) by mouth daily 90 tablet 3     Hypromellose (ARTIFICIAL TEARS OP) Apply 1 drop to eye as needed Both eyes.       losartan (COZAAR) 100 MG tablet Take 1 tablet (100 mg) by mouth daily 90 tablet 3     polyethylene glycol (MIRALAX) powder Take 17 g (1 capful) by mouth daily 510 g 3     triamcinolone (KENALOG) 0.1 % cream Apply 2-3 times a day to affected area. 80 g 6     VITAMIN D 400 UNIT OR TABS 1 cap twice a day        ipratropium (ATROVENT) 0.06 % nasal spray Spray 2 sprays into both nostrils 3 times daily as needed for rhinitis (Patient not taking: Reported on 10/14/2020) 3 Box 4     Social History     Tobacco Use     Smoking status: Never Smoker     Smokeless tobacco: Never Used     Tobacco comment: Never smoked; nonsmoking household   Substance Use Topics     Alcohol use: No     Comment: very rare       OBJECTIVE  BP (!) 146/77   Pulse 81   Temp 97.4  F (36.3  C) (Oral)   Resp 16   Wt 76.8 kg (169 lb 6.4 oz)   LMP  (LMP Unknown)   SpO2 96%   BMI 29.77 kg/m      Physical Exam  Vitals signs reviewed.   Neck:      Musculoskeletal: Normal range of motion.   Cardiovascular:      Rate and Rhythm: Normal rate.      Pulses: Normal pulses.   Pulmonary:      Effort: Pulmonary effort is normal.   Abdominal:      Comments: Minimal suprapubic area tenderness without any flank or CVA tenderness.   Skin:     General: Skin is warm.      Capillary Refill: Capillary refill takes less than 2 seconds.   Neurological:      General: No focal deficit present.      Mental Status: She is alert.         Labs:  Results for orders placed or performed in visit on 10/14/20 (from the past 24 hour(s))   UA reflex to Microscopic and Culture    Specimen: Midstream Urine   Result Value Ref Range    Color Urine Yellow     Appearance Urine Slightly Cloudy     Glucose Urine Negative NEG^Negative mg/dL    Bilirubin Urine Negative NEG^Negative    Ketones  Urine Negative NEG^Negative mg/dL    Specific Gravity Urine <=1.005 1.003 - 1.035    Blood Urine Large (A) NEG^Negative    pH Urine 6.0 5.0 - 7.0 pH    Protein Albumin Urine 30 (A) NEG^Negative mg/dL    Urobilinogen Urine 0.2 0.2 - 1.0 EU/dL    Nitrite Urine Negative NEG^Negative    Leukocyte Esterase Urine Large (A) NEG^Negative    Source Midstream Urine    Urine Microscopic   Result Value Ref Range    WBC Urine >100 (A) OTO5^0 - 5 /HPF    RBC Urine 25-50 (A) OTO2^O - 2 /HPF    Squamous Epithelial /LPF Urine Few FEW^Few /LPF    Transitional Epi Few FEW^Few /HPF    Bacteria Urine Moderate (A) NEG^Negative /HPF         ASSESSMENT:    ICD-10-CM    1. Dysuria  R30.0 UA reflex to Microscopic and Culture     Urine Microscopic   2. Nonspecific finding on examination of urine  R82.90 Urine Culture Aerobic Bacterial   3. Bladder infection  N30.90 ciprofloxacin (CIPRO) 250 MG tablet        Exam and history is consistent with bladder infection  Cipro as above  Emptying cares OTC Azo was described at her discretion  She should complete her antibiotic as tolerated return to see us immediately for any persisting worsening symptoms.  The patient indicates understanding of these issues and agrees with the plan.   Ted Sandoval MD

## 2020-10-16 LAB
BACTERIA SPEC CULT: ABNORMAL
Lab: ABNORMAL
SPECIMEN SOURCE: ABNORMAL

## 2020-10-19 ENCOUNTER — OFFICE VISIT (OUTPATIENT)
Dept: URGENT CARE | Facility: URGENT CARE | Age: 85
End: 2020-10-19
Payer: COMMERCIAL

## 2020-10-19 VITALS
TEMPERATURE: 97.8 F | WEIGHT: 167.6 LBS | DIASTOLIC BLOOD PRESSURE: 80 MMHG | SYSTOLIC BLOOD PRESSURE: 148 MMHG | RESPIRATION RATE: 22 BRPM | OXYGEN SATURATION: 97 % | BODY MASS INDEX: 29.45 KG/M2 | HEART RATE: 82 BPM

## 2020-10-19 DIAGNOSIS — I10 HYPERTENSION GOAL BP (BLOOD PRESSURE) < 140/90: ICD-10-CM

## 2020-10-19 DIAGNOSIS — R29.898 LEG WEAKNESS, BILATERAL: ICD-10-CM

## 2020-10-19 DIAGNOSIS — M62.830 BACK MUSCLE SPASM: Primary | ICD-10-CM

## 2020-10-19 DIAGNOSIS — G45.9 TIA (TRANSIENT ISCHEMIC ATTACK): ICD-10-CM

## 2020-10-19 DIAGNOSIS — N18.30 STAGE 3 CHRONIC KIDNEY DISEASE, UNSPECIFIED WHETHER STAGE 3A OR 3B CKD (H): ICD-10-CM

## 2020-10-19 DIAGNOSIS — M79.18 MYALGIA, OTHER SITE: ICD-10-CM

## 2020-10-19 DIAGNOSIS — E78.5 HYPERLIPIDEMIA LDL GOAL <100: ICD-10-CM

## 2020-10-19 LAB
ALBUMIN SERPL-MCNC: 3.6 G/DL (ref 3.4–5)
ALP SERPL-CCNC: 95 U/L (ref 40–150)
ALT SERPL W P-5'-P-CCNC: 21 U/L (ref 0–50)
AST SERPL W P-5'-P-CCNC: 16 U/L (ref 0–45)
BILIRUB DIRECT SERPL-MCNC: 0.1 MG/DL (ref 0–0.2)
BILIRUB SERPL-MCNC: 0.4 MG/DL (ref 0.2–1.3)
CHOLEST SERPL-MCNC: 219 MG/DL
CK SERPL-CCNC: 98 U/L (ref 30–225)
HDLC SERPL-MCNC: 53 MG/DL
LDLC SERPL CALC-MCNC: 130 MG/DL
NONHDLC SERPL-MCNC: 166 MG/DL
PROT SERPL-MCNC: 7.3 G/DL (ref 6.8–8.8)
TRIGL SERPL-MCNC: 179 MG/DL

## 2020-10-19 PROCEDURE — 82550 ASSAY OF CK (CPK): CPT | Performed by: INTERNAL MEDICINE

## 2020-10-19 PROCEDURE — 80076 HEPATIC FUNCTION PANEL: CPT | Performed by: INTERNAL MEDICINE

## 2020-10-19 PROCEDURE — 36415 COLL VENOUS BLD VENIPUNCTURE: CPT | Performed by: INTERNAL MEDICINE

## 2020-10-19 PROCEDURE — 80061 LIPID PANEL: CPT | Performed by: INTERNAL MEDICINE

## 2020-10-19 PROCEDURE — 99214 OFFICE O/P EST MOD 30 MIN: CPT | Mod: 25 | Performed by: PHYSICIAN ASSISTANT

## 2020-10-19 PROCEDURE — 20552 NJX 1/MLT TRIGGER POINT 1/2: CPT | Performed by: PHYSICIAN ASSISTANT

## 2020-10-19 RX ORDER — TRIAMCINOLONE ACETONIDE 40 MG/ML
40 INJECTION, SUSPENSION INTRA-ARTICULAR; INTRAMUSCULAR ONCE
Status: COMPLETED | OUTPATIENT
Start: 2020-10-19 | End: 2020-10-19

## 2020-10-19 RX ORDER — CYCLOBENZAPRINE HCL 10 MG
10 TABLET ORAL 3 TIMES DAILY PRN
Qty: 30 TABLET | Refills: 0 | Status: SHIPPED | OUTPATIENT
Start: 2020-10-19 | End: 2021-03-15

## 2020-10-19 RX ADMIN — TRIAMCINOLONE ACETONIDE 40 MG: 40 INJECTION, SUSPENSION INTRA-ARTICULAR; INTRAMUSCULAR at 13:17

## 2020-10-19 ASSESSMENT — ENCOUNTER SYMPTOMS
SORE THROAT: 0
WOUND: 0
EYES NEGATIVE: 1
SHORTNESS OF BREATH: 0
JOINT SWELLING: 0
DIARRHEA: 0
ENDOCRINE NEGATIVE: 1
ALLERGIC/IMMUNOLOGIC NEGATIVE: 1
NECK STIFFNESS: 0
RESPIRATORY NEGATIVE: 1
HEADACHES: 0
NAUSEA: 0
DIZZINESS: 0
MYALGIAS: 0
ARTHRALGIAS: 0
PALPITATIONS: 0
BRUISES/BLEEDS EASILY: 0
HEMATOLOGIC/LYMPHATIC NEGATIVE: 1
COUGH: 0
BACK PAIN: 1
CHILLS: 0
RHINORRHEA: 0
LIGHT-HEADEDNESS: 0
WEAKNESS: 0
CARDIOVASCULAR NEGATIVE: 1
VOMITING: 0
NECK PAIN: 0
FEVER: 0

## 2020-10-19 ASSESSMENT — PAIN SCALES - GENERAL: PAINLEVEL: EXTREME PAIN (9)

## 2020-10-19 NOTE — PATIENT INSTRUCTIONS
Patient Education     Back Spasm (No Trauma)    Spasm of the back muscles can occur after a sudden forceful twisting or bending such as in a car accident. A spasm can also happen after a simple awkward movement, or after lifting something heavy with poor body positioning. In any case, muscle spasm adds to the pain. Sleeping in an awkward position or on a poor quality mattress can also cause this. Some people respond to emotional stress by tensing the muscles of their back.  Pain that continues may need further assessment or other types of treatment such as physical therapy.  You don't always need X-rays for the first assessment of back pain, unless you had a physical injury such as from a car accident or fall. If your pain continues and doesn't respond to medical treatment, X-rays and other tests may then be done.   Home care    As soon as possible, start sitting or walking again. This will help prevent problems from a long bed rest. These problems include muscle weakness, worsening back stiffness and pain, and blood clots in the legs.    When in bed, try to find a position of comfort. A firm mattress is best. Try lying flat on your back with pillows under your knees. You can also try lying on your side with your knees bent up toward your chest and a pillow between your knees.    Don't sit for long periods. Also limit car rides and travel. This puts more stress on the lower back than standing or walking.     During the first 24 to 72 hours after an injury or flare-up, put an ice pack on the painful area for 20 minutes, then remove it for 20 minutes. Do this over a period of 60 to 90 minutes, or several times a day. This will reduce swelling and pain. Always wrap ice packs in a thin towel.    You can start with ice, then switch to heat. Heat from a hot shower, hot bath, or heating pad reduces pain and works well for muscle spasms. Put heat on the painful area for 20 minutes, then remove it for 20 minutes. Do this  over a period of 60 to 90 minutes, or several times a day. Don't sleep on a heating pad. It can burn or damage skin.    Alternate using ice and heat.    Be aware of safe lifting methods. don't lift anything over 15 pounds until all the pain is gone.  Gentle stretching will help your back heal faster. Do this simple routine 2 to 3 times a day until your back is feeling better.    Lie on your back with your knees bent and both feet on the ground.    Slowly raise your left knee to your chest as you flatten your lower back against the floor. Hold for 20 to 30 seconds.    Relax and repeat the exercise with your right knee.    Do 2 to 3 of these exercises for each leg.    Repeat, hugging both knees to your chest at the same time.    Don't bounce, but use a gentle pull.  Medicines  Talk with your doctor before using medicine, especially if you have other medical problems or are taking other medicines.  You may use over-the-counter medicines such as acetaminophen, ibuprofen, or naprosyn to control pain, unless your healthcare provider prescribed another pain medicine. Talk with your healthcare provider if you have a chronic condition such as diabetes, liver or kidney disease, stomach ulcer, or digestive bleeding, or are taking blood thinners.  Be careful if you are given prescription pain medicine, opioids, or medicine for muscle spasm. They can cause drowsiness, and affect your coordination, reflexes, and judgment. Don't drive or operate heavy machinery when taking these medicines. Take pain medicine only as prescribed by your healthcare provider.  Follow-up care  Follow up with your doctor, or as advised. You may need physical therapy or more tests.  If X-rays were taken, they may be reviewed by a radiologist. You will be told of any new findings that may affect your care.  Call   Call if any of these occur:    Trouble breathing    Confusion    Drowsiness or trouble awakening    Fainting or loss of consciousness    Rapid  or very slow heart rate    Loss of bowel or bladder control  When to seek medical advice  Call your healthcare provider right away if any of these occur:    Pain becomes worse or spreads to your legs    Weakness or numbness in one or both legs    Numbness in the groin or genital area    Fever of 100.4 F (38 C) or higher , or as directed by your healthcare provider    Chills    Burning or pain when passing urine  Date Last Reviewed: 11/1/2018 2000-2019 [employer]. [address] [place], [place] [id number]. All rights reserved. This information is not intended as a substitute for professional medical care. Always follow your healthcare professional's instructions.

## 2020-10-19 NOTE — PROGRESS NOTES
Chief Complaint:    Chief Complaint   Patient presents with     Back Pain     Has had her whole life but the pain worsened about 3 days ago       HPI: Carolin Mcbride is an 86 year old female who presents for evaluation and treatment of mid back pain.  This is an ongoing problem for her.  She states that the pain worsened roughly 3 days ago after doing housework.  The pain has not improved with several different creams and chiropractic care.  She denies any numbness, tingling or weakness in the extremities.  No loss of bowel or bladder control.        ROS:      Review of Systems   Constitutional: Negative for chills and fever.   HENT: Negative for congestion, ear pain, rhinorrhea and sore throat.    Eyes: Negative.    Respiratory: Negative.  Negative for cough and shortness of breath.    Cardiovascular: Negative.  Negative for chest pain and palpitations.   Gastrointestinal: Negative for diarrhea, nausea and vomiting.   Endocrine: Negative.    Genitourinary: Negative.    Musculoskeletal: Positive for back pain. Negative for arthralgias, joint swelling, myalgias, neck pain and neck stiffness.   Skin: Negative.  Negative for rash and wound.   Allergic/Immunologic: Negative.  Negative for immunocompromised state.   Neurological: Negative for dizziness, weakness, light-headedness and headaches.   Hematological: Negative.  Does not bruise/bleed easily.        Family History   Family History   Problem Relation Age of Onset     Cancer Mother         ovarian      Heart Disease Sister         CABG x 3     Neurologic Disorder Sister         Fibromyalgia     Cancer Father         stomach/interstines      Musculoskeletal Disorder Brother         back      Heart Disease Brother 60        CABG x 4     Glaucoma No family hx of      Macular Degeneration No family hx of        Social History  Social History     Socioeconomic History     Marital status:      Spouse name: Shashank     Number of children: 3     Years of  education: Not on file     Highest education level: Not on file   Occupational History     Employer: RETIRED   Social Needs     Financial resource strain: Not on file     Food insecurity     Worry: Not on file     Inability: Not on file     Transportation needs     Medical: Not on file     Non-medical: Not on file   Tobacco Use     Smoking status: Never Smoker     Smokeless tobacco: Never Used     Tobacco comment: Never smoked; nonsmoking household   Substance and Sexual Activity     Alcohol use: No     Comment: very rare     Drug use: No     Comment: never     Sexual activity: Yes     Partners: Male     Birth control/protection: None   Lifestyle     Physical activity     Days per week: Not on file     Minutes per session: Not on file     Stress: Not on file   Relationships     Social connections     Talks on phone: Not on file     Gets together: Not on file     Attends [employer] service: Not on file     Active member of club or organization: Not on file     Attends meetings of clubs or organizations: Not on file     Relationship status: Not on file     Intimate partner violence     Fear of current or ex partner: Not on file     Emotionally abused: Not on file     Physically abused: Not on file     Forced sexual activity: Not on file   Other Topics Concern     Parent/sibling w/ CABG, MI or angioplasty before 65F 55M? No      Service No     Blood Transfusions No     Caffeine Concern No     Occupational Exposure No     Hobby Hazards No     Sleep Concern No     Stress Concern No     Weight Concern Yes     Special Diet No     Back Care No     Exercise Yes     Comment: Curves     Bike Helmet No     Seat Belt Yes     Self-Exams Yes   Social History Narrative     Not on file        Surgical History:  Past Surgical History:   Procedure Laterality Date     APPENDECTOMY       CHOLECYSTECTOMY, OPEN       COLONOSCOPY  2009    neg     CYSTOCELE REPAIR  2003    TVT SPARC        Problem List:  Patient Active Problem List    Diagnosis     Pseudophakia OU c YAG OU     CAD (coronary artery disease)     Advance care planning     Hypertension goal BP (blood pressure) < 140/90     Hyperlipidemia with target LDL less than 100     Osteoporosis     CKD (chronic kidney disease) stage 3, GFR 30-59 ml/min     Seasonal allergic rhinitis     Diagnostic skin and sensitization tests (aka ALLERGENS)     Pastrana's neuroma     Strabismic amblyopia     Anterior basement membrane dystrophy, OU     PVD (posterior vitreous detachment) OU     Granuloma annulare     Recurrent and persistent hematuria     Leg weakness, bilateral     Microscopic hematuria     Proteinuria, unspecified type        Allergies:  Allergies   Allergen Reactions     Baycol [Cerivastatin Sodium]       muscle mass loss       Crestor [Hmg-Coa-R Inhibitors]      Previously on Baycol and affect muscle mass loss.     Darvocet [Acetaminophen]      Severe nausea     Macrobid [Nitrofuran Derivatives]      Hives        Current Meds:    Current Outpatient Medications:      aspirin 81 MG tablet, Take 1 tablet (81 mg) by mouth daily, Disp: 90 tablet, Rfl: 3     atorvastatin (LIPITOR) 20 MG tablet, Take 1 tablet (20 mg) by mouth daily, Disp: 90 tablet, Rfl: 3     CALCIUM 500 + D OR, 1 tab three times a day , Disp: , Rfl:      citalopram (CELEXA) 10 MG tablet, Take 1 tablet (10 mg) by mouth daily, Disp: 90 tablet, Rfl: 3     cyclobenzaprine (FLEXERIL) 10 MG tablet, Take 1 tablet (10 mg) by mouth 3 times daily as needed for muscle spasms, Disp: 30 tablet, Rfl: 0     Hypromellose (ARTIFICIAL TEARS OP), Apply 1 drop to eye as needed Both eyes., Disp: , Rfl:      losartan (COZAAR) 100 MG tablet, Take 1 tablet (100 mg) by mouth daily, Disp: 90 tablet, Rfl: 3     polyethylene glycol (MIRALAX) powder, Take 17 g (1 capful) by mouth daily, Disp: 510 g, Rfl: 3     triamcinolone (KENALOG) 0.1 % cream, Apply 2-3 times a day to affected area., Disp: 80 g, Rfl: 6     VITAMIN D 400 UNIT OR TABS, 1 cap twice a day ,  Disp: , Rfl:      ipratropium (ATROVENT) 0.06 % nasal spray, Spray 2 sprays into both nostrils 3 times daily as needed for rhinitis (Patient not taking: Reported on 10/14/2020), Disp: 3 Box, Rfl: 4    Current Facility-Administered Medications:      triamcinolone (KENALOG-40) injection 40 mg, 40 mg, Intramuscular, Once, Ric Paul PA-C     PHYSICAL EXAM:     Vital signs noted and reviewed by Ric Paul PA-C  BP (!) 148/80 (BP Location: Left arm, Patient Position: Sitting, Cuff Size: Adult Regular)   Pulse 82   Temp 97.8  F (36.6  C) (Tympanic)   Resp 22   Wt 76 kg (167 lb 9.6 oz)   LMP  (LMP Unknown)   SpO2 97%   BMI 29.45 kg/m       PEFR:    Physical Exam  Vitals signs and nursing note reviewed.   Constitutional:       General: She is not in acute distress.     Appearance: She is well-developed. She is not ill-appearing, toxic-appearing or diaphoretic.   HENT:      Head: Normocephalic and atraumatic.      Right Ear: Tympanic membrane and external ear normal. No drainage, swelling or tenderness. Tympanic membrane is not perforated, erythematous, retracted or bulging.      Left Ear: Tympanic membrane and external ear normal. No drainage, swelling or tenderness. Tympanic membrane is not perforated, erythematous, retracted or bulging.      Nose: No mucosal edema, congestion or rhinorrhea.      Right Sinus: No maxillary sinus tenderness or frontal sinus tenderness.      Left Sinus: No maxillary sinus tenderness or frontal sinus tenderness.      Mouth/Throat:      Pharynx: No pharyngeal swelling, oropharyngeal exudate, posterior oropharyngeal erythema or uvula swelling.      Tonsils: No tonsillar abscesses.   Eyes:      Pupils: Pupils are equal, round, and reactive to light.   Neck:      Musculoskeletal: Full passive range of motion without pain, normal range of motion and neck supple.      Trachea: Trachea normal.   Cardiovascular:      Rate and Rhythm: Normal rate and regular rhythm.      Heart  sounds: Normal heart sounds, S1 normal and S2 normal. No murmur. No friction rub. No gallop.    Pulmonary:      Effort: Pulmonary effort is normal. No respiratory distress.      Breath sounds: Normal breath sounds. No decreased breath sounds, wheezing, rhonchi or rales.   Abdominal:      General: Bowel sounds are normal. There is no distension.      Palpations: Abdomen is soft. Abdomen is not rigid. There is no mass.      Tenderness: There is no abdominal tenderness. There is no guarding or rebound.   Musculoskeletal:      Thoracic back: She exhibits tenderness, pain and spasm. She exhibits normal range of motion, no bony tenderness, no swelling, no edema, no deformity and normal pulse.        Back:    Lymphadenopathy:      Cervical: No cervical adenopathy.   Skin:     General: Skin is warm and dry.   Neurological:      Mental Status: She is alert and oriented to person, place, and time.      Cranial Nerves: No cranial nerve deficit.      Deep Tendon Reflexes: Reflexes are normal and symmetric.   Psychiatric:         Behavior: Behavior normal. Behavior is cooperative.         Thought Content: Thought content normal.         Judgment: Judgment normal.          Labs:     No results found for any visits on 10/19/20.    Medical Decision Making:    Differential Diagnosis:  back muscle spasm, back strain, herniated disk, vertebral fracture.     ASSESSMENT:     1. Back muscle spasm    2. Myalgia, other site     3. Leg weakness, bilateral    4. Stage 3 chronic kidney disease, unspecified whether stage 3a or 3b CKD    5. Hypertension goal BP (blood pressure) < 140/90           PLAN:     Trigger Point Injection  After informed consent was obtained including risks, benefits, and alternatives, I did perform a trigger point injection of the patient's left thoracic region.  After I was able to demarcate the painful area, I cleansed the skin with alcohol. I then used sterile technique and a 27-gauge 1.5-inch needle to infuse 40 mg  of Kenalog along with 4 mL of 0.25% Bupivicain using sterile technique. Patient tolerated this well with no complications. Patient verbalized Significant relief if symptoms.    Ice the affected area.  Tylenol for pain.  NSAIDS contraindicated with CKD.  Stay active.  Rx for Flexeril sent in.  No dosage adjustment needed for CKD per up to date.  Patient has a Hx of bilateral leg weakness.  This is a known issue that I take into account for their medical decisions.  There are no current exacerbations or new concerns.  Patient is hypertensive in clinic today.  Second BP reading was also above goal.  Patient instructed to follow up with PCP in the next week for BP recheck.  Sooner if symptoms worsen.  Worrisome symptoms discussed with instructions to go to the ED.  Patient verbalized understanding and agreed with this plan.     Ric Paul PA-C  10/19/2020, 12:44 PM

## 2020-10-22 ENCOUNTER — VIRTUAL VISIT (OUTPATIENT)
Dept: CARDIOLOGY | Facility: CLINIC | Age: 85
End: 2020-10-22
Payer: COMMERCIAL

## 2020-10-22 DIAGNOSIS — R06.09 DYSPNEA ON EXERTION: Primary | ICD-10-CM

## 2020-10-22 DIAGNOSIS — G45.9 TIA (TRANSIENT ISCHEMIC ATTACK): ICD-10-CM

## 2020-10-22 DIAGNOSIS — E78.5 HYPERLIPIDEMIA LDL GOAL <100: ICD-10-CM

## 2020-10-22 PROCEDURE — 99214 OFFICE O/P EST MOD 30 MIN: CPT | Mod: 95 | Performed by: INTERNAL MEDICINE

## 2020-10-22 RX ORDER — ATORVASTATIN CALCIUM 40 MG/1
40 TABLET, FILM COATED ORAL DAILY
Qty: 90 TABLET | Refills: 3 | Status: SHIPPED | OUTPATIENT
Start: 2020-10-22 | End: 2021-05-27

## 2020-10-22 NOTE — PROGRESS NOTES
"Carolin Mcbride is a 86 year old female who is being evaluated via a billable telephone visit.      The patient has been notified of following:     \"This telephone visit will be conducted via a call between you and your physician/provider. We have found that certain health care needs can be provided without the need for a physical exam.  This service lets us provide the care you need with a short phone conversation.  If a prescription is necessary we can send it directly to your pharmacy.  If lab work is needed we can place an order for that and you can then stop by our lab to have the test done at a later time.    Telephone visits are billed at different rates depending on your insurance coverage. During this emergency period, for some insurers they may be billed the same as an in-person visit.  Please reach out to your insurance provider with any questions.    If during the course of the call the physician/provider feels a telephone visit is not appropriate, you will not be charged for this service.\"    Patient has given verbal consent for Telephone visit?  Yes    How would you like to obtain your AVS? Torsten    Phone call duration: 15 minutes       SUBJECTIVE:  Carolin Mcbride is a 86 year old female who presents for management of hyperlipidemia.  Previously patient was seen for TIA-like symptoms.  All evaluations including CT MRI echo was negative.  Also had a ZI0 patch which did not show any significant arrhythmia and no atrial fibrillation.  Patient was on a brief course of Plavix.  Patient was on atorvastatin 20 mg daily.  This was stopped because of lower extremity weakness.  Years ago patient was prescribed Baycol and this called severe muscle weakness and atrophy.  Patient is in fact very scared to take a statin.  Patient was started on 20 mg of Lipitor.  She is tolerating this very well.  No complaints.  Here to review the lipid profile on 20 mg of Lipitor.  Patient has a new symptom.  When she go " upstairs to bed feels short of breath and it is relieved by resting on the bed.  No chest pain no other associated symptoms.  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 minimally only for Tree pollens (all other environmental allergens NEGATIVE)       Diagnostic skin and sensitization tests (aka ALLERGENS)      Priority: Medium     CKD (chronic kidney disease) stage 3, GFR 30-59 ml/min 05/22/2014     Priority: Medium     Hyperlipidemia with target LDL less than 100 04/08/2013     Priority: Medium     Diagnosis updated by automated process. Provider to review and confirm.       Osteoporosis      Priority: Medium     5 years Fosamax 1-1-2011 to 12-. Medication holiday recommended.         Hypertension goal BP (blood pressure) < 140/90 06/26/2012     Priority: Medium     Advance care planning 03/13/2012     Priority: Medium     Advance Care Planning 12/20/2016: ACP Review of Chart / Resources Provided:  Reviewed chart for advance care plan.  Carolin LIAT Mcbride has been provided information and resources to begin or update their advance care plan.  Advance Care Planning 03/13/2012: Discussed advance care planning with patient; information given to patient to review. 3/13/2012 . Lara Roque MA                  CAD (coronary artery disease)      Priority: Medium      asymptomatic, on CT scan       Pseudophakia OU c YAG OU 06/02/2011     Priority: Medium    .  Current Outpatient Medications   Medication Sig     aspirin 81 MG tablet Take 1 tablet (81 mg) by mouth daily     atorvastatin (LIPITOR) 20 MG tablet Take 1 tablet (20 mg) by mouth daily     CALCIUM 500 + D OR 1 tab three times a day      citalopram (CELEXA) 10 MG tablet Take 1 tablet (10 mg) by mouth daily     cyclobenzaprine (FLEXERIL) 10 MG tablet Take 1 tablet (10 mg) by mouth 3 times daily as needed for muscle spasms     Hypromellose (ARTIFICIAL TEARS OP) Apply 1 drop to eye as needed Both eyes.     losartan (COZAAR) 100 MG tablet Take 1 tablet (100 mg) by mouth daily     polyethylene glycol (MIRALAX) powder Take 17 g (1 capful) by mouth daily     triamcinolone (KENALOG) 0.1 % cream Apply 2-3 times a day to affected area.     VITAMIN D 400 UNIT OR TABS 1 cap twice a day      ipratropium (ATROVENT) 0.06 % nasal spray Spray 2 sprays into both nostrils 3 times daily as needed for rhinitis (Patient not taking: Reported on 10/14/2020)     No current facility-administered medications for this visit.      Past Medical History:   Diagnosis Date     Adjustment reaction with anxiety and depression 2001     CAD (coronary artery disease) 2009    asymptomatic, on CT scan     Cyst, ovarian      Diagnostic skin and sensitization tests (aka ALLERGENS) 9/29/14 IgE tests pos. minimally only for Tree pollens (all other environmental allergens NEGATIVE)     HTN (hypertension)      Hyperlipidemia      Myositis 2001    related to past Baycol use- resolved     Osteoporosis      Seasonal allergic rhinitis     9/29/14 IgE tests pos. minimally only for Tree pollens (all other environmental allergens NEGATIVE)     Past Surgical History:   Procedure Laterality Date     APPENDECTOMY       CHOLECYSTECTOMY, OPEN       COLONOSCOPY  2009    neg     CYSTOCELE REPAIR  2003    TVT SPARC     Allergies   Allergen Reactions     Baycol [Cerivastatin Sodium]        muscle mass loss       Crestor [Hmg-Coa-R Inhibitors]      Previously on Baycol and affect muscle mass loss.     Darvocet [Acetaminophen]      Severe nausea     Macrobid [Nitrofuran Derivatives]      Hives     Social History     Socioeconomic History     Marital status:      Spouse name: Shashank     Number of children: 3     Years of education: Not on file     Highest education level: Not on file   Occupational History     Employer: RETIRED   Social Needs     Financial resource strain: Not on file     Food insecurity     Worry: Not on file     Inability: Not on file     Transportation needs     Medical: Not on file     Non-medical: Not on file   Tobacco Use     Smoking status: Never Smoker     Smokeless tobacco: Never Used     Tobacco comment: Never smoked; nonsmoking household   Substance and Sexual Activity     Alcohol use: No     Comment: very rare     Drug use: No     Comment: never     Sexual activity: Yes     Partners: Male     Birth control/protection: None   Lifestyle     Physical activity     Days per week: Not on file     Minutes per session: Not on file     Stress: Not on file   Relationships     Social connections     Talks on phone: Not on file     Gets together: Not on file     Attends Restoration service: Not on file     Active member of club or organization: Not on file     Attends meetings of clubs or organizations: Not on file     Relationship status: Not on file     Intimate partner violence     Fear of current or ex partner: Not on file     Emotionally abused: Not on file     Physically abused: Not on file     Forced sexual activity: Not on file   Other Topics Concern     Parent/sibling w/ CABG, MI or angioplasty before 65F 55M? No      Service No     Blood Transfusions No     Caffeine Concern No     Occupational Exposure No     Hobby Hazards No     Sleep Concern No     Stress Concern No     Weight Concern Yes     Special Diet No     Back Care No     Exercise Yes     Comment:  Curves     Bike Helmet No     Seat Belt Yes     Self-Exams Yes   Social History Narrative     Not on file     Family History   Problem Relation Age of Onset     Cancer Mother         ovarian      Heart Disease Sister         CABG x 3     Neurologic Disorder Sister         Fibromyalgia     Cancer Father         stomach/interstines      Musculoskeletal Disorder Brother         back      Heart Disease Brother 60        CABG x 4     Glaucoma No family hx of      Macular Degeneration No family hx of           REVIEW OF SYSTEMS:  General: negative, fever, chills, night sweats  Skin: negative, acne, rash and scaling  Eyes: negative, double vision, eye pain and photophobia  Ears/Nose/Throat: negative, nasal congestion and purulent rhinorrhea  Respiratory: No dyspnea on exertion, No cough, No hemoptysis and negative  Cardiovascular: negative, palpitations, tachycardia, irregular heart beat, chest pain, exertional chest pain or pressure, paroxysmal nocturnal dyspnea, dyspnea on exertion and orthopnea         ASSESSMENT/PLAN:  Patient here for follow-up.  Management of hyperlipidemia.  In the past patient was started on Baycol and developed severe myalgia and weakness of lower extremities.  Because of this patient was reluctant to take any statin.  As her LDL was 241 patient was restarted on 20 mg of Lipitor which she had.  She is tolerating this dose very well without any myalgia or any other symptoms.  Repeat lipid profile showed LDL of 130.  Suggested increasing the dose of Lipitor to 40 mg daily and patient is willing to try this.  We will increase the dose to 40 mg daily.  For the new symptom of shortness of breath while climbing steps planning to do a Lexiscan.  She had an echocardiogram in July 2019 which showed normal LV function and no significant abnormality.  Patient will have a lipid profile in 3 months time with LFTs.  Also will check a CK at that time.  Per orders.   Return to Clinic 1yr.

## 2020-10-22 NOTE — LETTER
"10/22/2020      RE: Carolin Mcbride  [address] [address]       Dear Colleague,    Thank you for the opportunity to participate in the care of your patient, Carolin Mcbride, at the [facility]. Please see a copy of my visit note below.    Carolin Mcbride is a 86 year old female who is being evaluated via a billable telephone visit.      The patient has been notified of following:     \"This telephone visit will be conducted via a call between you and your physician/provider. We have found that certain health care needs can be provided without the need for a physical exam.  This service lets us provide the care you need with a short phone conversation.  If a prescription is necessary we can send it directly to your pharmacy.  If lab work is needed we can place an order for that and you can then stop by our lab to have the test done at a later time.    Telephone visits are billed at different rates depending on your insurance coverage. During this emergency period, for some insurers they may be billed the same as an in-person visit.  Please reach out to your insurance provider with any questions.    If during the course of the call the physician/provider feels a telephone visit is not appropriate, you will not be charged for this service.\"    Patient has given verbal consent for Telephone visit?  Yes    How would you like to obtain your AVS? Torsten    Phone call duration: 15 minutes       SUBJECTIVE:  Carolin Mcbride is a 86 year old female who presents for management of hyperlipidemia.  Previously patient was seen for TIA-like symptoms.  All evaluations including CT MRI echo was negative.  Also had a ZI0 patch which did not show any significant arrhythmia and no atrial fibrillation.  Patient was on a brief course of Plavix.  Patient was on atorvastatin 20 mg daily.  This was stopped because of lower extremity weakness.  Years " ago patient was prescribed Baycol and this called severe muscle weakness and atrophy.  Patient is in fact very scared to take a statin.  Patient was started on 20 mg of Lipitor.  She is tolerating this very well.  No complaints.  Here to review the lipid profile on 20 mg of Lipitor.  Patient has a new symptom.  When she go upstairs to bed feels short of breath and it is relieved by resting on the bed.  No chest pain no other associated symptoms.  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 minimally only for Tree pollens (all other environmental allergens NEGATIVE)       Diagnostic skin and sensitization tests (aka ALLERGENS)      Priority: Medium     CKD (chronic kidney disease) stage 3, GFR 30-59 ml/min 05/22/2014     Priority: Medium     Hyperlipidemia with target LDL less than 100 04/08/2013     Priority: Medium     Diagnosis updated by automated process. Provider to review and confirm.       Osteoporosis      Priority: Medium     5 years Fosamax 1-1-2011 to 12-. Medication holiday recommended.         Hypertension goal BP (blood pressure) < 140/90 06/26/2012     Priority: Medium     Advance care planning 03/13/2012     Priority: Medium     Advance Care Planning 12/20/2016: ACP Review of Chart / Resources Provided:  Reviewed chart for advance care plan.   Carolin A Reed has been provided information and resources to begin or update their advance care plan.  Advance Care Planning 03/13/2012: Discussed advance care planning with patient; information given to patient to review. 3/13/2012 . Lara Roque MA                  CAD (coronary artery disease)      Priority: Medium     asymptomatic, on CT scan       Pseudophakia OU c YAG OU 06/02/2011     Priority: Medium    .  Current Outpatient Medications   Medication Sig     aspirin 81 MG tablet Take 1 tablet (81 mg) by mouth daily     atorvastatin (LIPITOR) 20 MG tablet Take 1 tablet (20 mg) by mouth daily     CALCIUM 500 + D OR 1 tab three times a day      citalopram (CELEXA) 10 MG tablet Take 1 tablet (10 mg) by mouth daily     cyclobenzaprine (FLEXERIL) 10 MG tablet Take 1 tablet (10 mg) by mouth 3 times daily as needed for muscle spasms     Hypromellose (ARTIFICIAL TEARS OP) Apply 1 drop to eye as needed Both eyes.     losartan (COZAAR) 100 MG tablet Take 1 tablet (100 mg) by mouth daily     polyethylene glycol (MIRALAX) powder Take 17 g (1 capful) by mouth daily     triamcinolone (KENALOG) 0.1 % cream Apply 2-3 times a day to affected area.     VITAMIN D 400 UNIT OR TABS 1 cap twice a day      ipratropium (ATROVENT) 0.06 % nasal spray Spray 2 sprays into both nostrils 3 times daily as needed for rhinitis (Patient not taking: Reported on 10/14/2020)     No current facility-administered medications for this visit.      Past Medical History:   Diagnosis Date     Adjustment reaction with anxiety and depression 2001     CAD (coronary artery disease) 2009    asymptomatic, on CT scan     Cyst, ovarian      Diagnostic skin and sensitization tests (aka ALLERGENS) 9/29/14 IgE tests pos. minimally only for Tree pollens (all other environmental allergens NEGATIVE)     HTN (hypertension)      Hyperlipidemia      Myositis 2001    related to past Baycol use- resolved     Osteoporosis      Seasonal allergic rhinitis     9/29/14 IgE  tests pos. minimally only for Tree pollens (all other environmental allergens NEGATIVE)     Past Surgical History:   Procedure Laterality Date     APPENDECTOMY       CHOLECYSTECTOMY, OPEN       COLONOSCOPY  2009    neg     CYSTOCELE REPAIR  2003    TVT SPARC     Allergies   Allergen Reactions     Baycol [Cerivastatin Sodium]       muscle mass loss       Crestor [Hmg-Coa-R Inhibitors]      Previously on Baycol and affect muscle mass loss.     Darvocet [Acetaminophen]      Severe nausea     Macrobid [Nitrofuran Derivatives]      Hives     Social History     Socioeconomic History     Marital status:      Spouse name: Shashank     Number of children: 3     Years of education: Not on file     Highest education level: Not on file   Occupational History     Employer: RETIRED   Social Needs     Financial resource strain: Not on file     Food insecurity     Worry: Not on file     Inability: Not on file     Transportation needs     Medical: Not on file     Non-medical: Not on file   Tobacco Use     Smoking status: Never Smoker     Smokeless tobacco: Never Used     Tobacco comment: Never smoked; nonsmoking household   Substance and Sexual Activity     Alcohol use: No     Comment: very rare     Drug use: No     Comment: never     Sexual activity: Yes     Partners: Male     Birth control/protection: None   Lifestyle     Physical activity     Days per week: Not on file     Minutes per session: Not on file     Stress: Not on file   Relationships     Social connections     Talks on phone: Not on file     Gets together: Not on file     Attends Buddhist service: Not on file     Active member of club or organization: Not on file     Attends meetings of clubs or organizations: Not on file     Relationship status: Not on file     Intimate partner violence     Fear of current or ex partner: Not on file     Emotionally abused: Not on file     Physically abused: Not on file     Forced sexual activity: Not on file   Other Topics  Concern     Parent/sibling w/ CABG, MI or angioplasty before 65F 55M? No      Service No     Blood Transfusions No     Caffeine Concern No     Occupational Exposure No     Hobby Hazards No     Sleep Concern No     Stress Concern No     Weight Concern Yes     Special Diet No     Back Care No     Exercise Yes     Comment: Curves     Bike Helmet No     Seat Belt Yes     Self-Exams Yes   Social History Narrative     Not on file     Family History   Problem Relation Age of Onset     Cancer Mother         ovarian      Heart Disease Sister         CABG x 3     Neurologic Disorder Sister         Fibromyalgia     Cancer Father         stomach/interstines      Musculoskeletal Disorder Brother         back      Heart Disease Brother 60        CABG x 4     Glaucoma No family hx of      Macular Degeneration No family hx of           REVIEW OF SYSTEMS:  General: negative, fever, chills, night sweats  Skin: negative, acne, rash and scaling  Eyes: negative, double vision, eye pain and photophobia  Ears/Nose/Throat: negative, nasal congestion and purulent rhinorrhea  Respiratory: No dyspnea on exertion, No cough, No hemoptysis and negative  Cardiovascular: negative, palpitations, tachycardia, irregular heart beat, chest pain, exertional chest pain or pressure, paroxysmal nocturnal dyspnea, dyspnea on exertion and orthopnea         ASSESSMENT/PLAN:  Patient here for follow-up.  Management of hyperlipidemia.  In the past patient was started on Baycol and developed severe myalgia and weakness of lower extremities.  Because of this patient was reluctant to take any statin.  As her LDL was 241 patient was restarted on 20 mg of Lipitor which she had.  She is tolerating this dose very well without any myalgia or any other symptoms.  Repeat lipid profile showed LDL of 130.  Suggested increasing the dose of Lipitor to 40 mg daily and patient is willing to try this.  We will increase the dose to 40 mg daily.  For the new symptom of  shortness of breath while climbing steps planning to do a Lexiscan.  She had an echocardiogram in July 2019 which showed normal LV function and no significant abnormality.  Patient will have a lipid profile in 3 months time with LFTs.  Also will check a CK at that time.  Per orders.   Return to Clinic 1yr.      Please do not hesitate to contact me if you have any questions/concerns.     Sincerely,     VAISHALI Juan MD

## 2020-10-22 NOTE — PATIENT INSTRUCTIONS
Thank you for coming to the [facility] [place]; please note the following instructions:    1.  Increase lipitor (atorvastatin) to 40 mg daily.  A new prescription was sent to your preferred pharmacy.      2.  Dr. Peña recommends a follow up in 3 months with fasting labs prior    2.  Dr. Peña is recommending a nuclear stress test called a lexiscan to rule out coronary artery disease.          If you have any questions regarding your visit please contact your care team:     Cardiology  Telephone Number   Calista CAMACHO, RN  Leslee YORK, YARA HARTMANN, EVERTON SERRATO, EVERTON HARPER, LPN   [phone] (option 1)   For scheduling appts:     [phone] (select option 1)       For the Device Clinic (Pacemakers and ICD's)  RN's :  Arin Cantu   During business hours: [phone]    *After business hours:  [phone] (select option 4)      Normal test result notifications will be released via [facility] or mailed within 7 business days.  All other test results, will be communicated via telephone once reviewed by your cardiologist.    If you need a medication refill please contact your pharmacy.  Please allow 3 business days for your refill to be completed.    As always, thank you for trusting us with your health care needs!

## 2020-11-04 ENCOUNTER — OFFICE VISIT (OUTPATIENT)
Dept: URGENT CARE | Facility: URGENT CARE | Age: 85
End: 2020-11-04
Payer: COMMERCIAL

## 2020-11-04 VITALS
DIASTOLIC BLOOD PRESSURE: 97 MMHG | HEART RATE: 80 BPM | OXYGEN SATURATION: 100 % | BODY MASS INDEX: 29.1 KG/M2 | SYSTOLIC BLOOD PRESSURE: 167 MMHG | WEIGHT: 165.6 LBS | TEMPERATURE: 98.2 F

## 2020-11-04 DIAGNOSIS — M62.830 BACK MUSCLE SPASM: Primary | ICD-10-CM

## 2020-11-04 PROCEDURE — 99214 OFFICE O/P EST MOD 30 MIN: CPT | Performed by: FAMILY MEDICINE

## 2020-11-04 RX ORDER — CYCLOBENZAPRINE HCL 10 MG
10 TABLET ORAL AT BEDTIME
Qty: 7 TABLET | Refills: 1 | Status: SHIPPED | OUTPATIENT
Start: 2020-11-04 | End: 2021-03-15

## 2020-11-04 ASSESSMENT — ENCOUNTER SYMPTOMS
SHORTNESS OF BREATH: 0
VOMITING: 0
RHINORRHEA: 0
SORE THROAT: 0
CHILLS: 0
COUGH: 0
HEADACHES: 0
DIARRHEA: 0
NAUSEA: 0
FEVER: 0

## 2020-11-04 NOTE — PROGRESS NOTES
SUBJECTIVE:   Carolin Mcbride is a 86 year old female presenting with a chief complaint of   Chief Complaint   Patient presents with     Shoulder Pain     L shoulder pain x3 weeks, radiates to her L shoulder blade, Therapies tried: Chiropractor, massage, and muscle relaxants       She is an established patient of [facility].    3 weeks of left axillary shoulder and parascapular area. 10/10 sharp and intermittently improving to a 5/10   Chiropractic and massage visit 2 days. 5 chiropractic visits. One massage.  Her pain was reproducible on massage directly over the parascapular area near the rhomboid muscles.    Has has this since the age of the 18 years of age. Every few years lasting 2 years at times.   Has had 20+ episodes but not over the past 6 years. acupuncture helped last time.     Review of Systems   Constitutional: Negative for chills and fever.   HENT: Negative for congestion, ear pain, rhinorrhea and sore throat.    Respiratory: Negative for cough and shortness of breath.    Gastrointestinal: Negative for diarrhea, nausea and vomiting.   Neurological: Negative for headaches.     Patient Active Problem List   Diagnosis     Pseudophakia OU c YAG OU     CAD (coronary artery disease)     Advance care planning     Hypertension goal BP (blood pressure) < 140/90     Hyperlipidemia with target LDL less than 100     Osteoporosis     CKD (chronic kidney disease) stage 3, GFR 30-59 ml/min     Seasonal allergic rhinitis     Diagnostic skin and sensitization tests (aka ALLERGENS)     Pastrana's neuroma     Strabismic amblyopia     Anterior basement membrane dystrophy, OU     PVD (posterior vitreous detachment) OU     Granuloma annulare     Recurrent and persistent hematuria     Leg weakness, bilateral     Microscopic hematuria     Proteinuria, unspecified type        Past Medical History:   Diagnosis Date     Adjustment reaction with anxiety and depression 2001     CAD (coronary artery disease) 2009    asymptomatic, on  CT scan     Cyst, ovarian      Diagnostic skin and sensitization tests (aka ALLERGENS) 9/29/14 IgE tests pos. minimally only for Tree pollens (all other environmental allergens NEGATIVE)     HTN (hypertension)      Hyperlipidemia      Myositis 2001    related to past [employer] use- resolved     Osteoporosis      Seasonal allergic rhinitis     9/29/14 IgE tests pos. minimally only for Tree pollens (all other environmental allergens NEGATIVE)     Family History   Problem Relation Age of Onset     Cancer Mother         ovarian      Heart Disease Sister         CABG x 3     Neurologic Disorder Sister         Fibromyalgia     Cancer Father         stomach/interstines      Musculoskeletal Disorder Brother         back      Heart Disease Brother 60        CABG x 4     Glaucoma No family hx of      Macular Degeneration No family hx of      Current Outpatient Medications   Medication Sig Dispense Refill     aspirin 81 MG tablet Take 1 tablet (81 mg) by mouth daily 90 tablet 3     atorvastatin (LIPITOR) 40 MG tablet Take 1 tablet (40 mg) by mouth daily 90 tablet 3     CALCIUM 500 + D OR 1 tab three times a day        citalopram (CELEXA) 10 MG tablet Take 1 tablet (10 mg) by mouth daily 90 tablet 3     cyclobenzaprine (FLEXERIL) 10 MG tablet Take 1 tablet (10 mg) by mouth 3 times daily as needed for muscle spasms 30 tablet 0     Hypromellose (ARTIFICIAL TEARS OP) Apply 1 drop to eye as needed Both eyes.       ipratropium (ATROVENT) 0.06 % nasal spray Spray 2 sprays into both nostrils 3 times daily as needed for rhinitis (Patient not taking: Reported on 10/14/2020) 3 Box 4     losartan (COZAAR) 100 MG tablet Take 1 tablet (100 mg) by mouth daily 90 tablet 3     polyethylene glycol (MIRALAX) powder Take 17 g (1 capful) by mouth daily 510 g 3     triamcinolone (KENALOG) 0.1 % cream Apply 2-3 times a day to affected area. 80 g 6     VITAMIN D 400 UNIT OR TABS 1 cap twice a day        Social History     Tobacco Use     Smoking status:  Never Smoker     Smokeless tobacco: Never Used     Tobacco comment: Never smoked; nonsmoking household   Substance Use Topics     Alcohol use: No     Comment: very rare       OBJECTIVE  BP (!) 167/97   Pulse 80   Temp 98.2  F (36.8  C) (Oral)   Wt 75.1 kg (165 lb 9.6 oz)   LMP  (LMP Unknown)   SpO2 100%   BMI 29.10 kg/m      Physical Exam  Neck:      Musculoskeletal: Normal range of motion.   Cardiovascular:      Rate and Rhythm: Normal rate.      Pulses: Normal pulses.   Pulmonary:      Effort: Pulmonary effort is normal.   Musculoskeletal: Normal range of motion.      Comments: Classic palpable muscle tenderness located the left parascapular area overlying the rhomboid muscle primarily soft tissue soreness to palpation consistent with her previous pain.   Skin:     General: Skin is warm.   Neurological:      Mental Status: She is alert.         ASSESSMENT:    ICD-10-CM    1. Back muscle spasm  M62.830 cyclobenzaprine (FLEXERIL) 10 MG tablet   Consistent with muscle spasm of rhomboid and teres muscles    PLAN:  Recommended occasional massage rest ice trial of ongoing chiropractic care 1 or 2 more visits.  She also was asking about corticosteroid injections into the area I recommended defer to a pain clinic if she feels this is necessary is not a procedure due in the urgent care setting.  I did emphasize importance of regular nutrition One-A-Day multivitamin proper electrolyte balance which could potentially contribute to muscle spasm if she was deficient in any of the above.  Follow-up with us for any worsening symptoms or go directly to the ER for any cardiac related symptoms including anterior chest pain nausea vomiting sweating or shortness of breath should go directly to the ER.  Ted Sandoval MD

## 2020-11-16 ENCOUNTER — ANCILLARY PROCEDURE (OUTPATIENT)
Dept: NUCLEAR MEDICINE | Facility: CLINIC | Age: 85
End: 2020-11-16
Attending: INTERNAL MEDICINE
Payer: COMMERCIAL

## 2020-11-16 DIAGNOSIS — R06.09 DYSPNEA ON EXERTION: ICD-10-CM

## 2020-11-16 LAB
CV STRESS MAX HR HE: 115
RATE PRESSURE PRODUCT: NORMAL
STRESS ECHO BASELINE DIASTOLIC HE: 92
STRESS ECHO BASELINE HR: 78
STRESS ECHO BASELINE SYSTOLIC BP: 172
STRESS ECHO CALCULATED PERCENT HR: 86 %
STRESS ECHO LAST STRESS DIASTOLIC BP: 100
STRESS ECHO LAST STRESS SYSTOLIC BP: 190
STRESS ECHO TARGET HR: 134

## 2020-11-16 PROCEDURE — A9502 TC99M TETROFOSMIN: HCPCS | Performed by: INTERNAL MEDICINE

## 2020-11-16 PROCEDURE — 93018 CV STRESS TEST I&R ONLY: CPT | Performed by: INTERNAL MEDICINE

## 2020-11-16 PROCEDURE — 78452 HT MUSCLE IMAGE SPECT MULT: CPT | Performed by: INTERNAL MEDICINE

## 2020-11-16 PROCEDURE — 93016 CV STRESS TEST SUPVJ ONLY: CPT

## 2020-11-16 PROCEDURE — 93017 CV STRESS TEST TRACING ONLY: CPT

## 2020-11-16 RX ORDER — REGADENOSON 0.08 MG/ML
0.4 INJECTION, SOLUTION INTRAVENOUS ONCE
Status: COMPLETED | OUTPATIENT
Start: 2020-11-16 | End: 2020-11-16

## 2020-11-16 RX ADMIN — REGADENOSON 0.4 MG: 0.08 INJECTION, SOLUTION INTRAVENOUS at 14:11

## 2020-11-17 ENCOUNTER — TELEPHONE (OUTPATIENT)
Dept: CARDIOLOGY | Facility: CLINIC | Age: 85
End: 2020-11-17

## 2020-11-17 DIAGNOSIS — R93.1 ABNORMAL NUCLEAR CARDIAC IMAGING TEST: Primary | ICD-10-CM

## 2020-11-17 DIAGNOSIS — R06.09 DYSPNEA ON EXERTION: ICD-10-CM

## 2020-11-17 NOTE — TELEPHONE ENCOUNTER
----- Message from VAISHALI Juan MD sent at 11/17/2020  2:03 PM CST -----  The stress test appears normal.  There is an apical defect.  It is better to rule out an infarct.  Can you get a limited echo with contrast to assess the LV apex thanks KP

## 2020-11-17 NOTE — TELEPHONE ENCOUNTER
[facility] Call Center    Phone Message    May a detailed message be left on voicemail: yes     Reason for Call: Other: Pt returning call to [place], requesting another call back. Thank you     Action Taken: Message routed to:  [facility]    Travel Screening: Not Applicable

## 2020-11-17 NOTE — TELEPHONE ENCOUNTER
Call to pt- left vm asking pt tcb to review stress test recommendation for limited echo - ordered by Dr Peña    Not active on my chart

## 2020-11-18 NOTE — TELEPHONE ENCOUNTER
Spoke with pt, review NM study. Recommendation from Dr Peña. Pt agreed, verbalized understanding    schd limited echo with contrast tomorrow afternoon

## 2020-11-19 ENCOUNTER — ANCILLARY PROCEDURE (OUTPATIENT)
Dept: CARDIOLOGY | Facility: CLINIC | Age: 85
End: 2020-11-19
Attending: INTERNAL MEDICINE
Payer: COMMERCIAL

## 2020-11-19 DIAGNOSIS — R93.1 ABNORMAL NUCLEAR CARDIAC IMAGING TEST: ICD-10-CM

## 2020-11-19 DIAGNOSIS — R06.09 DYSPNEA ON EXERTION: ICD-10-CM

## 2020-11-19 PROCEDURE — 99207 PR STATISTIC IV PUSH SINGLE INITIAL SUBSTANCE: CPT | Performed by: STUDENT IN AN ORGANIZED HEALTH CARE EDUCATION/TRAINING PROGRAM

## 2020-11-19 PROCEDURE — 93325 DOPPLER ECHO COLOR FLOW MAPG: CPT | Performed by: STUDENT IN AN ORGANIZED HEALTH CARE EDUCATION/TRAINING PROGRAM

## 2020-11-19 PROCEDURE — 93321 DOPPLER ECHO F-UP/LMTD STD: CPT | Performed by: STUDENT IN AN ORGANIZED HEALTH CARE EDUCATION/TRAINING PROGRAM

## 2020-11-19 PROCEDURE — 93308 TTE F-UP OR LMTD: CPT | Performed by: STUDENT IN AN ORGANIZED HEALTH CARE EDUCATION/TRAINING PROGRAM

## 2020-11-19 RX ADMIN — Medication 7 ML: at 17:00

## 2020-11-20 ENCOUNTER — ANCILLARY PROCEDURE (OUTPATIENT)
Dept: GENERAL RADIOLOGY | Facility: CLINIC | Age: 85
End: 2020-11-20
Attending: FAMILY MEDICINE
Payer: COMMERCIAL

## 2020-11-20 ENCOUNTER — OFFICE VISIT (OUTPATIENT)
Dept: FAMILY MEDICINE | Facility: CLINIC | Age: 85
End: 2020-11-20
Payer: COMMERCIAL

## 2020-11-20 DIAGNOSIS — M79.18 RHOMBOID MUSCLE PAIN: ICD-10-CM

## 2020-11-20 DIAGNOSIS — R07.82 INTERCOSTAL PAIN: Primary | ICD-10-CM

## 2020-11-20 DIAGNOSIS — R07.82 INTERCOSTAL PAIN: ICD-10-CM

## 2020-11-20 PROCEDURE — 99213 OFFICE O/P EST LOW 20 MIN: CPT | Performed by: FAMILY MEDICINE

## 2020-11-20 PROCEDURE — 71046 X-RAY EXAM CHEST 2 VIEWS: CPT | Performed by: RADIOLOGY

## 2020-11-20 NOTE — PROGRESS NOTES
Subjective     Carolin Mcbride is a 86 year old female who presents to clinic today for the following health issues:    HPI         Musculoskeletal problem/pain  Onset/Duration: 5-6 weeks   Description  Location: Shoulder/Upper Back - left  Joint Swelling: no but  states he felt a lump   Redness: no  Pain: YES  Warmth: no  Intensity:  Mild to severe  Progression of Symptoms:  worsening  Accompanying signs and symptoms:   Fevers: no  Numbness/tingling/weakness: no  History  Trauma to the area: no   Recent illness:  no  Previous similar problem: YES  Previous evaluation:  YES- seen at  11/04/2020  Precipitating or alleviating factors:  Aggravating factors include: none  Therapies tried and outcome: heat, ice, acetaminophen, muscle relaxers and asper cream         Review of Systems   Constitutional, HEENT, cardiovascular, pulmonary, gi and gu systems are negative, except as otherwise noted.      Objective    BP (!) 158/80 (BP Location: Left arm, Patient Position: Chair, Cuff Size: Adult Regular)   Pulse 101   Temp 98.2  F (36.8  C) (Oral)   Wt 75.3 kg (166 lb)   LMP  (LMP Unknown)   SpO2 98%   BMI 29.17 kg/m    Body mass index is 29.17 kg/m .  Physical Exam   GENERAL: healthy, alert and is not in distress with Normal gait.  EYES: Eyes grossly normal to inspection, PERRL and conjunctivae and sclerae normal  NECK: no adenopathy, no asymmetry, masses, or scars and thyroid normal to palpation  RESP: lungs clear to auscultation - no rales, rhonchi or wheezes  CV: regular rate and rhythm, normal S1 S2, no S3 or S4, no murmur, click or rub, no peripheral edema and peripheral pulses strong  ABDOMEN: soft, nontender, no hepatosplenomegaly, no masses and bowel sounds normal  MS: no gross musculoskeletal defects noted, no edema, the area of pain is over the rhomboid muscle on the right side. No spasm at this time. Normal ROM shoulders.   SKIN: no suspicious lesions or rashes  NEURO: normal strength and tone,  mentation intact and speech normal  PSYCH: affect is normal.     No results found for this or any previous visit (from the past 24 hour(s)).        Assessment & Plan     Intercostal pain  Discussed local treatment with Voltaren gel. Stretching exercises.   - XR Chest 2 Views  - diclofenac (VOLTAREN) 1 % topical gel  Dispense: 100 g; Refill: 1  - [facility]  Referral    Rhomboid muscle pain  Most likely from recurrent spasm. Heat is the most useful way to relax the muscle. Massage and stretching. Not usually amenable to cold or injection.   - diclofenac (VOLTAREN) 1 % topical gel  Dispense: 100 g; Refill: 1  - [facility]  Referral          FUTURE APPOINTMENTS:       - Follow-up for annual visit or as needed  See Patient Instructions    Return in about 3 months (around 2/20/2021) for Follow up, with me.    Lela Townsend MD  [facility]

## 2020-11-20 NOTE — PATIENT INSTRUCTIONS
Patient Education     Myalgias  Myalgias are another word for muscle aches and soreness. This is a symptom, not a disease. Myalgias can have many causes. A cold, the flu, or any infection can cause them. So can any illness with a high fever. They may happen after  heavy exercise or injury such as an accident or fall. Some medicines such as statins and certain antidepressants can cause myalgias. They can also be a symptom of long-term (chronic) health problems such as lupus, chronic fatigue, or hypothyroidism. With these illnesses, other serious symptoms often occur with muscle pain and soreness.     Myalgias most often go away on their own. If they don't go away, come back, or are severe, you may need tests to help find the cause.   Home care    Rest until you feel better.    Follow instructions that you were given for how to care for yourself. This may depend on the cause of your myalgias.     If myalgia is thought to be because of a medicine, talk with the doctor who prescribed the medicine about what to do.    To control pain, take prescription or over-the-counter medicines as directed. Unless told not to, you can try acetaminophen or ibuprofen.    Follow-up care  Follow up with your healthcare provider or as advised. If your symptoms don't go away in a few days or if they come back, follow up with your healthcare provider for an exam and testing.   When to see medical advice  Call your healthcare provider for any of the following:    Fever of 100.4 F (38 C) or higher, or as directed by your healthcare provider    Pain that gets worse and not better, or that goes away and comes back    New joint pains    New rash    Severe headache, neck pain, drowsiness, or confusion  [employer] last reviewed this educational content on 8/1/2019 2000-2020 [employer]. [address] [place] [address], [place] [address]. All rights reserved. This information is not intended as a substitute for professional medical care.  Always follow your healthcare professional's instructions.

## 2020-11-21 VITALS
OXYGEN SATURATION: 98 % | DIASTOLIC BLOOD PRESSURE: 80 MMHG | TEMPERATURE: 98.2 F | SYSTOLIC BLOOD PRESSURE: 148 MMHG | WEIGHT: 166 LBS | HEART RATE: 101 BPM | BODY MASS INDEX: 29.17 KG/M2

## 2020-11-22 NOTE — RESULT ENCOUNTER NOTE
Dear Carolin Mcbride,    Your test results are attached. I am happy to let you know that they are stable.    The chest xray did not show any cause for the pain. It does seem that this is from spasm in your rhomboid muscle. Let me know if the gel is helping.     Please call me if you have any questions about these test results or about your care.    Sincerely,    Lela Townsend MD

## 2020-11-23 ENCOUNTER — TELEPHONE (OUTPATIENT)
Dept: CARDIOLOGY | Facility: CLINIC | Age: 85
End: 2020-11-23

## 2020-11-23 DIAGNOSIS — R93.1 ABNORMAL NUCLEAR CARDIAC IMAGING TEST: Primary | ICD-10-CM

## 2020-11-23 DIAGNOSIS — R06.09 DYSPNEA ON EXERTION: ICD-10-CM

## 2020-11-23 NOTE — LETTER
November 24, 2020      TO: Carolin Mcbride  [address] [facility] [address]         Dear Carolin,    You are scheduled for a Coronary Angiogram at the [facility], [address] [place] [address], [place] [address].   Please arrive to the [facility] Waiting Room on __Tuesday, Dec 8th________  at ____6:30 am_______.       You will need to undergo a COVID-19 PCR swab test within 4 days of procedure. You will receive a phone call with more information. If you do not hear from the COVID scheduling team, please call: [phone] to schedule.      When you arrive you will be escorted back to the pre procedure area called 2A. Here they will insert an IV, draw labs, and obtain a short medical history. Please bring an updated list of your current medications.    A provider will come and talk with you about the procedure and obtain consent.    A nurse from the Cardiac Catheterization Lab will then escort you to the procedure area. You will be receiving sedation during the procedure so you will need someone to drive you to and from the hospital.    After the procedure you will recover for a short period (2 - 6 hours). You will be discharged with instructions for post procedure care. However, depending on what the angiogram shows you may have to have stents placed and this might require an overnight stay. We ask that you bring a small bag of necessities for your comfort if you would need to stay overnight. DO NOT BRING ANY VALUABLES!      Pre procedure instructions:  It is recommended that you undergo a COVID-19 PCR swab test 48-72 hours before procedure. You will receive a phone call with more information.   1. Make arrangements to have a  as you will not be allowed to drive following the procedure. Someone should stay with you the night after your procedure.  2.  Do not eat any solid food or milk products for 8 hours prior to the exam. You may drink clear liquids until 2 hours prior to the exam.  Clear liquids include the following: water, Jell-O, clear broth, apple juice or any non-carbonated drink that you can see through (no pop!).   3. Do not drink alcohol or smoke 24 hours prior to test.  4. You may take your other morning medications as prescribed with a sip of water. If you currently take an aspirin, continue taking your same dose. If not, take a 325 mg aspirin the day before and the day of your procedure.       If you have further questions, please utilize [employer] to contact us.   If your question concerns the above instructions, contact:  Calista Li RN  Electrophysiology Nurse Coordinator.  [phone]    If your question concerns the schedule/appointment times, contact:  [facility]   [phone]        Post Procedure Instructions:  For 24 hours:    Have an adult stay with you for 24 hours.    Relax and take it easy.    Drink plenty of fluids.    You may eat your normal diet, unless your doctor tells you otherwise.    Do NOT make any important or legal decisions.    Do NOT drive or operate machines at home or at work.    Do NOT drink alcohol.    Do NOT smoke.     Medicines:    If you have begun Plavix (clopidogrel), Effient (prasugrel), or Brilinta (ticagrelor), do not stop taking it until you talk to your heart doctor (cardiologist).     If you are on metformin (Glucophage), do not restart it until you have blood tests (within 2 to 3 days after discharge). When your doctor tells you it is safe, you may restart the metformin.    If you have stopped any other medicines, check with your nurse or provider about when to restart them.     Care of wrist or arm site:    It is normal to have soreness at the puncture site and mild tingling in your hand for up to 3 days.    Remove the Band-Aid after 24 hours. If there is minor oozing, apply another Band-aid and remove it after 12 hours.    Do NOT take a bath, or use a hot tub or pool for the next 48 hours. You may shower.    It  is normal to have a small bruise. There should not be a lump at the site.    Do not scrub the site.    Do not use lotion or powder near the puncture site for 3 days.       Activity Restrictions    For 2 days, do not use your hand or arm to support your weight (such as rising from a chair) or bend your wrist  (such as lifting a garage door).    For 2 days, do not lift more than 5 pounds or exercise your arm (tennis, golf or bowling).       If you start bleeding from the site in your arm: Sit down and press firmly on the site with your fingers for 10 minutes.  Call your doctor as soon as you can.  Call 911 right away if you have bleeding that is heavy or does not stop.  Call your doctor if:    You have a large or growing hard lump around the site.    The site is red, swollen, hot or tender.

## 2020-11-23 NOTE — TELEPHONE ENCOUNTER
Spoke to patient and patient is agreeable to proceed a coronary angiogram.  Patient can do first available.  Will contact patient with details tomorrow 11/24.  Calista Li RN  Cardiology Care Coordinator  [facility]  [phone] option 1

## 2020-11-23 NOTE — TELEPHONE ENCOUNTER
VAISHALI Juan, MD  [facility]             Echo and Stress test is suggestive of LAD disease. Need a coronary angiogram if she agrees.   Thanks

## 2020-11-23 NOTE — TELEPHONE ENCOUNTER
[facility] Call Center    Phone Message    May a detailed message be left on voicemail: no     Reason for Call: Other: Pt said she spoke with Dr. Peña via phone on 11/22/20; Pt was told his RN would call her regarding a cath procedure he is recommending. Please call Pt back.     Action Taken: Message routed to:  Other: [facility]    Travel Screening: Not Applicable

## 2020-11-24 ENCOUNTER — TELEPHONE (OUTPATIENT)
Dept: FAMILY MEDICINE | Facility: CLINIC | Age: 85
End: 2020-11-24

## 2020-11-24 PROBLEM — R06.09 DYSPNEA ON EXERTION: Status: ACTIVE | Noted: 2020-11-24

## 2020-11-24 PROBLEM — R93.1 ABNORMAL NUCLEAR CARDIAC IMAGING TEST: Status: ACTIVE | Noted: 2020-11-24

## 2020-11-24 RX ORDER — SODIUM CHLORIDE 9 MG/ML
INJECTION, SOLUTION INTRAVENOUS CONTINUOUS
Status: CANCELLED | OUTPATIENT
Start: 2020-11-24

## 2020-11-24 RX ORDER — LIDOCAINE 40 MG/G
CREAM TOPICAL
Status: CANCELLED | OUTPATIENT
Start: 2020-11-24

## 2020-11-24 NOTE — TELEPHONE ENCOUNTER
Patient is having pain in her rhomboid muscle with spasm. She actually does not need a shoulder injection but may benefit from a local injection in the area of the rhomboid muscle per Dr. Chung's discretion. This should be safe prior to the angiogram. I will forward the question to cardiology. Lela Townsend MD

## 2020-11-24 NOTE — TELEPHONE ENCOUNTER
.Reason for Call:   question about cortisone inj    Detailed comments: Patient scheduled a cortisone injection to the shoulder for 12- with Dr Chung/Pt has an angiogram on 12-. wondering if the cortisone injection will affect the angiogram in any way;     Phone Number Patient can be reached at:   phone number:  [phone]    Best Time: anytime    Can we leave a detailed message on this number? YES    Call taken on 11/24/2020 at 10:44 AM by Mervat Navarrete

## 2020-11-24 NOTE — TELEPHONE ENCOUNTER
Dr. Chung is out of the office today. Cortisone injection should not affect the angiogram but will also route to pcp for her thoughts too since she knows patient.

## 2020-11-25 DIAGNOSIS — Z11.59 ENCOUNTER FOR SCREENING FOR OTHER VIRAL DISEASES: Primary | ICD-10-CM

## 2020-12-01 ENCOUNTER — OFFICE VISIT (OUTPATIENT)
Dept: FAMILY MEDICINE | Facility: CLINIC | Age: 85
End: 2020-12-01
Payer: COMMERCIAL

## 2020-12-01 VITALS
DIASTOLIC BLOOD PRESSURE: 70 MMHG | WEIGHT: 167 LBS | HEART RATE: 95 BPM | SYSTOLIC BLOOD PRESSURE: 136 MMHG | OXYGEN SATURATION: 96 % | BODY MASS INDEX: 28.51 KG/M2 | HEIGHT: 64 IN

## 2020-12-01 DIAGNOSIS — M79.18 MYALGIA, OTHER SITE: ICD-10-CM

## 2020-12-01 DIAGNOSIS — M54.6 ACUTE LEFT-SIDED THORACIC BACK PAIN: Primary | ICD-10-CM

## 2020-12-01 PROCEDURE — 20552 NJX 1/MLT TRIGGER POINT 1/2: CPT | Performed by: FAMILY MEDICINE

## 2020-12-01 PROCEDURE — 99207 PR DROP WITH A PROCEDURE: CPT | Mod: 25 | Performed by: FAMILY MEDICINE

## 2020-12-01 ASSESSMENT — PAIN SCALES - GENERAL: PAINLEVEL: MODERATE PAIN (5)

## 2020-12-01 ASSESSMENT — MIFFLIN-ST. JEOR: SCORE: 1174.57

## 2020-12-01 NOTE — PATIENT INSTRUCTIONS
At [facility], we strive to deliver an exceptional experience to you, every time we see you. If you receive a survey, please complete it as we do value your feedback.  If you have MyChart, you can expect to receive results automatically within 24 hours of their completion.  Your provider will send a note interpreting your results as well.   If you do not have MyChart, you should receive your results in about a week by mail.    Your care team:                            Family Medicine Internal Medicine   MD Edgardo Wilhelm MD Shantel Branch-Fleming, MD Srinivasa Vaka, MD Katya Georgiev PA-C Megan Hill, APRN CNP    Merrill Patten, MD Pediatrics   Kris Meyers, PAEthelC  Hope Xavier, CNP MD Rebecca Turner APRN CNP   MD Soumya Yee MD Deborah Mielke, MD Sneha Ramos, APRN CNP  Radha Rosado, PAEthelC  Candelaria Dorado, CNP  MD Diana Degroot MD Angela Wermerskirchen, MD      Clinic hours: Monday - Thursday 7 am-7 pm; Fridays 7 am-5 pm.   Urgent care: Monday - Friday 11 am-9 pm; Saturday and Sunday 9 am-5 pm.    Clinic: [phone]       [facility]: Monday - Thursday 8 am - 7 pm; Friday 8 am - 6 pm  [facility]: [phone]     Use [email or web address] for 24/7 diagnosis and treatment of dozens of conditions.

## 2020-12-01 NOTE — PROGRESS NOTES
"Subjective     Carolin Mcbride is a 86 year old female who presents to clinic today for the following health issues:    HPI         Back Pain  Onset/Duration: about 8 weeks  Description:   Location of pain: upper back left  Character of pain: sharp, dull ache, burning and constant  Pain radiation: radiates into the left underarm   New numbness or weakness in legs, not attributed to pain: no   Intensity: Currently 5/10  Progression of Symptoms: same  History:   Specific cause: none  Pain interferes with job: not applicable  History of back problems: no prior back problems  Any previous MRI or X-rays: Yes- at [facility].  Date 11/20/20  Sees a specialist for back pain: No  Alleviating factors:   Improved by: none    Precipitating factors:  Worsened by: Nothing  Therapies tried and outcome: chiropractor and massage    SUBJECTIVE:  Here today with some left upper back pain as above, hoping for a cortisone injection.  Has met with previous providers including Dr. Townsend who thought she might benefit from a trigger point injection.  Started a couple months ago without any particular injury or overuse.  Patient does generally lift weights but has stopped this because of it.  Always along her left shoulder but there is some radiation toward her left breast.  No numbness or tingling.    Review of systems otherwise negative.  Past medical, family, and social history reviewed and updated in chart.    OBJECTIVE:  /70 (BP Location: Left arm, Patient Position: Chair, Cuff Size: Adult Large)   Pulse 95   Ht 1.613 m (5' 3.5\")   Wt 75.8 kg (167 lb)   LMP  (LMP Unknown)   SpO2 96%   BMI 29.12 kg/m    Alert, pleasant, upbeat, and in no apparent discomfort.  Back -mild kyphosis.  She has a definite trigger point over the left scapula in the region of the infraspinatus muscle.  Normal range of motion through the left shoulder.  Past labs reviewed with the patient.     The risks and benefits, as well as expected effect, of " trigger point injection was discussed with patient and he agrees to proceed.  After identifying the area of maximal point tenderness the area was prepped clean with alcohol.  The trigger point was injected with 4 cc of a 4:1 mixture of lidocaine 1% and kenalog 10.  Pain experienced initially, followed by pain relief.  Bandaid.  No complications.    ASSESSMENT / PLAN:  (M54.6) Acute left-sided thoracic back pain  (primary encounter diagnosis)  Comment: Status post trigger point injection to muscular spasm/hotspot.  Aftercare discussed.  Plan:     Follow up contact me with progress in a week or 2  S. Jules Chung MD    (Chart documentation completed in part with Dragon voice-recognition software.  Even though reviewed some grammatical, spelling, and word errors may remain.)

## 2020-12-04 DIAGNOSIS — Z11.59 ENCOUNTER FOR SCREENING FOR OTHER VIRAL DISEASES: ICD-10-CM

## 2020-12-04 PROCEDURE — U0003 INFECTIOUS AGENT DETECTION BY NUCLEIC ACID (DNA OR RNA); SEVERE ACUTE RESPIRATORY SYNDROME CORONAVIRUS 2 (SARS-COV-2) (CORONAVIRUS DISEASE [COVID-19]), AMPLIFIED PROBE TECHNIQUE, MAKING USE OF HIGH THROUGHPUT TECHNOLOGIES AS DESCRIBED BY CMS-2020-01-R: HCPCS | Performed by: INTERNAL MEDICINE

## 2020-12-05 LAB
SARS-COV-2 RNA SPEC QL NAA+PROBE: NOT DETECTED
SPECIMEN SOURCE: NORMAL

## 2020-12-07 ENCOUNTER — TELEPHONE (OUTPATIENT)
Dept: CARDIOLOGY | Facility: CLINIC | Age: 85
End: 2020-12-07

## 2020-12-08 ENCOUNTER — APPOINTMENT (OUTPATIENT)
Dept: MEDSURG UNIT | Facility: CLINIC | Age: 85
End: 2020-12-08
Attending: INTERNAL MEDICINE
Payer: COMMERCIAL

## 2020-12-08 ENCOUNTER — APPOINTMENT (OUTPATIENT)
Dept: LAB | Facility: CLINIC | Age: 85
End: 2020-12-08
Attending: INTERNAL MEDICINE
Payer: COMMERCIAL

## 2020-12-08 ENCOUNTER — APPOINTMENT (OUTPATIENT)
Dept: ULTRASOUND IMAGING | Facility: CLINIC | Age: 85
End: 2020-12-08
Attending: STUDENT IN AN ORGANIZED HEALTH CARE EDUCATION/TRAINING PROGRAM
Payer: COMMERCIAL

## 2020-12-08 ENCOUNTER — HOSPITAL ENCOUNTER (OUTPATIENT)
Facility: CLINIC | Age: 85
Discharge: HOME | End: 2020-12-08
Attending: INTERNAL MEDICINE | Admitting: INTERNAL MEDICINE
Payer: COMMERCIAL

## 2020-12-08 VITALS
SYSTOLIC BLOOD PRESSURE: 150 MMHG | WEIGHT: 164 LBS | HEIGHT: 64 IN | BODY MASS INDEX: 28 KG/M2 | DIASTOLIC BLOOD PRESSURE: 75 MMHG | RESPIRATION RATE: 16 BRPM | OXYGEN SATURATION: 100 % | HEART RATE: 73 BPM | TEMPERATURE: 97.8 F

## 2020-12-08 DIAGNOSIS — R06.09 DYSPNEA ON EXERTION: ICD-10-CM

## 2020-12-08 DIAGNOSIS — I25.10 CORONARY ARTERY DISEASE INVOLVING NATIVE CORONARY ARTERY OF NATIVE HEART WITHOUT ANGINA PECTORIS: Primary | ICD-10-CM

## 2020-12-08 DIAGNOSIS — R93.1 ABNORMAL NUCLEAR CARDIAC IMAGING TEST: ICD-10-CM

## 2020-12-08 DIAGNOSIS — E78.5 HYPERLIPIDEMIA LDL GOAL <100: ICD-10-CM

## 2020-12-08 PROBLEM — Z98.890 STATUS POST CORONARY ANGIOGRAM: Status: ACTIVE | Noted: 2020-12-08

## 2020-12-08 LAB
ALBUMIN SERPL-MCNC: 3.6 G/DL (ref 3.4–5)
ALP SERPL-CCNC: 88 U/L (ref 40–150)
ALT SERPL W P-5'-P-CCNC: 26 U/L (ref 0–50)
ANION GAP SERPL CALCULATED.3IONS-SCNC: 5 MMOL/L (ref 3–14)
AST SERPL W P-5'-P-CCNC: 8 U/L (ref 0–45)
BASOPHILS # BLD AUTO: 0 10E9/L (ref 0–0.2)
BASOPHILS NFR BLD AUTO: 0.5 %
BILIRUB DIRECT SERPL-MCNC: 0.1 MG/DL (ref 0–0.2)
BILIRUB SERPL-MCNC: 0.4 MG/DL (ref 0.2–1.3)
BUN SERPL-MCNC: 20 MG/DL (ref 7–30)
CALCIUM SERPL-MCNC: 9.3 MG/DL (ref 8.5–10.1)
CHLORIDE SERPL-SCNC: 110 MMOL/L (ref 94–109)
CHOLEST SERPL-MCNC: 187 MG/DL
CK SERPL-CCNC: 102 U/L (ref 30–225)
CO2 SERPL-SCNC: 26 MMOL/L (ref 20–32)
CREAT SERPL-MCNC: 0.96 MG/DL (ref 0.52–1.04)
DIFFERENTIAL METHOD BLD: NORMAL
EOSINOPHIL # BLD AUTO: 0.2 10E9/L (ref 0–0.7)
EOSINOPHIL NFR BLD AUTO: 3 %
ERYTHROCYTE [DISTWIDTH] IN BLOOD BY AUTOMATED COUNT: 13.4 % (ref 10–15)
GFR SERPL CREATININE-BSD FRML MDRD: 53 ML/MIN/{1.73_M2}
GLUCOSE SERPL-MCNC: 123 MG/DL (ref 70–99)
HCT VFR BLD AUTO: 40.6 % (ref 35–47)
HDLC SERPL-MCNC: 50 MG/DL
HGB BLD-MCNC: 13.2 G/DL (ref 11.7–15.7)
IMM GRANULOCYTES # BLD: 0 10E9/L (ref 0–0.4)
IMM GRANULOCYTES NFR BLD: 0.4 %
INR PPP: 0.98 (ref 0.86–1.14)
KCT BLD-ACNC: 221 SEC (ref 75–150)
KCT BLD-ACNC: 253 SEC (ref 75–150)
KCT BLD-ACNC: 327 SEC (ref 75–150)
LDLC SERPL CALC-MCNC: 94 MG/DL
LYMPHOCYTES # BLD AUTO: 1.5 10E9/L (ref 0.8–5.3)
LYMPHOCYTES NFR BLD AUTO: 19.1 %
MCH RBC QN AUTO: 29.1 PG (ref 26.5–33)
MCHC RBC AUTO-ENTMCNC: 32.5 G/DL (ref 31.5–36.5)
MCV RBC AUTO: 90 FL (ref 78–100)
MONOCYTES # BLD AUTO: 0.8 10E9/L (ref 0–1.3)
MONOCYTES NFR BLD AUTO: 9.8 %
NEUTROPHILS # BLD AUTO: 5.2 10E9/L (ref 1.6–8.3)
NEUTROPHILS NFR BLD AUTO: 67.2 %
NONHDLC SERPL-MCNC: 136 MG/DL
NRBC # BLD AUTO: 0 10*3/UL
NRBC BLD AUTO-RTO: 0 /100
PLATELET # BLD AUTO: 296 10E9/L (ref 150–450)
POTASSIUM SERPL-SCNC: 3.9 MMOL/L (ref 3.4–5.3)
PROT SERPL-MCNC: 7.2 G/DL (ref 6.8–8.8)
RBC # BLD AUTO: 4.53 10E12/L (ref 3.8–5.2)
SODIUM SERPL-SCNC: 142 MMOL/L (ref 133–144)
TRIGL SERPL-MCNC: 213 MG/DL
WBC # BLD AUTO: 7.7 10E9/L (ref 4–11)

## 2020-12-08 PROCEDURE — 92921 HC PRQ TRLUML CORONARY ANGIOPLASTY ADDL BRANCH: CPT | Performed by: INTERNAL MEDICINE

## 2020-12-08 PROCEDURE — 93931 UPPER EXTREMITY STUDY: CPT | Mod: GZ

## 2020-12-08 PROCEDURE — 85610 PROTHROMBIN TIME: CPT | Performed by: INTERNAL MEDICINE

## 2020-12-08 PROCEDURE — C1887 CATHETER, GUIDING: HCPCS | Performed by: INTERNAL MEDICINE

## 2020-12-08 PROCEDURE — 258N000003 HC RX IP 258 OP 636: Performed by: INTERNAL MEDICINE

## 2020-12-08 PROCEDURE — 99152 MOD SED SAME PHYS/QHP 5/>YRS: CPT | Mod: 59 | Performed by: INTERNAL MEDICINE

## 2020-12-08 PROCEDURE — 92928 PRQ TCAT PLMT NTRAC ST 1 LES: CPT | Mod: LD | Performed by: INTERNAL MEDICINE

## 2020-12-08 PROCEDURE — 93931 UPPER EXTREMITY STUDY: CPT | Mod: 26 | Performed by: RADIOLOGY

## 2020-12-08 PROCEDURE — 250N000011 HC RX IP 250 OP 636: Performed by: INTERNAL MEDICINE

## 2020-12-08 PROCEDURE — 250N000013 HC RX MED GY IP 250 OP 250 PS 637: Performed by: INTERNAL MEDICINE

## 2020-12-08 PROCEDURE — C1874 STENT, COATED/COV W/DEL SYS: HCPCS | Performed by: INTERNAL MEDICINE

## 2020-12-08 PROCEDURE — 82550 ASSAY OF CK (CPK): CPT | Performed by: INTERNAL MEDICINE

## 2020-12-08 PROCEDURE — 99152 MOD SED SAME PHYS/QHP 5/>YRS: CPT | Performed by: INTERNAL MEDICINE

## 2020-12-08 PROCEDURE — 85347 COAGULATION TIME ACTIVATED: CPT

## 2020-12-08 PROCEDURE — 272N000001 HC OR GENERAL SUPPLY STERILE: Performed by: INTERNAL MEDICINE

## 2020-12-08 PROCEDURE — 93005 ELECTROCARDIOGRAM TRACING: CPT

## 2020-12-08 PROCEDURE — 80048 BASIC METABOLIC PNL TOTAL CA: CPT | Performed by: INTERNAL MEDICINE

## 2020-12-08 PROCEDURE — C9600 PERC DRUG-EL COR STENT SING: HCPCS | Mod: RC | Performed by: INTERNAL MEDICINE

## 2020-12-08 PROCEDURE — 250N000011 HC RX IP 250 OP 636: Performed by: NURSE PRACTITIONER

## 2020-12-08 PROCEDURE — 93010 ELECTROCARDIOGRAM REPORT: CPT | Mod: 76 | Performed by: INTERNAL MEDICINE

## 2020-12-08 PROCEDURE — 36415 COLL VENOUS BLD VENIPUNCTURE: CPT | Performed by: INTERNAL MEDICINE

## 2020-12-08 PROCEDURE — 93454 CORONARY ARTERY ANGIO S&I: CPT | Mod: XU | Performed by: INTERNAL MEDICINE

## 2020-12-08 PROCEDURE — 999N000142 HC STATISTIC PROCEDURE PREP ONLY

## 2020-12-08 PROCEDURE — 85025 COMPLETE CBC W/AUTO DIFF WBC: CPT | Performed by: INTERNAL MEDICINE

## 2020-12-08 PROCEDURE — C1725 CATH, TRANSLUMIN NON-LASER: HCPCS | Performed by: INTERNAL MEDICINE

## 2020-12-08 PROCEDURE — 250N000013 HC RX MED GY IP 250 OP 250 PS 637: Performed by: NURSE PRACTITIONER

## 2020-12-08 PROCEDURE — 92921 PR PRQ TRLUML CORONARY ANGIOPLASTY ADDL BRANCH: CPT | Mod: LD | Performed by: INTERNAL MEDICINE

## 2020-12-08 PROCEDURE — 250N000009 HC RX 250: Performed by: INTERNAL MEDICINE

## 2020-12-08 PROCEDURE — 99153 MOD SED SAME PHYS/QHP EA: CPT | Performed by: INTERNAL MEDICINE

## 2020-12-08 PROCEDURE — 80061 LIPID PANEL: CPT | Performed by: INTERNAL MEDICINE

## 2020-12-08 PROCEDURE — 93454 CORONARY ARTERY ANGIO S&I: CPT | Mod: 26 | Performed by: INTERNAL MEDICINE

## 2020-12-08 PROCEDURE — C1769 GUIDE WIRE: HCPCS | Performed by: INTERNAL MEDICINE

## 2020-12-08 PROCEDURE — C1894 INTRO/SHEATH, NON-LASER: HCPCS | Performed by: INTERNAL MEDICINE

## 2020-12-08 PROCEDURE — 80076 HEPATIC FUNCTION PANEL: CPT | Performed by: INTERNAL MEDICINE

## 2020-12-08 PROCEDURE — 999N000054 HC STATISTIC EKG NON-CHARGEABLE

## 2020-12-08 DEVICE — STENT SYNERGY DRUG ELUTING 3.00X38MM  H7493926038300: Type: IMPLANTABLE DEVICE | Status: FUNCTIONAL

## 2020-12-08 DEVICE — STENT SYNERGY DRUG ELUTING 2.50X16MM  H7493926016250: Type: IMPLANTABLE DEVICE | Status: FUNCTIONAL

## 2020-12-08 DEVICE — STENT SYNERGY DRUG ELUTING 3.00X28MM  H7493926028300: Type: IMPLANTABLE DEVICE | Status: FUNCTIONAL

## 2020-12-08 RX ORDER — NALOXONE HYDROCHLORIDE 0.4 MG/ML
0.2 INJECTION, SOLUTION INTRAMUSCULAR; INTRAVENOUS; SUBCUTANEOUS
Status: DISCONTINUED | OUTPATIENT
Start: 2020-12-08 | End: 2020-12-08 | Stop reason: HOSPADM

## 2020-12-08 RX ORDER — NITROGLYCERIN 0.4 MG/1
TABLET SUBLINGUAL
Qty: 25 TABLET | Refills: 3 | Status: SHIPPED | OUTPATIENT
Start: 2020-12-08 | End: 2020-12-08

## 2020-12-08 RX ORDER — NALOXONE HYDROCHLORIDE 0.4 MG/ML
0.4 INJECTION, SOLUTION INTRAMUSCULAR; INTRAVENOUS; SUBCUTANEOUS
Status: DISCONTINUED | OUTPATIENT
Start: 2020-12-08 | End: 2020-12-08 | Stop reason: HOSPADM

## 2020-12-08 RX ORDER — NALOXONE HYDROCHLORIDE 0.4 MG/ML
0.4 INJECTION, SOLUTION INTRAMUSCULAR; INTRAVENOUS; SUBCUTANEOUS
Status: DISCONTINUED | OUTPATIENT
Start: 2020-12-08 | End: 2020-12-08

## 2020-12-08 RX ORDER — FENTANYL CITRATE 50 UG/ML
INJECTION, SOLUTION INTRAMUSCULAR; INTRAVENOUS
Status: DISCONTINUED | OUTPATIENT
Start: 2020-12-08 | End: 2020-12-08 | Stop reason: HOSPADM

## 2020-12-08 RX ORDER — FLUMAZENIL 0.1 MG/ML
0.2 INJECTION, SOLUTION INTRAVENOUS
Status: DISCONTINUED | OUTPATIENT
Start: 2020-12-08 | End: 2020-12-08 | Stop reason: HOSPADM

## 2020-12-08 RX ORDER — OXYCODONE HYDROCHLORIDE 5 MG/1
5 TABLET ORAL EVERY 4 HOURS PRN
Status: DISCONTINUED | OUTPATIENT
Start: 2020-12-08 | End: 2020-12-08 | Stop reason: HOSPADM

## 2020-12-08 RX ORDER — IOPAMIDOL 755 MG/ML
INJECTION, SOLUTION INTRAVASCULAR
Status: DISCONTINUED | OUTPATIENT
Start: 2020-12-08 | End: 2020-12-08 | Stop reason: HOSPADM

## 2020-12-08 RX ORDER — NITROGLYCERIN 0.4 MG/1
0.4 TABLET SUBLINGUAL EVERY 5 MIN PRN
Status: DISCONTINUED | OUTPATIENT
Start: 2020-12-08 | End: 2020-12-08 | Stop reason: HOSPADM

## 2020-12-08 RX ORDER — HYDRALAZINE HYDROCHLORIDE 20 MG/ML
10 INJECTION INTRAMUSCULAR; INTRAVENOUS EVERY 6 HOURS PRN
Status: DISCONTINUED | OUTPATIENT
Start: 2020-12-08 | End: 2020-12-08 | Stop reason: HOSPADM

## 2020-12-08 RX ORDER — HEPARIN SODIUM 1000 [USP'U]/ML
INJECTION, SOLUTION INTRAVENOUS; SUBCUTANEOUS
Status: DISCONTINUED | OUTPATIENT
Start: 2020-12-08 | End: 2020-12-08 | Stop reason: HOSPADM

## 2020-12-08 RX ORDER — ATROPINE SULFATE 0.1 MG/ML
0.5 INJECTION INTRAVENOUS
Status: DISCONTINUED | OUTPATIENT
Start: 2020-12-08 | End: 2020-12-08 | Stop reason: HOSPADM

## 2020-12-08 RX ORDER — NALOXONE HYDROCHLORIDE 0.4 MG/ML
0.2 INJECTION, SOLUTION INTRAMUSCULAR; INTRAVENOUS; SUBCUTANEOUS
Status: DISCONTINUED | OUTPATIENT
Start: 2020-12-08 | End: 2020-12-08

## 2020-12-08 RX ORDER — LIDOCAINE 40 MG/G
CREAM TOPICAL
Status: DISCONTINUED | OUTPATIENT
Start: 2020-12-08 | End: 2020-12-08 | Stop reason: HOSPADM

## 2020-12-08 RX ORDER — FENTANYL CITRATE 50 UG/ML
25-50 INJECTION, SOLUTION INTRAMUSCULAR; INTRAVENOUS
Status: ACTIVE | OUTPATIENT
Start: 2020-12-08 | End: 2020-12-08

## 2020-12-08 RX ORDER — NICARDIPINE HYDROCHLORIDE 2.5 MG/ML
INJECTION INTRAVENOUS
Status: DISCONTINUED | OUTPATIENT
Start: 2020-12-08 | End: 2020-12-08 | Stop reason: HOSPADM

## 2020-12-08 RX ORDER — HYDRALAZINE HYDROCHLORIDE 20 MG/ML
10 INJECTION INTRAMUSCULAR; INTRAVENOUS EVERY 4 HOURS PRN
Status: DISCONTINUED | OUTPATIENT
Start: 2020-12-08 | End: 2020-12-08

## 2020-12-08 RX ORDER — ONDANSETRON 2 MG/ML
4 INJECTION INTRAMUSCULAR; INTRAVENOUS EVERY 6 HOURS PRN
Status: DISCONTINUED | OUTPATIENT
Start: 2020-12-08 | End: 2020-12-08

## 2020-12-08 RX ORDER — OXYCODONE HYDROCHLORIDE 10 MG/1
10 TABLET ORAL EVERY 4 HOURS PRN
Status: DISCONTINUED | OUTPATIENT
Start: 2020-12-08 | End: 2020-12-08 | Stop reason: HOSPADM

## 2020-12-08 RX ORDER — METOPROLOL TARTRATE 1 MG/ML
5-10 INJECTION, SOLUTION INTRAVENOUS
Status: DISCONTINUED | OUTPATIENT
Start: 2020-12-08 | End: 2020-12-08 | Stop reason: HOSPADM

## 2020-12-08 RX ORDER — NITROGLYCERIN 0.4 MG/1
TABLET SUBLINGUAL
Qty: 25 TABLET | Refills: 3 | Status: SHIPPED | OUTPATIENT
Start: 2020-12-08 | End: 2022-05-03

## 2020-12-08 RX ORDER — ASPIRIN 81 MG/1
81 TABLET ORAL DAILY
Status: DISCONTINUED | OUTPATIENT
Start: 2020-12-09 | End: 2020-12-08 | Stop reason: HOSPADM

## 2020-12-08 RX ORDER — ONDANSETRON 4 MG/1
4 TABLET, ORALLY DISINTEGRATING ORAL EVERY 6 HOURS PRN
Status: DISCONTINUED | OUTPATIENT
Start: 2020-12-08 | End: 2020-12-08 | Stop reason: HOSPADM

## 2020-12-08 RX ORDER — ONDANSETRON 2 MG/ML
4 INJECTION INTRAMUSCULAR; INTRAVENOUS EVERY 6 HOURS PRN
Status: DISCONTINUED | OUTPATIENT
Start: 2020-12-08 | End: 2020-12-08 | Stop reason: HOSPADM

## 2020-12-08 RX ORDER — NITROGLYCERIN 5 MG/ML
VIAL (ML) INTRAVENOUS
Status: DISCONTINUED | OUTPATIENT
Start: 2020-12-08 | End: 2020-12-08 | Stop reason: HOSPADM

## 2020-12-08 RX ORDER — SODIUM CHLORIDE 9 MG/ML
INJECTION, SOLUTION INTRAVENOUS CONTINUOUS
Status: ACTIVE | OUTPATIENT
Start: 2020-12-08 | End: 2020-12-08

## 2020-12-08 RX ORDER — SODIUM CHLORIDE 9 MG/ML
INJECTION, SOLUTION INTRAVENOUS CONTINUOUS
Status: DISCONTINUED | OUTPATIENT
Start: 2020-12-08 | End: 2020-12-08 | Stop reason: HOSPADM

## 2020-12-08 RX ADMIN — SODIUM CHLORIDE: 9 INJECTION, SOLUTION INTRAVENOUS at 07:45

## 2020-12-08 RX ADMIN — ONDANSETRON 4 MG: 2 INJECTION INTRAMUSCULAR; INTRAVENOUS at 13:50

## 2020-12-08 RX ADMIN — TICAGRELOR 90 MG: 90 TABLET ORAL at 17:34

## 2020-12-08 RX ADMIN — ASPIRIN 325 MG: 325 TABLET, COATED ORAL at 07:44

## 2020-12-08 ASSESSMENT — MIFFLIN-ST. JEOR: SCORE: 1168.9

## 2020-12-08 NOTE — Clinical Note
The first balloon was inserted into the right coronary artery and distal right coronary artery.Max pressure = 12 cassie. Total duration = 10 seconds.

## 2020-12-08 NOTE — H&P
Cardiology H&P     Carolin Mcbride MRN# [id number]       Date of Admission:  12/8/2020      HPI: Carolin Mcbride is a 86 year old female with dyspnea on exertion, hypertension, family history of coronary disease presents for angiogram in setting of abnormal stress test imaging and possible anginal symptoms.  Takes a daily aspirin.  No bleeding issues.  No contrast issues.    Past Medical History:  Past Medical History:   Diagnosis Date     Adjustment reaction with anxiety and depression 2001     CAD (coronary artery disease) 2009    asymptomatic, on CT scan     Cyst, ovarian      Diagnostic skin and sensitization tests (aka ALLERGENS) 9/29/14 IgE tests pos. minimally only for Tree pollens (all other environmental allergens NEGATIVE)     HTN (hypertension)      Hyperlipidemia      Myositis 2001    related to past Baycol use- resolved     Osteoporosis      Seasonal allergic rhinitis     9/29/14 IgE tests pos. minimally only for Tree pollens (all other environmental allergens NEGATIVE)       Past Surgical History:  Past Surgical History:   Procedure Laterality Date     APPENDECTOMY       CHOLECYSTECTOMY, OPEN       COLONOSCOPY  2009    neg     CYSTOCELE REPAIR  2003    TVT SPARC       Allergies:     Allergies   Allergen Reactions     Baycol [Cerivastatin Sodium]       muscle mass loss       Crestor [Hmg-Coa-R Inhibitors]      Previously on Baycol and affect muscle mass loss.     Darvocet [Acetaminophen]      Severe nausea     Macrobid [Nitrofuran Derivatives]      Hives       Medications:  No current facility-administered medications on file prior to encounter.        aspirin 81 MG tablet, Take 1 tablet (81 mg) by mouth daily       atorvastatin (LIPITOR) 40 MG tablet, Take 1 tablet (40 mg) by mouth daily       citalopram (CELEXA) 10 MG tablet, Take 1 tablet (10 mg) by mouth daily       cyclobenzaprine (FLEXERIL) 10 MG tablet, Take 1 tablet (10 mg) by mouth At Bedtime       cyclobenzaprine (FLEXERIL)  10 MG tablet, Take 1 tablet (10 mg) by mouth 3 times daily as needed for muscle spasms       losartan (COZAAR) 100 MG tablet, Take 1 tablet (100 mg) by mouth daily       VITAMIN D 400 UNIT OR TABS, 1 cap twice a day        CALCIUM 500 + D OR, 1 tab three times a day        diclofenac (VOLTAREN) 1 % topical gel, Apply 2 g topically 4 times daily       Hypromellose (ARTIFICIAL TEARS OP), Apply 1 drop to eye as needed Both eyes.       polyethylene glycol (MIRALAX) powder, Take 17 g (1 capful) by mouth daily       triamcinolone (KENALOG) 0.1 % cream, Apply 2-3 times a day to affected area.        Social History:  Social History     Socioeconomic History     Marital status:      Spouse name: Shashank     Number of children: 3     Years of education: Not on file     Highest education level: Not on file   Occupational History     Employer: RETIRED   Social Needs     Financial resource strain: Not on file     Food insecurity     Worry: Not on file     Inability: Not on file     Transportation needs     Medical: Not on file     Non-medical: Not on file   Tobacco Use     Smoking status: Never Smoker     Smokeless tobacco: Never Used     Tobacco comment: Never smoked; nonsmoking household   Substance and Sexual Activity     Alcohol use: No     Comment: very rare     Drug use: No     Comment: never     Sexual activity: Yes     Partners: Male     Birth control/protection: None   Lifestyle     Physical activity     Days per week: Not on file     Minutes per session: Not on file     Stress: Not on file   Relationships     Social connections     Talks on phone: Not on file     Gets together: Not on file     Attends [employer] service: Not on file     Active member of club or organization: Not on file     Attends meetings of clubs or organizations: Not on file     Relationship status: Not on file     Intimate partner violence     Fear of current or ex partner: Not on file     Emotionally abused: Not on file     Physically  abused: Not on file     Forced sexual activity: Not on file   Other Topics Concern     Parent/sibling w/ CABG, MI or angioplasty before 65F 55M? No      Service No     Blood Transfusions No     Caffeine Concern No     Occupational Exposure No     Hobby Hazards No     Sleep Concern No     Stress Concern No     Weight Concern Yes     Special Diet No     Back Care No     Exercise Yes     Comment: Curves     Bike Helmet No     Seat Belt Yes     Self-Exams Yes   Social History Narrative     Not on file       Family History:  Family History   Problem Relation Age of Onset     Cancer Mother         ovarian      Heart Disease Sister         CABG x 3     Neurologic Disorder Sister         Fibromyalgia     Cancer Father         stomach/interstines      Musculoskeletal Disorder Brother         back      Heart Disease Brother 60        CABG x 4     Glaucoma No family hx of      Macular Degeneration No family hx of        ROS:  As detailed in above.    Exam:  Temp:  [98.4  F (36.9  C)] 98.4  F (36.9  C)  Pulse:  [73] 73  Resp:  [16] 16  BP: (199)/(84) 199/84  SpO2:  [97 %] 97 %    Alert, oriented  No distress  Mildly anxious  Regular rate and rhythm  Lungs are clear  Strong radial/femoral pulses  No dependent edema    Data:  CMP  Recent Labs   Lab 12/08/20  0656   PROTTOTAL 7.2   ALBUMIN 3.6   BILITOTAL 0.4   ALKPHOS 88   AST 8   ALT 26     CBC  Recent Labs   Lab 12/08/20  0745   WBC 7.7   RBC 4.53   HGB 13.2   HCT 40.6   MCV 90   MCH 29.1   MCHC 32.5   RDW 13.4          No results found for: TROPI, TROPONIN, TROPR, TROPN          Assessment/Plan  - Abnormal stress test with possible anginal symptoms  - Patient agrees to proceed with coronary angiogram and possible PCI after discussion of risks/benefits.

## 2020-12-08 NOTE — PROVIDER NOTIFICATION
D/I/A: TR band removed from L radial site.  CMS intact.  Hand and fingers remain bluish/purple in color with edema present in hand and forearm.  NP/JUAN CARLOS Eleonora notified and MD Pink came to see patient at bedside.  VSS.  Sinus rhythm on monitor.  Denies pain or sob.    P: Continue to monitor status.  Await completion of L UE US.

## 2020-12-08 NOTE — Clinical Note
Stent deployed in the middle left anterior descending. Max pressure = 16 cassie. Total duration = 10 seconds. Balloon reinflated a second time: Max pressure = 10 cassie. Total duration = 10 seconds.

## 2020-12-08 NOTE — Clinical Note
The first balloon was inserted into the left anterior descending and middle left anterior descending.Max pressure = 12 cassie. Total duration = 10 seconds.

## 2020-12-08 NOTE — PROGRESS NOTES
Pt here in 2A for CORS. A&O. PIV placed, labs drawn and IVFs infusing. Peripheral pulses assessed and marked. Groins prepped. Contrast education completed. Pt ambulate to bathroom x1. Prep complete except consent.

## 2020-12-08 NOTE — PROVIDER NOTIFICATION
Large hematoma noted above pt TR band sites - pressure applied via inflated BP cuff. Shanna ROPER notified and coming to see pt.    Pt BP also high (SBP 160s-170s). Shanna ROPER notified and okayed for pt to take home Losartan (also see pt care order). Pt took home dose of Losartan.    DENYS jamison NP saw at bedside.

## 2020-12-08 NOTE — Clinical Note
The first balloon was inserted into the left anterior descending and proximal left anterior descending.Max pressure = 16 cassie. Total duration = 10 seconds.     Max pressure = 16 cassie. Total duration = 10 seconds.    Balloon reinflated a second time: Max pressure = 16 cassie. Total duration = 10 seconds.

## 2020-12-08 NOTE — DISCHARGE INSTRUCTIONS
Going Home after an Angioplasty or Stent Placement (Cardiac)  ______________________________________________      After you go home:    Have an adult stay with you for 24 hours.    Drink plenty of fluids.    You may eat your normal diet, unless your doctor tells you otherwise.    For 24 hours:    Relax and take it easy.    Do NOT smoke.    Do NOT make any important or legal decisions.    Do NOT drive or operate machines at home or at work.    Do NOT drink alcohol.    Remove the Band-Aid after 24 hours. If there is minor oozing, apply another Band-aid and remove it after 12 hours.    For 2 days, do NOT have sex or do any heavy exercise.    Do NOT take a bath, or use a hot tub or pool for at least 3 days. You may shower.    Care of groin site  It is normal to have a small bruise or lump at the site.    Do not scrub the site.    For the first 2 days: Do not stoop or squat. When you cough, sneeze or move your bowels, hold your hand over the puncture site and press gently.    Do not lift more than 10 pounds for at least 3 to 5 days.    Do not use lotion or powder near the puncture site for 3 days.    If you start bleeding from the site in your groin, lie down flat and press firmly  on the site. Call your doctor as soon as you can.    Care of wrist or arm site  It is normal to have soreness at the puncture site and mild tingling in your hand for up to 3 days.    For 2 days, do not use your hand or arm to support your weight (such as rising from a chair) or bend your wrist (such as lifting a garage door).    For 2 days, do not lift more than 5 pounds or exercise your arm (tennis, golf or bowling).    If you start bleeding from the site in your arm:    Sit down and press firmly on the site with your fingers for 10 minutes. Call your doctor as soon as you can.    If the bleeding stops, sit still and keep your wrist straight for 2 hours.    Medicines    If you have started taking Plavix or Effient, do not stop taking it  until you talk to your heart doctor (cardiologist).    If you are on metformin (Glucophage), do not restart it until you have blood tests (within 2 to 3 days after discharge). When your doctor tells you it is safe, you may restart the metformin.    If you have stopped any other medicines, check with your nurse or provider about when to restart them.    Call 911 right away if you have bleeding that is heavy or does not stop.    Call your doctor if:    You have a large or growing hard lump around the site.    The site is red, swollen, hot or tender.    Blood or fluid is draining from the site.    You have chills or a fever greater than 101 F (38 C).    Your leg or arm feels numb or cool.    You have hives, a rash or unusual itching.      [facility] [place]:  [phone] (7 days a week)

## 2020-12-08 NOTE — Clinical Note
Stent deployed in the proximal left anterior descending. Max pressure = 16 cassie. Total duration = 10 seconds.

## 2020-12-08 NOTE — PROGRESS NOTES
D/I/A: Pt roomed on 3C in bay 28.  Arrived via litter and accompanied by Cath Lab RN on monitor.  VSSA - BP slightly elevated at 150/79.  Rhythm upon arrival NSR on monitor.  Denies pain or sob.  Reviewed activity restrictions and when to notify RN, ie-changes to breathing or increased chest pressure or chest pain.  CCL access:  L TR band x2 in place (second TR band placed d/t developing hematoma per report from cath lab). Pt LUE hand dusky colored upon arrival to 3C, sensation intact and +2 radial pulse.  P: Continue to monitor status.  Discharge to home once meeting criteria.

## 2020-12-08 NOTE — Clinical Note
dry, intact, no bleeding and hematoma. L Radial sheath removed TR Band 14 ml's @ 10:30 am, 2nd TR Band 13 ml's @ 10:35 am, MD peralta

## 2020-12-08 NOTE — Clinical Note
The first balloon was inserted into the left anterior descending and middle left anterior descending.Max pressure = 16 cassie. Total duration = 10 seconds.     Max pressure = 16 cassie. Total duration = 10 seconds.    Balloon reinflated a second time: Max pressure = 16 cassie. Total duration = 10 seconds.  Balloon reinflated a third time: Max pressure = 16 cassie. Total duration = 10 seconds.

## 2020-12-08 NOTE — Clinical Note
The first balloon was inserted into the right coronary artery and RPLB.Max pressure = 12 cassie. Total duration = 10 seconds.     Max pressure = 12 cassie. Total duration = 10 seconds.    Balloon reinflated a second time: Max pressure = 12 cassie. Total duration = 10 seconds.

## 2020-12-09 ENCOUNTER — TELEPHONE (OUTPATIENT)
Dept: CARDIOLOGY | Facility: CLINIC | Age: 85
End: 2020-12-09

## 2020-12-09 LAB
INTERPRETATION ECG - MUSE: NORMAL
INTERPRETATION ECG - MUSE: NORMAL

## 2020-12-09 NOTE — TELEPHONE ENCOUNTER
Discharge follow up post PCI:      What does the site look like?  Review: Care of wrist / arm site/ femoral site  It is normal to have soreness and or bruising at the puncture site and mild tingling in your hand for up to 3 days. The site should be flat and dry.                        Wrist    For 2 days, do not use your hand or arm to support your weight (such as rising from a chair) or bend your wrist (such as lifting a garage door).                               For 2 days, do not lift more than 5 pounds or exercise your arm (tennis, golf or bowling).  Groin     No heavy lifting (10 pounds) for 5-7 days.        If you start bleeding from the site in your arm:  Sit down and press firmly on the site with your fingers for 10 minutes. Call your doctor as soon as you can.  If the bleeding stops, sit still and keep your wrist straight for 2 hours.  Do NOT take a bath, or use a hot tub or pool for at least 3 days. You may shower.     Call your doctor if:    You have a large or growing hard lump around the site.    The site is red, swollen, hot or tender.    Blood or fluid is draining from the site.    You have chills or a fever greater than 101 F (38 C).    Your leg or arm feels numb or cool.    You have hives, a rash or unusual itching.     Are you having any pain? no     How is your activity tolerance?    For 2 days, do NOT have sex or do any heavy exercise.  Do you have someone at home to assist you with your daily activities?        Review Medications: Dual antiplatelet therapy  If you have started taking Plavix do not stop taking it until you talk to your heart doctor (cardiologist). triple anticoagulation  therapy for 30 days, then discontinue the aspirin  If you have stopped any other medicines, check with your nurse or provider about when to restart them.  Review Nitroglycerin instructions, take one tablet under the tongue for chest pain, if there is no relief take another one 5 minutes apart. If you  still have no relief from the chest pain, call 911.                            Have you scheduled with Cardiac Rehab? no but is planning on going to [facility] cardiac rehab [place] due to location      FOLLOW UP  Do you have a follow up appointment with your provider? YES  What other discharge instructions do you have?   None  Are you to get labs, procedures or tests before you see your provider?  no      You are scheduled to see  in 1 month and to see primary care in 1 week          CONTACT INFORMATION  Please feel free to call us with any other questions or symptoms that are concerning for you at [phone] if it is after 4:30 in the afternoon, or a weekend please call [phone] and ask for the on call specialist.  We want to do everything we can to help prevent you needing to return to the ED, so please do not hesitate to call us.

## 2020-12-09 NOTE — PROGRESS NOTES
DISCHARGE                         12/8/20 @ 1830.  ----------------------------------------------------------------------------  Discharged to: Home  Via: private transportation  Accompanied by: Family  Discharge Instructions: Regular diet, limited activity for L. Arm, medications, follow up appointments, when to call the MD, aftercare instructions.  Prescriptions: To be filled by [facility] in [place]; medication list reviewed & sent with pt  Follow Up Appointments: arranged; information given  Belongings: All sent with pt  IV: d/c'd  Telemetry: Pt in NSR when discharged, tele d/c'd  Pt exhibits understanding of above discharge instructions; all questions answered.    Discharge Paperwork: Signed, copied, and sent home with patient.     ES REID done- Patent arterial vasculature in the left arm without evidence of.  MD returned to reassess pt and agreed that pt is safe to discharge home.

## 2020-12-11 NOTE — LETTER
December 27, 2017      Carolin Mcbride  [address] [address]      Dear Carolin Mcbride,    Your test results are attached. I am happy to let you know that they are stable and your medications can stay the same.    The blood sugar is normal and you do not have diabetes. The kidney test is stable. The cholesterol looks good also. We can recheck labs in 1 year.       Resulted Orders   Renal panel   Result Value Ref Range    Sodium 140 133 - 144 mmol/L    Potassium 4.3 3.4 - 5.3 mmol/L    Chloride 106 94 - 109 mmol/L    Carbon Dioxide 29 20 - 32 mmol/L    Anion Gap 5 3 - 14 mmol/L    Glucose 101 (H) 70 - 99 mg/dL      Comment:      Fasting specimen    Urea Nitrogen 17 7 - 30 mg/dL    Creatinine 0.94 0.52 - 1.04 mg/dL    GFR Estimate 57 (L) >60 mL/min/1.7m2      Comment:      Non  GFR Calc    GFR Estimate If Black 69 >60 mL/min/1.7m2      Comment:       GFR Calc    Calcium 9.5 8.5 - 10.1 mg/dL    Phosphorus 3.5 2.5 - 4.5 mg/dL    Albumin 3.5 3.4 - 5.0 g/dL   CBC with platelets   Result Value Ref Range    WBC 7.2 4.0 - 11.0 10e9/L    RBC Count 4.29 3.8 - 5.2 10e12/L    Hemoglobin 12.3 11.7 - 15.7 g/dL    Hematocrit 38.5 35.0 - 47.0 %    MCV 90 78 - 100 fl    MCH 28.7 26.5 - 33.0 pg    MCHC 31.9 31.5 - 36.5 g/dL    RDW 13.1 10.0 - 15.0 %    Platelet Count 291 150 - 450 10e9/L   Lipid panel reflex to direct LDL Fasting   Result Value Ref Range    Cholesterol 212 (H) <200 mg/dL      Comment:      Desirable:       <200 mg/dl    Triglycerides 187 (H) <150 mg/dL      Comment:      Borderline high:  150-199 mg/dl  High:             200-499 mg/dl  Very high:       >499 mg/dl  Fasting specimen      HDL Cholesterol 58 >49 mg/dL    LDL Cholesterol Calculated 117 (H) <100 mg/dL      Comment:      Above desirable:  100-129 mg/dl  Borderline High:  130-159 mg/dL  High:             160-189 mg/dL  Very high:       >189 mg/dl      Non HDL Cholesterol 154 (H) <130 mg/dL      Comment:      Above  Provider E-Visit time total (minutes): 5    Bruno Dorado DO  12/14/2020 8:51 AM     Desirable:  130-159 mg/dl  Borderline high:  160-189 mg/dl  High:             190-219 mg/dl  Very high:       >219 mg/dl       Please call me if you have any questions about these test results or about your care.    Sincerely,    Lela Townsend MD/MARYURI

## 2020-12-18 ENCOUNTER — OFFICE VISIT (OUTPATIENT)
Dept: FAMILY MEDICINE | Facility: CLINIC | Age: 85
End: 2020-12-18
Payer: COMMERCIAL

## 2020-12-18 VITALS
SYSTOLIC BLOOD PRESSURE: 145 MMHG | TEMPERATURE: 97.9 F | WEIGHT: 166 LBS | OXYGEN SATURATION: 98 % | HEART RATE: 80 BPM | DIASTOLIC BLOOD PRESSURE: 74 MMHG | HEIGHT: 64 IN | BODY MASS INDEX: 28.34 KG/M2

## 2020-12-18 DIAGNOSIS — S50.12XD: ICD-10-CM

## 2020-12-18 DIAGNOSIS — M54.9 UPPER BACK PAIN ON LEFT SIDE: ICD-10-CM

## 2020-12-18 DIAGNOSIS — N18.30 STAGE 3 CHRONIC KIDNEY DISEASE, UNSPECIFIED WHETHER STAGE 3A OR 3B CKD (H): ICD-10-CM

## 2020-12-18 DIAGNOSIS — I25.119 CORONARY ARTERY DISEASE INVOLVING NATIVE CORONARY ARTERY OF NATIVE HEART WITH ANGINA PECTORIS (H): Primary | ICD-10-CM

## 2020-12-18 DIAGNOSIS — I10 HYPERTENSION GOAL BP (BLOOD PRESSURE) < 140/90: ICD-10-CM

## 2020-12-18 DIAGNOSIS — E78.5 HYPERLIPIDEMIA WITH TARGET LDL LESS THAN 100: ICD-10-CM

## 2020-12-18 PROCEDURE — 99214 OFFICE O/P EST MOD 30 MIN: CPT | Performed by: FAMILY MEDICINE

## 2020-12-18 ASSESSMENT — PAIN SCALES - GENERAL: PAINLEVEL: MODERATE PAIN (5)

## 2020-12-18 ASSESSMENT — MIFFLIN-ST. JEOR: SCORE: 1177.97

## 2020-12-18 NOTE — PATIENT INSTRUCTIONS
At [facility], we strive to deliver an exceptional experience to you, every time we see you. If you receive a survey, please complete it as we do value your feedback.  If you have MyChart, you can expect to receive results automatically within 24 hours of their completion.  Your provider will send a note interpreting your results as well.   If you do not have MyChart, you should receive your results in about a week by mail.    Your care team:                            Family Medicine Internal Medicine   MD Edgardo Wilhelm, MD Felipe Galloway MD Katya Georgiev PA-C  Svetlana Hyatt, APRN CNP    Merrill Patten, MD Pediatrics   Kris Meyers, PAEthelC  Hope Xavier, CNP MD Rebecca Turner APRN CNP   MD Soumya Yee MD Deborah Mielke, MD Sneha Ramos, APRN CNP  Radha Rosado PAEthelC  Candelaria Dorado, CNP  MD Diana Degroot MD Angela Wermerskirchen, MD      Clinic hours: Monday - Thursday 7 am-7 pm; Fridays 7 am-5 pm.   Urgent care: Monday - Friday 11 am-9 pm; Saturday and Sunday 9 am-5 pm.    Clinic: [phone]       [facility]: Monday - Thursday 8 am - 7 pm; Friday 8 am - 6 pm  [facility]: [phone]     Use www.[email or web address] for 24/7 diagnosis and treatment of dozens of conditions.    Patient Education     Established High Blood Pressure    High blood pressure (hypertension) is a long-term (chronic) disease. Often healthcare providers don t know what causes it. But it can be caused by certain health conditions and medicines.  If you have high blood pressure, you may not have any symptoms. If you do have symptoms, they may include:    Headache    Dizziness    Changes in your vision    Chest pain    Shortness of breath  But even without symptoms, high blood pressure that s not treated raises your risk for heart attack, heart failure,  kidney disease, and stroke. High blood pressure is a serious health risk and shouldn t be ignored.  Blood pressure measurements are given as 2 numbers. Systolic blood pressure is the upper number. This is the pressure when the heart contracts. Diastolic blood pressure is the lower number. This is the pressure when the heart relaxes between beats. You will see your blood pressure readings written together. For example, a person with a systolic pressure of 118 and a diastolic pressure of 78 will have 118/78 written in the medical record.  Blood pressure is classified as normal, raised (elevated) or stage 1 or stage 2 high blood pressure:    Normal blood pressure. Systolic of less than 120 and diastolic of less than 80 (120/80).    Elevated blood pressure. Systolic of 120 to 129 and diastolic less than 80.    Stage 1 high blood pressure. Systolic is 130 to 139 or diastolic between 80 to 89.    Stage 2 high blood pressure. Systolic is 140 or higher or the diastolic is 90 or higher.  Home care  If you have high blood pressure, follow these home care guidelines to help lower your blood pressure. If you are taking medicines for high blood pressure, these methods may reduce or end your need for medicines in the future.    Start a weight-loss program if you are overweight.    Cut back on how much salt you get in your diet. Here s how to do this:  ? Don t eat foods that have a lot of salt. These include olives, pickles, smoked meats, and salted potato chips.  ? Don t add salt to your food at the table.  ? Use only small amounts of salt when cooking.    Start an exercise program. Talk with your healthcare provider about the type of exercise program that would be best for you. It doesn't have to be hard. Even brisk walking for 20 minutes 3 times a week is a good form of exercise.    Don t take medicines that stimulate the heart. This includes many over-the-counter cold and sinus decongestant pills and sprays, as well as diet  pills. Check the warnings about high blood pressure on the label. Before buying any over-the-counter medicines or supplements, always ask the pharmacist about the product's possible interaction with your high blood pressure and your high blood pressure medicines.    Stimulants such as amphetamine or cocaine could be deadly for someone with high blood pressure. Never take these.    Limit how much caffeine you get in your diet. Switch to caffeine-free products.    Stop smoking. If you are a long-time smoker, this can be hard. Talk with your healthcare provider about medicines and nicotine replacement options to help you. Also join a stop-smoking program . This makes it more likely that you will quit for good.    Learn how to handle stress. This is an important part of any program to lower blood pressure. Learn about relaxation methods such as meditation, yoga, or biofeedback.    If your provider prescribed medicines, take them exactly as directed. Missing doses may cause your blood pressure get out of control.    If you miss a dose, check with your healthcare provider or pharmacist about what to do.    Think about buying an automatic blood pressure machine to check your blood pressure at home. Ask your provider for a recommendation. You can get one of these at most pharmacies.  Using a home blood pressure monitor  The American Heart Association advises the following guidelines for home blood pressure monitoring:    Don't smoke or drink coffee for 30 minutes before taking your blood pressure.    Go to the bathroom before the test.    Relax for 5 minutes before taking the measurement.    Sit with your back supported (don't sit on a couch or soft chair). Keep your feet on the floor uncrossed. Place your arm on a solid flat surface (such as a table) with the upper part of the arm at heart level. Place the middle of the cuff directly above the bend of the elbow. Check the monitor's instruction manual for an  illustration.    Take multiple readings. When you measure, take 2 to 3 readings one minute apart and record all of the results.    Take your blood pressure at the same time every day, or as your healthcare provider advises.    Record the date, time, and blood pressure reading.    Take the record with you to your next healthcare appointment. If your blood pressure monitor has a built-in memory, just take the monitor with you to your next appointment.    Call your provider if you have several high readings. Don't be frightened by one high blood pressure reading. But if you get a few high readings, check in with your healthcare provider.  Follow-up care  You will need to see your healthcare provider regularly. This is to check your blood pressure and to make changes to your medicines. Make a follow-up appointment as directed. Bring the record of your home blood pressure readings to the appointment.  Call [phone]  [id number] you have any of these:    Blood pressure of 180/120 or higher    Chest pain or shortness of breath    Weakness of an arm or leg or one side of the face    Problems speaking or seeing    When to get medical advice  Call your healthcare provider right away if any of these occur:    Severe headache    Throbbing or rushing sound in the ears    Nosebleed    Sudden severe pain in your belly (abdomen)    Extreme drowsiness, confusion, or fainting    Dizziness or spinning feeling (vertigo)  [employer] last reviewed this educational content on 7/1/2019 2000-2020 [employer]. [address] [place] [address], [place] [address]. All rights reserved. This information is not intended as a substitute for professional medical care. Always follow your healthcare professional's instructions.

## 2020-12-18 NOTE — PROGRESS NOTES
Subjective     Carolin Mcbride is a 86 year old female who presents to clinic today for the following health issues:    HPI         Concern - Angiogram Results  Onset: F/U   Description: Angiogram results  Intensity: moderate  Progression of Symptoms:    Accompanying Signs & Symptoms: None   Previous history of similar problem: None  Precipitating factors:        Worsened by: None   Alleviating factors:        Improved by: None  Therapies tried and outcome: None  Patient has questions concerning the nitroglycerin and aspirin     Conclusion- Angiogram 12-8-2020    1.  2 Vessel coronary artery disease involving the LAD/D1 and the rPL branch.  2.  Successful PCI of the rPL branch with single KELLY.  3.  Successful PCI of the proximal-mid LAD lesion with 2 overlapping KELLY.  4.  Successful balloon angioplasty of the ostium of the D1 branch.       Plan      Follow bedrest per protocol    Continued medical management and lifestyle modifications for cardiovascular risk factor optimizations.    Follow up with Dr. Peña.    Arterial sheath removed from radial artery with TR band placement.    Cardiac rehabilitation.    Discharge today per protocol        Continuation of dual antiplatelet therapy for 12 months   Post antiplatelet therapy of    Aspirin; give 81 mg qd .     Ticagrelor; and 90 mg BID.      Continue high dose statin therapy         Hyperlipidemia Follow-Up      Are you regularly taking any medication or supplement to lower your cholesterol?   Yes- atorvastatin 40 mg    Are you having muscle aches or other side effects that you think could be caused by your cholesterol lowering medication?  No    Hypertension Follow-up      Do you check your blood pressure regularly outside of the clinic? Yes     Are you following a low salt diet? Yes    Are your blood pressures ever more than 140 on the top number (systolic) OR more   than 90 on the bottom number (diastolic), for example 140/90? No    Vascular Disease  "Follow-up      How often do you take nitroglycerin? Never    Do you take an aspirin every day? Yes and Brilinta twice a day     Chronic Kidney Disease Follow-up      Do you take any over the counter pain medicine?: No    Chronic Pain Follow-Up    Where in your body do you have pain? Left upper back  How has your pain affected your ability to work? Not applicable  Which of these pain treatments have you tried since your last clinic visit? Activity or exercise, Cold, Heat, Massage, Rest, Steroid Injections and Stretching  How well are you sleeping? Fair  How has your mood been since your last visit? About the same  Have you had a significant life event? Health Concerns  Other aggravating factors: none  Taking medication as directed? Yes    PHQ-9 SCORE 8/21/2018 7/10/2019 8/10/2020   PHQ-9 Total Score - - -   PHQ-9 Total Score 0 0 0     DELFINO-7 SCORE 3/13/2012 11/11/2013 12/20/2016   Total Score 0 0 -   Total Score - - 0     No flowsheet data found.  Encounter-Level CSA:    There are no encounter-level csa.     Patient-Level CSA:    There are no patient-level csa.           Review of Systems   Constitutional, HEENT, cardiovascular, pulmonary, gi and gu systems are negative, except as otherwise noted.      Objective    BP (!) 145/74   Pulse 80   Temp 97.9  F (36.6  C) (Oral)   Ht 1.626 m (5' 4\")   Wt 75.3 kg (166 lb)   LMP  (LMP Unknown)   SpO2 98%   BMI 28.49 kg/m    Body mass index is 28.49 kg/m .  Physical Exam   GENERAL: elderly, alert, well nourished, well hydrated, no distress  HENT: ear canals- normal; TMs- normal; Nose- normal; Mouth- no ulcers, no lesions, missing dentition  NECK: no tenderness, no adenopathy, no asymmetry, no masses, no stiffness; thyroid- normal to palpation  RESP: lungs clear to auscultation - no rales, no rhonchi, no wheezes  CV: regular rates and rhythm, normal S1 S2, no S3 or S4 and no murmur, no click or rub, normal pulses  ABDOMEN: soft, no tenderness, no  hepatosplenomegaly, no " masses, normal bowel sounds  MS: extremities- no gross deformities noted, no edema  SKIN:ecchymosis left fore arm with resolving colors and blister that is flat and no signs of infection or inflammation. Non-tender.     NEURO: strength and tone- normal, sensory exam- grossly normal, reflexes- symmetric  BACK: no CVA tenderness, no paralumbar tenderness, upper  over left rhomboids with spasm.   MENTAL STATUS EXAM:  Appearance/Behavior: no apparent distress, neatly groomed, dressed appropriately for weather, appears stated age and is not frail-appearing  Speech: normal  Mood/Affect: normal affect  Insight: Good     No results found for any visits on 12/18/20.        Assessment & Plan     Coronary artery disease involving native coronary artery of native heart with angina pectoris (H)  Stenting 12-8-2020 with dual platelet therapy, continue aspirin and Brilinta as prescribed. Follow up with cardiology next month.     Upper back pain on left side  Persistent pain that is worse sometimes at night. Requests referral to [facility] of neurology.   - NEUROLOGY ADULT REFERRAL    Hypertension goal BP (blood pressure) < 140/90  A little high today but blood pressures at home are lower and in range    Hyperlipidemia with target LDL less than 100  Now on statin medication and tolerating this well after years of refusal due to bad experience on previous statin. Needs high dose due to cardiac condition.    Stage 3 chronic kidney disease, unspecified whether stage 3a or 3b CKD  Stable     Ecchymosis of forearm, left, subsequent encounter  Resolving extensive bruising of left arm after catheterization and blood thinners.           CONSULTATION/REFERRAL to cardiology  FUTURE LABS:       - Schedule a fasting blood draw next month  Regular exercise- after cardiac rehab next month  See Patient Instructions    No follow-ups on file.    Lela Townsend MD  [facility]

## 2021-01-04 ENCOUNTER — TRANSFERRED RECORDS (OUTPATIENT)
Dept: HEALTH INFORMATION MANAGEMENT | Facility: CLINIC | Age: 86
End: 2021-01-04

## 2021-01-06 ENCOUNTER — TELEPHONE (OUTPATIENT)
Dept: CARDIOLOGY | Facility: CLINIC | Age: 86
End: 2021-01-06

## 2021-01-06 DIAGNOSIS — I25.10 CAD (CORONARY ARTERY DISEASE): Primary | ICD-10-CM

## 2021-01-06 NOTE — TELEPHONE ENCOUNTER
brilinta is too expensive.  I need an ok to switch her to plavix.  Dr. Green, please advise.    Calista Li, RN  Cardiology Care Coordinator  [facility]  [phone] option 1

## 2021-01-06 NOTE — TELEPHONE ENCOUNTER
[facility]    Phone Message    May a detailed message be left on voicemail: yes     Reason for Call: Other: Pt needs to go on brilinta starting tomorrow and the medication is $282/month for her. She is wondering if there is a cheaper alternative. Please call back to discuss.,     Action Taken: Message routed to:  [facility] cardio    Travel Screening: Not Applicable

## 2021-01-07 ENCOUNTER — TRANSFERRED RECORDS (OUTPATIENT)
Dept: HEALTH INFORMATION MANAGEMENT | Facility: CLINIC | Age: 86
End: 2021-01-07

## 2021-01-07 RX ORDER — CLOPIDOGREL BISULFATE 75 MG/1
TABLET ORAL
Qty: 94 TABLET | Refills: 3 | Status: SHIPPED | OUTPATIENT
Start: 2021-01-07 | End: 2021-12-16

## 2021-01-07 NOTE — TELEPHONE ENCOUNTER
Date: 1/7/2021    Time of Call: 1:24 PM     Diagnosis:  CAD     [ VORB ] Ordering provider: Dr. Peña  Order: STOP Brilinta due to cost.  Start Plavix loading dose 300 mg then decrease to 75 mg daily     Order received by: Calista Li RN       Follow-up/additional notes: Rx sent.  Med list updated.  Spoke to patient and reviewed MD's response.  Pt verbalized understanding.

## 2021-01-21 ENCOUNTER — VIRTUAL VISIT (OUTPATIENT)
Dept: CARDIOLOGY | Facility: CLINIC | Age: 86
End: 2021-01-21
Payer: COMMERCIAL

## 2021-01-21 DIAGNOSIS — E78.5 HYPERLIPIDEMIA WITH TARGET LDL LESS THAN 70: ICD-10-CM

## 2021-01-21 DIAGNOSIS — Z98.61 STATUS POST PERCUTANEOUS TRANSLUMINAL CORONARY ANGIOPLASTY: Primary | ICD-10-CM

## 2021-01-21 DIAGNOSIS — I25.5 ISCHEMIC CARDIOMYOPATHY: ICD-10-CM

## 2021-01-21 PROCEDURE — 99213 OFFICE O/P EST LOW 20 MIN: CPT | Mod: 95 | Performed by: INTERNAL MEDICINE

## 2021-01-21 NOTE — LETTER
1/21/2021      RE: Carolin Mcbride  [address] [facility] [address]       Dear Colleague,    Thank you for the opportunity to participate in the care of your patient, Carolin Mcbride, at the [facility] HEART [facility]. Please see a copy of my visit note below.    Carolin is a 86 year old who is being evaluated via a billable telephone visit.      What phone number would you like to be contacted at? [phone]  How would you like to obtain your AVS? Mail a copy  Phone call duration: 13 minutes       SUBJECTIVE:  Carolin Mcbride is a 86 year old female who presents for post PCI follow-up.  Patient had 2 stent to LAD, 1 stent torPL branch andPOBA of D1 on 12/8/2020.    Initially patient presented for the management of hyperlipidemia.  Her LDL was over 200.  Patient had a TIA and she was initially managed on clopidogrel for a short course.  Her CT MRI and echo were normal.  Also had a Zio patch which did not show significant arrhythmia or atrial fibrillation.  Patient was very reluctant to take statin as she had severe myalgia with Baycol.  She was started on 20 mg of Lipitor which she discontinued due to some nonspecific muscle ache.  Patient was restarted on Lipitor 20 mg and then subsequently it was increased to 40 mg daily.  Patient is tolerating this dose very well.  As part of CAD evaluation patient had a nuclear scan.  She had an apical perfusion defect which was fixed.  The question was whether it was an artifact or real.  Subsequently patient had an echocardiogram which showed apical wall motion abnormality.  Subsequent angiogram showed LAD D1 disease and RPL disease for which she had intervention.  Currently patient is undergoing cardiac rehab and had no cardiac complaints today.  Overall she is doing very well.    Patient Active Problem List    Diagnosis Date Noted     Status post coronary angiogram 12/08/2020     Priority: Medium     Dyspnea  on exertion 11/24/2020     Priority: Medium     Added automatically from request for surgery [id number]       Abnormal nuclear cardiac imaging test 11/24/2020     Priority: Medium     Added automatically from request for surgery [id number]       Microscopic hematuria 08/11/2020     Priority: Medium     Proteinuria, unspecified type 08/11/2020     Priority: Medium     Leg weakness, bilateral 04/13/2020     Priority: Medium     Recurrent and persistent hematuria 10/31/2019     Priority: Medium     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 minimally only for Tree pollens (all other environmental allergens NEGATIVE)       Diagnostic skin and sensitization tests (aka ALLERGENS)      Priority: Medium     CKD (chronic kidney disease) stage 3, GFR 30-59 ml/min 05/22/2014     Priority: Medium     Hyperlipidemia with target LDL less than 100 04/08/2013     Priority: Medium     Osteoporosis      Priority: Medium     5 years Fosamax 1-1-2011 to 12-. Medication holiday recommended.         Hypertension goal BP (blood pressure) < 140/90 06/26/2012     Priority: Medium     Advance care planning 03/13/2012     Priority: Medium     Advance Care Planning 12/20/2016: ACP Review of Chart / Resources Provided:  Reviewed chart for advance care plan.  Carolin LIAT Mcbride has been provided information and resources to begin or update their advance care plan.  Advance Care Planning 03/13/2012: Discussed advance care planning with patient; information given to patient to review. 3/13/2012 . Lara Roque MA                  CAD (coronary artery disease)      Priority: Medium     Stenting of 2  "arteries and balloon angioplasty 12-8-2020.        Pseudophakia OU c YAG OU 06/02/2011     Priority: Medium    .  Current Outpatient Medications   Medication Sig     aspirin 81 MG tablet Take 1 tablet (81 mg) by mouth daily     atorvastatin (LIPITOR) 40 MG tablet Take 1 tablet (40 mg) by mouth daily     CALCIUM 500 + D OR 1 tab three times a day      citalopram (CELEXA) 10 MG tablet Take 1 tablet (10 mg) by mouth daily     clopidogrel (PLAVIX) 75 MG tablet Take 4 tablets (300 mg) on the first day for a loading dose then decrease to 1 tablet (75 mg) daily     cyclobenzaprine (FLEXERIL) 10 MG tablet Take 1 tablet (10 mg) by mouth At Bedtime     cyclobenzaprine (FLEXERIL) 10 MG tablet Take 1 tablet (10 mg) by mouth 3 times daily as needed for muscle spasms     diclofenac (VOLTAREN) 1 % topical gel Apply 2 g topically 4 times daily     Hypromellose (ARTIFICIAL TEARS OP) Apply 1 drop to eye as needed Both eyes.     losartan (COZAAR) 100 MG tablet Take 1 tablet (100 mg) by mouth daily     nitroGLYcerin (NITROSTAT) 0.4 MG sublingual tablet One tablet under the tongue every 5 minutes if needed for chest pain. May repeat every 5 minutes for a maximum of 3 doses in 15 minutes\"     polyethylene glycol (MIRALAX) powder Take 17 g (1 capful) by mouth daily     triamcinolone (KENALOG) 0.1 % cream Apply 2-3 times a day to affected area.     VITAMIN D 400 UNIT OR TABS 1 cap twice a day      Current Facility-Administered Medications   Medication     triamcinolone acetonide (KENALOG-10) injection 10 mg     Past Medical History:   Diagnosis Date     Adjustment reaction with anxiety and depression 2001     CAD (coronary artery disease) 2009    asymptomatic, on CT scan     Cyst, ovarian      Diagnostic skin and sensitization tests (aka ALLERGENS) 9/29/14 IgE tests pos. minimally only for Tree pollens (all other environmental allergens NEGATIVE)     HTN (hypertension)      Hyperlipidemia      Myositis 2001    related to past Baycol use- " resolved     Osteoporosis      Seasonal allergic rhinitis     9/29/14 IgE tests pos. minimally only for Tree pollens (all other environmental allergens NEGATIVE)     Past Surgical History:   Procedure Laterality Date     APPENDECTOMY       CHOLECYSTECTOMY, OPEN       COLONOSCOPY  2009    neg     CV CORONARY ANGIOGRAM N/A 12/8/2020    Procedure: CV CORONARY ANGIOGRAM;  Surgeon: Isra Weber MD;  Location:  HEART CARDIAC CATH LAB     CV PCI STENT DRUG ELUTING N/A 12/8/2020    Procedure: Percutaneous Coronary Intervention Stent Drug Eluting;  Surgeon: Isra Weber MD;  Location:  HEART CARDIAC CATH LAB     CYSTOCELE REPAIR  2003    TVT SPARC     Allergies   Allergen Reactions     Baycol [Cerivastatin Sodium]       muscle mass loss       Crestor [Hmg-Coa-R Inhibitors]      Previously on Baycol and affect muscle mass loss.     Darvocet [Acetaminophen]      Severe nausea     Macrobid [Nitrofuran Derivatives]      Hives     Social History     Socioeconomic History     Marital status:      Spouse name: Shashank     Number of children: 3     Years of education: Not on file     Highest education level: Not on file   Occupational History     Employer: RETIRED   Social Needs     Financial resource strain: Not on file     Food insecurity     Worry: Not on file     Inability: Not on file     Transportation needs     Medical: Not on file     Non-medical: Not on file   Tobacco Use     Smoking status: Never Smoker     Smokeless tobacco: Never Used     Tobacco comment: Never smoked; nonsmoking household   Substance and Sexual Activity     Alcohol use: No     Comment: very rare     Drug use: No     Comment: never     Sexual activity: Yes     Partners: Male     Birth control/protection: None   Lifestyle     Physical activity     Days per week: Not on file     Minutes per session: Not on file     Stress: Not on file   Relationships     Social connections     Talks on phone: Not on file      Gets together: Not on file     Attends Hindu service: Not on file     Active member of club or organization: Not on file     Attends meetings of clubs or organizations: Not on file     Relationship status: Not on file     Intimate partner violence     Fear of current or ex partner: Not on file     Emotionally abused: Not on file     Physically abused: Not on file     Forced sexual activity: Not on file   Other Topics Concern     Parent/sibling w/ CABG, MI or angioplasty before 65F 55M? No      Service No     Blood Transfusions No     Caffeine Concern No     Occupational Exposure No     Hobby Hazards No     Sleep Concern No     Stress Concern No     Weight Concern Yes     Special Diet No     Back Care No     Exercise Yes     Comment: Curves     Bike Helmet No     Seat Belt Yes     Self-Exams Yes   Social History Narrative     Not on file     Family History   Problem Relation Age of Onset     Cancer Mother         ovarian      Heart Disease Sister         CABG x 3     Neurologic Disorder Sister         Fibromyalgia     Cancer Father         stomach/interstines      Musculoskeletal Disorder Brother         back      Heart Disease Brother 60        CABG x 4     Glaucoma No family hx of      Macular Degeneration No family hx of           REVIEW OF SYSTEMS:  Unremarkable.       ASSESSMENT/PLAN:  Patient here for post PCI follow-up.  On December eighth 2020 patient had 2 stents to LAD,POBA to D1 and a stent to RPL branch.  Currently patient is undergoing cardiac rehab.  She had no cardiac complaints today.  Overall patient is doing very well.  Patient's echocardiogram prior to the intervention showed normal function and apical wall motion abnormality.  Currently patient is on DAPT with clopidogrel.  We will continue DAPT for at least minimum 1 year or up to 2 years if possible.  Patient will come back to clinic in 6 months time.  At that time we will repeat the echocardiogram to reassess the wall motion  abnormality.  Patient is advised to continue her current medications.  We will also reassess lipid profile in 6 months.  Patient had severe myalgia and issues with statin.  Current LDL is 94.  Reluctant to increase the dose due to previous history of myalgia and significant CK elevation.  May be ideal to continue with current dose.  Per orders.   Return to Clinic in 6 months time.      Please do not hesitate to contact me if you have any questions/concerns.     Sincerely,     VAISHALI Juan MD

## 2021-01-21 NOTE — PROGRESS NOTES
Carolin is a 86 year old who is being evaluated via a billable telephone visit.      What phone number would you like to be contacted at? [phone]  How would you like to obtain your AVS? Mail a copy  Phone call duration: 13 minutes       SUBJECTIVE:  Carolin Mcbride is a 86 year old female who presents for post PCI follow-up.  Patient had 2 stent to LAD, 1 stent torPL branch andPOBA of D1 on 12/8/2020.    Initially patient presented for the management of hyperlipidemia.  Her LDL was over 200.  Patient had a TIA and she was initially managed on clopidogrel for a short course.  Her CT MRI and echo were normal.  Also had a Zio patch which did not show significant arrhythmia or atrial fibrillation.  Patient was very reluctant to take statin as she had severe myalgia with Baycol.  She was started on 20 mg of Lipitor which she discontinued due to some nonspecific muscle ache.  Patient was restarted on Lipitor 20 mg and then subsequently it was increased to 40 mg daily.  Patient is tolerating this dose very well.  As part of CAD evaluation patient had a nuclear scan.  She had an apical perfusion defect which was fixed.  The question was whether it was an artifact or real.  Subsequently patient had an echocardiogram which showed apical wall motion abnormality.  Subsequent angiogram showed LAD D1 disease and RPL disease for which she had intervention.  Currently patient is undergoing cardiac rehab and had no cardiac complaints today.  Overall she is doing very well.    Patient Active Problem List    Diagnosis Date Noted     Status post coronary angiogram 12/08/2020     Priority: Medium     Dyspnea on exertion 11/24/2020     Priority: Medium     Added automatically from request for surgery [id number]       Abnormal nuclear cardiac imaging test 11/24/2020     Priority: Medium     Added automatically from request for surgery [id number]       Microscopic hematuria 08/11/2020     Priority: Medium     Proteinuria, unspecified type  08/11/2020     Priority: Medium     Leg weakness, bilateral 04/13/2020     Priority: Medium     Recurrent and persistent hematuria 10/31/2019     Priority: Medium     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 minimally only for Tree pollens (all other environmental allergens NEGATIVE)       Diagnostic skin and sensitization tests (aka ALLERGENS)      Priority: Medium     CKD (chronic kidney disease) stage 3, GFR 30-59 ml/min 05/22/2014     Priority: Medium     Hyperlipidemia with target LDL less than 100 04/08/2013     Priority: Medium     Osteoporosis      Priority: Medium     5 years Fosamax 1-1-2011 to 12-. Medication holiday recommended.         Hypertension goal BP (blood pressure) < 140/90 06/26/2012     Priority: Medium     Advance care planning 03/13/2012     Priority: Medium     Advance Care Planning 12/20/2016: ACP Review of Chart / Resources Provided:  Reviewed chart for advance care plan.  Carolin Mcbride has been provided information and resources to begin or update their advance care plan.  Advance Care Planning 03/13/2012: Discussed advance care planning with patient; information given to patient to review. 3/13/2012 . Lara Roque MA                  CAD (coronary artery disease)      Priority: Medium     Stenting of 2 arteries and balloon angioplasty 12-8-2020.        Pseudophakia OU c YAG OU 06/02/2011     Priority: Medium    .  Current Outpatient Medications   Medication Sig     aspirin 81 MG tablet Take 1 tablet (81 mg) by mouth daily     atorvastatin (LIPITOR) 40 MG tablet Take 1 tablet (40 mg) by mouth daily     CALCIUM 500 + D OR 1 tab  "three times a day      citalopram (CELEXA) 10 MG tablet Take 1 tablet (10 mg) by mouth daily     clopidogrel (PLAVIX) 75 MG tablet Take 4 tablets (300 mg) on the first day for a loading dose then decrease to 1 tablet (75 mg) daily     cyclobenzaprine (FLEXERIL) 10 MG tablet Take 1 tablet (10 mg) by mouth At Bedtime     cyclobenzaprine (FLEXERIL) 10 MG tablet Take 1 tablet (10 mg) by mouth 3 times daily as needed for muscle spasms     diclofenac (VOLTAREN) 1 % topical gel Apply 2 g topically 4 times daily     Hypromellose (ARTIFICIAL TEARS OP) Apply 1 drop to eye as needed Both eyes.     losartan (COZAAR) 100 MG tablet Take 1 tablet (100 mg) by mouth daily     nitroGLYcerin (NITROSTAT) 0.4 MG sublingual tablet One tablet under the tongue every 5 minutes if needed for chest pain. May repeat every 5 minutes for a maximum of 3 doses in 15 minutes\"     polyethylene glycol (MIRALAX) powder Take 17 g (1 capful) by mouth daily     triamcinolone (KENALOG) 0.1 % cream Apply 2-3 times a day to affected area.     VITAMIN D 400 UNIT OR TABS 1 cap twice a day      Current Facility-Administered Medications   Medication     triamcinolone acetonide (KENALOG-10) injection 10 mg     Past Medical History:   Diagnosis Date     Adjustment reaction with anxiety and depression 2001     CAD (coronary artery disease) 2009    asymptomatic, on CT scan     Cyst, ovarian      Diagnostic skin and sensitization tests (aka ALLERGENS) 9/29/14 IgE tests pos. minimally only for Tree pollens (all other environmental allergens NEGATIVE)     HTN (hypertension)      Hyperlipidemia      Myositis 2001    related to past Baycol use- resolved     Osteoporosis      Seasonal allergic rhinitis     9/29/14 IgE tests pos. minimally only for Tree pollens (all other environmental allergens NEGATIVE)     Past Surgical History:   Procedure Laterality Date     APPENDECTOMY       CHOLECYSTECTOMY, OPEN       COLONOSCOPY  2009    neg     CV CORONARY ANGIOGRAM N/A " 12/8/2020    Procedure: CV CORONARY ANGIOGRAM;  Surgeon: Isra Weber MD;  Location:  HEART CARDIAC CATH LAB     CV PCI STENT DRUG ELUTING N/A 12/8/2020    Procedure: Percutaneous Coronary Intervention Stent Drug Eluting;  Surgeon: Isra Weber MD;  Location:  HEART CARDIAC CATH LAB     CYSTOCELE REPAIR  2003    TVT SPARC     Allergies   Allergen Reactions     Baycol [Cerivastatin Sodium]       muscle mass loss       Crestor [Hmg-Coa-R Inhibitors]      Previously on Baycol and affect muscle mass loss.     Darvocet [Acetaminophen]      Severe nausea     Macrobid [Nitrofuran Derivatives]      Hives     Social History     Socioeconomic History     Marital status:      Spouse name: Shashank     Number of children: 3     Years of education: Not on file     Highest education level: Not on file   Occupational History     Employer: RETIRED   Social Needs     Financial resource strain: Not on file     Food insecurity     Worry: Not on file     Inability: Not on file     Transportation needs     Medical: Not on file     Non-medical: Not on file   Tobacco Use     Smoking status: Never Smoker     Smokeless tobacco: Never Used     Tobacco comment: Never smoked; nonsmoking household   Substance and Sexual Activity     Alcohol use: No     Comment: very rare     Drug use: No     Comment: never     Sexual activity: Yes     Partners: Male     Birth control/protection: None   Lifestyle     Physical activity     Days per week: Not on file     Minutes per session: Not on file     Stress: Not on file   Relationships     Social connections     Talks on phone: Not on file     Gets together: Not on file     Attends Jain service: Not on file     Active member of club or organization: Not on file     Attends meetings of clubs or organizations: Not on file     Relationship status: Not on file     Intimate partner violence     Fear of current or ex partner: Not on file     Emotionally abused:  Not on file     Physically abused: Not on file     Forced sexual activity: Not on file   Other Topics Concern     Parent/sibling w/ CABG, MI or angioplasty before 65F 55M? No      Service No     Blood Transfusions No     Caffeine Concern No     Occupational Exposure No     Hobby Hazards No     Sleep Concern No     Stress Concern No     Weight Concern Yes     Special Diet No     Back Care No     Exercise Yes     Comment: Curves     Bike Helmet No     Seat Belt Yes     Self-Exams Yes   Social History Narrative     Not on file     Family History   Problem Relation Age of Onset     Cancer Mother         ovarian      Heart Disease Sister         CABG x 3     Neurologic Disorder Sister         Fibromyalgia     Cancer Father         stomach/interstines      Musculoskeletal Disorder Brother         back      Heart Disease Brother 60        CABG x 4     Glaucoma No family hx of      Macular Degeneration No family hx of           REVIEW OF SYSTEMS:  Unremarkable.       ASSESSMENT/PLAN:  Patient here for post PCI follow-up.  On December eighth 2020 patient had 2 stents to LAD,POBA to D1 and a stent to RPL branch.  Currently patient is undergoing cardiac rehab.  She had no cardiac complaints today.  Overall patient is doing very well.  Patient's echocardiogram prior to the intervention showed normal function and apical wall motion abnormality.  Currently patient is on DAPT with clopidogrel.  We will continue DAPT for at least minimum 1 year or up to 2 years if possible.  Patient will come back to clinic in 6 months time.  At that time we will repeat the echocardiogram to reassess the wall motion abnormality.  Patient is advised to continue her current medications.  We will also reassess lipid profile in 6 months.  Patient had severe myalgia and issues with statin.  Current LDL is 94.  Reluctant to increase the dose due to previous history of myalgia and significant CK elevation.  May be ideal to continue with current  dose.  Per orders.   Return to Clinic in 6 months time.

## 2021-01-21 NOTE — PATIENT INSTRUCTIONS
Thank you for coming to the [facility] @ [place]; please note the following instructions:    1. Dr. Peña recommends to follow up in 1 year with an echo and fasting labs prior.  The cardiology team will contact you to schedule when the time gets closer.          If you have any questions regarding your visit please contact your care team:     Cardiology  Telephone Number   Calista CAMACHO, RN  Leslee YORK, RN   Valeria HARTMANN, RMA  Raquel SERRATO, RMA  Dilcia HARPER, LPN   [phone] (option 1)   For scheduling appts:     [phone] (select option 1)       For the Device Clinic (Pacemakers and ICD's)  RN's :  Arin Cantu   During business hours: [phone]    *After business hours:  [phone] (select option 4)      Normal test result notifications will be released via [employer] or mailed within 7 business days.  All other test results, will be communicated via telephone once reviewed by your cardiologist.    If you need a medication refill please contact your pharmacy.  Please allow 3 business days for your refill to be completed.    As always, thank you for trusting us with your health care needs!

## 2021-01-26 ENCOUNTER — TELEPHONE (OUTPATIENT)
Dept: FAMILY MEDICINE | Facility: CLINIC | Age: 86
End: 2021-01-26

## 2021-01-26 ENCOUNTER — TELEPHONE (OUTPATIENT)
Dept: CARDIOLOGY | Facility: CLINIC | Age: 86
End: 2021-01-26

## 2021-01-26 DIAGNOSIS — I10 HYPERTENSION, BENIGN ESSENTIAL, GOAL BELOW 140/90: ICD-10-CM

## 2021-01-26 RX ORDER — LOSARTAN POTASSIUM 100 MG/1
100 TABLET ORAL DAILY
Qty: 90 TABLET | Refills: 1 | Status: SHIPPED | OUTPATIENT
Start: 2021-01-26 | End: 2021-08-13

## 2021-01-26 NOTE — CONFIDENTIAL NOTE
"Spoke to patient who reports that cardiac rehab is going well and wants to know if she should continue to do it.  States she has weights, a bike and a treadmill at home.  She has already went 20 times and is doing well.  Patient pays $35 per session.     Reports that the staff feels she is doing excellent and her vitals today were 126/78/77    Writer advised patient to continue to complete the program if comfortable with paying per session.  Explained cardiac rehab is so important and continuing it will only benefit her.  Patient states she is going to do it until \"ismael Wednesday\" then she plans on exercising at home.    Calista Li RN  Cardiology Care Coordinator  [facility]  [phone] option 1      "

## 2021-01-26 NOTE — TELEPHONE ENCOUNTER
[facility] Call Center    Phone Message    May a detailed message be left on voicemail: no     Reason for Call: Other: Carolin Kiran calling about Cardiac Rehab- Please call Carolin Kiran at: [phone]     Action Taken: Message routed to:  [facility] Cardiology    Travel Screening: Not Applicable

## 2021-01-26 NOTE — TELEPHONE ENCOUNTER
Remaining refills sent to patient's requested pharmacy.     Kay Murillo RN  [facility]

## 2021-01-26 NOTE — TELEPHONE ENCOUNTER
Reason for Call:  Other prescription    Detailed comments: Patient has multiple prescriptions she would like sent to [facility] in [place] rather than [employer] please. Please call her to discuss what she needs sent. Nothing Urgent.     Phone Number Patient can be reached at: Cell number on file:    Telephone Information:   Mobile [phone]       Best Time: anytime    Can we leave a detailed message on this number? YES    Call taken on 1/26/2021 at 1:14 PM by Vianey Prasad

## 2021-01-26 NOTE — TELEPHONE ENCOUNTER
Called pt, does not need anything sent right now except update on her losartan. She just wants us top know that she will be using [facility] from now on. Pended med and pharmacy.

## 2021-02-01 ENCOUNTER — TRANSFERRED RECORDS (OUTPATIENT)
Dept: HEALTH INFORMATION MANAGEMENT | Facility: CLINIC | Age: 86
End: 2021-02-01

## 2021-02-08 ENCOUNTER — IMMUNIZATION (OUTPATIENT)
Dept: PEDIATRICS | Facility: CLINIC | Age: 86
End: 2021-02-08
Payer: COMMERCIAL

## 2021-02-08 PROCEDURE — 91300 PR COVID VAC PFIZER DIL RECON 30 MCG/0.3 ML IM: CPT

## 2021-02-08 PROCEDURE — 0001A PR COVID VAC PFIZER DIL RECON 30 MCG/0.3 ML IM: CPT

## 2021-03-01 ENCOUNTER — IMMUNIZATION (OUTPATIENT)
Dept: PEDIATRICS | Facility: CLINIC | Age: 86
End: 2021-03-01
Attending: INTERNAL MEDICINE
Payer: COMMERCIAL

## 2021-03-01 ENCOUNTER — TRANSFERRED RECORDS (OUTPATIENT)
Dept: HEALTH INFORMATION MANAGEMENT | Facility: CLINIC | Age: 86
End: 2021-03-01

## 2021-03-01 PROCEDURE — 91300 PR COVID VAC PFIZER DIL RECON 30 MCG/0.3 ML IM: CPT

## 2021-03-01 PROCEDURE — 0002A PR COVID VAC PFIZER DIL RECON 30 MCG/0.3 ML IM: CPT

## 2021-03-04 ENCOUNTER — TELEPHONE (OUTPATIENT)
Dept: CARDIOLOGY | Facility: CLINIC | Age: 86
End: 2021-03-04

## 2021-03-04 NOTE — TELEPHONE ENCOUNTER
[facility] Call Center    Phone Message    May a detailed message be left on voicemail: no     Reason for Call: Other: I spoke with Carolin. She called to get clarification on approximately how many more sessions of Cardiac Rehab are necessary. She stated she is feeling much better, but feels it's important to continue rehab until Dr Peña advises on next steps. Also, she would like to know when she should follow-up with her PCP. Please reach out to Carolin at [phone].     Action Taken: Message routed to:  [facility] Cardiology    Travel Screening: Not Applicable

## 2021-03-04 NOTE — LETTER
March 5, 2021      TO: Carolin Mcbride  [address] [facility] [address]         Dear Carolin,    You are scheduled for a follow up visit on June 6rd, 2021 with Dr Peña. This is scheduled as a telephone visit at this time. If the format changes and Dr Peña is able to see you in clinic,we will contact you.      Please do not hesitate to call us if you have any questions or concerns.    Sincerely,      [facility]- Cardiology

## 2021-03-04 NOTE — TELEPHONE ENCOUNTER
Spoke to patient who reports that she is still going to cardiac rehab but decreased it to 1 time per day due to cost.      12/8/20 PCI    Has completed 20 weeks of cardiac rehab but instead of goig 3 times a week she has been going 1 time per week for the last month. Wants to know if Dr. Peña wants her to continue rehab.      Dr. Peña, please advise.    Calista Li, RN  Cardiology Care Coordinator  [facility]  [phone] option 1

## 2021-03-05 NOTE — TELEPHONE ENCOUNTER
Spoke to pt: will continue exercise at home.  Stated she got both covid shots   will follow up with primary care    Follow up with Dr Peña in June scheduled  Pt unable to do video due to old computer. Has tried in past.     Pt will call with further questions

## 2021-03-05 NOTE — TELEPHONE ENCOUNTER
VAISHALI Juan MD Heikkila, Kari S, RN 16 hours ago (4:46 PM)     Baudilio Coe,   You can discontinue the rehab. Continue samr exercise at home.   Thanks. .    Message text

## 2021-03-08 ENCOUNTER — NURSE TRIAGE (OUTPATIENT)
Dept: NURSING | Facility: CLINIC | Age: 86
End: 2021-03-08

## 2021-03-08 NOTE — TELEPHONE ENCOUNTER
Carolin calls to inform that her cardiologist recommended she follow up with her PCP.    She had a cardiac procedure on 12/8/2020 - coronary angiogram.    No symptoms reported. FNA advised that she was seen on 12/18 by PCP following her angiogram. She would still like to schedule an appointment with PCP.    Per protocol, advised to be seen in 2 weeks. Care advice reviewed. Patient verbalizes understanding. Advised to call back with further questions/concerns.     Veronika Dove RN/[facility] Nurse Advisor      Reason for Disposition    Nursing judgment or information in reference    Protocols used: NO GUIDELINE [id number]

## 2021-03-15 ENCOUNTER — VIRTUAL VISIT (OUTPATIENT)
Dept: FAMILY MEDICINE | Facility: CLINIC | Age: 86
End: 2021-03-15
Payer: COMMERCIAL

## 2021-03-15 DIAGNOSIS — I10 HYPERTENSION GOAL BP (BLOOD PRESSURE) < 140/90: ICD-10-CM

## 2021-03-15 DIAGNOSIS — N18.31 STAGE 3A CHRONIC KIDNEY DISEASE (H): ICD-10-CM

## 2021-03-15 DIAGNOSIS — I25.119 CORONARY ARTERY DISEASE INVOLVING NATIVE CORONARY ARTERY OF NATIVE HEART WITH ANGINA PECTORIS (H): Primary | ICD-10-CM

## 2021-03-15 DIAGNOSIS — E78.5 HYPERLIPIDEMIA WITH TARGET LDL LESS THAN 100: ICD-10-CM

## 2021-03-15 PROCEDURE — 99442 PR PHYSICIAN TELEPHONE EVALUATION 11-20 MIN: CPT | Mod: 95 | Performed by: FAMILY MEDICINE

## 2021-03-15 NOTE — PROGRESS NOTES
"Carolin is a 86 year old who is being evaluated via a billable telephone visit.      What phone number would you like to be contacted at? [phone]   How would you like to obtain your AVS? Mail a copy    Assessment & Plan     Coronary artery disease involving native coronary artery of native heart with angina pectoris (H)  Doing well and completed cardiac rehab. Discussed physical activity including 10 pound hand weights, stationary bicycle at home. Follow up with cardiology in June. Easy bruising with blood thinner, otherwise no concerns    Hypertension goal BP (blood pressure) < 140/90  Well controlled on medications   - **Basic metabolic panel FUTURE anytime; Future  - **CBC with platelets FUTURE anytime; Future  - Albumin Random Urine Quantitative with Creat Ratio; Future    Hyperlipidemia with target LDL less than 100  Follow up Lipids ordered by cardiology and have not been done yet. Will return to clinic for blood work and face to face visit next month. Tolerating atorvastatin well. Has had a history of statin intolerance and was not taking these for years.   - **ALT FUTURE anytime; Future    Stage 3a chronic kidney disease  Stable                BMI:   Estimated body mass index is 28.49 kg/m  as calculated from the following:    Height as of 12/18/20: 1.626 m (5' 4\").    Weight as of 12/18/20: 75.3 kg (166 lb).       FUTURE LABS:       - Schedule a fasting blood draw 1 month  FUTURE APPOINTMENTS:       - Follow-up visit in 1 month  Regular exercise  See Patient Instructions    No follow-ups on file.    Lela Townsend MD  [facility]    Lata Coe is a 86 year old who presents for the following health issues     HPI     Follow up per cardiologist. 20 minutes treadmill and 20 minutes weights. Doing well with these and wants to know what she can do at home now for activity.     Due for follow up fasting lipids and would like to schedule an appointment in clinic to " see me.       Hyperlipidemia Follow-Up      Are you regularly taking any medication or supplement to lower your cholesterol?   Yes- atorvastatin    Are you having muscle aches or other side effects that you think could be caused by your cholesterol lowering medication?  No    Hypertension Follow-up      Do you check your blood pressure regularly outside of the clinic? Yes     Are you following a low salt diet? Yes    Are your blood pressures ever more than 140 on the top number (systolic) OR more   than 90 on the bottom number (diastolic), for example 140/90? No    Vascular Disease Follow-up      How often do you take nitroglycerin? Never    Do you take an aspirin every day? No on Plavix     Chronic Kidney Disease Follow-up      Do you take any over the counter pain medicine?: No          Review of Systems   Constitutional, HEENT, cardiovascular, pulmonary, gi and gu systems are negative, except as otherwise noted.      Objective           Vitals:  No vitals were obtained today due to virtual visit.    Physical Exam   healthy, alert and no distress  PSYCH: Alert and oriented times 3; coherent speech, normal   rate and volume, able to articulate logical thoughts, able   to abstract reason, no tangential thoughts, no hallucinations   or delusions  Her affect is normal  RESP: No cough, no audible wheezing, able to talk in full sentences  Remainder of exam unable to be completed due to telephone visits    Admission on 12/08/2020, Discharged on 12/08/2020   Component Date Value Ref Range Status     CK Total 12/08/2020 102  30 - 225 U/L Final     Cholesterol 12/08/2020 187  <200 mg/dL Final     Triglycerides 12/08/2020 213* <150 mg/dL Final    Comment: Borderline high:  150-199 mg/dl  High:             200-499 mg/dl  Very high:       >499 mg/dl       HDL Cholesterol 12/08/2020 50  >49 mg/dL Final     LDL Cholesterol Calculated 12/08/2020 94  <100 mg/dL Final    Desirable:       <100 mg/dl     Non HDL Cholesterol  12/08/2020 136* <130 mg/dL Final    Comment: Above Desirable:  130-159 mg/dl  Borderline high:  160-189 mg/dl  High:             190-219 mg/dl  Very high:       >219 mg/dl       Bilirubin Direct 12/08/2020 0.1  0.0 - 0.2 mg/dL Final     Bilirubin Total 12/08/2020 0.4  0.2 - 1.3 mg/dL Final     Albumin 12/08/2020 3.6  3.4 - 5.0 g/dL Final     Protein Total 12/08/2020 7.2  6.8 - 8.8 g/dL Final     Alkaline Phosphatase 12/08/2020 88  40 - 150 U/L Final     ALT 12/08/2020 26  0 - 50 U/L Final     AST 12/08/2020 8  0 - 45 U/L Final     Interpretation ECG 12/08/2020 Click View Image link to view waveform and result   Final     Sodium 12/08/2020 142  133 - 144 mmol/L Final     Potassium 12/08/2020 3.9  3.4 - 5.3 mmol/L Final     Chloride 12/08/2020 110* 94 - 109 mmol/L Final     Carbon Dioxide 12/08/2020 26  20 - 32 mmol/L Final     Anion Gap 12/08/2020 5  3 - 14 mmol/L Final     Glucose 12/08/2020 123* 70 - 99 mg/dL Final     Urea Nitrogen 12/08/2020 20  7 - 30 mg/dL Final     Creatinine 12/08/2020 0.96  0.52 - 1.04 mg/dL Final     GFR Estimate 12/08/2020 53* >60 mL/min/[1.73_m2] Final    Comment: Non  GFR Calc  Starting 12/18/2018, serum creatinine based estimated GFR (eGFR) will be   calculated using the Chronic Kidney Disease Epidemiology Collaboration   (CKD-EPI) equation.       GFR Estimate If Black 12/08/2020 62  >60 mL/min/[1.73_m2] Final    Comment:  GFR Calc  Starting 12/18/2018, serum creatinine based estimated GFR (eGFR) will be   calculated using the Chronic Kidney Disease Epidemiology Collaboration   (CKD-EPI) equation.       Calcium 12/08/2020 9.3  8.5 - 10.1 mg/dL Final     WBC 12/08/2020 7.7  4.0 - 11.0 10e9/L Final     RBC Count 12/08/2020 4.53  3.8 - 5.2 10e12/L Final     Hemoglobin 12/08/2020 13.2  11.7 - 15.7 g/dL Final     Hematocrit 12/08/2020 40.6  35.0 - 47.0 % Final     MCV 12/08/2020 90  78 - 100 fl Final     MCH 12/08/2020 29.1  26.5 - 33.0 pg Final     MCHC  12/08/2020 32.5  31.5 - 36.5 g/dL Final     RDW 12/08/2020 13.4  10.0 - 15.0 % Final     Platelet Count 12/08/2020 296  150 - 450 10e9/L Final     Diff Method 12/08/2020 Automated Method   Final     % Neutrophils 12/08/2020 67.2  % Final     % Lymphocytes 12/08/2020 19.1  % Final     % Monocytes 12/08/2020 9.8  % Final     % Eosinophils 12/08/2020 3.0  % Final     % Basophils 12/08/2020 0.5  % Final     % Immature Granulocytes 12/08/2020 0.4  % Final     Nucleated RBCs 12/08/2020 0  0 /100 Final     Absolute Neutrophil 12/08/2020 5.2  1.6 - 8.3 10e9/L Final     Absolute Lymphocytes 12/08/2020 1.5  0.8 - 5.3 10e9/L Final     Absolute Monocytes 12/08/2020 0.8  0.0 - 1.3 10e9/L Final     Absolute Eosinophils 12/08/2020 0.2  0.0 - 0.7 10e9/L Final     Absolute Basophils 12/08/2020 0.0  0.0 - 0.2 10e9/L Final     Abs Immature Granulocytes 12/08/2020 0.0  0 - 0.4 10e9/L Final     Absolute Nucleated RBC 12/08/2020 0.0   Final     INR 12/08/2020 0.98  0.86 - 1.14 Final     Activated Clot Time 12/08/2020 253* 75 - 150 sec Final     Activated Clot Time 12/08/2020 221* 75 - 150 sec Final     Activated Clot Time 12/08/2020 327* 75 - 150 sec Final     Interpretation ECG 12/08/2020 Click View Image link to view waveform and result   Final               Phone call duration: 13 minutes

## 2021-03-15 NOTE — PATIENT INSTRUCTIONS
Patient Education     Women and Heart Disease: Tips for Making Small Changes  Making even one lifestyle change for your heart reduces your risk for heart disease. Change is hard for everyone, so take it one step at a time. Here are some tips to help you get started on making changes that are good for your heart.    Make a plan  Trying to do too much too fast can end in failure.    Start by writing down all the things you d like to do to lower your risks.    Break each one into small steps. If you said,  Cut down on fat,  a small step could be to use fruit spread instead of butter on your toast. Or have soup and a roll for lunch instead of going out for a hamburger and fries.    Decide which step you d like to take first. Then choose a second and a third step.    Check off each step as you achieve it. Add new steps as you go along.    Set a deadline to meet each of your steps and help you stick with the new rule you have made for yourself.    If a step isn t working, try another. Come back to the first one later.  Keep records  Keeping records helps you know your habits and see your successes.    Keeping an exercise record can help you see your progress and keep you going. Try logging your activities every week. This will give you a chance to look back at the end of the week and make any changes needed.    Keeping food records can help you see your eating patterns and plan ways to make small changes. Try writing down the foods you eat each day. You will likely find it easier to be more consistent in making healthy choices.    Noting when you feel stressed or want to smoke can help you think of ways to avoid these triggers. Write down a list of activities you would rather do to not give into these impulses.  Reward yourself  Making changes isn t easy. You deserve to reward yourself when you succeed. Just making the change may be its own reward. But why not give yourself an extra pat on the back?    Give yourself  something special you ve been wanting.    Do something that you ve always promised yourself you d do, such as going dancing.    Treat yourself to an activity you'll enjoy after a successful week.  Kota last reviewed this educational content on 7/1/2019 2000-2020 [employer]. All rights reserved. This information is not intended as a substitute for professional medical care. Always follow your healthcare professional's instructions.

## 2021-04-06 DIAGNOSIS — I25.10 CAD (CORONARY ARTERY DISEASE): ICD-10-CM

## 2021-04-08 RX ORDER — CLOPIDOGREL BISULFATE 75 MG/1
TABLET ORAL
Qty: 94 TABLET | Refills: 3 | OUTPATIENT
Start: 2021-04-08

## 2021-04-12 ENCOUNTER — APPOINTMENT (OUTPATIENT)
Dept: LAB | Facility: CLINIC | Age: 86
End: 2021-04-12
Payer: COMMERCIAL

## 2021-04-12 ENCOUNTER — OFFICE VISIT (OUTPATIENT)
Dept: FAMILY MEDICINE | Facility: CLINIC | Age: 86
End: 2021-04-12
Payer: COMMERCIAL

## 2021-04-12 VITALS
OXYGEN SATURATION: 97 % | BODY MASS INDEX: 28.34 KG/M2 | HEIGHT: 64 IN | SYSTOLIC BLOOD PRESSURE: 138 MMHG | HEART RATE: 73 BPM | TEMPERATURE: 98.4 F | DIASTOLIC BLOOD PRESSURE: 70 MMHG | WEIGHT: 166 LBS

## 2021-04-12 DIAGNOSIS — I25.119 CORONARY ARTERY DISEASE INVOLVING NATIVE CORONARY ARTERY OF NATIVE HEART WITH ANGINA PECTORIS (H): Primary | ICD-10-CM

## 2021-04-12 DIAGNOSIS — E78.5 HYPERLIPIDEMIA WITH TARGET LDL LESS THAN 100: ICD-10-CM

## 2021-04-12 DIAGNOSIS — N18.31 STAGE 3A CHRONIC KIDNEY DISEASE (H): ICD-10-CM

## 2021-04-12 DIAGNOSIS — I10 HYPERTENSION GOAL BP (BLOOD PRESSURE) < 140/90: ICD-10-CM

## 2021-04-12 DIAGNOSIS — F33.0 MAJOR DEPRESSIVE DISORDER, RECURRENT EPISODE, MILD (H): ICD-10-CM

## 2021-04-12 LAB
ERYTHROCYTE [DISTWIDTH] IN BLOOD BY AUTOMATED COUNT: 12.4 % (ref 10–15)
HCT VFR BLD AUTO: 38.8 % (ref 35–47)
HGB BLD-MCNC: 12.7 G/DL (ref 11.7–15.7)
MCH RBC QN AUTO: 28.5 PG (ref 26.5–33)
MCHC RBC AUTO-ENTMCNC: 32.7 G/DL (ref 31.5–36.5)
MCV RBC AUTO: 87 FL (ref 78–100)
PLATELET # BLD AUTO: 278 10E9/L (ref 150–450)
RBC # BLD AUTO: 4.45 10E12/L (ref 3.8–5.2)
WBC # BLD AUTO: 6.5 10E9/L (ref 4–11)

## 2021-04-12 PROCEDURE — 80048 BASIC METABOLIC PNL TOTAL CA: CPT | Performed by: FAMILY MEDICINE

## 2021-04-12 PROCEDURE — 80061 LIPID PANEL: CPT | Performed by: FAMILY MEDICINE

## 2021-04-12 PROCEDURE — 99214 OFFICE O/P EST MOD 30 MIN: CPT | Performed by: FAMILY MEDICINE

## 2021-04-12 PROCEDURE — 84460 ALANINE AMINO (ALT) (SGPT): CPT | Performed by: FAMILY MEDICINE

## 2021-04-12 PROCEDURE — 85027 COMPLETE CBC AUTOMATED: CPT | Performed by: FAMILY MEDICINE

## 2021-04-12 PROCEDURE — 82043 UR ALBUMIN QUANTITATIVE: CPT | Performed by: FAMILY MEDICINE

## 2021-04-12 PROCEDURE — 36415 COLL VENOUS BLD VENIPUNCTURE: CPT | Performed by: FAMILY MEDICINE

## 2021-04-12 ASSESSMENT — MIFFLIN-ST. JEOR: SCORE: 1177.97

## 2021-04-12 NOTE — PATIENT INSTRUCTIONS
At [facility], we strive to deliver an exceptional experience to you, every time we see you. If you receive a survey, please complete it as we do value your feedback.  If you have MyChart, you can expect to receive results automatically within 24 hours of their completion.  Your provider will send a note interpreting your results as well.   If you do not have MyChart, you should receive your results in about a week by mail.    Your care team:                            Family Medicine Internal Medicine   MD Edgardo Wilhelm MD Shantel Branch-Fleming, MD Srinivasa Vaka, MD Katya Belousova, PAEthelC  Svetlana Hyatt, APRN CNP    Merrill Patten, MD Pediatrics   Kris Meyers, PANICK Xavier, CNP MD Rebecca Turner APRN CNP   MD Soumya Yee MD Deborah Mielke, MD Sneha Ramos, APRN [facility]      Clinic hours: Monday - Thursday 7 am-6 pm; Fridays 7 am-5 pm.   Urgent care: Monday - Friday 10 am- 8 pm; Saturday and Sunday 9 am-5 pm.    Clinic: [phone]       [facility]: Monday - Thursday 8 am - 7 pm; Friday 8 am - 6 pm  [facility]: [phone]     Use [email or web address] for 24/7 diagnosis and treatment of dozens of conditions.  Patient Education     Controlling Your Cholesterol  Cholesterol is a waxy substance. It travels in your blood through the blood vessels. When you have high cholesterol, it can build up along the walls of the blood vessels. This makes the vessels narrower and decreases blood flow. You are then at greater risk of having a heart attack or a stroke.  Good and bad cholesterol  Lipids are fats, and blood is mostly water. Fat and water don't mix. So our bodies need lipoproteins (lipids inside a protein shell) to carry the lipids. The protein shell carries its lipids through the bloodstream. There are two main kinds of lipoproteins:    LDL (low-density lipoprotein) is  "known as \"bad cholesterol.\" It mainly carries cholesterol. It delivers this cholesterol to body cells. Excess LDL cholesterol will build up in artery walls. This increases your risk for heart disease and stroke.    HDL (high-density lipoprotein) is known as \"good cholesterol.\" This protein shell collects excess cholesterol that LDLs have left behind on blood vessel walls. That's why high levels of HDL cholesterol can decrease your risk of heart disease and stroke.  Controlling cholesterol levels  Total cholesterol includes LDL and HDL cholesterol, as well as other fats in the bloodstream. If your total cholesterol is high, follow the steps below to help lower your total cholesterol level:  Eat less unhealthy fat    Cut back on saturated fats and trans fats (also called hydrogenated) by selecting lean cuts of meat, low-fat dairy, and using oils instead of solid fats. Limit baked goods, processed meats, and fried foods. A diet that s high in these fats increases your bad cholesterol. It's not enough to just cut back on foods containing cholesterol.    Eat about two, 3.5 ounce servings of non-fried fish such as salmon, herring, sardines or mackerel per week . Most fish contain omega-3 fatty acids. These help lower total blood cholesterol. Omega-3 fatty acids lowers triglyceride levels, another form of fat in the blood. If you are pregnant or thinking of becoming pregnant or are breastfeeding, talk with your healthcare provider for advice about the best fish choices and how much to eat.    Eat more whole grains and soluble fiber (such as oat bran). These lower overall cholesterol.  Be active    Choose an activity you enjoy. Walking, swimming, and riding a bike are some good ways to be active.    Start at a level where you feel comfortable. Increase your time and pace a little each week.    Work up to 30 to 40 minutes of moderate to high intensity physical activity at least 3 to 4 days per week.    Remember, some " activity is better than none.    If you haven't been exercising regularly, start slowly. Check with your healthcare provider to make sure the exercise plan is right for you.  Quit smoking  Quitting smoking can improve your lipid levels. It also lowers your risk for heart disease and stroke.  Manage your weight  If you are overweight or obese, your healthcare provider will work with you to lose weight and lower your BMI (body mass index) to a normal or near-normal level. Making diet changes and increasing physical activity can help.  Take medicine as directed  Many people need medicine to get their LDL levels to a safe level. Medicine to lower cholesterol levels is effective and safe. Taking medicine is not a substitute for exercise or watching your diet! Your healthcare provider can tell you whether you might benefit from a cholesterol-lowering medicine.  Kota last reviewed this educational content on 6/1/2019 2000-2021 [employer]. All rights reserved. This information is not intended as a substitute for professional medical care. Always follow your healthcare professional's instructions.

## 2021-04-12 NOTE — PROGRESS NOTES
Assessment & Plan     Coronary artery disease involving native coronary artery of native heart with angina pectoris (H)  No recurrent chest pain since surgery. Had to quit her statin medication again due to muscle weakness and pain. Symptoms improved off statin. Told that she has to take this or will have another heart attack. May need to go to specialist at [facility] for alternative treatment.     Hyperlipidemia with target LDL less than 100  Not on statin for 1 week  - **ALT FUTURE anytime  - Lipid panel reflex to direct LDL Fasting    Hypertension goal BP (blood pressure) < 140/90  Well controlled on medications   - Albumin Random Urine Quantitative with Creat Ratio  - **CBC with platelets FUTURE anytime  - **Basic metabolic panel FUTURE anytime    Major depressive disorder, recurrent episode, mild (H)  Stable     Stage 3a chronic kidney disease  Recheck today               CONSULTATION/REFERRAL to cholesterol management clinic in cardiology  FUTURE LABS:       - Schedule fasting labs in 3 months  FUTURE APPOINTMENTS:       - Follow-up visit in 3 months or sooner if any questions or concerns.   Regular exercise  See Patient Instructions    No follow-ups on file.    Lela Townsend MD  [facility]    Lata Coe is a 86 year old who presents for the following health issues   HPI     Patient here today for cholesterol check  Hyperlipidemia Follow-Up      Are you regularly taking any medication or supplement to lower your cholesterol?   No    Are you having muscle aches or other side effects that you think could be caused by your cholesterol lowering medication?  Yes- muscle fatigue and pain due to statin, stopped 1 week ago    Hypertension Follow-up      Do you check your blood pressure regularly outside of the clinic? Yes     Are you following a low salt diet? Yes    Are your blood pressures ever more than 140 on the top number (systolic) OR more   than 90 on the bottom  "number (diastolic), for example 140/90? No    Vascular Disease Follow-up      How often do you take nitroglycerin? Never    Do you take an aspirin every day? Yes    Chronic Kidney Disease Follow-up      Do you take any over the counter pain medicine?: No      Review of Systems   Constitutional, HEENT, cardiovascular, pulmonary, gi and gu systems are negative, except as otherwise noted.      Objective    /70 (BP Location: Right arm)   Pulse 73   Temp 98.4  F (36.9  C) (Oral)   Ht 1.626 m (5' 4\")   Wt 75.3 kg (166 lb)   LMP  (LMP Unknown)   SpO2 97%   BMI 28.49 kg/m    Body mass index is 28.49 kg/m .  Physical Exam   GENERAL: elderly, alert, well nourished, well hydrated, no distress  HENT: ear canals- normal; TMs- normal; Nose- normal; Mouth- no ulcers, no lesions, missing dentition  NECK: no tenderness, no adenopathy, no asymmetry, no masses, no stiffness; thyroid- normal to palpation  RESP: lungs clear to auscultation - no rales, no rhonchi, no wheezes  CV: regular rates and rhythm, normal S1 S2, no S3 or S4 and no murmur, no click or rub, normal pulses  ABDOMEN: soft, no tenderness, no  hepatosplenomegaly, no masses, normal bowel sounds  MS: extremities- no gross deformities noted, no edema  SKIN: no suspicious lesions, no rashes, age related skin changes with seborrheic keratosis and no actinic keratosis.    NEURO: strength and tone- decreased, sensory exam- grossly normal, reflexes- symmetric  BACK: no CVA tenderness, no paralumbar tenderness  MENTAL STATUS EXAM:  Appearance/Behavior: no apparent distress, neatly groomed, dressed appropriately for weather, appears stated age and is not frail-appearing  Speech: normal  Mood/Affect: normal affect  Insight: Good     Results for orders placed or performed in visit on 04/12/21   **CBC with platelets FUTURE anytime     Status: None   Result Value Ref Range    WBC 6.5 4.0 - 11.0 10e9/L    RBC Count 4.45 3.8 - 5.2 10e12/L    Hemoglobin 12.7 11.7 - 15.7 g/dL "    Hematocrit 38.8 35.0 - 47.0 %    MCV 87 78 - 100 fl    MCH 28.5 26.5 - 33.0 pg    MCHC 32.7 31.5 - 36.5 g/dL    RDW 12.4 10.0 - 15.0 %    Platelet Count 278 150 - 450 10e9/L     Results for orders placed or performed in visit on 04/12/21 (from the past 24 hour(s))   **CBC with platelets FUTURE anytime   Result Value Ref Range    WBC 6.5 4.0 - 11.0 10e9/L    RBC Count 4.45 3.8 - 5.2 10e12/L    Hemoglobin 12.7 11.7 - 15.7 g/dL    Hematocrit 38.8 35.0 - 47.0 %    MCV 87 78 - 100 fl    MCH 28.5 26.5 - 33.0 pg    MCHC 32.7 31.5 - 36.5 g/dL    RDW 12.4 10.0 - 15.0 %    Platelet Count 278 150 - 450 10e9/L

## 2021-04-12 NOTE — LETTER
April 14, 2021      Carolin Mcbride  [address] [facility] [address]        Dear ,    Your test results are attached.     The cholesterol is starting to climb back up off the statin medication as we kind of knew it would. The kidney test is still in stage 3 chronic kidney disease. The blood sugar is normal and you do not have diabetes. I will forward these results to your cardiologist.     Please call me if you have any questions about these test results or about your care.     Sincerely,       Lela Townsend MD         Resulted Orders   **ALT FUTURE anytime   Result Value Ref Range    ALT 30 0 - 50 U/L   Albumin Random Urine Quantitative with Creat Ratio   Result Value Ref Range    Creatinine Urine 223 mg/dL    Albumin Urine mg/L 240 mg/L    Albumin Urine mg/g Cr 107.62 (H) 0 - 25 mg/g Cr   **CBC with platelets FUTURE anytime   Result Value Ref Range    WBC 6.5 4.0 - 11.0 10e9/L    RBC Count 4.45 3.8 - 5.2 10e12/L    Hemoglobin 12.7 11.7 - 15.7 g/dL    Hematocrit 38.8 35.0 - 47.0 %    MCV 87 78 - 100 fl    MCH 28.5 26.5 - 33.0 pg    MCHC 32.7 31.5 - 36.5 g/dL    RDW 12.4 10.0 - 15.0 %    Platelet Count 278 150 - 450 10e9/L   **Basic metabolic panel FUTURE anytime   Result Value Ref Range    Sodium 138 133 - 144 mmol/L    Potassium 4.6 3.4 - 5.3 mmol/L    Chloride 107 94 - 109 mmol/L    Carbon Dioxide 28 20 - 32 mmol/L    Anion Gap 3 3 - 14 mmol/L    Glucose 98 70 - 99 mg/dL    Urea Nitrogen 21 7 - 30 mg/dL    Creatinine 1.12 (H) 0.52 - 1.04 mg/dL    GFR Estimate 44 (L) >60 mL/min/[1.73_m2]      Comment:      Non  GFR Calc  Starting 12/18/2018, serum creatinine based estimated GFR (eGFR) will be   calculated using the Chronic Kidney Disease Epidemiology Collaboration   (CKD-EPI) equation.      GFR Estimate If Black 51 (L) >60 mL/min/[1.73_m2]      Comment:       GFR Calc  Starting 12/18/2018, serum creatinine based estimated GFR (eGFR) will be   calculated using the  Chronic Kidney Disease Epidemiology Collaboration   (CKD-EPI) equation.      Calcium 9.0 8.5 - 10.1 mg/dL   Lipid panel reflex to direct LDL Fasting   Result Value Ref Range    Cholesterol 234 (H) <200 mg/dL      Comment:      Desirable:       <200 mg/dl    Triglycerides 227 (H) <150 mg/dL      Comment:      Borderline high:  150-199 mg/dl  High:             200-499 mg/dl  Very high:       >499 mg/dl      HDL Cholesterol 47 (L) >49 mg/dL    LDL Cholesterol Calculated 142 (H) <100 mg/dL      Comment:      Above desirable:  100-129 mg/dl  Borderline High:  130-159 mg/dL  High:             160-189 mg/dL  Very high:       >189 mg/dl      Non HDL Cholesterol 187 (H) <130 mg/dL      Comment:      Above Desirable:  130-159 mg/dl  Borderline high:  160-189 mg/dl  High:             190-219 mg/dl  Very high:       >219 mg/dl

## 2021-04-12 NOTE — Clinical Note
Carolin Aguirre Dr. stopped her statin medication last week due to severe muscle weakness and some pain. She is not willing to restart this and is aware of the cardiac risk. She said that you recommended an alternative program or treatment if she was not able to take her statin. Is there a referral I can make for this or any other recommended treatment? She has a lipid panel pending, but this will not be steady state. Lela Townsend MD

## 2021-04-13 LAB
ALT SERPL W P-5'-P-CCNC: 30 U/L (ref 0–50)
ANION GAP SERPL CALCULATED.3IONS-SCNC: 3 MMOL/L (ref 3–14)
BUN SERPL-MCNC: 21 MG/DL (ref 7–30)
CALCIUM SERPL-MCNC: 9 MG/DL (ref 8.5–10.1)
CHLORIDE SERPL-SCNC: 107 MMOL/L (ref 94–109)
CHOLEST SERPL-MCNC: 234 MG/DL
CO2 SERPL-SCNC: 28 MMOL/L (ref 20–32)
CREAT SERPL-MCNC: 1.12 MG/DL (ref 0.52–1.04)
CREAT UR-MCNC: 223 MG/DL
GFR SERPL CREATININE-BSD FRML MDRD: 44 ML/MIN/{1.73_M2}
GLUCOSE SERPL-MCNC: 98 MG/DL (ref 70–99)
HDLC SERPL-MCNC: 47 MG/DL
LDLC SERPL CALC-MCNC: 142 MG/DL
MICROALBUMIN UR-MCNC: 240 MG/L
MICROALBUMIN/CREAT UR: 107.62 MG/G CR (ref 0–25)
NONHDLC SERPL-MCNC: 187 MG/DL
POTASSIUM SERPL-SCNC: 4.6 MMOL/L (ref 3.4–5.3)
SODIUM SERPL-SCNC: 138 MMOL/L (ref 133–144)
TRIGL SERPL-MCNC: 227 MG/DL

## 2021-04-14 NOTE — RESULT ENCOUNTER NOTE
Dear Carolin Mcbride,    Your test results are attached.    The cholesterol is starting to climb back up off the statin medication as we kind of knew it would. The kidney test is still in stage 3 chronic kidney disease. The blood sugar is normal and you do not have diabetes. I will forward these results to your cardiologist.     Please call me if you have any questions about these test results or about your care.    Sincerely,    Lela Townsend MD

## 2021-04-20 ENCOUNTER — TELEPHONE (OUTPATIENT)
Dept: CARDIOLOGY | Facility: CLINIC | Age: 86
End: 2021-04-20

## 2021-04-20 NOTE — CONFIDENTIAL NOTE
Patient stopped statin due to intolerance.  Please see primary care note below.  Dr. Peña. Please advise.    Calista Li, RN  Cardiology Care Coordinator  [facility]  [phone] option 1    Coronary artery disease involving native coronary artery of native heart with angina pectoris (H)  No recurrent chest pain since surgery. Had to quit her statin medication again due to muscle weakness and pain. Symptoms improved off statin. Told that she has to take this or will have another heart attack. May need to go to specialist at [facility] for alternative treatment

## 2021-04-20 NOTE — LETTER
April 21, 2021      TO: Carolin Mcbride  [address] [facility] [address]         Dear Carolin,    You are scheduled for a consultation with Dr. Eben Gomez, cardiologist to discuss your statin intolerance and medication options.  This is on Monday, May 27th at 1:00 pm.  Please arrive at 12:45 pm.  Cardiology is on the 3rd floor.  The address [facility] the [facility] is [address] [place] [address].        It was a pleasure to see you at your last clinic visit. Please do not hesitate to call me if you have any questions or concerns.    Sincerely,      Calista Li, RN  Cardiology Care Coordinator  [facility]  [phone] option 1

## 2021-04-21 NOTE — TELEPHONE ENCOUNTER
First available at [facility] [place]. Will need to confirm date/time with pt

## 2021-04-21 NOTE — TELEPHONE ENCOUNTER
appt confirmed with patient and letter sent with map.    Calista Li RN  Cardiology Care Coordinator  [facility]  [phone] option 1

## 2021-04-25 ENCOUNTER — TELEPHONE (OUTPATIENT)
Dept: FAMILY MEDICINE | Facility: CLINIC | Age: 86
End: 2021-04-25

## 2021-04-25 DIAGNOSIS — E78.5 HYPERLIPIDEMIA WITH TARGET LDL LESS THAN 100: Primary | ICD-10-CM

## 2021-04-25 RX ORDER — EZETIMIBE 10 MG/1
10 TABLET ORAL DAILY
Qty: 90 TABLET | Refills: 3 | Status: SHIPPED | OUTPATIENT
Start: 2021-04-25 | End: 2021-04-30

## 2021-04-25 NOTE — TELEPHONE ENCOUNTER
Please call patient to start Zetia 10 mg once a day as an alternative to statin for her cholesterol. This is not a statin and will not cause the leg cramps, weakness or pain. Sent to the pharmacy.   Lela Townsend MD

## 2021-04-26 NOTE — TELEPHONE ENCOUNTER
"Routing to Provider to Review and advise.    Provider:  Called and spoke with Carolin. She would like a return phone call tomorrow by her provider to discuss this. She is currently seeing a cardiac doctor that deals with people that cannot tolerate statins. She goes in to the cardiologist on May 27 and \"they will probably start doing monthly injections\" per patient. RN informed her that this new medication that her provider wants her to switch to is not a statin.     Patient would like a call from provider tomorrow when she is back from [place] to discuss this.    Carolin: [phone]    Nel Donahue RN    "

## 2021-04-30 NOTE — TELEPHONE ENCOUNTER
Discussed recommendations. Will wait on medications until seen in cholesterol clinic. Lela Townsend MD

## 2021-05-27 ENCOUNTER — OFFICE VISIT (OUTPATIENT)
Dept: CARDIOLOGY | Facility: CLINIC | Age: 86
End: 2021-05-27
Attending: FAMILY MEDICINE
Payer: COMMERCIAL

## 2021-05-27 VITALS
BODY MASS INDEX: 29.77 KG/M2 | WEIGHT: 168 LBS | DIASTOLIC BLOOD PRESSURE: 79 MMHG | SYSTOLIC BLOOD PRESSURE: 171 MMHG | HEART RATE: 68 BPM | HEIGHT: 63 IN | OXYGEN SATURATION: 98 %

## 2021-05-27 DIAGNOSIS — E78.5 DYSLIPIDEMIA: Primary | ICD-10-CM

## 2021-05-27 DIAGNOSIS — I25.10 CORONARY ARTERY DISEASE INVOLVING NATIVE HEART WITHOUT ANGINA PECTORIS, UNSPECIFIED VESSEL OR LESION TYPE: ICD-10-CM

## 2021-05-27 PROCEDURE — G0463 HOSPITAL OUTPT CLINIC VISIT: HCPCS

## 2021-05-27 PROCEDURE — 99214 OFFICE O/P EST MOD 30 MIN: CPT | Performed by: INTERNAL MEDICINE

## 2021-05-27 RX ORDER — EZETIMIBE 10 MG/1
10 TABLET ORAL DAILY
Qty: 30 TABLET | Refills: 11 | Status: SHIPPED | OUTPATIENT
Start: 2021-05-27 | End: 2022-03-14

## 2021-05-27 ASSESSMENT — MIFFLIN-ST. JEOR: SCORE: 1171.17

## 2021-05-27 ASSESSMENT — PAIN SCALES - GENERAL: PAINLEVEL: NO PAIN (0)

## 2021-05-27 NOTE — NURSING NOTE
Med Reconcile: Reviewed and verified all current medications with the patient. The updated medication list was printed and given to the patient.  New Medication: Patient was educated regarding newly prescribed medication, including discussion of  the indication, administration, side effects, and when to report to MD or RN. Patient demonstrated understanding of this information and agreed to call with further questions or concerns.  Patient was instructed to return for the next laboratory testing 3 months.   Diet: Patient instructed regarding a heart healthy diet, including discussion of reduced fat and sodium intake. Patient demonstrated understanding of this information and agreed to call with further questions or concerns.  Return Appointment: Patient given instructions regarding scheduling next clinic visit. Patient demonstrated understanding of this information and agreed to call with further questions or concerns.  Patient stated she understood all health information given and agreed to call with further questions or concerns.  ------------------------------------  Patient Instructions:  It was a pleasure to see you in the cardiology clinic today.      If you have any questions, you can reach my nurse, Nedra RODGERS LPN, at [phone].  Press Option #1 for the [facility], and then press Option #4 for nursing.    We are encouraging the use of Zidisha to communicate with your HealthCare Provider    Medication Changes:  Stop Lipitor Permanently  Begin Zetia 10mg once daily    Recommendations:  -Have repeat fasting labs done in 3 months    Please follow up: With Dr. Gomez in 3 months with labs prior    Sincerely,    Eben Gomez MD     If you have an urgent need after hours (8:00 am to 4:30 pm) please call [phone] and ask for the cardiology fellow on call.      Escorted patient to coordinator to make follow up appt. Kay Taylor RN on 5/27/2021 at 2:45 PM

## 2021-05-27 NOTE — PROGRESS NOTES
HPI:     I had the privilege to evaluate Ms Carolin JOSUE, who is a 86 yr female patient with a history of  CAD, who had 2 stent to LAD, 1 stent torPL branch andPOBA of D1 on 12/8/2020.     Initially patient presented for the management of hyperlipidemia.  Her LDL was over 200.  Patient had a TIA and she was initially managed on clopidogrel for a short course.  Her CT MRI and echo were normal.  Also had a Zio patch which did not show significant arrhythmia or atrial fibrillation.  Patient was very reluctant to take statin as she had severe myalgia with Baycol.        She was started on 20 mg of atorvastatin  which she discontinued due to some nonspecific muscle ache.  Patient was restarted on atorvastatin  20 mg and then subsequently it was increased to 40 mg daily, which she initially tolerated well. The atorvastatin was the original lipitor. Once she had to switch to the generic atorvastatin - she started to develop myalgias and muscle weakness.    In the past,  patient had a nuclear scan.  She had an apical perfusion defect which was fixed.  The question was whether it was an artifact or real.  Subsequently patient had an echocardiogram which showed apical wall motion abnormality.  Subsequent angiogram showed LAD D1 disease and RPL disease for which she had intervention.      Patient had some myalgia on 40 mg of atorvastatin after a few weeks.  Her CK was normal medication was discontinued.    Patient denies chest pain, shortness of breath, palpitations and intermittent claudication.      PAST MEDICAL HISTORY:  Past Medical History:   Diagnosis Date     Adjustment reaction with anxiety and depression 2001     CAD (coronary artery disease) 2009    asymptomatic, on CT scan     Cyst, ovarian      Diagnostic skin and sensitization tests (aka ALLERGENS) 9/29/14 IgE tests pos. minimally only for Tree pollens (all other environmental allergens NEGATIVE)     HTN (hypertension)      Hyperlipidemia      Myositis 2001     "related to past Baycol use- resolved     Osteoporosis      Seasonal allergic rhinitis     9/29/14 IgE tests pos. minimally only for Tree pollens (all other environmental allergens NEGATIVE)       CURRENT MEDICATIONS:  Current Outpatient Medications   Medication Sig Dispense Refill     aspirin 81 MG tablet Take 1 tablet (81 mg) by mouth daily 90 tablet 3     citalopram (CELEXA) 10 MG tablet Take 1 tablet (10 mg) by mouth daily 90 tablet 3     clopidogrel (PLAVIX) 75 MG tablet Take 4 tablets (300 mg) on the first day for a loading dose then decrease to 1 tablet (75 mg) daily 94 tablet 3     Hypromellose (ARTIFICIAL TEARS OP) Apply 1 drop to eye as needed Both eyes.       losartan (COZAAR) 100 MG tablet Take 1 tablet (100 mg) by mouth daily 90 tablet 1     nitroGLYcerin (NITROSTAT) 0.4 MG sublingual tablet One tablet under the tongue every 5 minutes if needed for chest pain. May repeat every 5 minutes for a maximum of 3 doses in 15 minutes\" 25 tablet 3     VITAMIN D 400 UNIT OR TABS 1 cap twice a day        atorvastatin (LIPITOR) 40 MG tablet Take 1 tablet (40 mg) by mouth daily (Patient not taking: Reported on 5/27/2021) 90 tablet 3     CALCIUM 500 + D OR 1 tab three times a day          PAST SURGICAL HISTORY:  Past Surgical History:   Procedure Laterality Date     APPENDECTOMY       CHOLECYSTECTOMY, OPEN       COLONOSCOPY  2009    neg     CV CORONARY ANGIOGRAM N/A 12/8/2020    Procedure: CV CORONARY ANGIOGRAM;  Surgeon: Isra Weber MD;  Location: [facility] CARDIAC CATH LAB     CV PCI STENT DRUG ELUTING N/A 12/8/2020    Procedure: Percutaneous Coronary Intervention Stent Drug Eluting;  Surgeon: Isra Weber MD;  Location:  HEART CARDIAC CATH LAB     CYSTOCELE REPAIR  2003    TVT SPARC       ALLERGIES     Allergies   Allergen Reactions     Baycol [Cerivastatin Sodium]       muscle mass loss       Crestor [Hmg-Coa-R Inhibitors]      Previously on Baycol and affect muscle mass loss. "     Darvocet [Acetaminophen]      Severe nausea     Macrobid [Nitrofuran Derivatives]      Hives       FAMILY HISTORY:  Family History   Problem Relation Age of Onset     Cancer Mother         ovarian      Heart Disease Sister         CABG x 3     Neurologic Disorder Sister         Fibromyalgia     Cancer Father         stomach/interstines      Musculoskeletal Disorder Brother         back      Heart Disease Brother 60        CABG x 4     Glaucoma No family hx of      Macular Degeneration No family hx of        SOCIAL HISTORY:  Social History     Socioeconomic History     Marital status:      Spouse name: Shashank     Number of children: 3     Years of education: None     Highest education level: None   Occupational History     Employer: RETIRED   Social Needs     Financial resource strain: None     Food insecurity     Worry: None     Inability: None     Transportation needs     Medical: None     Non-medical: None   Tobacco Use     Smoking status: Never Smoker     Smokeless tobacco: Never Used     Tobacco comment: Never smoked; nonsmoking household   Substance and Sexual Activity     Alcohol use: No     Comment: very rare     Drug use: No     Comment: never     Sexual activity: Yes     Partners: Male     Birth control/protection: None   Lifestyle     Physical activity     Days per week: None     Minutes per session: None     Stress: None   Relationships     Social connections     Talks on phone: None     Gets together: None     Attends Tenriism service: None     Active member of club or organization: None     Attends meetings of clubs or organizations: None     Relationship status: None     Intimate partner violence     Fear of current or ex partner: None     Emotionally abused: None     Physically abused: None     Forced sexual activity: None   Other Topics Concern     Parent/sibling w/ CABG, MI or angioplasty before 65F 55M? No      Service No     Blood Transfusions No     Caffeine Concern No      "Occupational Exposure No     Hobby Hazards No     Sleep Concern No     Stress Concern No     Weight Concern Yes     Special Diet No     Back Care No     Exercise Yes     Comment: Curves     Bike Helmet No     Seat Belt Yes     Self-Exams Yes   Social History Narrative     None       ROS:   Constitutional: No fever, chills, or sweats. No weight gain/loss   ENT: No visual disturbance, ear ache, epistaxis, sore throat  Allergies/Immunologic: Negative.   Respiratory: No cough, hemoptysia  Cardiovascular: As per HPI  GI: No nausea, vomiting, hematemesis, melena, or hematochezia  : No urinary frequency, dysuria, or hematuria  Integument: Negative  Psychiatric: Negative  Neuro: Negative  Endocrinology: Negative   Musculoskeletal: Negative    EXAM:  BP (!) 171/79 (BP Location: Right arm, Patient Position: Chair, Cuff Size: Adult Regular)   Pulse 68   Ht 1.6 m (5' 3\")   Wt 76.2 kg (168 lb)   LMP  (LMP Unknown)   SpO2 98%   BMI 29.76 kg/m    In general, the patient is a pleasant female in no apparent distress.    HEENT: NC/AT.  PERRLA.  EOMI.  Sclerae white, not injected.  Nares clear.  Pharynx without erythema or exudate.  Dentition intact.    Neck: No adenopathy.  No thyromegaly. Carotids +4/4 bilaterally without bruits.  No jugular venous distension.   Heart: RRR. Normal S1, S2 splits physiologically. No murmur, rub, click, or gallop. The PMI is in the 5th ICS in the midclavicular line. There is no heave.    Lungs: CTA.  No ronchi, wheezes, rales.  No dullness to percussion.   Abdomen: Soft, nontender, nondistended. No organomegaly.  No bruits.   Extremities: No clubbing, cyanosis, or edema.  The pulses are +4/4 at the radial, brachial, femoral, popliteal, DP, and PT sites bilaterally.  No bruits are noted.  Neurologic: Alert and oriented to person/place/time, normal speech, gait and affect  Skin: No petechiae, purpura or rash.    BP at the end of visit: right 136/78; left 132/72    Labs:  LIPID RESULTS:  Lab " Results   Component Value Date    CHOL 234 (H) 04/12/2021    HDL 47 (L) 04/12/2021     (H) 04/12/2021    TRIG 227 (H) 04/12/2021    CHOLHDLRATIO 2.4 07/13/2015    NHDL 187 (H) 04/12/2021       !LIPID/HEPATIC Latest Ref Rng & Units 10/19/2020 12/8/2020   CHOLESTEROL <200 mg/dL 219 (H) 187   TRIGLYCERIDES <150 mg/dL 179 (H) 213 (H)   HDL CHOLESTEROL >49 mg/dL 53 50   LDL CHOLESTEROL, CALCULATED <100 mg/dL 130 (H) 94   VLDL-CHOLESTEROL 0 - 30 mg/dL     NON HDL CHOLESTEROL <130 mg/dL 166 (H) 136 (H)     !LIPID/HEPATIC Latest Ref Rng & Units 4/12/2021   CHOLESTEROL <200 mg/dL 234 (H)   TRIGLYCERIDES <150 mg/dL 227 (H)   HDL CHOLESTEROL >49 mg/dL 47 (L)   LDL CHOLESTEROL, CALCULATED <100 mg/dL 142 (H)   VLDL-CHOLESTEROL 0 - 30 mg/dL    NON HDL CHOLESTEROL <130 mg/dL 187 (H)           LIVER ENZYME RESULTS:  Lab Results   Component Value Date    AST 8 12/08/2020    ALT 30 04/12/2021       CBC RESULTS:  Lab Results   Component Value Date    WBC 6.5 04/12/2021    RBC 4.45 04/12/2021    HGB 12.7 04/12/2021    HCT 38.8 04/12/2021    MCV 87 04/12/2021    MCH 28.5 04/12/2021    MCHC 32.7 04/12/2021    RDW 12.4 04/12/2021     04/12/2021       BMP RESULTS:  Lab Results   Component Value Date     04/12/2021    POTASSIUM 4.6 04/12/2021    CHLORIDE 107 04/12/2021    CO2 28 04/12/2021    ANIONGAP 3 04/12/2021    GLC 98 04/12/2021    BUN 21 04/12/2021    CR 1.12 (H) 04/12/2021    GFRESTIMATED 44 (L) 04/12/2021    GFRESTBLACK 51 (L) 04/12/2021    LISY 9.0 04/12/2021          INR RESULTS:  Lab Results   Component Value Date    INR 0.98 12/08/2020             Assessment and Plan:     We discussed the results with patient.  We discussed the importance of a heart healthy diet and lifestyle.    We discussed following therapy changes:    Medication Changes:  Stop Lipitor Permanently  Begin Zetia 10mg once daily     Recommendations:  -Have repeat fasting labs done in 3 months    If patient is not at goal - because she is  intolerant for high and low dose high potent and low potent statin therapy (has also tried simvastatin and pravastatin in the past)     We will add beyond the zetia a PCSK-9 inhibitor either Repatha or Praluent (what is approved by her assurance) subcut every 2 weeks.      Please follow up: With Dr. Gomez in 3 months with labs prior    Eben Gomez MD, PhD  Professor of Medicine  Division of Cardiology         CC  Patient Care Team:  Lela Townsend MD as PCP - General (Family Practice)  Lela Townsend MD as Assigned PCP  VAISHALI Juan MD as Assigned Heart and Vascular Provider  Nevin Ricci MD as Assigned Surgical Provider  LELA TOWNSEND

## 2021-05-27 NOTE — LETTER
5/27/2021      RE: Carolin Mcbride  [address] [address]       Dear Colleague,    Thank you for the opportunity to participate in the care of your patient, Carolin Mcbride, at the [facility] [place] [facility]. Please see a copy of my visit note below.    HPI:     I had the privilege to evaluate Ms Carolin JOSUE, who is a 86 yr female patient with a history of  CAD, who had 2 stent to LAD, 1 stent torPL branch andPOBA of D1 on 12/8/2020.     Initially patient presented for the management of hyperlipidemia.  Her LDL was over 200.  Patient had a TIA and she was initially managed on clopidogrel for a short course.  Her CT MRI and echo were normal.  Also had a Zio patch which did not show significant arrhythmia or atrial fibrillation.  Patient was very reluctant to take statin as she had severe myalgia with Baycol.        She was started on 20 mg of atorvastatin  which she discontinued due to some nonspecific muscle ache.  Patient was restarted on atorvastatin  20 mg and then subsequently it was increased to 40 mg daily, which she initially tolerated well. The atorvastatin was the original lipitor. Once she had to switch to the generic atorvastatin - she started to develop myalgias and muscle weakness.    In the past,  patient had a nuclear scan.  She had an apical perfusion defect which was fixed.  The question was whether it was an artifact or real.  Subsequently patient had an echocardiogram which showed apical wall motion abnormality.  Subsequent angiogram showed LAD D1 disease and RPL disease for which she had intervention.      Patient had some myalgia on 40 mg of atorvastatin after a few weeks.  Her CK was normal medication was discontinued.    Patient denies chest pain, shortness of breath, palpitations and intermittent claudication.      PAST MEDICAL HISTORY:  Past Medical History:   Diagnosis Date     Adjustment reaction with anxiety  "and depression 2001     CAD (coronary artery disease) 2009    asymptomatic, on CT scan     Cyst, ovarian      Diagnostic skin and sensitization tests (aka ALLERGENS) 9/29/14 IgE tests pos. minimally only for Tree pollens (all other environmental allergens NEGATIVE)     HTN (hypertension)      Hyperlipidemia      Myositis 2001    related to past Baycol use- resolved     Osteoporosis      Seasonal allergic rhinitis     9/29/14 IgE tests pos. minimally only for Tree pollens (all other environmental allergens NEGATIVE)       CURRENT MEDICATIONS:  Current Outpatient Medications   Medication Sig Dispense Refill     aspirin 81 MG tablet Take 1 tablet (81 mg) by mouth daily 90 tablet 3     citalopram (CELEXA) 10 MG tablet Take 1 tablet (10 mg) by mouth daily 90 tablet 3     clopidogrel (PLAVIX) 75 MG tablet Take 4 tablets (300 mg) on the first day for a loading dose then decrease to 1 tablet (75 mg) daily 94 tablet 3     Hypromellose (ARTIFICIAL TEARS OP) Apply 1 drop to eye as needed Both eyes.       losartan (COZAAR) 100 MG tablet Take 1 tablet (100 mg) by mouth daily 90 tablet 1     nitroGLYcerin (NITROSTAT) 0.4 MG sublingual tablet One tablet under the tongue every 5 minutes if needed for chest pain. May repeat every 5 minutes for a maximum of 3 doses in 15 minutes\" 25 tablet 3     VITAMIN D 400 UNIT OR TABS 1 cap twice a day        atorvastatin (LIPITOR) 40 MG tablet Take 1 tablet (40 mg) by mouth daily (Patient not taking: Reported on 5/27/2021) 90 tablet 3     CALCIUM 500 + D OR 1 tab three times a day          PAST SURGICAL HISTORY:  Past Surgical History:   Procedure Laterality Date     APPENDECTOMY       CHOLECYSTECTOMY, OPEN       COLONOSCOPY  2009    neg     CV CORONARY ANGIOGRAM N/A 12/8/2020    Procedure: CV CORONARY ANGIOGRAM;  Surgeon: Isra Weber MD;  Location:  HEART CARDIAC CATH LAB     CV PCI STENT DRUG ELUTING N/A 12/8/2020    Procedure: Percutaneous Coronary Intervention Stent Drug " Eluting;  Surgeon: Isra Weber MD;  Location:  HEART CARDIAC CATH LAB     CYSTOCELE REPAIR  2003    TVT SPARC       ALLERGIES     Allergies   Allergen Reactions     Baycol [Cerivastatin Sodium]       muscle mass loss       Crestor [Hmg-Coa-R Inhibitors]      Previously on Baycol and affect muscle mass loss.     Darvocet [Acetaminophen]      Severe nausea     Macrobid [Nitrofuran Derivatives]      Hives       FAMILY HISTORY:  Family History   Problem Relation Age of Onset     Cancer Mother         ovarian      Heart Disease Sister         CABG x 3     Neurologic Disorder Sister         Fibromyalgia     Cancer Father         stomach/interstines      Musculoskeletal Disorder Brother         back      Heart Disease Brother 60        CABG x 4     Glaucoma No family hx of      Macular Degeneration No family hx of        SOCIAL HISTORY:  Social History     Socioeconomic History     Marital status:      Spouse name: Shashank     Number of children: 3     Years of education: None     Highest education level: None   Occupational History     Employer: RETIRED   Social Needs     Financial resource strain: None     Food insecurity     Worry: None     Inability: None     Transportation needs     Medical: None     Non-medical: None   Tobacco Use     Smoking status: Never Smoker     Smokeless tobacco: Never Used     Tobacco comment: Never smoked; nonsmoking household   Substance and Sexual Activity     Alcohol use: No     Comment: very rare     Drug use: No     Comment: never     Sexual activity: Yes     Partners: Male     Birth control/protection: None   Lifestyle     Physical activity     Days per week: None     Minutes per session: None     Stress: None   Relationships     Social connections     Talks on phone: None     Gets together: None     Attends [employer]an service: None     Active member of club or organization: None     Attends meetings of clubs or organizations: None     Relationship status: None  "    Intimate partner violence     Fear of current or ex partner: None     Emotionally abused: None     Physically abused: None     Forced sexual activity: None   Other Topics Concern     Parent/sibling w/ CABG, MI or angioplasty before 65F 55M? No      Service No     Blood Transfusions No     Caffeine Concern No     Occupational Exposure No     Hobby Hazards No     Sleep Concern No     Stress Concern No     Weight Concern Yes     Special Diet No     Back Care No     Exercise Yes     Comment: Curves     Bike Helmet No     Seat Belt Yes     Self-Exams Yes   Social History Narrative     None       ROS:   Constitutional: No fever, chills, or sweats. No weight gain/loss   ENT: No visual disturbance, ear ache, epistaxis, sore throat  Allergies/Immunologic: Negative.   Respiratory: No cough, hemoptysia  Cardiovascular: As per HPI  GI: No nausea, vomiting, hematemesis, melena, or hematochezia  : No urinary frequency, dysuria, or hematuria  Integument: Negative  Psychiatric: Negative  Neuro: Negative  Endocrinology: Negative   Musculoskeletal: Negative    EXAM:  BP (!) 171/79 (BP Location: Right arm, Patient Position: Chair, Cuff Size: Adult Regular)   Pulse 68   Ht 1.6 m (5' 3\")   Wt 76.2 kg (168 lb)   LMP  (LMP Unknown)   SpO2 98%   BMI 29.76 kg/m    In general, the patient is a pleasant female in no apparent distress.    HEENT: NC/AT.  PERRLA.  EOMI.  Sclerae white, not injected.  Nares clear.  Pharynx without erythema or exudate.  Dentition intact.    Neck: No adenopathy.  No thyromegaly. Carotids +4/4 bilaterally without bruits.  No jugular venous distension.   Heart: RRR. Normal S1, S2 splits physiologically. No murmur, rub, click, or gallop. The PMI is in the 5th ICS in the midclavicular line. There is no heave.    Lungs: CTA.  No ronchi, wheezes, rales.  No dullness to percussion.   Abdomen: Soft, nontender, nondistended. No organomegaly.  No bruits.   Extremities: No clubbing, cyanosis, or edema.  " The pulses are +4/4 at the radial, brachial, femoral, popliteal, DP, and PT sites bilaterally.  No bruits are noted.  Neurologic: Alert and oriented to person/place/time, normal speech, gait and affect  Skin: No petechiae, purpura or rash.    BP at the end of visit: right 136/78; left 132/72    Labs:  LIPID RESULTS:  Lab Results   Component Value Date    CHOL 234 (H) 04/12/2021    HDL 47 (L) 04/12/2021     (H) 04/12/2021    TRIG 227 (H) 04/12/2021    CHOLHDLRATIO 2.4 07/13/2015    NHDL 187 (H) 04/12/2021       !LIPID/HEPATIC Latest Ref Rng & Units 10/19/2020 12/8/2020   CHOLESTEROL <200 mg/dL 219 (H) 187   TRIGLYCERIDES <150 mg/dL 179 (H) 213 (H)   HDL CHOLESTEROL >49 mg/dL 53 50   LDL CHOLESTEROL, CALCULATED <100 mg/dL 130 (H) 94   VLDL-CHOLESTEROL 0 - 30 mg/dL     NON HDL CHOLESTEROL <130 mg/dL 166 (H) 136 (H)     !LIPID/HEPATIC Latest Ref Rng & Units 4/12/2021   CHOLESTEROL <200 mg/dL 234 (H)   TRIGLYCERIDES <150 mg/dL 227 (H)   HDL CHOLESTEROL >49 mg/dL 47 (L)   LDL CHOLESTEROL, CALCULATED <100 mg/dL 142 (H)   VLDL-CHOLESTEROL 0 - 30 mg/dL    NON HDL CHOLESTEROL <130 mg/dL 187 (H)           LIVER ENZYME RESULTS:  Lab Results   Component Value Date    AST 8 12/08/2020    ALT 30 04/12/2021       CBC RESULTS:  Lab Results   Component Value Date    WBC 6.5 04/12/2021    RBC 4.45 04/12/2021    HGB 12.7 04/12/2021    HCT 38.8 04/12/2021    MCV 87 04/12/2021    MCH 28.5 04/12/2021    MCHC 32.7 04/12/2021    RDW 12.4 04/12/2021     04/12/2021       BMP RESULTS:  Lab Results   Component Value Date     04/12/2021    POTASSIUM 4.6 04/12/2021    CHLORIDE 107 04/12/2021    CO2 28 04/12/2021    ANIONGAP 3 04/12/2021    GLC 98 04/12/2021    BUN 21 04/12/2021    CR 1.12 (H) 04/12/2021    GFRESTIMATED 44 (L) 04/12/2021    GFRESTBLACK 51 (L) 04/12/2021    LISY 9.0 04/12/2021          INR RESULTS:  Lab Results   Component Value Date    INR 0.98 12/08/2020             Assessment and Plan:     We discussed the  results with patient.  We discussed the importance of a heart healthy diet and lifestyle.    We discussed following therapy changes:    Medication Changes:  Stop Lipitor Permanently  Begin Zetia 10mg once daily     Recommendations:  -Have repeat fasting labs done in 3 months    If patient is not at goal - because she is intolerant for high and low dose high potent and low potent statin therapy (has also tried simvastatin and pravastatin in the past)     We will add beyond the zetia a PCSK-9 inhibitor either Repatha or Praluent (what is approved by her assurance) subcut every 2 weeks.      Please follow up: With Dr. Gomez in 3 months with labs prior    Eben Gomez MD, PhD  Professor of Medicine  Division of Cardiology     CC  Patient Care Team:  Lela Townsend MD as PCP - General (Family Practice)  VAISHALI Juan MD as Assigned Heart and Vascular Provider  Nevin Ricci MD as Assigned Surgical Provider

## 2021-05-27 NOTE — NURSING NOTE
Chief Complaint   Patient presents with     New Patient     New Pt Visit - Prevention      Vitals were taken and medication reconciled.    VY Lopez  1:05 PM

## 2021-05-27 NOTE — PATIENT INSTRUCTIONS
Patient Instructions:  It was a pleasure to see you in the cardiology clinic today.      If you have any questions, you can reach my nurse, Nedra RODGERS LPN, at [phone].  Press Option #1 for the [facility], and then press Option #4 for nursing.    We are encouraging the use of MyChart to communicate with your HealthCare Provider    Medication Changes:  Stop Lipitor Permanently  Begin Zetia 10mg once daily    Recommendations:  -Have repeat fasting labs done in 3 months    Please follow up: With Dr. Gomez in 3 months with labs prior    Sincerely,    Eben Gomez MD     If you have an urgent need after hours (8:00 am to 4:30 pm) please call [phone] and ask for the cardiology fellow on call.

## 2021-06-10 ENCOUNTER — OFFICE VISIT (OUTPATIENT)
Dept: CARDIOLOGY | Facility: CLINIC | Age: 86
End: 2021-06-10
Payer: COMMERCIAL

## 2021-06-10 VITALS
HEART RATE: 73 BPM | WEIGHT: 168 LBS | OXYGEN SATURATION: 97 % | SYSTOLIC BLOOD PRESSURE: 124 MMHG | DIASTOLIC BLOOD PRESSURE: 77 MMHG | BODY MASS INDEX: 29.76 KG/M2

## 2021-06-10 DIAGNOSIS — M79.10 MYALGIA: Primary | ICD-10-CM

## 2021-06-10 DIAGNOSIS — Z98.61 STATUS POST PERCUTANEOUS TRANSLUMINAL CORONARY ANGIOPLASTY: ICD-10-CM

## 2021-06-10 DIAGNOSIS — I25.5 ISCHEMIC CARDIOMYOPATHY: ICD-10-CM

## 2021-06-10 PROCEDURE — 99214 OFFICE O/P EST MOD 30 MIN: CPT | Performed by: INTERNAL MEDICINE

## 2021-06-10 NOTE — LETTER
6/10/2021      RE: Carolin Mcbride  [address] [address]          SUBJECTIVE:  Carolin Mcbride is a 86 year old female who presents for post PCI follow-up.    Patient had 2 stent to LAD, 1 stent torPL branch andPOBA of D1 on 12/8/2020.     Initially patient presented for the management of hyperlipidemia.  Her LDL was over 200.  Patient had a TIA and she was initially managed on clopidogrel for a short course.  Her CT MRI and echo were normal.  Also had a Zio patch which did not show significant arrhythmia or atrial fibrillation.  Patient was very reluctant to take statin as she had severe myalgia with Baycol.  She was started on 20 mg of Lipitor which she discontinued due to some nonspecific muscle ache.  Patient was restarted on Lipitor 20 mg and then subsequently it was increased to 40 mg daily.  Patient is tolerating this dose very well.  As part of CAD evaluation patient had a nuclear scan.  She had an apical perfusion defect which was fixed.  The question was whether it was an artifact or real.  Subsequently patient had an echocardiogram which showed apical wall motion abnormality.  Subsequent angiogram showed LAD D1 disease and RPL disease for which she had intervention.     Patient had some myalgia on 40 mg of Lipitor.  Her CK was normal medication was discontinued and he was referred to lipid clinic for further management.  Today patient had no cardiac complaints.  She is doing well from this aspect.  Still complaining of myalgia and muscle weakness.    Patient Active Problem List    Diagnosis Date Noted     Status post coronary angiogram 12/08/2020     Priority: Medium     Dyspnea on exertion 11/24/2020     Priority: Medium     Added automatically from request for surgery [id number]       Abnormal nuclear cardiac imaging test 11/24/2020     Priority: Medium     Added automatically from request for surgery [id number]       Microscopic hematuria 08/11/2020     Priority: Medium     Proteinuria,  unspecified type 08/11/2020     Priority: Medium     Leg weakness, bilateral 04/13/2020     Priority: Medium     Recurrent and persistent hematuria 10/31/2019     Priority: Medium     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 minimally only for Tree pollens (all other environmental allergens NEGATIVE)       Diagnostic skin and sensitization tests (aka ALLERGENS)      Priority: Medium     CKD (chronic kidney disease) stage 3, GFR 30-59 ml/min 05/22/2014     Priority: Medium     Hyperlipidemia with target LDL less than 100 04/08/2013     Priority: Medium     Osteoporosis      Priority: Medium     5 years Fosamax 1-1-2011 to 12-. Medication holiday recommended.         Hypertension goal BP (blood pressure) < 140/90 06/26/2012     Priority: Medium     Advance care planning 03/13/2012     Priority: Medium     Advance Care Planning 12/20/2016: ACP Review of Chart / Resources Provided:  Reviewed chart for advance care plan.  Carolin Mcbride has been provided information and resources to begin or update their advance care plan.  Advance Care Planning 03/13/2012: Discussed advance care planning with patient; information given to patient to review. 3/13/2012 . Lara Roque MA                  CAD (coronary artery disease)      Priority: Medium     Stenting of 2 arteries and balloon angioplasty 12-8-2020.        Pseudophakia OU c YAG OU 06/02/2011     Priority: Medium    .  Current Outpatient Medications   Medication Sig     aspirin 81 MG tablet Take 1 tablet (81 mg) by mouth daily     citalopram (CELEXA) 10 MG tablet Take 1 tablet (10 mg) by mouth daily     clopidogrel  "(PLAVIX) 75 MG tablet Take 4 tablets (300 mg) on the first day for a loading dose then decrease to 1 tablet (75 mg) daily     ezetimibe (ZETIA) 10 MG tablet Take 1 tablet (10 mg) by mouth daily     Hypromellose (ARTIFICIAL TEARS OP) Apply 1 drop to eye as needed Both eyes.     losartan (COZAAR) 100 MG tablet Take 1 tablet (100 mg) by mouth daily     VITAMIN D 400 UNIT OR TABS 1 cap twice a day      nitroGLYcerin (NITROSTAT) 0.4 MG sublingual tablet One tablet under the tongue every 5 minutes if needed for chest pain. May repeat every 5 minutes for a maximum of 3 doses in 15 minutes\"     Current Facility-Administered Medications   Medication     triamcinolone acetonide (KENALOG-10) injection 10 mg     Past Medical History:   Diagnosis Date     Adjustment reaction with anxiety and depression 2001     CAD (coronary artery disease) 2009    asymptomatic, on CT scan     Cyst, ovarian      Diagnostic skin and sensitization tests (aka ALLERGENS) 9/29/14 IgE tests pos. minimally only for Tree pollens (all other environmental allergens NEGATIVE)     HTN (hypertension)      Hyperlipidemia      Myositis 2001    related to past Baycol use- resolved     Osteoporosis      Seasonal allergic rhinitis     9/29/14 IgE tests pos. minimally only for Tree pollens (all other environmental allergens NEGATIVE)     Past Surgical History:   Procedure Laterality Date     APPENDECTOMY       CHOLECYSTECTOMY, OPEN       COLONOSCOPY  2009    neg     CV CORONARY ANGIOGRAM N/A 12/8/2020    Procedure: CV CORONARY ANGIOGRAM;  Surgeon: Isra Weber MD;  Location: [facility] CARDIAC CATH LAB     CV PCI STENT DRUG ELUTING N/A 12/8/2020    Procedure: Percutaneous Coronary Intervention Stent Drug Eluting;  Surgeon: Isra Weber MD;  Location: [facility] CARDIAC CATH LAB     CYSTOCELE REPAIR  2003    TVT SPARC     Allergies   Allergen Reactions     Baycol [Cerivastatin Sodium]       muscle mass loss       Crestor [Hmg-Coa-R " Inhibitors]      Previously on Baycol and affect muscle mass loss.     Darvocet [Acetaminophen]      Severe nausea     Macrobid [Nitrofuran Derivatives]      Hives     Social History     Socioeconomic History     Marital status:      Spouse name: Shashank     Number of children: 3     Years of education: Not on file     Highest education level: Not on file   Occupational History     Employer: RETIRED   Social Needs     Financial resource strain: Not on file     Food insecurity     Worry: Not on file     Inability: Not on file     Transportation needs     Medical: Not on file     Non-medical: Not on file   Tobacco Use     Smoking status: Never Smoker     Smokeless tobacco: Never Used     Tobacco comment: Never smoked; nonsmoking household   Substance and Sexual Activity     Alcohol use: No     Comment: very rare     Drug use: No     Comment: never     Sexual activity: Yes     Partners: Male     Birth control/protection: None   Lifestyle     Physical activity     Days per week: Not on file     Minutes per session: Not on file     Stress: Not on file   Relationships     Social connections     Talks on phone: Not on file     Gets together: Not on file     Attends [employer] service: Not on file     Active member of club or organization: Not on file     Attends meetings of clubs or organizations: Not on file     Relationship status: Not on file     Intimate partner violence     Fear of current or ex partner: Not on file     Emotionally abused: Not on file     Physically abused: Not on file     Forced sexual activity: Not on file   Other Topics Concern     Parent/sibling w/ CABG, MI or angioplasty before 65F 55M? No      Service No     Blood Transfusions No     Caffeine Concern No     Occupational Exposure No     Hobby Hazards No     Sleep Concern No     Stress Concern No     Weight Concern Yes     Special Diet No     Back Care No     Exercise Yes     Comment: Curves     Bike Helmet No     Seat Belt Yes      Self-Exams Yes   Social History Narrative     Not on file     Family History   Problem Relation Age of Onset     Cancer Mother         ovarian      Heart Disease Sister         CABG x 3     Neurologic Disorder Sister         Fibromyalgia     Cancer Father         stomach/interstines      Musculoskeletal Disorder Brother         back      Heart Disease Brother 60        CABG x 4     Glaucoma No family hx of      Macular Degeneration No family hx of           REVIEW OF SYSTEMS:  General: negative, fever, chills, night sweats  Skin: negative, acne, rash and scaling  Eyes: negative, double vision, eye pain and photophobia  Ears/Nose/Throat: negative, nasal congestion and purulent rhinorrhea  Respiratory: No dyspnea on exertion, No cough, No hemoptysis and negative  Cardiovascular: negative, palpitations, tachycardia, irregular heart beat, chest pain, exertional chest pain or pressure, paroxysmal nocturnal dyspnea, dyspnea on exertion and orthopnea       OBJECTIVE:  Blood pressure (!) 155/79, pulse 66, weight 76.2 kg (168 lb), SpO2 97 %, not currently breastfeeding.  General Appearance: healthy, alert, active and no distress  Head: Normocephalic. No masses, lesions, tenderness or abnormalities  Eyes: conjuctiva clear, PERRL, EOM intact  Ears: External ears normal. Canals clear. TM's normal.  Nose: Nares normal  Mouth: normal  Neck: Supple, no cervical adenopathy, no thyromegaly  Lungs: clear to auscultation  Cardiac: regular rate and rhythm, normal S1 and S2, no murmur       ASSESSMENT/PLAN:  Patient here for 6-month follow-up after PCI.  Doing well.  No anginal symptoms.  No CHF symptoms.  Patient as per history he did not tolerate 40 mg of Lipitor.  She developed myalgia but normal CK.  This medication was discontinued and patient was referred to lipid clinic for further management.  From cardiac standpoint she is doing well.  Had no complaints today.  She will continue her Plavix during December of this year.  Also  advised to continue rest of the medications.  EKG reviewed.  Sinus rhythm.  First-degree AV block.  Nonspecific ST-T changes.  Prior echocardiogram reviewed normal LV function.  Apical wall motion abnormality.  Nuclear stress test reviewed.  Apical perfusion defect.  Per orders.   Return to Clinic in 6 months time with an echocardiogram.    Total visit duration 30 minutes.  This includes face-to-face interview, physical exam, chart review, review of labs EKG and echocardiogram as well as documentation.      VAISHALI Juan MD

## 2021-06-10 NOTE — LETTER
6/10/2021      RE: Carolin Mcbride  [address] [facility] [address]       Dear Colleague,    Thank you for the opportunity to participate in the care of your patient, Carolin Mcbride, at the [facility]. Please see a copy of my visit note below.       SUBJECTIVE:  Carolin Mcbride is a 86 year old female who presents for post PCI follow-up.    Patient had 2 stent to LAD, 1 stent torPL branch andPOBA of D1 on 12/8/2020.     Initially patient presented for the management of hyperlipidemia.  Her LDL was over 200.  Patient had a TIA and she was initially managed on clopidogrel for a short course.  Her CT MRI and echo were normal.  Also had a Zio patch which did not show significant arrhythmia or atrial fibrillation.  Patient was very reluctant to take statin as she had severe myalgia with Baycol.  She was started on 20 mg of Lipitor which she discontinued due to some nonspecific muscle ache.  Patient was restarted on Lipitor 20 mg and then subsequently it was increased to 40 mg daily.  Patient is tolerating this dose very well.  As part of CAD evaluation patient had a nuclear scan.  She had an apical perfusion defect which was fixed.  The question was whether it was an artifact or real.  Subsequently patient had an echocardiogram which showed apical wall motion abnormality.  Subsequent angiogram showed LAD D1 disease and RPL disease for which she had intervention.     Patient had some myalgia on 40 mg of Lipitor.  Her CK was normal medication was discontinued and he was referred to lipid clinic for further management.  Today patient had no cardiac complaints.  She is doing well from this aspect.  Still complaining of myalgia and muscle weakness.    Patient Active Problem List    Diagnosis Date Noted     Status post coronary angiogram 12/08/2020     Priority: Medium     Dyspnea on exertion 11/24/2020     Priority: Medium     Added  automatically from request for surgery [id number]       Abnormal nuclear cardiac imaging test 11/24/2020     Priority: Medium     Added automatically from request for surgery [id number]       Microscopic hematuria 08/11/2020     Priority: Medium     Proteinuria, unspecified type 08/11/2020     Priority: Medium     Leg weakness, bilateral 04/13/2020     Priority: Medium     Recurrent and persistent hematuria 10/31/2019     Priority: Medium     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 minimally only for Tree pollens (all other environmental allergens NEGATIVE)       Diagnostic skin and sensitization tests (aka ALLERGENS)      Priority: Medium     CKD (chronic kidney disease) stage 3, GFR 30-59 ml/min 05/22/2014     Priority: Medium     Hyperlipidemia with target LDL less than 100 04/08/2013     Priority: Medium     Osteoporosis      Priority: Medium     5 years Fosamax 1-1-2011 to 12-. Medication holiday recommended.         Hypertension goal BP (blood pressure) < 140/90 06/26/2012     Priority: Medium     Advance care planning 03/13/2012     Priority: Medium     Advance Care Planning 12/20/2016: ACP Review of Chart / Resources Provided:  Reviewed chart for advance care plan.  Carolin LIAT Mcbride has been provided information and resources to begin or update their advance care plan.  Advance Care Planning 03/13/2012: Discussed advance care planning with patient; information given to patient to review. 3/13/2012 . Lara Roque MA                  CAD (coronary artery disease)      Priority: Medium     Stenting of 2 arteries and balloon angioplasty 12-8-2020.         "Pseudophakia OU c YAG OU 06/02/2011     Priority: Medium    .  Current Outpatient Medications   Medication Sig     aspirin 81 MG tablet Take 1 tablet (81 mg) by mouth daily     citalopram (CELEXA) 10 MG tablet Take 1 tablet (10 mg) by mouth daily     clopidogrel (PLAVIX) 75 MG tablet Take 4 tablets (300 mg) on the first day for a loading dose then decrease to 1 tablet (75 mg) daily     ezetimibe (ZETIA) 10 MG tablet Take 1 tablet (10 mg) by mouth daily     Hypromellose (ARTIFICIAL TEARS OP) Apply 1 drop to eye as needed Both eyes.     losartan (COZAAR) 100 MG tablet Take 1 tablet (100 mg) by mouth daily     VITAMIN D 400 UNIT OR TABS 1 cap twice a day      nitroGLYcerin (NITROSTAT) 0.4 MG sublingual tablet One tablet under the tongue every 5 minutes if needed for chest pain. May repeat every 5 minutes for a maximum of 3 doses in 15 minutes\"     Current Facility-Administered Medications   Medication     triamcinolone acetonide (KENALOG-10) injection 10 mg     Past Medical History:   Diagnosis Date     Adjustment reaction with anxiety and depression 2001     CAD (coronary artery disease) 2009    asymptomatic, on CT scan     Cyst, ovarian      Diagnostic skin and sensitization tests (aka ALLERGENS) 9/29/14 IgE tests pos. minimally only for Tree pollens (all other environmental allergens NEGATIVE)     HTN (hypertension)      Hyperlipidemia      Myositis 2001    related to past Baycol use- resolved     Osteoporosis      Seasonal allergic rhinitis     9/29/14 IgE tests pos. minimally only for Tree pollens (all other environmental allergens NEGATIVE)     Past Surgical History:   Procedure Laterality Date     APPENDECTOMY       CHOLECYSTECTOMY, OPEN       COLONOSCOPY  2009    neg     CV CORONARY ANGIOGRAM N/A 12/8/2020    Procedure: CV CORONARY ANGIOGRAM;  Surgeon: Isra Weber MD;  Location:  HEART CARDIAC CATH LAB     CV PCI STENT DRUG ELUTING N/A 12/8/2020    Procedure: Percutaneous Coronary " Intervention Stent Drug Eluting;  Surgeon: Isra Weber MD;  Location:  HEART CARDIAC CATH LAB     CYSTOCELE REPAIR  2003    TVT SPARC     Allergies   Allergen Reactions     Baycol [Cerivastatin Sodium]       muscle mass loss       Crestor [Hmg-Coa-R Inhibitors]      Previously on Baycol and affect muscle mass loss.     Darvocet [Acetaminophen]      Severe nausea     Macrobid [Nitrofuran Derivatives]      Hives     Social History     Socioeconomic History     Marital status:      Spouse name: Shashank     Number of children: 3     Years of education: Not on file     Highest education level: Not on file   Occupational History     Employer: RETIRED   Social Needs     Financial resource strain: Not on file     Food insecurity     Worry: Not on file     Inability: Not on file     Transportation needs     Medical: Not on file     Non-medical: Not on file   Tobacco Use     Smoking status: Never Smoker     Smokeless tobacco: Never Used     Tobacco comment: Never smoked; nonsmoking household   Substance and Sexual Activity     Alcohol use: No     Comment: very rare     Drug use: No     Comment: never     Sexual activity: Yes     Partners: Male     Birth control/protection: None   Lifestyle     Physical activity     Days per week: Not on file     Minutes per session: Not on file     Stress: Not on file   Relationships     Social connections     Talks on phone: Not on file     Gets together: Not on file     Attends Scientology service: Not on file     Active member of club or organization: Not on file     Attends meetings of clubs or organizations: Not on file     Relationship status: Not on file     Intimate partner violence     Fear of current or ex partner: Not on file     Emotionally abused: Not on file     Physically abused: Not on file     Forced sexual activity: Not on file   Other Topics Concern     Parent/sibling w/ CABG, MI or angioplasty before 65F 55M? No      Service No     Blood  Transfusions No     Caffeine Concern No     Occupational Exposure No     Hobby Hazards No     Sleep Concern No     Stress Concern No     Weight Concern Yes     Special Diet No     Back Care No     Exercise Yes     Comment: Curves     Bike Helmet No     Seat Belt Yes     Self-Exams Yes   Social History Narrative     Not on file     Family History   Problem Relation Age of Onset     Cancer Mother         ovarian      Heart Disease Sister         CABG x 3     Neurologic Disorder Sister         Fibromyalgia     Cancer Father         stomach/interstines      Musculoskeletal Disorder Brother         back      Heart Disease Brother 60        CABG x 4     Glaucoma No family hx of      Macular Degeneration No family hx of           REVIEW OF SYSTEMS:  General: negative, fever, chills, night sweats  Skin: negative, acne, rash and scaling  Eyes: negative, double vision, eye pain and photophobia  Ears/Nose/Throat: negative, nasal congestion and purulent rhinorrhea  Respiratory: No dyspnea on exertion, No cough, No hemoptysis and negative  Cardiovascular: negative, palpitations, tachycardia, irregular heart beat, chest pain, exertional chest pain or pressure, paroxysmal nocturnal dyspnea, dyspnea on exertion and orthopnea       OBJECTIVE:  Blood pressure (!) 155/79, pulse 66, weight 76.2 kg (168 lb), SpO2 97 %, not currently breastfeeding.  General Appearance: healthy, alert, active and no distress  Head: Normocephalic. No masses, lesions, tenderness or abnormalities  Eyes: conjuctiva clear, PERRL, EOM intact  Ears: External ears normal. Canals clear. TM's normal.  Nose: Nares normal  Mouth: normal  Neck: Supple, no cervical adenopathy, no thyromegaly  Lungs: clear to auscultation  Cardiac: regular rate and rhythm, normal S1 and S2, no murmur       ASSESSMENT/PLAN:  Patient here for 6-month follow-up after PCI.  Doing well.  No anginal symptoms.  No CHF symptoms.  Patient as per history he did not tolerate 40 mg of Lipitor.  She  developed myalgia but normal CK.  This medication was discontinued and patient was referred to lipid clinic for further management.  From cardiac standpoint she is doing well.  Had no complaints today.  She will continue her Plavix during December of this year.  Also advised to continue rest of the medications.  EKG reviewed.  Sinus rhythm.  First-degree AV block.  Nonspecific ST-T changes.  Prior echocardiogram reviewed normal LV function.  Apical wall motion abnormality.  Nuclear stress test reviewed.  Apical perfusion defect.  Per orders.   Return to Clinic in 6 months time with an echocardiogram.    Total visit duration 30 minutes.  This includes face-to-face interview, physical exam, chart review, review of labs EKG and echocardiogram as well as documentation.      Please do not hesitate to contact me if you have any questions/concerns.     Sincerely,     VAISHALI Juan MD

## 2021-06-10 NOTE — PATIENT INSTRUCTIONS
Thank you for coming to the [facility] @ [facility]; please note the following instructions:    1. Physical therapy referral placed for external location    2.  Follow up in December with an echo prior to seeing Dr. Peña        If you have any questions regarding your visit please contact your care team:     Cardiology  Telephone Number   Calista CAMACHO, RN  Leslee YORK, RN   Valeria HARTMANN, RMA  Raquel SERRATO, RMA  Dilcia HARPER, LPN   [phone] (option 1)   For scheduling appts:     [phone] (select option 1)       For the Device Clinic (Pacemakers and ICD's)  RN's :  Arin Cantu   During business hours: [phone]    *After business hours:  [phone] (select option 4)      Normal test result notifications will be released via I[employer] or mailed within 7 business days.  All other test results, will be communicated via telephone once reviewed by your cardiologist.    If you need a medication refill please contact your pharmacy.  Please allow 3 business days for your refill to be completed.    As always, thank you for trusting us with your health care needs!

## 2021-06-10 NOTE — PROGRESS NOTES
SUBJECTIVE:  Carolin Mcbride is a 86 year old female who presents for post PCI follow-up.    Patient had 2 stent to LAD, 1 stent torPL branch andPOBA of D1 on 12/8/2020.     Initially patient presented for the management of hyperlipidemia.  Her LDL was over 200.  Patient had a TIA and she was initially managed on clopidogrel for a short course.  Her CT MRI and echo were normal.  Also had a Zio patch which did not show significant arrhythmia or atrial fibrillation.  Patient was very reluctant to take statin as she had severe myalgia with Baycol.  She was started on 20 mg of Lipitor which she discontinued due to some nonspecific muscle ache.  Patient was restarted on Lipitor 20 mg and then subsequently it was increased to 40 mg daily.  Patient is tolerating this dose very well.  As part of CAD evaluation patient had a nuclear scan.  She had an apical perfusion defect which was fixed.  The question was whether it was an artifact or real.  Subsequently patient had an echocardiogram which showed apical wall motion abnormality.  Subsequent angiogram showed LAD D1 disease and RPL disease for which she had intervention.     Patient had some myalgia on 40 mg of Lipitor.  Her CK was normal medication was discontinued and he was referred to lipid clinic for further management.  Today patient had no cardiac complaints.  She is doing well from this aspect.  Still complaining of myalgia and muscle weakness.    Patient Active Problem List    Diagnosis Date Noted     Status post coronary angiogram 12/08/2020     Priority: Medium     Dyspnea on exertion 11/24/2020     Priority: Medium     Added automatically from request for surgery [id number]       Abnormal nuclear cardiac imaging test 11/24/2020     Priority: Medium     Added automatically from request for surgery [id number]       Microscopic hematuria 08/11/2020     Priority: Medium     Proteinuria, unspecified type 08/11/2020     Priority: Medium     Leg weakness, bilateral  04/13/2020     Priority: Medium     Recurrent and persistent hematuria 10/31/2019     Priority: Medium     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 minimally only for Tree pollens (all other environmental allergens NEGATIVE)       Diagnostic skin and sensitization tests (aka ALLERGENS)      Priority: Medium     CKD (chronic kidney disease) stage 3, GFR 30-59 ml/min 05/22/2014     Priority: Medium     Hyperlipidemia with target LDL less than 100 04/08/2013     Priority: Medium     Osteoporosis      Priority: Medium     5 years Fosamax 1-1-2011 to 12-. Medication holiday recommended.         Hypertension goal BP (blood pressure) < 140/90 06/26/2012     Priority: Medium     Advance care planning 03/13/2012     Priority: Medium     Advance Care Planning 12/20/2016: ACP Review of Chart / Resources Provided:  Reviewed chart for advance care plan.  Carolin Mcbride has been provided information and resources to begin or update their advance care plan.  Advance Care Planning 03/13/2012: Discussed advance care planning with patient; information given to patient to review. 3/13/2012 . Lara Roque MA                  CAD (coronary artery disease)      Priority: Medium     Stenting of 2 arteries and balloon angioplasty 12-8-2020.        Pseudophakia OU c YAG OU 06/02/2011     Priority: Medium    .  Current Outpatient Medications   Medication Sig     aspirin 81 MG tablet Take 1 tablet (81 mg) by mouth daily     citalopram (CELEXA) 10 MG tablet Take 1 tablet (10 mg) by mouth daily     clopidogrel (PLAVIX) 75 MG tablet Take 4 tablets (300 mg) on the first day for a loading  "dose then decrease to 1 tablet (75 mg) daily     ezetimibe (ZETIA) 10 MG tablet Take 1 tablet (10 mg) by mouth daily     Hypromellose (ARTIFICIAL TEARS OP) Apply 1 drop to eye as needed Both eyes.     losartan (COZAAR) 100 MG tablet Take 1 tablet (100 mg) by mouth daily     VITAMIN D 400 UNIT OR TABS 1 cap twice a day      nitroGLYcerin (NITROSTAT) 0.4 MG sublingual tablet One tablet under the tongue every 5 minutes if needed for chest pain. May repeat every 5 minutes for a maximum of 3 doses in 15 minutes\"     Current Facility-Administered Medications   Medication     triamcinolone acetonide (KENALOG-10) injection 10 mg     Past Medical History:   Diagnosis Date     Adjustment reaction with anxiety and depression 2001     CAD (coronary artery disease) 2009    asymptomatic, on CT scan     Cyst, ovarian      Diagnostic skin and sensitization tests (aka ALLERGENS) 9/29/14 IgE tests pos. minimally only for Tree pollens (all other environmental allergens NEGATIVE)     HTN (hypertension)      Hyperlipidemia      Myositis 2001    related to past Baycol use- resolved     Osteoporosis      Seasonal allergic rhinitis     9/29/14 IgE tests pos. minimally only for Tree pollens (all other environmental allergens NEGATIVE)     Past Surgical History:   Procedure Laterality Date     APPENDECTOMY       CHOLECYSTECTOMY, OPEN       COLONOSCOPY  2009    neg     CV CORONARY ANGIOGRAM N/A 12/8/2020    Procedure: CV CORONARY ANGIOGRAM;  Surgeon: Isra Weber MD;  Location: [facility] CARDIAC CATH LAB     CV PCI STENT DRUG ELUTING N/A 12/8/2020    Procedure: Percutaneous Coronary Intervention Stent Drug Eluting;  Surgeon: Isra Weber MD;  Location: [facility] CARDIAC CATH LAB     CYSTOCELE REPAIR  2003    TVT SPARC     Allergies   Allergen Reactions     Baycol [Cerivastatin Sodium]       muscle mass loss       Crestor [Hmg-Coa-R Inhibitors]      Previously on Baycol and affect muscle mass loss.     " Darvocet [Acetaminophen]      Severe nausea     Macrobid [Nitrofuran Derivatives]      Hives     Social History     Socioeconomic History     Marital status:      Spouse name: Shashank     Number of children: 3     Years of education: Not on file     Highest education level: Not on file   Occupational History     Employer: RETIRED   Social Needs     Financial resource strain: Not on file     Food insecurity     Worry: Not on file     Inability: Not on file     Transportation needs     Medical: Not on file     Non-medical: Not on file   Tobacco Use     Smoking status: Never Smoker     Smokeless tobacco: Never Used     Tobacco comment: Never smoked; nonsmoking household   Substance and Sexual Activity     Alcohol use: No     Comment: very rare     Drug use: No     Comment: never     Sexual activity: Yes     Partners: Male     Birth control/protection: None   Lifestyle     Physical activity     Days per week: Not on file     Minutes per session: Not on file     Stress: Not on file   Relationships     Social connections     Talks on phone: Not on file     Gets together: Not on file     Attends Taoist service: Not on file     Active member of club or organization: Not on file     Attends meetings of clubs or organizations: Not on file     Relationship status: Not on file     Intimate partner violence     Fear of current or ex partner: Not on file     Emotionally abused: Not on file     Physically abused: Not on file     Forced sexual activity: Not on file   Other Topics Concern     Parent/sibling w/ CABG, MI or angioplasty before 65F 55M? No      Service No     Blood Transfusions No     Caffeine Concern No     Occupational Exposure No     Hobby Hazards No     Sleep Concern No     Stress Concern No     Weight Concern Yes     Special Diet No     Back Care No     Exercise Yes     Comment: Curves     Bike Helmet No     Seat Belt Yes     Self-Exams Yes   Social History Narrative     Not on file     Family  History   Problem Relation Age of Onset     Cancer Mother         ovarian      Heart Disease Sister         CABG x 3     Neurologic Disorder Sister         Fibromyalgia     Cancer Father         stomach/interstines      Musculoskeletal Disorder Brother         back      Heart Disease Brother 60        CABG x 4     Glaucoma No family hx of      Macular Degeneration No family hx of           REVIEW OF SYSTEMS:  General: negative, fever, chills, night sweats  Skin: negative, acne, rash and scaling  Eyes: negative, double vision, eye pain and photophobia  Ears/Nose/Throat: negative, nasal congestion and purulent rhinorrhea  Respiratory: No dyspnea on exertion, No cough, No hemoptysis and negative  Cardiovascular: negative, palpitations, tachycardia, irregular heart beat, chest pain, exertional chest pain or pressure, paroxysmal nocturnal dyspnea, dyspnea on exertion and orthopnea       OBJECTIVE:  Blood pressure (!) 155/79, pulse 66, weight 76.2 kg (168 lb), SpO2 97 %, not currently breastfeeding.  General Appearance: healthy, alert, active and no distress  Head: Normocephalic. No masses, lesions, tenderness or abnormalities  Eyes: conjuctiva clear, PERRL, EOM intact  Ears: External ears normal. Canals clear. TM's normal.  Nose: Nares normal  Mouth: normal  Neck: Supple, no cervical adenopathy, no thyromegaly  Lungs: clear to auscultation  Cardiac: regular rate and rhythm, normal S1 and S2, no murmur       ASSESSMENT/PLAN:  Patient here for 6-month follow-up after PCI.  Doing well.  No anginal symptoms.  No CHF symptoms.  Patient as per history he did not tolerate 40 mg of Lipitor.  She developed myalgia but normal CK.  This medication was discontinued and patient was referred to lipid clinic for further management.  From cardiac standpoint she is doing well.  Had no complaints today.  She will continue her Plavix during December of this year.  Also advised to continue rest of the medications.  EKG reviewed.  Sinus  rhythm.  First-degree AV block.  Nonspecific ST-T changes.  Prior echocardiogram reviewed normal LV function.  Apical wall motion abnormality.  Nuclear stress test reviewed.  Apical perfusion defect.  Per orders.   Return to Clinic in 6 months time with an echocardiogram.    Total visit duration 30 minutes.  This includes face-to-face interview, physical exam, chart review, review of labs EKG and echocardiogram as well as documentation.

## 2021-07-01 DIAGNOSIS — F33.0 MAJOR DEPRESSIVE DISORDER, RECURRENT EPISODE, MILD (H): ICD-10-CM

## 2021-07-01 RX ORDER — CITALOPRAM HYDROBROMIDE 10 MG/1
10 TABLET ORAL DAILY
Qty: 90 TABLET | Refills: 3 | Status: SHIPPED | OUTPATIENT
Start: 2021-07-01 | End: 2022-07-11

## 2021-07-01 NOTE — TELEPHONE ENCOUNTER
"Requested Prescriptions   Pending Prescriptions Disp Refills    citalopram (CELEXA) 10 MG tablet 90 tablet 3     Sig: Take 1 tablet (10 mg) by mouth daily       SSRIs Protocol Failed - 7/1/2021  1:03 PM        Failed - PHQ-9 score less than 5 in past 6 months     Please review last PHQ-9 score.           Passed - Medication is active on med list        Passed - Patient is age 18 or older        Passed - No active pregnancy on record        Passed - No positive pregnancy test in last 12 months        Passed - Recent (6 mo) or future (30 days) visit within the authorizing provider's specialty     Patient had office visit in the last 6 months or has a visit in the next 30 days with authorizing provider or within the authorizing provider's specialty.  See \"Patient Info\" tab in inbasket, or \"Choose Columns\" in Meds & Orders section of the refill encounter.                 "

## 2021-07-09 ENCOUNTER — TELEPHONE (OUTPATIENT)
Dept: FAMILY MEDICINE | Facility: CLINIC | Age: 86
End: 2021-07-09

## 2021-07-09 DIAGNOSIS — E04.1 THYROID NODULE: Primary | ICD-10-CM

## 2021-07-09 NOTE — TELEPHONE ENCOUNTER
.Reason for call:  Other   Patient called regarding (reason for call): call back  Additional comments: pt seen cardiologist today 7/9/21 and he told her that she has nodules on her thyroid and she is coming in for PT on 7/12 and wants to know if she can be seen in clinic or what her next steps should be. Please call pt at [phone]    Phone number to reach patient:  Home number on file [phone] (home)    Best Time:  anytime    Can we leave a detailed message on this number?  YES    Travel screening: Negative

## 2021-07-09 NOTE — TELEPHONE ENCOUNTER
I will place an order for a thyroid ultrasound. She is due for a 1 year follow up. Schedule an appointment with me after the ultrasound is done. Lela Townsend MD

## 2021-07-09 NOTE — TELEPHONE ENCOUNTER
Called pt and relayed message. Gave her the scheduling number. Pt will call back to schedule once this has been done.

## 2021-07-12 ENCOUNTER — THERAPY VISIT (OUTPATIENT)
Dept: PHYSICAL THERAPY | Facility: CLINIC | Age: 86
End: 2021-07-12
Attending: INTERNAL MEDICINE
Payer: COMMERCIAL

## 2021-07-12 DIAGNOSIS — M79.10 MYALGIA: ICD-10-CM

## 2021-07-12 DIAGNOSIS — M62.81 MUSCLE WEAKNESS (GENERALIZED): ICD-10-CM

## 2021-07-12 PROCEDURE — 97110 THERAPEUTIC EXERCISES: CPT | Mod: GP | Performed by: PHYSICAL THERAPIST

## 2021-07-12 PROCEDURE — 97161 PT EVAL LOW COMPLEX 20 MIN: CPT | Mod: GP | Performed by: PHYSICAL THERAPIST

## 2021-07-12 NOTE — PROGRESS NOTES
Physical Therapy Initial Evaluation  Subjective:  The history is provided by the patient.   Patient Health History  Carolin Mcbride being seen for leg and arm weakness.     Problem began: 6/10/2021.   Problem occurred: taking statins   Pain is reported as 0/10 on pain scale.  General health as reported by patient is good.  Pertinent medical history includes: heart problems, high blood pressure and overweight.   Red flags:  None as reported by patient.  Medical allergies: none.   Surgeries include:  Heart surgery. Other surgery history details: 3 stents on 12-8-20.    Current medications:  Cardiac medication and high blood pressure medication.    Current occupation is retired.                     Therapist Generated HPI Evaluation  Problem details: Patient reports that she had weakness in her arms and legs in 2001 after taking Bacal. She went off that in 2001 after it was taken off the market and her strength gradually returned to normal. She went without statins for about 10 years. She started to have weakness in her arms and legs again in about March of 2020 from being on statins again. She has been off them for 1 to 11/2 months now and feels she is a little better. MD order from 6-10-21. She denies pain, but has complaints of weakness in her arms and legs. .     Carolin Mcbride is a 86 year old female with weakness condition which occurred with cardiovascular disease.   This is a recurrent condition.  Where condition occurred: other (from taking statins).             Associated symptoms:  Loss of strength.  Symptoms are exacerbated by walking, bending/squatting, ascending stairs and descending stairs (walk minh 5' with fatigue)                        Since onset symptoms are gradually improving.            Barriers include:  None as reported by patient.                        Objective:        Flexibility/Screens:           Lower Extremity:  Not assessed                       Shoulder  Evaluation:  ROM:          Strength:        Abduction:  Left: 4+/5  Pain:    Right: 4+/5     Pain:            Elbow Flexion:  Left:5-/5     Pain:    Right:5-/5     Pain:  Elbow Extension:  Left:5-/5     Pain:    Right:5-/5     Pain:                                Hip Evaluation    Hip Strength:      Extension:  Left: 4/5  -  Pain:Right: 4/5    -  Pain:    Abduction:  Left: 4/5    -   Pain:Right: 4/5   -   Pain:                                     General Evaluation:  AROM:  normal                Lower Extremity Strength:    Knee Extension: 4+      Knee Flexion: 5-      Ankle Plantarflexion: 4+      Ankle Dorsiflexion: 5-      Lower Extremity Flexibility:  not assessed                          Edema:  normal        Balance:    Single Leg Stance--Eyes Open:  Left: 1/30 sec    Right: 2/30 sec            Functional Assessment:  not assessed                                               ROS    Assessment/Plan:    Patient is a 86 year old female with weakness complaints.    Patient has the following significant findings with corresponding treatment plan.                Diagnosis 1:  Muscle weakness following statin use  Decreased strength - therapeutic exercise, therapeutic activities and home program  Impaired muscle performance - neuro re-education and home program    Therapy Evaluation Codes:   1) History comprised of:   Personal factors that impact the plan of care:      Past/current experiences and Time since onset of symptoms.    Comorbidity factors that impact the plan of care are:      None.     Medications impacting care: None.  2) Examination of Body Systems comprised of:   Body structures and functions that impact the plan of care:      weakness.   Activity limitations that impact the plan of care are:      Squatting/kneeling, Stairs and Walking.  3) Clinical presentation characteristics are:   Stable/Uncomplicated.  4) Decision-Making    Low complexity using standardized patient assessment instrument and/or  measureable assessment of functional outcome.  Cumulative Therapy Evaluation is: Low complexity.    Previous and current functional limitations:  (See Goal Flow Sheet for this information)    Short term and Long term goals: (See Goal Flow Sheet for this information)     Communication ability:  Patient appears to be able to clearly communicate and understand verbal and written communication and follow directions correctly.  Treatment Explanation - The following has been discussed with the patient:   RX ordered/plan of care  Anticipated outcomes  Possible risks and side effects  This patient would benefit from PT intervention to resume normal activities.   Rehab potential is good.    Frequency:  1 X week, once daily  Duration:  for 8 weeks  Discharge Plan:  Achieve all LTG.  Independent in home treatment program.  Reach maximal therapeutic benefit.    Please refer to the daily flowsheet for treatment today, total treatment time and time spent performing 1:1 timed codes.

## 2021-07-14 PROBLEM — M62.81 MUSCLE WEAKNESS (GENERALIZED): Status: ACTIVE | Noted: 2021-07-14

## 2021-07-20 ENCOUNTER — THERAPY VISIT (OUTPATIENT)
Dept: PHYSICAL THERAPY | Facility: CLINIC | Age: 86
End: 2021-07-20
Payer: COMMERCIAL

## 2021-07-20 ENCOUNTER — OFFICE VISIT (OUTPATIENT)
Dept: OPHTHALMOLOGY | Facility: CLINIC | Age: 86
End: 2021-07-20
Payer: COMMERCIAL

## 2021-07-20 DIAGNOSIS — H43.813 PVD (POSTERIOR VITREOUS DETACHMENT), BILATERAL: ICD-10-CM

## 2021-07-20 DIAGNOSIS — Z96.1 PSEUDOPHAKIA: ICD-10-CM

## 2021-07-20 DIAGNOSIS — Z01.00 EXAMINATION OF EYES AND VISION: Primary | ICD-10-CM

## 2021-07-20 DIAGNOSIS — H18.529 ANTERIOR BASEMENT MEMBRANE DYSTROPHY: ICD-10-CM

## 2021-07-20 DIAGNOSIS — H52.12 MYOPIA OF LEFT EYE: ICD-10-CM

## 2021-07-20 DIAGNOSIS — H52.221 REGULAR ASTIGMATISM OF RIGHT EYE: ICD-10-CM

## 2021-07-20 DIAGNOSIS — M62.81 MUSCLE WEAKNESS (GENERALIZED): ICD-10-CM

## 2021-07-20 DIAGNOSIS — H52.4 PRESBYOPIA OF BOTH EYES: ICD-10-CM

## 2021-07-20 DIAGNOSIS — H53.039 STRABISMIC AMBLYOPIA, UNSPECIFIED LATERALITY: ICD-10-CM

## 2021-07-20 PROCEDURE — 97112 NEUROMUSCULAR REEDUCATION: CPT | Mod: GP | Performed by: PHYSICAL THERAPIST

## 2021-07-20 PROCEDURE — 97110 THERAPEUTIC EXERCISES: CPT | Mod: GP | Performed by: PHYSICAL THERAPIST

## 2021-07-20 PROCEDURE — 92014 COMPRE OPH EXAM EST PT 1/>: CPT | Performed by: STUDENT IN AN ORGANIZED HEALTH CARE EDUCATION/TRAINING PROGRAM

## 2021-07-20 PROCEDURE — 92015 DETERMINE REFRACTIVE STATE: CPT | Performed by: STUDENT IN AN ORGANIZED HEALTH CARE EDUCATION/TRAINING PROGRAM

## 2021-07-20 ASSESSMENT — REFRACTION_WEARINGRX
OS_ADD: +2.75
SPECS_TYPE: PAL
OD_AXIS: 108
OS_AXIS: 125
OS_SPHERE: -0.25
OD_SPHERE: PLANO
OS_CYLINDER: +0.50
OD_ADD: +2.75
OD_CYLINDER: +1.00

## 2021-07-20 ASSESSMENT — CUP TO DISC RATIO
OS_RATIO: 0.4
OD_RATIO: 0.5

## 2021-07-20 ASSESSMENT — VISUAL ACUITY
METHOD: SNELLEN - LINEAR
OD_CC: J1
OS_CC: 20/80-1
OD_CC: 20/30-1

## 2021-07-20 ASSESSMENT — REFRACTION_MANIFEST
OS_CYLINDER: SPHERE
OD_SPHERE: PLANO
OS_SPHERE: -0.50
OD_AXIS: 103
OD_ADD: +3.00
OD_CYLINDER: +1.00
OS_ADD: +3.00

## 2021-07-20 ASSESSMENT — SLIT LAMP EXAM - LIDS
COMMENTS: 1+ DERMATOCHALASIS
COMMENTS: 1+ DERMATOCHALASIS

## 2021-07-20 ASSESSMENT — CONF VISUAL FIELD
OS_NORMAL: 1
OD_NORMAL: 1

## 2021-07-20 ASSESSMENT — EXTERNAL EXAM - LEFT EYE: OS_EXAM: NORMAL

## 2021-07-20 ASSESSMENT — TONOMETRY
OS_IOP_MMHG: 13
OD_IOP_MMHG: 16
IOP_METHOD: APPLANATION

## 2021-07-20 ASSESSMENT — EXTERNAL EXAM - RIGHT EYE: OD_EXAM: NORMAL

## 2021-07-20 NOTE — PROGRESS NOTES
Current Eye Medications:  Tear prn 2-3 times a day.     Subjective: Comprehensive eye exam.   Pt reports that she is not seeing quite as well, and she wonders if is developing film in her eyes. Improves when she uses gel tears. Pt also reports her eyes can be mattery and can be mattered shut.   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     Objective:  See Ophthalmology Exam.      Assessment:  Carolin Mcbride is a 86 year old female who presents with:   Encounter Diagnoses   Name Primary?     Examination of eyes and vision      Myopia of left eye      Regular astigmatism of right eye      Presbyopia of both eyes        Pseudophakia OU c YAG OU      Strabismic amblyopia OS      Anterior basement membrane dystrophy, OU      PVD (posterior vitreous detachment), bilateral      Watch C:D.     Plan:  Use a gel tear at bedtime and twice per day (like Refresh Liquigel or GenTeal Gel Tears).     Glasses prescription given - optional to update    Nevin Ricci MD  [phone]

## 2021-07-20 NOTE — LETTER
7/20/2021         RE: Carolin Mcbride  [address] [address]        Dear Colleague,    Thank you for referring your patient, Carolin Mcbride, to the [facility]. Please see a copy of my visit note below.     Current Eye Medications:  Tear prn 2-3 times a day.     Subjective: Comprehensive eye exam.   Pt reports that she is not seeing quite as well, and she wonders if is developing film in her eyes. Improves when she uses gel tears. Pt also reports her eyes can be mattery and can be mattered shut.   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     Objective:  See Ophthalmology Exam.      Assessment:  Carolin Mcbride is a 86 year old female who presents with:   Encounter Diagnoses   Name Primary?     Examination of eyes and vision      Myopia of left eye      Regular astigmatism of right eye      Presbyopia of both eyes        Pseudophakia OU c YAG OU      Strabismic amblyopia OS      Anterior basement membrane dystrophy, OU      PVD (posterior vitreous detachment), bilateral      Watch C:D.     Plan:  Use a gel tear at bedtime and twice per day (like Refresh Liquigel or GenTeal Gel Tears).     Glasses prescription given - optional to update    Nevin Ricci MD  [phone]          Again, thank you for allowing me to participate in the care of your patient.        Sincerely,        Nevin Ricci MD

## 2021-07-20 NOTE — PATIENT INSTRUCTIONS
Use a gel tear at bedtime and twice per day (like Refresh Liquigel or GenTeal Gel Tears).     Glasses prescription given - optional to update    Nevin Ricci MD  [phone]

## 2021-07-27 ENCOUNTER — ANCILLARY PROCEDURE (OUTPATIENT)
Dept: ULTRASOUND IMAGING | Facility: CLINIC | Age: 86
End: 2021-07-27
Attending: FAMILY MEDICINE
Payer: COMMERCIAL

## 2021-07-27 DIAGNOSIS — E04.1 THYROID NODULE: ICD-10-CM

## 2021-07-27 PROCEDURE — 76536 US EXAM OF HEAD AND NECK: CPT | Performed by: RADIOLOGY

## 2021-07-27 NOTE — LETTER
July 28, 2021      Carolin Mcbride  [address] [address]        Dear ,    We are writing to inform you of your test results.    Your test results are attached. I am happy to let you know that they are stable.     The nodules have not changed in size and look benign.     Please call me if you have any questions about these test results or about your care.        Resulted Orders   US Thyroid    Narrative    US THYROID 7/27/2021 11:22 AM    CLINICAL HISTORY: Thyroid nodule  TECHNIQUE: Thyroid ultrasound.     COMPARISON: 3/9/2020     FINDINGS:  RIGHT lobe: 3.6 x 1.5 x 1.1 cm. Homogeneous echotexture.  Isthmus: 2 mm.  LEFT lobe: 4.0 x 1.4 x 1.5 cm. Homogeneous echotexture.    NODULES:    Nodule 1: 0.9 x 0.8 x 0.8 cm fibroid in the superior right lobe,  previously measuring 0.5 x 0.3 x 0.7 cm.   Composition: Mixed cystic and solid, 1 point   Echogenicity: Hyperechoic or isoechoic, 1 point   Shape: Wider-than-tall, 0 points   Margin: Ill-defined, 0 points   Echogenic Foci: None, or large comet-tail artifacts, 0 points   Point Total: 2 points. TI-RADS 2. No FNA.      Nodule 2: 0.8 x 0.7 x 0.7 cm cyst in the inferior right lobe  previously measured 0.8 x 0.6 x 0.7 cm.  Composition: Cystic or almost completely cystic, 0 points   Echogenicity: Anechoic, 0 points   Shape: Not taller than wide, 0 points   Margin: Smooth, 0 points   Echogenic Foci: None, or large comet-tail artifacts, 0 points   Point Total: 0 points. TI-RADS 1. No FNA.    Nodule 3: A 0.7 x 0.7 x 0.6 cm nodule in the inferior left lobe,  previously measured 0.8 x 0.7 x 0.7 cm.  Composition: Unable to determine, 2 points   Echogenicity: Unable to determine, 1 point   Shape: Not taller than wide, 0 points   Margin: Smooth, 0 points   Echogenic Foci: Peripheral (rim) calcifications, 2 points   Point Total: 4 points. TI-RADS 4. If 1.5 cm or larger, recommend FNA;  if 1 cm or larger, follow up US (annually for 5 years).    Impression     IMPRESSION:  1.  No significant change in thyroid nodules since 3/9/2020.    Nodules are characterized per  ACR Thyroid Imaging, Reporting and Data System (TI-RADS): White Paper  of the ACR TI-RADS Committee  Geoff Srivastava et al. Journal of the American College of  Radiology 2017. Volume 14 (2017), Issue 5, 145-354.     LEAH CARMONA MD         SYSTEM ID:  [id number]       If you have any questions or concerns, please call the clinic at the number listed above.       Sincerely,      Lela Townsend MD

## 2021-07-28 NOTE — RESULT ENCOUNTER NOTE
Dear Carolin Mcbride,    Your test results are attached. I am happy to let you know that they are stable.    The nodules have not changed in size and look benign.     Please call me if you have any questions about these test results or about your care.    Sincerely,    Lela Townsend MD

## 2021-08-09 ENCOUNTER — OFFICE VISIT (OUTPATIENT)
Dept: FAMILY MEDICINE | Facility: CLINIC | Age: 86
End: 2021-08-09
Payer: COMMERCIAL

## 2021-08-09 DIAGNOSIS — L20.84 INTRINSIC ECZEMA: Primary | ICD-10-CM

## 2021-08-09 DIAGNOSIS — E78.5 HYPERLIPIDEMIA WITH TARGET LDL LESS THAN 100: ICD-10-CM

## 2021-08-09 DIAGNOSIS — I10 HYPERTENSION GOAL BP (BLOOD PRESSURE) < 140/90: ICD-10-CM

## 2021-08-09 DIAGNOSIS — Z78.9 STATIN INTOLERANCE: ICD-10-CM

## 2021-08-09 DIAGNOSIS — I25.119 CORONARY ARTERY DISEASE INVOLVING NATIVE CORONARY ARTERY OF NATIVE HEART WITH ANGINA PECTORIS (H): ICD-10-CM

## 2021-08-09 DIAGNOSIS — N18.31 STAGE 3A CHRONIC KIDNEY DISEASE (H): ICD-10-CM

## 2021-08-09 PROCEDURE — 99214 OFFICE O/P EST MOD 30 MIN: CPT | Performed by: FAMILY MEDICINE

## 2021-08-09 RX ORDER — TRIAMCINOLONE ACETONIDE 1 MG/G
OINTMENT TOPICAL 2 TIMES DAILY
Qty: 30 G | Refills: 1 | Status: SHIPPED | OUTPATIENT
Start: 2021-08-09 | End: 2022-12-13

## 2021-08-09 RX ORDER — NYSTATIN AND TRIAMCINOLONE ACETONIDE 100000; 1 [USP'U]/G; MG/G
OINTMENT TOPICAL 2 TIMES DAILY
Qty: 60 G | Refills: 1 | Status: SHIPPED | OUTPATIENT
Start: 2021-08-09 | End: 2021-08-09 | Stop reason: ALTCHOICE

## 2021-08-09 NOTE — PROGRESS NOTES
Assessment & Plan     Intrinsic eczema  Most consistent with an eczematous patch but needs biopsy or dermatologist evaluation to confirm. Patient would like to try triamcinolone ointment and overnight occlusion first to see if this helps to clear it up. No other products endorsed, to avoid inadvertent allergic reactions. Follow up in 2-3 weeks by phone if not improving. Will refer to dermatologist.   - triamcinolone (KENALOG) 0.1 % external ointment; Apply topically 2 times daily Apply to rash on leg.    Coronary artery disease involving native coronary artery of native heart with angina pectoris (H)  Not tolerant of atorvastatin either with increased leg pain and weakness. Started on Zetia. Follow up with cardiologist as scheduled. They are considering one of the newer injectable biologics. High risk and needs secondary prevention    Hypertension goal BP (blood pressure) < 140/90  Not well controlled on medications, but better at home    Hyperlipidemia with target LDL less than 100  Not at goal    Stage 3a chronic kidney disease  Stable eGFR. Check in 6 months    Statin intolerance  Does not ever want another trial. Takes some time for her leg pain and weakness to clear up after using statin.   - STATIN NOT PRESCRIBED (INTENTIONAL); Please choose reason not prescribed from choices below.             CONSULTATION/REFERRAL to cardiologist as scheduled.   FUTURE LABS:       - Schedule a fasting blood draw in 2-3 weeks  FUTURE APPOINTMENTS:       - Follow-up visit in 3 months or sooner if any questions or concerns.   Regular exercise  See Patient Instructions    Return in about 3 months (around 11/9/2021) for Follow up, with me, Blood pressure check.    Lela Townsend MD  [facility]    Lata Coe is a 86 year old who presents for the following health issues     HPI     Rash  Onset/Duration: was on feet and arms, now single patch on right lower leg for past couple of  years  Description  Location: right leg  Character: round, flakey, red  Itching: Not when using cream but scratches at it at night in her sleep  Intensity:  moderate  Progression of Symptoms:  same  Accompanying signs and symptoms:   Fever: no  Body aches or joint pain: no  Sore throat symptoms: no  Recent cold symptoms: no  History:           Previous episodes of similar rash: yes and these other lesions cleared up  New exposures:  None  Recent travel: no  Exposure to similar rash: no  Precipitating or alleviating factors: triamcinolone cream cream helps with itching.   Therapies tried and outcome: started to use aloe vera gel.     Hyperlipidemia Follow-Up      Are you regularly taking any medication or supplement to lower your cholesterol?   Yes- Zetia    Are you having muscle aches or other side effects that you think could be caused by your cholesterol lowering medication?  No    Hypertension Follow-up      Do you check your blood pressure regularly outside of the clinic? Yes     Are you following a low salt diet? Yes    Are your blood pressures ever more than 140 on the top number (systolic) OR more   than 90 on the bottom number (diastolic), for example 140/90? No    Vascular Disease Follow-up      How often do you take nitroglycerin? Never    Do you take an aspirin every day? Yes- taking Plavix for 1-2 years per cardiology recommendations    Chronic Kidney Disease Follow-up      Do you take any over the counter pain medicine?: No          Review of Systems   Constitutional, HEENT, cardiovascular, pulmonary, gi and gu systems are negative, except as otherwise noted.      Objective    BP (!) 140/81 (BP Location: Right arm, Patient Position: Sitting, Cuff Size: Adult Small)   Pulse 60   Temp 96.9  F (36.1  C) (Tympanic)   Wt 76.3 kg (168 lb 3.2 oz)   LMP  (LMP Unknown)   SpO2 99%   BMI 29.80 kg/m    Body mass index is 29.8 kg/m .  Physical Exam   GENERAL: elderly, alert, well nourished, well hydrated, no  distress, in very good shape and able to get on exam table without assistance.   HENT: ear canals- normal; TMs- normal; Nose- normal; Mouth- no ulcers, no lesions, missing dentition  NECK: no tenderness, no adenopathy, no asymmetry, no masses, no stiffness; thyroid- normal to palpation  RESP: lungs clear to auscultation - no rales, no rhonchi, no wheezes  CV: regular rates and rhythm, normal S1 S2, no S3 or S4 and no murmur, no click or rub, normal pulses  ABDOMEN: soft, no tenderness, no  hepatosplenomegaly, no masses, normal bowel sounds  MS: extremities- no gross deformities noted, no edema  SKIN: no suspicious lesions, age related skin changes with seborrheic keratosis and no actinic keratosis. 3-4 cm patch of erythema on right anterior leg with excoriation and some lichenification consistent with eczema, well demarcated.   NEURO: strength and tone- normal, sensory exam- grossly normal, reflexes- symmetric  BACK: no CVA tenderness, no paralumbar tenderness  MENTAL STATUS EXAM:  Appearance/Behavior: no apparent distress, neatly groomed, dressed appropriately for weather, appears stated age and is not frail-appearing  Speech: normal  Mood/Affect: normal affect  Insight: Good     Office Visit on 04/12/2021   Component Date Value Ref Range Status     ALT 04/12/2021 30  0 - 50 U/L Final     Creatinine Urine 04/12/2021 223  mg/dL Final     Albumin Urine mg/L 04/12/2021 240  mg/L Final     Albumin Urine mg/g Cr 04/12/2021 107.62* 0 - 25 mg/g Cr Final     WBC 04/12/2021 6.5  4.0 - 11.0 10e9/L Final     RBC Count 04/12/2021 4.45  3.8 - 5.2 10e12/L Final     Hemoglobin 04/12/2021 12.7  11.7 - 15.7 g/dL Final     Hematocrit 04/12/2021 38.8  35.0 - 47.0 % Final     MCV 04/12/2021 87  78 - 100 fl Final     MCH 04/12/2021 28.5  26.5 - 33.0 pg Final     MCHC 04/12/2021 32.7  31.5 - 36.5 g/dL Final     RDW 04/12/2021 12.4  10.0 - 15.0 % Final     Platelet Count 04/12/2021 278  150 - 450 10e9/L Final     Sodium 04/12/2021 138   133 - 144 mmol/L Final     Potassium 04/12/2021 4.6  3.4 - 5.3 mmol/L Final     Chloride 04/12/2021 107  94 - 109 mmol/L Final     Carbon Dioxide 04/12/2021 28  20 - 32 mmol/L Final     Anion Gap 04/12/2021 3  3 - 14 mmol/L Final     Glucose 04/12/2021 98  70 - 99 mg/dL Final     Urea Nitrogen 04/12/2021 21  7 - 30 mg/dL Final     Creatinine 04/12/2021 1.12* 0.52 - 1.04 mg/dL Final     GFR Estimate 04/12/2021 44* >60 mL/min/[1.73_m2] Final    Comment: Non  GFR Calc  Starting 12/18/2018, serum creatinine based estimated GFR (eGFR) will be   calculated using the Chronic Kidney Disease Epidemiology Collaboration   (CKD-EPI) equation.       GFR Estimate If Black 04/12/2021 51* >60 mL/min/[1.73_m2] Final    Comment:  GFR Calc  Starting 12/18/2018, serum creatinine based estimated GFR (eGFR) will be   calculated using the Chronic Kidney Disease Epidemiology Collaboration   (CKD-EPI) equation.       Calcium 04/12/2021 9.0  8.5 - 10.1 mg/dL Final     Cholesterol 04/12/2021 234* <200 mg/dL Final    Desirable:       <200 mg/dl     Triglycerides 04/12/2021 227* <150 mg/dL Final    Comment: Borderline high:  150-199 mg/dl  High:             200-499 mg/dl  Very high:       >499 mg/dl       HDL Cholesterol 04/12/2021 47* >49 mg/dL Final     LDL Cholesterol Calculated 04/12/2021 142* <100 mg/dL Final    Comment: Above desirable:  100-129 mg/dl  Borderline High:  130-159 mg/dL  High:             160-189 mg/dL  Very high:       >189 mg/dl       Non HDL Cholesterol 04/12/2021 187* <130 mg/dL Final    Comment: Above Desirable:  130-159 mg/dl  Borderline high:  160-189 mg/dl  High:             190-219 mg/dl  Very high:       >219 mg/dl

## 2021-08-09 NOTE — PATIENT INSTRUCTIONS
At [facility], we strive to deliver an exceptional experience to you, every time we see you. If you receive a survey, please complete it as we do value your feedback.  If you have MyChart, you can expect to receive results automatically within 24 hours of their completion.  Your provider will send a note interpreting your results as well.   If you do not have MyChart, you should receive your results in about a week by mail.    Your care team:                            Family Medicine Internal Medicine   MD Edgardo Wilhelm MD Shantel Branch-Fleming, MD Srinivasa Vaka, MD Katya Belousova, PANICK Hyatt, APRN CNP    Merrill Patten, MD Pediatrics   Kris Meyers, PANICK Xavier, CNP MD Rebecca Turner APRN CNP   MD Soumya Yee MD Deborah Mielke, MD Sneha Ramos, APRN [facility]      Clinic hours: Monday - Thursday 7 am-6 pm; Fridays 7 am-5 pm.   Urgent care: Monday - Friday 10 am- 8 pm; Saturday and Sunday 9 am-5 pm.    Clinic: [phone]       [facility]: Monday - Thursday 8 am - 7 pm; Friday 8 am - 6 pm  [facility]: [phone]     Use [email or web address] for 24/7 diagnosis and treatment of dozens of conditions.  Patient Education     Atopic Dermatitis (Adult)  Atopic dermatitis is a dry, itchy, red rash. It s also called eczema. The rash is chronic, or ongoing. It can come and go over time. The disease is often passed down in families. It causes a problem with the skin barrier that makes the skin more sensitive to the environment and other factors. The increased skin sensitivity causes an itch, which causes scratching. Scratching can worsen the itching or also break the skin. This can put the skin at risk of infection.   The condition is most common in people with asthma, hay fever, hives, or dry or sensitive skin. The rash may be caused by extreme heat or heavy  sweating. Skin irritants can cause the rash to flare up. These can include wool or silk clothing, grease, oils, some medicines, and harsh soaps and detergents. Emotional stress can also be a trigger.   Treatment is done to relieve the itching and inflammation of the skin. This is often done with home care and over-the-counter treatments. Your healthcare provider may prescribe other treatments.   Home care  Follow these tips to care for your condition:    Keep the areas of rash clean by bathing at least every other day. Use lukewarm water to bathe. Don t use hot water, which can dry out the skin.    Don t use soaps with strong detergents. Use mild soaps made for sensitive skin.    Apply a cream or ointment to damp skin right after bathing.    Avoid things that irritate your skin. Wear absorbent, soft fabrics next to the skin rather than rough or scratchy materials.    Use mild laundry soap free of scents and perfumes. Make sure to rinse all the soap out of your clothes.    Treat any skin infection as directed.    Use oral diphenhydramine to help reduce itching. This is an antihistamine you can buy at drug and grocery stores. It can make you sleepy, so use lower doses during the daytime. Be cautious of driving or operating machinery. Or you can use loratadine or other antihistamines that will not make you sleepy. Don't use diphenhydramine if you have glaucoma or have trouble urinating due to an enlarged prostate.  Follow-up care  See your healthcare provider, or as advised. If your symptoms don t get better or if they get worse in the next 7 days, make an appointment with your healthcare provider.   When to seek medical advice  Call your healthcare provider right away  if any of these occur:    Increasing area of redness or pain in the skin    Yellow crusts or wet drainage from the rash    Fever of 100.4 F (38 C) or higher, or as directed by your healthcare provider  [employer] last reviewed this educational content on  8/1/2019 2000-2021 [employer]. All rights reserved. This information is not intended as a substitute for professional medical care. Always follow your healthcare professional's instructions.           Patient Education     Eating Heart-Healthy Foods  Eating has a big impact on your heart health. In fact, eating healthier can improve several of your heart risks at once. For instance, it helps you manage weight, cholesterol, and blood pressure. Here are ideas to help you make heart-healthy changes without giving up all the foods and flavors you love.   Getting started    Talk with your healthcare provider about eating plans, such as the DASH or Mediterranean diet. You may also be referred to a dietitian.    Change a few things at a time. Give yourself time to get used to a few eating changes before adding more.    Work to create a tasty, healthy eating plan that you can stick to for the rest of your life.    Goals for healthy eating  Below are some tips to improve your eating habits:     Limit saturated fats and trans fats. Saturated fats raise your levels of cholesterol, so keep these fats to a minimum. They are found in foods such as fatty meats, whole milk, cheese, and palm and coconut oils. Avoid trans fats because they lower good cholesterol as well as raise bad cholesterol. Trans fats are most often found in processed foods, such as pastries, cookies, pies, muffins, fried foods, stick margarines, and shortening.    Reduce how much sodium (salt) you have. Eating too much salt may increase your blood pressure. Limit your sodium intake to 2,300 milligrams (mg) per day (the amount in 1 teaspoon of salt), or less if your healthcare provider recommends it. Dining out less often and eating fewer processed foods are two great ways to decrease the amount of salt you consume. At home, flavor your foods with other spices and herbs instead of salt.    Managing calories. A calorie is a unit of energy. Your  body burns calories for fuel, but if you eat more calories than your body burns, the extras are stored as fat. Your healthcare provider can help you create a diet plan to manage your calories. This will likely include eating healthier foods and getting regular exercise. To help you track your progress, keep a diary to record what you eat and how often you exercise.  Choose the right foods  Aim to make these foods staples of your diet. If you have diabetes, you may have different recommendations than what is listed here:     Fruits and vegetables provide plenty of nutrients without a lot of calories. At meals, fill half your plate with these foods. Choose between fresh, frozen, canned, or dried without added sauces, salt, or sugars. Split the other half of your plate between whole grains and lean protein.    Whole grains are high in fiber and rich in vitamins and nutrients. Good choices include whole wheat bread, pasta, oats, and brown rice. Make at least half of your grains whole grains.    Lean proteins give you nutrition with less fat. Good choices include fish, skinless chicken and turkey, and beans. Draining the fat from cooked ground meat is another way to reduce the amount of fat you eat.    Low-fat and nonfat dairy provide nutrients without a lot of fat. Try low-fat or nonfat milk, cheese, or yogurt.    Healthy fats can be good for you in small amounts. These are unsaturated fats, such as olive oil, nuts, and fish. Try to have at least 2 servings per week of fatty fish, such as salmon, sardines, mackerel, rainbow trout, and albacore tuna. These contain omega-3 fatty acids, which are good for your heart. Flaxseed and walnuts are other sources of heart-healthy fats.  More on heart-healthy eating  Read food labels  Healthy eating starts at the grocery store. Be sure to pay attention to food labels on packaged foods. Look for products that are high in fiber and protein, and low in saturated fat, added sugars, and  sodium. Avoid products that contain trans fat. And pay close attention to serving size. For instance, if you plan to eat two servings, double all the numbers on the label.   Prepare food right  A key part of healthy cooking is cutting down on added fat, sugar and salt. Look on the internet for lower-fat, lower-sodium recipes without a lot of added sugars. Also try these tips:     Remove fat from meat and skin from poultry before cooking.    Skim fat from the surface of soups and sauces.    Broil, roast, boil, bake, steam, grill, or microwave food without added fats.    Choose ingredients that spice up your food without adding calories, fat, sugar, or sodium. Try these items: horseradish, hot sauce, lemon, mustard, nonfat salad dressings, and vinegar. Small amounts of olive oil-based vinaigrettes are OK, too. For salt-free herbs and spices, try basil, cilantro, cinnamon, cumin, paprika, pepper, and rosemary.  [employer] last reviewed this educational content on 7/1/2020 2000-2021 [employer]. All rights reserved. This information is not intended as a substitute for professional medical care. Always follow your healthcare professional's instructions.

## 2021-08-10 VITALS
HEART RATE: 60 BPM | OXYGEN SATURATION: 99 % | WEIGHT: 168.2 LBS | SYSTOLIC BLOOD PRESSURE: 140 MMHG | BODY MASS INDEX: 29.8 KG/M2 | DIASTOLIC BLOOD PRESSURE: 81 MMHG | TEMPERATURE: 96.9 F

## 2021-08-10 PROBLEM — Z78.9 STATIN INTOLERANCE: Status: ACTIVE | Noted: 2021-08-10

## 2021-08-10 ASSESSMENT — ANXIETY QUESTIONNAIRES
6. BECOMING EASILY ANNOYED OR IRRITABLE: NOT AT ALL
1. FEELING NERVOUS, ANXIOUS, OR ON EDGE: NOT AT ALL
2. NOT BEING ABLE TO STOP OR CONTROL WORRYING: NOT AT ALL
5. BEING SO RESTLESS THAT IT IS HARD TO SIT STILL: NOT AT ALL
7. FEELING AFRAID AS IF SOMETHING AWFUL MIGHT HAPPEN: NOT AT ALL
3. WORRYING TOO MUCH ABOUT DIFFERENT THINGS: NOT AT ALL
GAD7 TOTAL SCORE: 0

## 2021-08-10 ASSESSMENT — PATIENT HEALTH QUESTIONNAIRE - PHQ9
SUM OF ALL RESPONSES TO PHQ QUESTIONS 1-9: 0
5. POOR APPETITE OR OVEREATING: NOT AT ALL

## 2021-08-11 ASSESSMENT — ANXIETY QUESTIONNAIRES: GAD7 TOTAL SCORE: 0

## 2021-08-12 DIAGNOSIS — I10 HYPERTENSION, BENIGN ESSENTIAL, GOAL BELOW 140/90: ICD-10-CM

## 2021-08-12 NOTE — TELEPHONE ENCOUNTER
"Requested Prescriptions   Pending Prescriptions Disp Refills    losartan (COZAAR) 100 MG tablet [Pharmacy Med Name: Losartan Potassium 100 MG Oral Tablet] 90 tablet 0     Sig: Take 1 tablet by mouth once daily        Angiotensin-II Receptors Failed - 8/12/2021 11:42 AM        Failed - Last blood pressure under 140/90 in past 12 months       BP Readings from Last 3 Encounters:   08/09/21 (!) 140/81   06/10/21 124/77   05/27/21 (!) 171/79                 Failed - Normal serum creatinine on file in past 12 months     Recent Labs   Lab Test 04/12/21  1308   CR 1.12*       Ok to refill medication if creatinine is low          Passed - Recent (12 mo) or future (30 days) visit within the authorizing provider's specialty     Patient has had an office visit with the authorizing provider or a provider within the authorizing providers department within the previous 12 mos or has a future within next 30 days. See \"Patient Info\" tab in inbasket, or \"Choose Columns\" in Meds & Orders section of the refill encounter.              Passed - Medication is active on med list        Passed - Patient is age 18 or older        Passed - No active pregnancy on record        Passed - Normal serum potassium on file in past 12 months       Recent Labs   Lab Test 04/12/21  1308   POTASSIUM 4.6                    Passed - No positive pregnancy test in past 12 months              "

## 2021-08-13 RX ORDER — LOSARTAN POTASSIUM 100 MG/1
TABLET ORAL
Qty: 90 TABLET | Refills: 0 | Status: SHIPPED | OUTPATIENT
Start: 2021-08-13 | End: 2021-11-22

## 2021-08-31 ENCOUNTER — ANCILLARY PROCEDURE (OUTPATIENT)
Dept: MAMMOGRAPHY | Facility: CLINIC | Age: 86
End: 2021-08-31
Attending: FAMILY MEDICINE
Payer: COMMERCIAL

## 2021-08-31 ENCOUNTER — LAB (OUTPATIENT)
Dept: LAB | Facility: CLINIC | Age: 86
End: 2021-08-31
Payer: COMMERCIAL

## 2021-08-31 DIAGNOSIS — I25.10 CORONARY ARTERY DISEASE INVOLVING NATIVE HEART WITHOUT ANGINA PECTORIS, UNSPECIFIED VESSEL OR LESION TYPE: ICD-10-CM

## 2021-08-31 DIAGNOSIS — E78.5 DYSLIPIDEMIA: ICD-10-CM

## 2021-08-31 DIAGNOSIS — Z12.31 VISIT FOR SCREENING MAMMOGRAM: ICD-10-CM

## 2021-08-31 LAB
ALBUMIN SERPL-MCNC: 3.6 G/DL (ref 3.4–5)
ALP SERPL-CCNC: 85 U/L (ref 40–150)
ALT SERPL W P-5'-P-CCNC: 23 U/L (ref 0–50)
ANION GAP SERPL CALCULATED.3IONS-SCNC: 1 MMOL/L (ref 3–14)
AST SERPL W P-5'-P-CCNC: 18 U/L (ref 0–45)
BILIRUB SERPL-MCNC: 0.5 MG/DL (ref 0.2–1.3)
BUN SERPL-MCNC: 25 MG/DL (ref 7–30)
CALCIUM SERPL-MCNC: 9.3 MG/DL (ref 8.5–10.1)
CHLORIDE BLD-SCNC: 110 MMOL/L (ref 94–109)
CHOLEST SERPL-MCNC: 249 MG/DL
CO2 SERPL-SCNC: 30 MMOL/L (ref 20–32)
CREAT SERPL-MCNC: 1.04 MG/DL (ref 0.52–1.04)
FASTING STATUS PATIENT QL REPORTED: YES
GFR SERPL CREATININE-BSD FRML MDRD: 49 ML/MIN/1.73M2
GLUCOSE BLD-MCNC: 121 MG/DL (ref 70–99)
HDLC SERPL-MCNC: 44 MG/DL
LDLC SERPL CALC-MCNC: 171 MG/DL
NONHDLC SERPL-MCNC: 205 MG/DL
POTASSIUM BLD-SCNC: 4.4 MMOL/L (ref 3.4–5.3)
PROT SERPL-MCNC: 7 G/DL (ref 6.8–8.8)
SODIUM SERPL-SCNC: 141 MMOL/L (ref 133–144)
TRIGL SERPL-MCNC: 170 MG/DL

## 2021-08-31 PROCEDURE — 36415 COLL VENOUS BLD VENIPUNCTURE: CPT

## 2021-08-31 PROCEDURE — 77067 SCR MAMMO BI INCL CAD: CPT | Mod: TC | Performed by: RADIOLOGY

## 2021-08-31 PROCEDURE — 80053 COMPREHEN METABOLIC PANEL: CPT

## 2021-08-31 PROCEDURE — 80061 LIPID PANEL: CPT

## 2021-08-31 PROCEDURE — 77063 BREAST TOMOSYNTHESIS BI: CPT | Mod: TC | Performed by: RADIOLOGY

## 2021-09-02 ENCOUNTER — OFFICE VISIT (OUTPATIENT)
Dept: CARDIOLOGY | Facility: CLINIC | Age: 86
End: 2021-09-02
Attending: INTERNAL MEDICINE
Payer: COMMERCIAL

## 2021-09-02 VITALS
BODY MASS INDEX: 28.34 KG/M2 | HEIGHT: 64 IN | SYSTOLIC BLOOD PRESSURE: 139 MMHG | DIASTOLIC BLOOD PRESSURE: 85 MMHG | WEIGHT: 166 LBS | OXYGEN SATURATION: 98 % | HEART RATE: 79 BPM

## 2021-09-02 DIAGNOSIS — E78.5 DYSLIPIDEMIA: ICD-10-CM

## 2021-09-02 DIAGNOSIS — I25.10 CORONARY ARTERY DISEASE INVOLVING NATIVE HEART WITHOUT ANGINA PECTORIS, UNSPECIFIED VESSEL OR LESION TYPE: ICD-10-CM

## 2021-09-02 PROCEDURE — 99214 OFFICE O/P EST MOD 30 MIN: CPT | Performed by: INTERNAL MEDICINE

## 2021-09-02 ASSESSMENT — MIFFLIN-ST. JEOR: SCORE: 1170.03

## 2021-09-02 NOTE — PROGRESS NOTES
HPI:     I had the privilege to evaluate Ms Carolin Mcbride, who is a 87 yr old female with a Hx of CAD resulting in  2 stent to LAD, 1 stent torPL branch andPOBA of D1 on 12/8/2020.     Initially patient presented for the management of hyperlipidemia.  Her LDL was over 200.  Patient had a TIA and she was initially managed on clopidogrel for a short course.  Her CT MRI and echo were normal.  Also had a Zio patch which did not show significant arrhythmia or atrial fibrillation.  In summary her side effects with statin therapy:  Patient was very reluctant to take statin as she had severe myalgia with Baycol.  She was started on 20 mg of Lipitor which she discontinued due to some nonspecific muscle ache  Patient was restarted on Lipitor 20 mg and then subsequently it was increased to 40 mg daily.  Patient is tolerating this dose very well.  As part of CAD evaluation patient had a nuclear scan.  She had an apical perfusion defect which was fixed.  The question was whether it was an artifact or real.  Subsequently patient had an echocardiogram which showed apical wall motion abnormality.  Subsequent angiogram showed LAD D1 disease and RPL disease for which she had intervention.   However during the last weeks patient experienced  some myalgia on 40 mg of Lipitor.  Her CK was normal medication was discontinued.    Today patient had no cardiac complaints.  She is doing well from this aspect.  Still complaining of myalgia and muscle weakness.    PAST MEDICAL HISTORY:  Past Medical History:   Diagnosis Date     Adjustment reaction with anxiety and depression 2001     CAD (coronary artery disease) 2009    asymptomatic, on CT scan     Cyst, ovarian      Diagnostic skin and sensitization tests (aka ALLERGENS) 9/29/14 IgE tests pos. minimally only for Tree pollens (all other environmental allergens NEGATIVE)     HTN (hypertension)      Hyperlipidemia      Myositis 2001    related to past Baycol use- resolved     Osteoporosis       "Seasonal allergic rhinitis     9/29/14 IgE tests pos. minimally only for Tree pollens (all other environmental allergens NEGATIVE)       CURRENT MEDICATIONS:  Current Outpatient Medications   Medication Sig Dispense Refill     aspirin 81 MG tablet Take 1 tablet (81 mg) by mouth daily 90 tablet 3     citalopram (CELEXA) 10 MG tablet Take 1 tablet (10 mg) by mouth daily 90 tablet 3     clopidogrel (PLAVIX) 75 MG tablet Take 4 tablets (300 mg) on the first day for a loading dose then decrease to 1 tablet (75 mg) daily 94 tablet 3     ezetimibe (ZETIA) 10 MG tablet Take 1 tablet (10 mg) by mouth daily 30 tablet 11     Hypromellose (ARTIFICIAL TEARS OP) Apply 1 drop to eye as needed Both eyes.       losartan (COZAAR) 100 MG tablet Take 1 tablet by mouth once daily 90 tablet 0     nitroGLYcerin (NITROSTAT) 0.4 MG sublingual tablet One tablet under the tongue every 5 minutes if needed for chest pain. May repeat every 5 minutes for a maximum of 3 doses in 15 minutes\" 25 tablet 3     triamcinolone (KENALOG) 0.1 % external ointment Apply topically 2 times daily Apply to rash on leg. 30 g 1     VITAMIN D 400 UNIT OR TABS 1 cap twice a day        STATIN NOT PRESCRIBED (INTENTIONAL) Please choose reason not prescribed from choices below. (Patient not taking: Reported on 9/2/2021)         PAST SURGICAL HISTORY:  Past Surgical History:   Procedure Laterality Date     APPENDECTOMY       CHOLECYSTECTOMY, OPEN       COLONOSCOPY  2009    neg     CV CORONARY ANGIOGRAM N/A 12/8/2020    Procedure: CV CORONARY ANGIOGRAM;  Surgeon: Isra Weber MD;  [facility]ARD[facility]AC [facility] LAB     CV PCI STENT DRUG ELUTING N/A 12/8/2020    Procedure: Percutaneous Coronary Intervention Stent Drug Eluting;  Surgeon: Isra Weber MD;  Location:  HEART CARDIAC CATH LAB     CYSTOCELE REPAIR  2003    TVT SPARC       ALLERGIES     Allergies   Allergen Reactions     Baycol [Cerivastatin Sodium]       muscle mass loss   "     Crestor [Hmg-Coa-R Inhibitors]      Also intolerant of simvastatin and atorvastatin with recurrent leg weakness and pain.     Darvocet [Acetaminophen]      Severe nausea     Macrobid [Nitrofuran Derivatives]      Hives       FAMILY HISTORY:  Family History   Problem Relation Age of Onset     Cancer Mother         ovarian      Heart Disease Sister         CABG x 3     Neurologic Disorder Sister         Fibromyalgia     Cancer Father         stomach/interstines      Musculoskeletal Disorder Brother         back      Heart Disease Brother 60        CABG x 4     Glaucoma No family hx of      Macular Degeneration No family hx of        SOCIAL HISTORY:  Social History     Socioeconomic History     Marital status:      Spouse name: Shashank     Number of children: 3     Years of education: Not on file     Highest education level: Not on file   Occupational History     Employer: RETIRED   Tobacco Use     Smoking status: Never Smoker     Smokeless tobacco: Never Used     Tobacco comment: Never smoked; nonsmoking household   Substance and Sexual Activity     Alcohol use: No     Comment: very rare     Drug use: No     Comment: never     Sexual activity: Yes     Partners: Male     Birth control/protection: None   Other Topics Concern     Parent/sibling w/ CABG, MI or angioplasty before 65F 55M? No      Service No     Blood Transfusions No     Caffeine Concern No     Occupational Exposure No     Hobby Hazards No     Sleep Concern No     Stress Concern No     Weight Concern Yes     Special Diet No     Back Care No     Exercise Yes     Comment: Curves     Bike Helmet No     Seat Belt Yes     Self-Exams Yes   Social History Narrative     Not on file     Social Determinants of Health     Financial Resource Strain:      Difficulty of Paying Living Expenses:    Food Insecurity:      Worried About Running Out of Food in the Last Year:      Ran Out of Food in the Last Year:    Transportation Needs:      Lack of  "Transportation (Medical):      Lack of Transportation (Non-Medical):    Physical Activity:      Days of Exercise per Week:      Minutes of Exercise per Session:    Stress:      Feeling of Stress :    Social Connections:      Frequency of Communication with Friends and Family:      Frequency of Social Gatherings with Friends and Family:      Attends Orthodoxy Services:      Active Member of Clubs or Organizations:      Attends Club or Organization Meetings:      Marital Status:    Intimate Partner Violence:      Fear of Current or Ex-Partner:      Emotionally Abused:      Physically Abused:      Sexually Abused:        ROS:   Constitutional: No fever, chills, or sweats. No weight gain/loss   ENT: No visual disturbance, ear ache, epistaxis, sore throat  Allergies/Immunologic: Negative.   Respiratory: No cough, hemoptysia  Cardiovascular: As per HPI  GI: No nausea, vomiting, hematemesis, melena, or hematochezia  : No urinary frequency, dysuria, or hematuria  Integument: Negative  Psychiatric: Negative  Neuro: Negative  Endocrinology: Negative   Musculoskeletal: Negative    EXAM:  /85   Pulse 79   Ht 1.613 m (5' 3.5\")   Wt 75.3 kg (166 lb)   LMP  (LMP Unknown)   SpO2 98%   BMI 28.94 kg/m    In general, the patient is a pleasant female in no apparent distress.    HEENT: NC/AT.  PERRLA.  EOMI.  Sclerae white, not injected.  Nares clear.  Pharynx without erythema or exudate.  Dentition intact.    Neck: No adenopathy.  No thyromegaly. Carotids +4/4 bilaterally without bruits.  No jugular venous distension.   Heart: RRR. Normal S1, S2 splits physiologically. No murmur, rub, click, or gallop. The PMI is in the 5th ICS in the midclavicular line. There is no heave.    Lungs: CTA.  No ronchi, wheezes, rales.  No dullness to percussion.   Abdomen: Soft, nontender, nondistended. No organomegaly.  No bruits.   Extremities: No clubbing, cyanosis, or edema.  The pulses are +4/4 at the radial, brachial, femoral, " popliteal, DP, and PT sites bilaterally.  No bruits are noted.  Neurologic: Alert and oriented to person/place/time, normal speech, gait and affect  Skin: No petechiae, purpura or rash.    BP at the end of vist 138/74    Labs:  LIPID RESULTS:  Lab Results   Component Value Date    CHOL 249 (H) 08/31/2021    CHOL 234 (H) 04/12/2021    HDL 44 (L) 08/31/2021    HDL 47 (L) 04/12/2021     (H) 08/31/2021     (H) 04/12/2021    TRIG 170 (H) 08/31/2021    TRIG 227 (H) 04/12/2021    CHOLHDLRATIO 2.4 07/13/2015    NHDL 205 (H) 08/31/2021    NHDL 187 (H) 04/12/2021       LIVER ENZYME RESULTS:  Lab Results   Component Value Date    AST 18 08/31/2021    AST 8 12/08/2020    ALT 23 08/31/2021    ALT 30 04/12/2021       CBC RESULTS:  Lab Results   Component Value Date    WBC 6.5 04/12/2021    RBC 4.45 04/12/2021    HGB 12.7 04/12/2021    HCT 38.8 04/12/2021    MCV 87 04/12/2021    MCH 28.5 04/12/2021    MCHC 32.7 04/12/2021    RDW 12.4 04/12/2021     04/12/2021       BMP RESULTS:  Lab Results   Component Value Date     08/31/2021     04/12/2021    POTASSIUM 4.4 08/31/2021    POTASSIUM 4.6 04/12/2021    CHLORIDE 110 (H) 08/31/2021    CHLORIDE 107 04/12/2021    CO2 30 08/31/2021    CO2 28 04/12/2021    ANIONGAP 1 (L) 08/31/2021    ANIONGAP 3 04/12/2021     (H) 08/31/2021    GLC 98 04/12/2021    BUN 25 08/31/2021    BUN 21 04/12/2021    CR 1.04 08/31/2021    CR 1.12 (H) 04/12/2021    GFRESTIMATED 49 (L) 08/31/2021    GFRESTIMATED 44 (L) 04/12/2021    GFRESTBLACK 51 (L) 04/12/2021    LISY 9.3 08/31/2021    LISY 9.0 04/12/2021          INR RESULTS:  Lab Results   Component Value Date    INR 0.98 12/08/2020         Assessment and Plan:     I discussed the rests with patient.  I discussed the importance of a heart healthy diet and lifestyle.  Because of all side-effects of statin therapy - patient should never receive statin therapy.    At this moment - I have started zetia 10 mg/d   Patient should  have a lipid profile with direct LDL-chol within 3 months in December 2021.  In function of the lab results - I will adapt the lipid-lowering medication.    Eben Gomez MD, PhD  Professor of Medicine  Division of Cardiology             CC  Patient Care Team:  Lela Townsend MD as PCP - General (Family Practice)  Lela Townsend MD as Assigned PCP  VAISHALI Juan MD as Assigned Heart and Vascular Provider  Nevin Ricci MD as Assigned Surgical Provider  Nedra Lovelace LPN as EBEN MARRUFO

## 2021-09-02 NOTE — LETTER
9/2/2021      RE: Carolin Mcbride  [address] [facility] [address]       Dear Colleague,    Thank you for the opportunity to participate in the care of your patient, Carolin Mcbride, at the [facility] [place] [facility]. Please see a copy of my visit note below.    HPI:     I had the privilege to evaluate Ms Carolin Mcbride, who is a 87 yr old female with a Hx of CAD resulting in  2 stent to LAD, 1 stent torPL branch andPOBA of D1 on 12/8/2020.     Initially patient presented for the management of hyperlipidemia.  Her LDL was over 200.  Patient had a TIA and she was initially managed on clopidogrel for a short course.  Her CT MRI and echo were normal.  Also had a Zio patch which did not show significant arrhythmia or atrial fibrillation.  In summary her side effects with statin therapy:  Patient was very reluctant to take statin as she had severe myalgia with Baycol.  She was started on 20 mg of Lipitor which she discontinued due to some nonspecific muscle ache  Patient was restarted on Lipitor 20 mg and then subsequently it was increased to 40 mg daily.  Patient is tolerating this dose very well.  As part of CAD evaluation patient had a nuclear scan.  She had an apical perfusion defect which was fixed.  The question was whether it was an artifact or real.  Subsequently patient had an echocardiogram which showed apical wall motion abnormality.  Subsequent angiogram showed LAD D1 disease and RPL disease for which she had intervention.   However during the last weeks patient experienced  some myalgia on 40 mg of Lipitor.  Her CK was normal medication was discontinued.    Today patient had no cardiac complaints.  She is doing well from this aspect.  Still complaining of myalgia and muscle weakness.    PAST MEDICAL HISTORY:  Past Medical History:   Diagnosis Date     Adjustment reaction with anxiety and depression 2001     CAD (coronary artery disease)  "2009    asymptomatic, on CT scan     Cyst, ovarian      Diagnostic skin and sensitization tests (aka ALLERGENS) 9/29/14 IgE tests pos. minimally only for Tree pollens (all other environmental allergens NEGATIVE)     HTN (hypertension)      Hyperlipidemia      Myositis 2001    related to past Baycol use- resolved     Osteoporosis      Seasonal allergic rhinitis     9/29/14 IgE tests pos. minimally only for Tree pollens (all other environmental allergens NEGATIVE)       CURRENT MEDICATIONS:  Current Outpatient Medications   Medication Sig Dispense Refill     aspirin 81 MG tablet Take 1 tablet (81 mg) by mouth daily 90 tablet 3     citalopram (CELEXA) 10 MG tablet Take 1 tablet (10 mg) by mouth daily 90 tablet 3     clopidogrel (PLAVIX) 75 MG tablet Take 4 tablets (300 mg) on the first day for a loading dose then decrease to 1 tablet (75 mg) daily 94 tablet 3     ezetimibe (ZETIA) 10 MG tablet Take 1 tablet (10 mg) by mouth daily 30 tablet 11     Hypromellose (ARTIFICIAL TEARS OP) Apply 1 drop to eye as needed Both eyes.       losartan (COZAAR) 100 MG tablet Take 1 tablet by mouth once daily 90 tablet 0     nitroGLYcerin (NITROSTAT) 0.4 MG sublingual tablet One tablet under the tongue every 5 minutes if needed for chest pain. May repeat every 5 minutes for a maximum of 3 doses in 15 minutes\" 25 tablet 3     triamcinolone (KENALOG) 0.1 % external ointment Apply topically 2 times daily Apply to rash on leg. 30 g 1     VITAMIN D 400 UNIT OR TABS 1 cap twice a day        STATIN NOT PRESCRIBED (INTENTIONAL) Please choose reason not prescribed from choices below. (Patient not taking: Reported on 9/2/2021)         PAST SURGICAL HISTORY:  Past Surgical History:   Procedure Laterality Date     APPENDECTOMY       CHOLECYSTECTOMY, OPEN       COLONOSCOPY  2009    neg     CV CORONARY ANGIOGRAM N/A 12/8/2020    Procedure: CV CORONARY ANGIOGRAM;  Surgeon: Isra Weber MD;  Location:  HEART CARDIAC CATH LAB     CV " PCI STENT DRUG ELUTING N/A 12/8/2020    Procedure: Percutaneous Coronary Intervention Stent Drug Eluting;  Surgeon: Isra Weber MD;  Location:  HEART CARDIAC CATH LAB     CYSTOCELE REPAIR  2003    TVT SPARC       ALLERGIES     Allergies   Allergen Reactions     Baycol [Cerivastatin Sodium]       muscle mass loss       Crestor [Hmg-Coa-R Inhibitors]      Also intolerant of simvastatin and atorvastatin with recurrent leg weakness and pain.     Darvocet [Acetaminophen]      Severe nausea     Macrobid [Nitrofuran Derivatives]      Hives       FAMILY HISTORY:  Family History   Problem Relation Age of Onset     Cancer Mother         ovarian      Heart Disease Sister         CABG x 3     Neurologic Disorder Sister         Fibromyalgia     Cancer Father         stomach/interstines      Musculoskeletal Disorder Brother         back      Heart Disease Brother 60        CABG x 4     Glaucoma No family hx of      Macular Degeneration No family hx of        SOCIAL HISTORY:  Social History     Socioeconomic History     Marital status:      Spouse name: Shashank     Number of children: 3     Years of education: Not on file     Highest education level: Not on file   Occupational History     Employer: RETIRED   Tobacco Use     Smoking status: Never Smoker     Smokeless tobacco: Never Used     Tobacco comment: Never smoked; nonsmoking household   Substance and Sexual Activity     Alcohol use: No     Comment: very rare     Drug use: No     Comment: never     Sexual activity: Yes     Partners: Male     Birth control/protection: None   Other Topics Concern     Parent/sibling w/ CABG, MI or angioplasty before 65F 55M? No      Service No     Blood Transfusions No     Caffeine Concern No     Occupational Exposure No     Hobby Hazards No     Sleep Concern No     Stress Concern No     Weight Concern Yes     Special Diet No     Back Care No     Exercise Yes     Comment: Curves     Bike Helmet No     Seat Belt  "Yes     Self-Exams Yes   Social History Narrative     Not on file     Social Determinants of Health     Financial Resource Strain:      Difficulty of Paying Living Expenses:    Food Insecurity:      Worried About Running Out of Food in the Last Year:      Ran Out of Food in the Last Year:    Transportation Needs:      Lack of Transportation (Medical):      Lack of Transportation (Non-Medical):    Physical Activity:      Days of Exercise per Week:      Minutes of Exercise per Session:    Stress:      Feeling of Stress :    Social Connections:      Frequency of Communication with Friends and Family:      Frequency of Social Gatherings with Friends and Family:      Attends Jehovah's witness Services:      Active Member of Clubs or Organizations:      Attends Club or Organization Meetings:      Marital Status:    Intimate Partner Violence:      Fear of Current or Ex-Partner:      Emotionally Abused:      Physically Abused:      Sexually Abused:        ROS:   Constitutional: No fever, chills, or sweats. No weight gain/loss   ENT: No visual disturbance, ear ache, epistaxis, sore throat  Allergies/Immunologic: Negative.   Respiratory: No cough, hemoptysia  Cardiovascular: As per HPI  GI: No nausea, vomiting, hematemesis, melena, or hematochezia  : No urinary frequency, dysuria, or hematuria  Integument: Negative  Psychiatric: Negative  Neuro: Negative  Endocrinology: Negative   Musculoskeletal: Negative    EXAM:  /85   Pulse 79   Ht 1.613 m (5' 3.5\")   Wt 75.3 kg (166 lb)   LMP  (LMP Unknown)   SpO2 98%   BMI 28.94 kg/m    In general, the patient is a pleasant female in no apparent distress.    HEENT: NC/AT.  PERRLA.  EOMI.  Sclerae white, not injected.  Nares clear.  Pharynx without erythema or exudate.  Dentition intact.    Neck: No adenopathy.  No thyromegaly. Carotids +4/4 bilaterally without bruits.  No jugular venous distension.   Heart: RRR. Normal S1, S2 splits physiologically. No murmur, rub, click, or " gallop. The PMI is in the 5th ICS in the midclavicular line. There is no heave.    Lungs: CTA.  No ronchi, wheezes, rales.  No dullness to percussion.   Abdomen: Soft, nontender, nondistended. No organomegaly.  No bruits.   Extremities: No clubbing, cyanosis, or edema.  The pulses are +4/4 at the radial, brachial, femoral, popliteal, DP, and PT sites bilaterally.  No bruits are noted.  Neurologic: Alert and oriented to person/place/time, normal speech, gait and affect  Skin: No petechiae, purpura or rash.    BP at the end of vist 138/74    Labs:  LIPID RESULTS:  Lab Results   Component Value Date    CHOL 249 (H) 08/31/2021    CHOL 234 (H) 04/12/2021    HDL 44 (L) 08/31/2021    HDL 47 (L) 04/12/2021     (H) 08/31/2021     (H) 04/12/2021    TRIG 170 (H) 08/31/2021    TRIG 227 (H) 04/12/2021    CHOLHDLRATIO 2.4 07/13/2015    NHDL 205 (H) 08/31/2021    NHDL 187 (H) 04/12/2021       LIVER ENZYME RESULTS:  Lab Results   Component Value Date    AST 18 08/31/2021    AST 8 12/08/2020    ALT 23 08/31/2021    ALT 30 04/12/2021       CBC RESULTS:  Lab Results   Component Value Date    WBC 6.5 04/12/2021    RBC 4.45 04/12/2021    HGB 12.7 04/12/2021    HCT 38.8 04/12/2021    MCV 87 04/12/2021    MCH 28.5 04/12/2021    MCHC 32.7 04/12/2021    RDW 12.4 04/12/2021     04/12/2021       BMP RESULTS:  Lab Results   Component Value Date     08/31/2021     04/12/2021    POTASSIUM 4.4 08/31/2021    POTASSIUM 4.6 04/12/2021    CHLORIDE 110 (H) 08/31/2021    CHLORIDE 107 04/12/2021    CO2 30 08/31/2021    CO2 28 04/12/2021    ANIONGAP 1 (L) 08/31/2021    ANIONGAP 3 04/12/2021     (H) 08/31/2021    GLC 98 04/12/2021    BUN 25 08/31/2021    BUN 21 04/12/2021    CR 1.04 08/31/2021    CR 1.12 (H) 04/12/2021    GFRESTIMATED 49 (L) 08/31/2021    GFRESTIMATED 44 (L) 04/12/2021    GFRESTBLACK 51 (L) 04/12/2021    LISY 9.3 08/31/2021    LISY 9.0 04/12/2021          INR RESULTS:  Lab Results   Component Value  Date    INR 0.98 12/08/2020         Assessment and Plan:     I discussed the rests with patient.  I discussed the importance of a heart healthy diet and lifestyle.  Because of all side-effects of statin therapy - patient should never receive statin therapy.    At this moment - I have started zetia 10 mg/d   Patient should have a lipid profile with direct LDL-chol within 3 months in December 2021.  In function of the lab results - I will adapt the lipid-lowering medication.    Eben Gomez MD, PhD  Professor of Medicine  Division of Cardiology     CC  Patient Care Team:  Lela Townsend MD as PCP - General (Family Practice)  VAISHALI Juan MD as Assigned Heart and Vascular Provider  Nevin Ricci MD as Assigned Surgical Provider  Nedra Lovelace LPN as GREGORYN

## 2021-09-02 NOTE — PATIENT INSTRUCTIONS
Patient Instructions:  It was a pleasure to see you in the cardiology clinic today.        If you have any questions, you can call [phone].  Press Option #1 for the [facility], and then press Option #4 for nursing.     We are encouraging the use of MobileSpan[employer] to communicate with your HealthCare Provider     Medication Changes:       Recommendations:  -Have repeat fasting labs done in     Please follow up: With Dr. Gomez in

## 2021-09-13 ENCOUNTER — NURSE TRIAGE (OUTPATIENT)
Dept: NURSING | Facility: CLINIC | Age: 86
End: 2021-09-13

## 2021-09-13 NOTE — TELEPHONE ENCOUNTER
Exposed on Saturday at [employer] to now covid pos.  No symptoms.  Needs to be tested 3-5 days after.  Looking to be tested Tuesday.    Warm transferred to scheduling    Vanessa Martinez RN  [facility] Nurse Advisor

## 2021-09-14 ENCOUNTER — VIRTUAL VISIT (OUTPATIENT)
Dept: URGENT CARE | Facility: CLINIC | Age: 86
End: 2021-09-14
Payer: COMMERCIAL

## 2021-09-14 DIAGNOSIS — Z20.822 EXPOSURE TO COVID-19 VIRUS: Primary | ICD-10-CM

## 2021-09-14 PROCEDURE — 99441 PR PHYSICIAN TELEPHONE EVALUATION 5-10 MIN: CPT | Mod: 95

## 2021-09-14 NOTE — PROGRESS NOTES
"  Carolin Mcbride is a 87 year old female who is being evaluated via a billable telephone visit.      The patient has been notified of following:     \"This telephone visit will be conducted via a phone call between you and your physician/provider. We have found that certain health care needs can be provided without the need for a physical exam.  This service lets us provide the care you need with a phone conversation.  If a prescription is necessary we can send it directly to your pharmacy.  If lab work is needed we can place an order for that and you can then stop by our lab to have the test done at a later time.\"     Patient has given verbal consent for telephone visit?  Yes    SUBJECTIVE:  Carolin Mcbride is an 87 year old female who presents for covid exposure.  Was at [employer] four evenings ago and later learned that was close to someone who tested positive for covid.  Pt had heart surgery last December.  Currently no sxs.  No cough or runny nose.  No v/d.  No shortness of breath.  No fever.  Has been vaccinated for covid.    PMH:   has a past medical history of Adjustment reaction with anxiety and depression (2001), CAD (coronary artery disease) (2009), Cyst, ovarian, Diagnostic skin and sensitization tests (aka ALLERGENS) (9/29/14 IgE tests pos. minimally only for Tree pollens (all other environmental allergens NEGATIVE)), HTN (hypertension), Hyperlipidemia, Myositis (2001), Osteoporosis, and Seasonal allergic rhinitis.  Patient Active Problem List   Diagnosis     Pseudophakia OU c YAG OU     CAD (coronary artery disease)     Advance care planning     Hypertension goal BP (blood pressure) < 140/90     Hyperlipidemia with target LDL less than 100     Osteoporosis     CKD (chronic kidney disease) stage 3, GFR 30-59 ml/min     Seasonal allergic rhinitis     Diagnostic skin and sensitization tests (aka ALLERGENS)     Pastrana's neuroma     Strabismic amblyopia     Anterior basement membrane dystrophy, OU     PVD " (posterior vitreous detachment) OU     Granuloma annulare     Recurrent and persistent hematuria     Leg weakness, bilateral     Microscopic hematuria     Proteinuria, unspecified type     Dyspnea on exertion     Abnormal nuclear cardiac imaging test     Status post coronary angiogram     Muscle weakness (generalized)     Statin intolerance     Social History     Socioeconomic History     Marital status:      Spouse name: Shashank     Number of children: 3     Years of education: Not on file     Highest education level: Not on file   Occupational History     Employer: RETIRED   Tobacco Use     Smoking status: Never Smoker     Smokeless tobacco: Never Used     Tobacco comment: Never smoked; nonsmoking household   Substance and Sexual Activity     Alcohol use: No     Comment: very rare     Drug use: No     Comment: never     Sexual activity: Yes     Partners: Male     Birth control/protection: None   Other Topics Concern     Parent/sibling w/ CABG, MI or angioplasty before 65F 55M? No      Service No     Blood Transfusions No     Caffeine Concern No     Occupational Exposure No     Hobby Hazards No     Sleep Concern No     Stress Concern No     Weight Concern Yes     Special Diet No     Back Care No     Exercise Yes     Comment: Curves     Bike Helmet No     Seat Belt Yes     Self-Exams Yes   Social History Narrative     Not on file     Social Determinants of Health     Financial Resource Strain:      Difficulty of Paying Living Expenses:    Food Insecurity:      Worried About Running Out of Food in the Last Year:      Ran Out of Food in the Last Year:    Transportation Needs:      Lack of Transportation (Medical):      Lack of Transportation (Non-Medical):    Physical Activity:      Days of Exercise per Week:      Minutes of Exercise per Session:    Stress:      Feeling of Stress :    Social Connections:      Frequency of Communication with Friends and Family:      Frequency of Social Gatherings with  Friends and Family:      Attends [employer] Services:      Active Member of Clubs or Organizations:      Attends Club or Organization Meetings:      Marital Status:    Intimate Partner Violence:      Fear of Current or Ex-Partner:      Emotionally Abused:      Physically Abused:      Sexually Abused:      Family History   Problem Relation Age of Onset     Cancer Mother         ovarian      Heart Disease Sister         CABG x 3     Neurologic Disorder Sister         Fibromyalgia     Cancer Father         stomach/interstines      Musculoskeletal Disorder Brother         back      Heart Disease Brother 60        CABG x 4     Glaucoma No family hx of      Macular Degeneration No family hx of        ALLERGIES:  Baycol [cerivastatin sodium], Crestor [hmg-coa-r inhibitors], Darvocet [acetaminophen], and Macrobid [nitrofuran derivatives]    Current Outpatient Medications   Medication     aspirin 81 MG tablet     citalopram (CELEXA) 10 MG tablet     clopidogrel (PLAVIX) 75 MG tablet     ezetimibe (ZETIA) 10 MG tablet     Hypromellose (ARTIFICIAL TEARS OP)     losartan (COZAAR) 100 MG tablet     nitroGLYcerin (NITROSTAT) 0.4 MG sublingual tablet     STATIN NOT PRESCRIBED (INTENTIONAL)     triamcinolone (KENALOG) 0.1 % external ointment     VITAMIN D 400 UNIT OR TABS     Current Facility-Administered Medications   Medication     triamcinolone acetonide (KENALOG-10) injection 10 mg         ROS:  ROS is done and is negative for general/constitutional, eye, ENT, Respiratory, cardiovascular, GI, , Skin, musculoskeletal except as noted elsewhere.  All other review of systems negative except as noted elsewhere.      OBJECTIVE:  LMP  (LMP Unknown)   GENERAL : Alert and oriented.  Did not seem to be in distress.   RESP: No respiratory distress detected during phone conversation           ASSESSMENT/PLAN:    ASSESSMENT / PLAN:  (Z20.822) Exposure to COVID-19 virus  (primary encounter diagnosis)  Comment: As covid can be spread to  others and can potentially become a serious illness, will test for covid.  Plan: Asymptomatic COVID-19 Virus (Coronavirus) by         PCR Nose        Reviewed testing scheduling process.  Reviewed quarantine. I reviewed supportive care, otc meds to use if needed, expected course, and signs of concern.  Follow up as needed or if she does not improve within 1 week(s) or if worsens in any way.  Reviewed red flag symptoms and is to go to the ER if experiences any of these.          See [facility] for orders, medications, letters, patient instructions    Lauryn Chahal MD  9/14/2021, 8:19 AM      Phone call duration:  7 minutes

## 2021-09-16 ENCOUNTER — LAB (OUTPATIENT)
Dept: URGENT CARE | Facility: URGENT CARE | Age: 86
End: 2021-09-16
Attending: INTERNAL MEDICINE
Payer: COMMERCIAL

## 2021-09-16 DIAGNOSIS — Z20.822 EXPOSURE TO COVID-19 VIRUS: ICD-10-CM

## 2021-09-16 PROCEDURE — U0003 INFECTIOUS AGENT DETECTION BY NUCLEIC ACID (DNA OR RNA); SEVERE ACUTE RESPIRATORY SYNDROME CORONAVIRUS 2 (SARS-COV-2) (CORONAVIRUS DISEASE [COVID-19]), AMPLIFIED PROBE TECHNIQUE, MAKING USE OF HIGH THROUGHPUT TECHNOLOGIES AS DESCRIBED BY CMS-2020-01-R: HCPCS

## 2021-09-16 PROCEDURE — U0005 INFEC AGEN DETEC AMPLI PROBE: HCPCS

## 2021-09-17 LAB — SARS-COV-2 RNA RESP QL NAA+PROBE: NEGATIVE

## 2021-09-21 PROBLEM — M62.81 MUSCLE WEAKNESS (GENERALIZED): Status: RESOLVED | Noted: 2021-07-14 | Resolved: 2021-09-21

## 2021-09-21 NOTE — PROGRESS NOTES
Discharge Note    Progress reporting period is from initial evaluation date (please see noted date below) to Jul 20, 2021.  No linked episodes      Carolin failed to follow up and current status is unknown.  Please see information below for last relevant information on current status.  Patient seen for 2 visits.    SUBJECTIVE  Subjective changes noted by patient:  Patient reports that she has muscle soreness from doing her exercies. Still denies pain.   .  Current pain level is  .     Previous pain level was   .   Changes in function:  Yes (See Goal flowsheet attached for changes in current functional level)  Adverse reaction to treatment or activity: None    OBJECTIVE  Changes noted in objective findings:       ASSESSMENT/PLAN  Diagnosis: muscle weakness   Updated problem list and treatment plan:   Decreased function - HEP  Decreased strength - HEP  Impaired muscle performance - HEP  STG/LTGs have been met or progress has been made towards goals:  Yes, please see goal flowsheet for most current information  Assessment of Progress: current status is unknown.    Last current status:     Self Management Plans:  HEP  I have re-evaluated this patient and find that the nature, scope, duration and intensity of the therapy is appropriate for the medical condition of the patient.  Carolin continues to require the following intervention to meet STG and LTG's:  HEP.    Recommendations:  Discharge with current home program.  Patient to follow up with MD as needed.    Please refer to the daily flowsheet for treatment today, total treatment time and time spent performing 1:1 timed codes.

## 2021-10-06 ENCOUNTER — TELEPHONE (OUTPATIENT)
Dept: FAMILY MEDICINE | Facility: CLINIC | Age: 86
End: 2021-10-06

## 2021-10-06 NOTE — TELEPHONE ENCOUNTER
Pt states she has productive cough for 1.5 weeks.  She did test negative for covid about 2 weeks ago.  Pt requesting appointment.  Video appointment made for tomorrow with pmd.  Advised if worsening sx or can't wait then come to urgent care at [place] that is open until 8 pm today and opens again at 10 am.  Adele LIZAMAN, RN

## 2021-10-07 ENCOUNTER — VIRTUAL VISIT (OUTPATIENT)
Dept: FAMILY MEDICINE | Facility: CLINIC | Age: 86
End: 2021-10-07
Payer: COMMERCIAL

## 2021-10-07 DIAGNOSIS — J06.9 URI WITH COUGH AND CONGESTION: Primary | ICD-10-CM

## 2021-10-07 PROCEDURE — 99441 PR PHYSICIAN TELEPHONE EVALUATION 5-10 MIN: CPT | Mod: 95 | Performed by: FAMILY MEDICINE

## 2021-10-07 NOTE — PATIENT INSTRUCTIONS

## 2021-10-07 NOTE — PROGRESS NOTES
"Carolin is a 87 year old who is being evaluated via a billable telephone visit.      What phone number would you like to be contacted at? home  How would you like to obtain your AVS? Mail a copy    Assessment & Plan     URI with cough and congestion  Symptomatic treatment with rest, fluids, tylenol and OTC cold medications as needed. Call if symptoms worse or do not improve for further evaluation and treatment. Discussed when to call if symptoms not getting better or getting worse. Antibiotics not needed at this time and may cause resistance.                BMI:   Estimated body mass index is 28.94 kg/m  as calculated from the following:    Height as of 9/2/21: 1.613 m (5' 3.5\").    Weight as of 9/2/21: 75.3 kg (166 lb).       CONSULTATION/REFERRAL to cardiology as scheduled.   FUTURE LABS:       - Schedule fasting labs in 1-2 months  FUTURE APPOINTMENTS:       - Follow-up visit in 3 months or sooner if any questions or concerns.   See Patient Instructions    No follow-ups on file.    Lela Townsend MD  [facility]    Lata Coe is a 87 year old who presents for the following health issues     HPI     Acute Illness  Acute illness concerns: upper respiratory infection symptoms with cough.   Onset/Duration: 1-2 weeks ago.   Symptoms:  Fever: no  Chills/Sweats: no  Headache (location?): no  Sinus Pressure: no  Conjunctivitis:  no  Ear Pain: no  Rhinorrhea: YES  Congestion: YES  Sore Throat: no  Cough: YES-productive of yellow sputum, not getting worse but not feeling better with some fatigue and malaise.   Wheeze: no  Decreased Appetite: no  Nausea: no  Vomiting: no  Diarrhea: no  Dysuria/Freq.: no  Dysuria or Hematuria: no  Fatigue/Achiness: no  Sick/Strep Exposure: no but went to her grandson's baseball game and got sick the next day.   Therapies tried and outcome: None      Review of Systems   Constitutional, HEENT, cardiovascular, pulmonary, gi and gu systems are negative, " except as otherwise noted.      Objective           Vitals:  No vitals were obtained today due to virtual visit.    Physical Exam   healthy, alert and no distress  PSYCH: Alert and oriented times 3; coherent speech, normal   rate and volume, able to articulate logical thoughts, able   to abstract reason, no tangential thoughts, no hallucinations   or delusions  Her affect is normal  RESP: No cough, no audible wheezing, able to talk in full sentences  Remainder of exam unable to be completed due to telephone visits    Lab on 09/16/2021   Component Date Value Ref Range Status     SARS CoV2 PCR 09/16/2021 Negative  Negative Final    NEGATIVE: SARS-CoV-2 (COVID-19) RNA not detected, presumed negative.     No results found for any visits on 10/07/21.            Phone call duration: 6 minutes

## 2021-11-20 DIAGNOSIS — I10 HYPERTENSION, BENIGN ESSENTIAL, GOAL BELOW 140/90: ICD-10-CM

## 2021-11-22 RX ORDER — LOSARTAN POTASSIUM 100 MG/1
TABLET ORAL
Qty: 90 TABLET | Refills: 0 | Status: SHIPPED | OUTPATIENT
Start: 2021-11-22 | End: 2022-05-24

## 2021-11-22 NOTE — TELEPHONE ENCOUNTER
Prescription approved per [facility] Refill Protocol.  Angie Zamudio RN, BSN   [facility] [place]

## 2021-12-02 NOTE — PROGRESS NOTES
Assessment & Plan     Hyperlipidemia with target LDL less than 100  Follow up with cardiology. Intolerant of statin and is looking at new medication for cholesterol, but can't afford 1000 dollars a month for injections.   - Lipid panel reflex to direct LDL Fasting  - ALT  - Lipid panel reflex to direct LDL Fasting  - ALT    Coronary artery disease involving native coronary artery of native heart with angina pectoris (H)  Echocardiogram scheduled for next week and follow up with cardiology. Did have some fatigue with snow shoveling and had to rest.     Stage 3a chronic kidney disease (H)  recheck  - CBC with platelets  - Basic metabolic panel  - CBC with platelets  - Basic metabolic panel    Nonintractable headache, unspecified chronicity pattern, unspecified headache type  Headache and malaise. Requests COVID testing. No other symptoms     Suspected 2019 novel coronavirus infection  Not detected.   - Symptomatic COVID-19 Virus (Coronavirus) by PCR Nose  - Symptomatic COVID-19 Virus (Coronavirus) by PCR Nose    Hypertension goal BP (blood pressure) < 140/90  Not well controlled on medications and will follow up with cardiology.                CONSULTATION/REFERRAL to cardiology for follow up as scheduled.   FUTURE LABS:       - Schedule fasting labs in 6 months  FUTURE APPOINTMENTS:       - Follow-up visit in 3 months or sooner if any questions or concerns.   Work on weight loss  Regular exercise  See Patient Instructions    Return in about 3 months (around 3/6/2022).    Lela Townsend MD  [facility]    Lata Coe is a 87 year old who presents for the following health issues     HPI     Annual Wellness Visit    Patient has been advised of split billing requirements and indicates understanding: No     Are you in the first 12 months of your Medicare Part B coverage?  No    Physical Health:    In general, how would you rate your overall physical health? good    Outside of  "work, how many days during the week do you exercise?4-5 days/week    Outside of work, approximately how many minutes a day do you exercise?15-30 minutes    If you drink alcohol do you typically have >3 drinks per day or >7 drinks per week? No    Do you usually eat at least 4 servings of fruit and vegetables a day, include whole grains & fiber and avoid regularly eating high fat or \"junk\" foods? Yes    Do you have any problems taking medications regularly? No    Do you have any side effects from medications? none    Needs assistance for the following daily activities: no assistance needed    Which of the following safety concerns are present in your home?  none identified     Hearing impairment: No    In the past 6 months, have you been bothered by leaking of urine? no    Mental Health:    In general, how would you rate your overall mental or emotional health? good  PHQ-2 Score:      Do you feel safe in your environment? Yes    Have you ever done Advance Care Planning? (For example, a Health Directive, POLST, or a discussion with a medical provider or your loved ones about your wishes)? Yes, advance care planning is on file.    Fall risk:       Cognitive Screenin) Repeat 3 items (Leader, Season, Table)    2) Clock draw: NORMAL  3) 3 item recall: Recalls 3 objects  Results: 3 items recalled: COGNITIVE IMPAIRMENT LESS LIKELY    Mini-CogTM Copyright WENDI Andrade. Licensed by the author for use in [facility]; reprinted with permission (avani@.[facility]). All rights reserved.      Do you have sleep apnea, excessive snoring or daytime drowsiness?: no    Current providers sharing in care for this patient include:   Patient Care Team:  Lela Townsend MD as PCP - General (Family Practice)  Lela Townsend MD as Assigned PCP  VAISHALI Juan MD as Assigned Heart and Vascular Provider  Nevin Ricci MD as Assigned Surgical Provider    Patient has been advised of split billing requirements and " indicates understanding: No  Hyperlipidemia Follow-Up      Are you regularly taking any medication or supplement to lower your cholesterol?   Yes- Zetia    Are you having muscle aches or other side effects that you think could be caused by your cholesterol lowering medication?  No    Hypertension Follow-up      Do you check your blood pressure regularly outside of the clinic? Yes     Are you following a low salt diet? Yes    Are your blood pressures ever more than 140 on the top number (systolic) OR more   than 90 on the bottom number (diastolic), for example 140/90? No    Vascular Disease Follow-up      How often do you take nitroglycerin? Never    Do you take an aspirin every day? Yes    Chronic Kidney Disease Follow-up      Do you take any over the counter pain medicine?: No      How many servings of fruits and vegetables do you eat daily?  2-3    On average, how many sweetened beverages do you drink each day (Examples: soda, juice, sweet tea, etc.  Do NOT count diet or artificially sweetened beverages)?   0    How many days per week do you exercise enough to make your heart beat faster? 4    How many minutes a day do you exercise enough to make your heart beat faster? 20 - 29    How many days per week do you miss taking your medication? 0      Concern for COVID-19  About how many days ago did these symptoms start? 2 WEEKS  Is this your first visit for this illness? Yes  In the 14 days before your symptoms started, have you had close contact with someone with COVID-19 (Coronavirus)? Yes, I have been in contact with someone who has COVID-19/Coronavirus (confirmed by lab test).  Do you have a fever or chills? No  Are you having new or worsening difficulty breathing? No  Do you have new or worsening cough? No  Have you had any new or unexplained body aches? No    Have you experienced any of the following NEW symptoms?    Headache: YES    Sore throat: No    Loss of taste or smell: No no but has some nausea    Chest  "pain: No    Diarrhea: No    Rash: No  What treatments have you tried? none  Who do you live with?   Are you, or a household member, a healthcare worker or a ? No  Do you live in a nursing home, group home, or shelter? No  Do you have a way to get food/medications if quarantined? Yes, I have a friend or family member who can help me.      Review of Systems   Constitutional, HEENT, cardiovascular, pulmonary, gi and gu systems are negative, except as otherwise noted.      Objective    BP (!) 183/67 (BP Location: Left arm, Patient Position: Chair, Cuff Size: Adult Large)   Pulse 101   Ht 1.613 m (5' 3.5\")   Wt 74.4 kg (164 lb)   LMP  (LMP Unknown)   SpO2 97%   BMI 28.60 kg/m    Body mass index is 28.6 kg/m .  Physical Exam   GENERAL: elderly, alert, well nourished, well hydrated, no distress  HENT: ear canals- normal; TMs- normal; Nose- normal; Mouth- no ulcers, no lesions, missing dentition  NECK: no tenderness, no adenopathy, no asymmetry, no masses, no stiffness; thyroid- normal to palpation  RESP: lungs clear to auscultation - no rales, no rhonchi, no wheezes  CV: regular rates and rhythm, normal S1 S2, no S3 or S4 and no murmur, no click or rub, normal pulses  ABDOMEN: soft, no tenderness, no  hepatosplenomegaly, no masses, normal bowel sounds  MS: extremities- no gross deformities noted, no edema  SKIN: no suspicious lesions, no rashes, age related skin changes with seborrheic keratosis and no actinic keratosis.    NEURO: strength and tone- normal, sensory exam- grossly normal, reflexes- symmetric  BACK: no CVA tenderness, no paralumbar tenderness  MENTAL STATUS EXAM:  Appearance/Behavior: no apparent distress, neatly groomed, dressed appropriately for weather, appears stated age and is not frail-appearing  Speech: normal  Mood/Affect: normal affect  Insight: Good     Lab on 09/16/2021   Component Date Value Ref Range Status     SARS CoV2 PCR 09/16/2021 Negative  Negative Final    " NEGATIVE: SARS-CoV-2 (COVID-19) RNA not detected, presumed negative.     Results for orders placed or performed in visit on 12/06/21   Symptomatic COVID-19 Virus (Coronavirus) by PCR Nose     Status: Normal    Specimen: Nose; Swab   Result Value Ref Range    SARS CoV2 PCR  Negative     Testing sent to reference lab. Results will be returned via unsolicited result   CBC with platelets     Status: Normal   Result Value Ref Range    WBC Count 7.1 4.0 - 11.0 10e3/uL    RBC Count 4.59 3.80 - 5.20 10e6/uL    Hemoglobin 13.0 11.7 - 15.7 g/dL    Hematocrit 40.3 35.0 - 47.0 %    MCV 88 78 - 100 fL    MCH 28.3 26.5 - 33.0 pg    MCHC 32.3 31.5 - 36.5 g/dL    RDW 12.9 10.0 - 15.0 %    Platelet Count 300 150 - 450 10e3/uL   Basic metabolic panel     Status: Abnormal   Result Value Ref Range    Sodium 138 133 - 144 mmol/L    Potassium 4.3 3.4 - 5.3 mmol/L    Chloride 107 94 - 109 mmol/L    Carbon Dioxide (CO2) 26 20 - 32 mmol/L    Anion Gap 5 3 - 14 mmol/L    Urea Nitrogen 22 7 - 30 mg/dL    Creatinine 0.98 0.52 - 1.04 mg/dL    Calcium 9.7 8.5 - 10.1 mg/dL    Glucose 100 (H) 70 - 99 mg/dL    GFR Estimate 52 (L) >60 mL/min/1.73m2   Lipid panel reflex to direct LDL Fasting     Status: Abnormal   Result Value Ref Range    Cholesterol 270 (H) <200 mg/dL    Triglycerides 195 (H) <150 mg/dL    Direct Measure HDL 49 (L) >=50 mg/dL    LDL Cholesterol Calculated 182 (H) <=100 mg/dL    Non HDL Cholesterol 221 (H) <130 mg/dL    Patient Fasting > 8hrs? Unknown     Narrative    Cholesterol  Desirable:  <200 mg/dL    Triglycerides  Normal:  Less than 150 mg/dL  Borderline High:  150-199 mg/dL  High:  200-499 mg/dL  Very High:  Greater than or equal to 500 mg/dL    Direct Measure HDL  Female:  Greater than or equal to 50 mg/dL   Male:  Greater than or equal to 40 mg/dL    LDL Cholesterol  Desirable:  <100mg/dL  Above Desirable:  100-129 mg/dL   Borderline High:  130-159 mg/dL   High:  160-189 mg/dL   Very High:  >= 190 mg/dL    Non HDL  Cholesterol  Desirable:  130 mg/dL  Above Desirable:  130-159 mg/dL  Borderline High:  160-189 mg/dL  High:  190-219 mg/dL  Very High:  Greater than or equal to 220 mg/dL   ALT     Status: Normal   Result Value Ref Range    ALT 26 0 - 50 U/L   Symptomatic COVID-19 Virus (Coronavirus) by PCR Nose     Status: None    Specimen: Nose; Swab   Result Value Ref Range    COVID-19 Virus PCR - Result NOT DETECTED

## 2021-12-02 NOTE — PATIENT INSTRUCTIONS
At [facility], we strive to deliver an exceptional experience to you, every time we see you. If you receive a survey, please complete it as we do value your feedback.  If you have MyChart, you can expect to receive results automatically within 24 hours of their completion.  Your provider will send a note interpreting your results as well.   If you do not have MyChart, you should receive your results in about a week by mail.    Your care team:                            Family Medicine Internal Medicine   MD Edgardo Wilhelm MD Shantel Branch-Fleming, MD Srinivasa Vaka, MD Katya Belousova, PANICK Hyatt, APRN CNP    Merrill Patten, MD Pediatrics   Kris Meyers, PANICK Xavier, CNP MD Rebecca Turner APRN CNP   MD Soumya Yee MD Deborah Mielke, MD Sneha Ramos, APRN [facility]      Clinic hours: Monday - Thursday 7 am-6 pm; Fridays 7 am-5 pm.   Urgent care: Monday - Friday 10 am- 8 pm; Saturday and Sunday 9 am-5 pm.    Clinic: [phone]       [facility]: Monday - Thursday 8 am - 7 pm; Friday 8 am - 6 pm  [facility]: [phone]     Use www.[email or web address] for 24/7 diagnosis and treatment of dozens of conditions.

## 2021-12-06 ENCOUNTER — OFFICE VISIT (OUTPATIENT)
Dept: FAMILY MEDICINE | Facility: CLINIC | Age: 86
End: 2021-12-06
Payer: COMMERCIAL

## 2021-12-06 DIAGNOSIS — E78.5 HYPERLIPIDEMIA WITH TARGET LDL LESS THAN 100: Primary | ICD-10-CM

## 2021-12-06 DIAGNOSIS — I25.119 CORONARY ARTERY DISEASE INVOLVING NATIVE CORONARY ARTERY OF NATIVE HEART WITH ANGINA PECTORIS (H): ICD-10-CM

## 2021-12-06 DIAGNOSIS — I10 HYPERTENSION GOAL BP (BLOOD PRESSURE) < 140/90: ICD-10-CM

## 2021-12-06 DIAGNOSIS — Z20.822 SUSPECTED 2019 NOVEL CORONAVIRUS INFECTION: ICD-10-CM

## 2021-12-06 DIAGNOSIS — N18.31 STAGE 3A CHRONIC KIDNEY DISEASE (H): ICD-10-CM

## 2021-12-06 DIAGNOSIS — R51.9 NONINTRACTABLE HEADACHE, UNSPECIFIED CHRONICITY PATTERN, UNSPECIFIED HEADACHE TYPE: ICD-10-CM

## 2021-12-06 LAB
ALT SERPL W P-5'-P-CCNC: 26 U/L (ref 0–50)
ANION GAP SERPL CALCULATED.3IONS-SCNC: 5 MMOL/L (ref 3–14)
BUN SERPL-MCNC: 22 MG/DL (ref 7–30)
CALCIUM SERPL-MCNC: 9.7 MG/DL (ref 8.5–10.1)
CHLORIDE BLD-SCNC: 107 MMOL/L (ref 94–109)
CHOLEST SERPL-MCNC: 270 MG/DL
CO2 SERPL-SCNC: 26 MMOL/L (ref 20–32)
CREAT SERPL-MCNC: 0.98 MG/DL (ref 0.52–1.04)
ERYTHROCYTE [DISTWIDTH] IN BLOOD BY AUTOMATED COUNT: 12.9 % (ref 10–15)
FASTING STATUS PATIENT QL REPORTED: ABNORMAL
GFR SERPL CREATININE-BSD FRML MDRD: 52 ML/MIN/1.73M2
GLUCOSE BLD-MCNC: 100 MG/DL (ref 70–99)
HCT VFR BLD AUTO: 40.3 % (ref 35–47)
HDLC SERPL-MCNC: 49 MG/DL
HGB BLD-MCNC: 13 G/DL (ref 11.7–15.7)
LDLC SERPL CALC-MCNC: 182 MG/DL
MCH RBC QN AUTO: 28.3 PG (ref 26.5–33)
MCHC RBC AUTO-ENTMCNC: 32.3 G/DL (ref 31.5–36.5)
MCV RBC AUTO: 88 FL (ref 78–100)
NONHDLC SERPL-MCNC: 221 MG/DL
PLATELET # BLD AUTO: 300 10E3/UL (ref 150–450)
POTASSIUM BLD-SCNC: 4.3 MMOL/L (ref 3.4–5.3)
RBC # BLD AUTO: 4.59 10E6/UL (ref 3.8–5.2)
SODIUM SERPL-SCNC: 138 MMOL/L (ref 133–144)
TRIGL SERPL-MCNC: 195 MG/DL
WBC # BLD AUTO: 7.1 10E3/UL (ref 4–11)

## 2021-12-06 PROCEDURE — 80048 BASIC METABOLIC PNL TOTAL CA: CPT | Performed by: FAMILY MEDICINE

## 2021-12-06 PROCEDURE — 84460 ALANINE AMINO (ALT) (SGPT): CPT | Performed by: FAMILY MEDICINE

## 2021-12-06 PROCEDURE — 85027 COMPLETE CBC AUTOMATED: CPT | Performed by: FAMILY MEDICINE

## 2021-12-06 PROCEDURE — 36415 COLL VENOUS BLD VENIPUNCTURE: CPT | Performed by: FAMILY MEDICINE

## 2021-12-06 PROCEDURE — U0003 INFECTIOUS AGENT DETECTION BY NUCLEIC ACID (DNA OR RNA); SEVERE ACUTE RESPIRATORY SYNDROME CORONAVIRUS 2 (SARS-COV-2) (CORONAVIRUS DISEASE [COVID-19]), AMPLIFIED PROBE TECHNIQUE, MAKING USE OF HIGH THROUGHPUT TECHNOLOGIES AS DESCRIBED BY CMS-2020-01-R: HCPCS | Mod: 90 | Performed by: FAMILY MEDICINE

## 2021-12-06 PROCEDURE — 80061 LIPID PANEL: CPT | Performed by: FAMILY MEDICINE

## 2021-12-06 PROCEDURE — U0005 INFEC AGEN DETEC AMPLI PROBE: HCPCS | Mod: 90 | Performed by: FAMILY MEDICINE

## 2021-12-06 PROCEDURE — 99000 SPECIMEN HANDLING OFFICE-LAB: CPT | Performed by: FAMILY MEDICINE

## 2021-12-06 PROCEDURE — 99397 PER PM REEVAL EST PAT 65+ YR: CPT | Performed by: FAMILY MEDICINE

## 2021-12-06 ASSESSMENT — MIFFLIN-ST. JEOR: SCORE: 1155.96

## 2021-12-06 NOTE — LETTER
December 8, 2021      Carolinshayan Mcbride  [address] [address]        Dear ,    We are writing to inform you of your test results.    Your test results are attached. I am happy to let you know that they are stable.     The cholesterol level has not changed much. I will forward these results to cardiology.     The blood sugar is normal and you do not have diabetes. The kidneys are stable.     COVID negative test.     Please call me if you have any questions about these test results or about your care.      Resulted Orders   Symptomatic COVID-19 Virus (Coronavirus) by PCR Nose   Result Value Ref Range    SARS CoV2 PCR  Negative     Testing sent to reference lab. Results will be returned via unsolicited result   CBC with platelets   Result Value Ref Range    WBC Count 7.1 4.0 - 11.0 10e3/uL    RBC Count 4.59 3.80 - 5.20 10e6/uL    Hemoglobin 13.0 11.7 - 15.7 g/dL    Hematocrit 40.3 35.0 - 47.0 %    MCV 88 78 - 100 fL    MCH 28.3 26.5 - 33.0 pg    MCHC 32.3 31.5 - 36.5 g/dL    RDW 12.9 10.0 - 15.0 %    Platelet Count 300 150 - 450 10e3/uL   Basic metabolic panel   Result Value Ref Range    Sodium 138 133 - 144 mmol/L    Potassium 4.3 3.4 - 5.3 mmol/L    Chloride 107 94 - 109 mmol/L    Carbon Dioxide (CO2) 26 20 - 32 mmol/L    Anion Gap 5 3 - 14 mmol/L    Urea Nitrogen 22 7 - 30 mg/dL    Creatinine 0.98 0.52 - 1.04 mg/dL    Calcium 9.7 8.5 - 10.1 mg/dL    Glucose 100 (H) 70 - 99 mg/dL    GFR Estimate 52 (L) >60 mL/min/1.73m2      Comment:      As of July 11, 2021, eGFR is calculated by the CKD-EPI creatinine equation, without race adjustment. eGFR can be influenced by muscle mass, exercise, and diet. The reported eGFR is an estimation only and is only applicable if the renal function is stable.   Lipid panel reflex to direct LDL Fasting   Result Value Ref Range    Cholesterol 270 (H) <200 mg/dL    Triglycerides 195 (H) <150 mg/dL    Direct Measure HDL 49 (L) >=50 mg/dL    LDL Cholesterol Calculated 182  (H) <=100 mg/dL    Non HDL Cholesterol 221 (H) <130 mg/dL    Patient Fasting > 8hrs? Unknown     Narrative    Cholesterol  Desirable:  <200 mg/dL    Triglycerides  Normal:  Less than 150 mg/dL  Borderline High:  150-199 mg/dL  High:  200-499 mg/dL  Very High:  Greater than or equal to 500 mg/dL    Direct Measure HDL  Female:  Greater than or equal to 50 mg/dL   Male:  Greater than or equal to 40 mg/dL    LDL Cholesterol  Desirable:  <100mg/dL  Above Desirable:  100-129 mg/dL   Borderline High:  130-159 mg/dL   High:  160-189 mg/dL   Very High:  >= 190 mg/dL    Non HDL Cholesterol  Desirable:  130 mg/dL  Above Desirable:  130-159 mg/dL  Borderline High:  160-189 mg/dL  High:  190-219 mg/dL  Very High:  Greater than or equal to 220 mg/dL   ALT   Result Value Ref Range    ALT 26 0 - 50 U/L   Symptomatic COVID-19 Virus (Coronavirus) by PCR Nose   Result Value Ref Range    COVID-19 Virus PCR - Result NOT DETECTED       Comment:      Not Detected    Collection of multiple specimens from the same patient may   be necessary to detect the virus. The possibility of a false   negative should be considered if the patient's recent   exposure or clinical presentation suggests 2019 nCOV   infection and diagnostic tests for other causes of illness   are negative. Repeat testing may be considered in this   setting.    Patient sample was heat inactivated and amplified using the   HDPCR(TM) SARS-CoV-2 assay ([employer] The HDPCRTM   SARS-CoV-2 assay is a reverse transcription real-time   polymerase chain reaction (qRT-PCR) test intended for the   qualitative detection of nucleic acid from SARS-CoV-2 in   human nasopharyngeal swabs, oropharyngeal swabs, anterior   nasal swabs, mid-turbinate nasal swabs as well as nasal   aspirate, nasal wash, and bronchoalveolar lavage (BAL)   specimens from individuals who are suspected of COVID-19 by   their healthcare provider.    A negative result does not rule out the presence of     real-time PCR inhibitors in the specimen or COVID-19 RNA in   concentrations below the limit of detection of the assay.   The possibility of a false negative should be considered if   the patients recent exposure or clinical presentation   suggests COVID-19. Additional testing or repeat testing   requires consultation with the laboratory.    Nasopharyngeal specimen is the preferred choice for   swab-based SARS CoV2 testing. When collection of a   nasopharyngeal swab is not possible the following are   acceptable alternatives:  an oropharyngeal (OP) specimen collected by a healthcare   professional, or nasal mid-turbinate (NMT) swab collected by   a healthcare professional or by onsite self-collection   (using a flocked tapered swab), or an anterior nares   specimen collected by a healthcare professional or by onsite   self-collection (using a round foam swab). ([facility]   [facility])    Testing performed by [facility] at   the [facility] [address] [place] [address].    The test performance characteristics were determined by   [facility]. It has not been cleared or approved by the FDA.    The laboratory is regulated under the Clinical Laboratory   Improvement Amendments of 1988 (CLIA-88) as qualified to   perform high-complexity testing. This test is used for   clinical purposes. It should not be regarded as   investigational or for research.       If you have any questions or concerns, please call the clinic at the number listed above.       Sincerely,      Lela Townsend MD

## 2021-12-07 VITALS
HEIGHT: 64 IN | DIASTOLIC BLOOD PRESSURE: 72 MMHG | OXYGEN SATURATION: 97 % | WEIGHT: 164 LBS | SYSTOLIC BLOOD PRESSURE: 144 MMHG | HEART RATE: 101 BPM | BODY MASS INDEX: 28 KG/M2

## 2021-12-07 LAB
SARS-COV-2 RNA RESP QL NAA+PROBE: NORMAL
SARS-COV-2 RNA RESP QL NAA+PROBE: NOT DETECTED

## 2021-12-08 NOTE — RESULT ENCOUNTER NOTE
Dear Carolin Mcbride,    Your test results are attached. I am happy to let you know that they are stable.    The cholesterol level has not changed much. I will forward these results to cardiology.    The blood sugar is normal and you do not have diabetes. The kidneys are stable.     COVID negative test.     Please call me if you have any questions about these test results or about your care.    Sincerely,    Lela Townsend MD

## 2021-12-09 ENCOUNTER — TELEPHONE (OUTPATIENT)
Dept: EMERGENCY MEDICINE | Facility: CLINIC | Age: 86
End: 2021-12-09
Payer: COMMERCIAL

## 2021-12-09 NOTE — TELEPHONE ENCOUNTER

## 2021-12-13 ENCOUNTER — ANCILLARY PROCEDURE (OUTPATIENT)
Dept: CARDIOLOGY | Facility: CLINIC | Age: 86
End: 2021-12-13
Attending: INTERNAL MEDICINE
Payer: COMMERCIAL

## 2021-12-13 DIAGNOSIS — I25.5 ISCHEMIC CARDIOMYOPATHY: ICD-10-CM

## 2021-12-13 LAB — LVEF ECHO: NORMAL

## 2021-12-13 PROCEDURE — 93306 TTE W/DOPPLER COMPLETE: CPT | Mod: GC | Performed by: INTERNAL MEDICINE

## 2021-12-16 ENCOUNTER — OFFICE VISIT (OUTPATIENT)
Dept: CARDIOLOGY | Facility: CLINIC | Age: 86
End: 2021-12-16
Payer: COMMERCIAL

## 2021-12-16 VITALS
DIASTOLIC BLOOD PRESSURE: 79 MMHG | BODY MASS INDEX: 28.94 KG/M2 | SYSTOLIC BLOOD PRESSURE: 152 MMHG | WEIGHT: 166 LBS | HEART RATE: 68 BPM | OXYGEN SATURATION: 98 %

## 2021-12-16 DIAGNOSIS — I25.5 ISCHEMIC CARDIOMYOPATHY: Primary | ICD-10-CM

## 2021-12-16 DIAGNOSIS — Z98.61 STATUS POST PERCUTANEOUS TRANSLUMINAL CORONARY ANGIOPLASTY: ICD-10-CM

## 2021-12-16 PROCEDURE — 99214 OFFICE O/P EST MOD 30 MIN: CPT | Performed by: INTERNAL MEDICINE

## 2021-12-16 RX ORDER — CLOPIDOGREL BISULFATE 75 MG/1
75 TABLET ORAL DAILY
Qty: 90 TABLET | Refills: 3 | Status: SHIPPED | OUTPATIENT
Start: 2021-12-16 | End: 2022-12-19

## 2021-12-16 NOTE — LETTER
12/16/2021      RE: Carolin Mcbride  [address] [facility] [address]       Dear Colleague,    Thank you for the opportunity to participate in the care of your patient, Carolin Mcbride, at the [facility]. Please see a copy of my visit note below.       SUBJECTIVE:  Carolin Mcbride is a 87 year old female who presents for follow-up.  Status post PCI.    Patient had 2 stent to LAD, 1 stent torPL branch andPOBA of D1 on 12/8/2020.     Initially patient presented for the management of hyperlipidemia.  Her LDL was over 200.  Patient had a TIA and she was initially managed on clopidogrel for a short course.  Her CT MRI and echo were normal.  Also had a Zio patch which did not show significant arrhythmia or atrial fibrillation.  Patient was very reluctant to take statin as she had severe myalgia with Baycol.  She was started on 20 mg of Lipitor which she discontinued due to some nonspecific muscle ache.  Patient was restarted on Lipitor 20 mg and then subsequently it was increased to 40 mg daily.  Patient is tolerating this dose very well.  As part of CAD evaluation patient had a nuclear scan.  She had an apical perfusion defect which was fixed.  The question was whether it was an artifact or real.  Subsequently patient had an echocardiogram which showed apical wall motion abnormality.  Subsequent angiogram showed LAD D1 disease and RPL disease for which she had intervention.      Patient had some myalgia on 40 mg of Lipitor.  Her CK was normal medication was discontinued and he was referred to lipid clinic for further management.  Today patient had no cardiac complaints.  She is doing well from this aspect.  Still complaining of myalgia and muscle weakness.   The lipid clinic she was started on Zetia 10 mg daily.  Latest lipid profile LDL is 182.  Patient will be returning to lipid clinic.  Today patient had no cardiac complaints.   Overall she is doing well.    Patient Active Problem List    Diagnosis Date Noted     Statin intolerance 08/10/2021     Priority: Medium     Status post coronary angiogram 12/08/2020     Priority: Medium     Dyspnea on exertion 11/24/2020     Priority: Medium     Added automatically from request for surgery [id number]       Abnormal nuclear cardiac imaging test 11/24/2020     Priority: Medium     Added automatically from request for surgery [id number]       Microscopic hematuria 08/11/2020     Priority: Medium     Proteinuria, unspecified type 08/11/2020     Priority: Medium     Leg weakness, bilateral 04/13/2020     Priority: Medium     Recurrent and persistent hematuria 10/31/2019     Priority: Medium     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 minimally only for Tree pollens (all other environmental allergens NEGATIVE)       Diagnostic skin and sensitization tests (aka ALLERGENS)      Priority: Medium     CKD (chronic kidney disease) stage 3, GFR 30-59 ml/min (H) 05/22/2014     Priority: Medium     Hyperlipidemia with target LDL less than 100 04/08/2013     Priority: Medium     Osteoporosis      Priority: Medium     5 years Fosamax 1-1-2011 to 12-. Medication holiday recommended.         Hypertension goal BP (blood pressure) < 140/90 06/26/2012     Priority: Medium     Advance care planning 03/13/2012     Priority: Medium     Advance Care Planning 12/20/2016: ACP Review of Chart / Resources Provided:  Reviewed chart for advance care plan.  Carolin Mcbride has been provided information and resources to begin or update their advance care plan.  Advance Care Planning  "03/13/2012: Discussed advance care planning with patient; information given to patient to review. 3/13/2012 . Lara Roque MA                  CAD (coronary artery disease)      Priority: Medium     Stenting of 2 arteries and balloon angioplasty 12-8-2020.        Pseudophakia OU c YAG OU 06/02/2011     Priority: Medium    .  Current Outpatient Medications   Medication Sig     aspirin 81 MG tablet Take 1 tablet (81 mg) by mouth daily     citalopram (CELEXA) 10 MG tablet Take 1 tablet (10 mg) by mouth daily     clopidogrel (PLAVIX) 75 MG tablet Take 4 tablets (300 mg) on the first day for a loading dose then decrease to 1 tablet (75 mg) daily     ezetimibe (ZETIA) 10 MG tablet Take 1 tablet (10 mg) by mouth daily     Hypromellose (ARTIFICIAL TEARS OP) Apply 1 drop to eye as needed Both eyes.     losartan (COZAAR) 100 MG tablet Take 1 tablet by mouth once daily     nitroGLYcerin (NITROSTAT) 0.4 MG sublingual tablet One tablet under the tongue every 5 minutes if needed for chest pain. May repeat every 5 minutes for a maximum of 3 doses in 15 minutes\"     STATIN NOT PRESCRIBED (INTENTIONAL) Please choose reason not prescribed from choices below.     triamcinolone (KENALOG) 0.1 % external ointment Apply topically 2 times daily Apply to rash on leg.     VITAMIN D 400 UNIT OR TABS 1 cap twice a day      Current Facility-Administered Medications   Medication     triamcinolone acetonide (KENALOG-10) injection 10 mg     Past Medical History:   Diagnosis Date     Adjustment reaction with anxiety and depression 2001     CAD (coronary artery disease) 2009    asymptomatic, on CT scan     Cyst, ovarian      Diagnostic skin and sensitization tests (aka ALLERGENS) 9/29/14 IgE tests pos. minimally only for Tree pollens (all other environmental allergens NEGATIVE)     HTN (hypertension)      Hyperlipidemia      Myositis 2001    related to past Baycol use- resolved     Osteoporosis      Seasonal allergic rhinitis     9/29/14 IgE tests " pos. minimally only for Tree pollens (all other environmental allergens NEGATIVE)     Past Surgical History:   Procedure Laterality Date     APPENDECTOMY       CHOLECYSTECTOMY, OPEN       COLONOSCOPY  2009    neg     CV CORONARY ANGIOGRAM N/A 12/8/2020    Procedure: CV CORONARY ANGIOGRAM;  Surgeon: Isra Weber MD;  Location: [facility] CARDIAC CATH LAB     CV PCI STENT DRUG ELUTING N/A 12/8/2020    Procedure: Percutaneous Coronary Intervention Stent Drug Eluting;  Surgeon: Isra Weber MD;  Location: [facility] CARDIAC CATH LAB     CYSTOCELE REPAIR  2003    TVT SPARC     Allergies   Allergen Reactions     Baycol [Cerivastatin Sodium]       muscle mass loss       Crestor [Hmg-Coa-R Inhibitors]      Also intolerant of simvastatin and atorvastatin with recurrent leg weakness and pain.     Darvocet [Acetaminophen]      Severe nausea     Macrobid [Nitrofuran Derivatives]      Hives     Social History     Socioeconomic History     Marital status:      Spouse name: Shashank     Number of children: 3     Years of education: Not on file     Highest education level: Not on file   Occupational History     Employer: RETIRED   Tobacco Use     Smoking status: Never Smoker     Smokeless tobacco: Never Used     Tobacco comment: Never smoked; nonsmoking household   Substance and Sexual Activity     Alcohol use: No     Comment: very rare     Drug use: No     Comment: never     Sexual activity: Yes     Partners: Male     Birth control/protection: None   Other Topics Concern     Parent/sibling w/ CABG, MI or angioplasty before 65F 55M? No      Service No     Blood Transfusions No     Caffeine Concern No     Occupational Exposure No     Hobby Hazards No     Sleep Concern No     Stress Concern No     Weight Concern Yes     Special Diet No     Back Care No     Exercise Yes     Comment: Curves     Bike Helmet No     Seat Belt Yes     Self-Exams Yes   Social History Narrative     Not on file      Social Determinants of Health     Financial Resource Strain: Not on file   Food Insecurity: Not on file   Transportation Needs: Not on file   Physical Activity: Not on file   Stress: Not on file   Social Connections: Not on file   Intimate Partner Violence: Not on file   Housing Stability: Not on file     Family History   Problem Relation Age of Onset     Cancer Mother         ovarian      Heart Disease Sister         CABG x 3     Neurologic Disorder Sister         Fibromyalgia     Cancer Father         stomach/interstines      Musculoskeletal Disorder Brother         back      Heart Disease Brother 60        CABG x 4     Glaucoma No family hx of      Macular Degeneration No family hx of           REVIEW OF SYSTEMS:  General: negative, fever, chills, night sweats  Skin: negative, acne, rash and scaling  Eyes: negative, double vision, eye pain and photophobia  Ears/Nose/Throat: negative, nasal congestion and purulent rhinorrhea  Respiratory: No dyspnea on exertion, No cough, No hemoptysis and negative  Cardiovascular: negative, palpitations, tachycardia, irregular heart beat, chest pain, exertional chest pain or pressure, paroxysmal nocturnal dyspnea, dyspnea on exertion and orthopnea       OBJECTIVE:  Blood pressure (!) 152/79, pulse 68, weight 75.3 kg (166 lb), SpO2 98 %, not currently breastfeeding.  General Appearance: healthy, alert, active and no distress  Head: Normocephalic. No masses, lesions, tenderness or abnormalities  Eyes: conjuctiva clear, PERRL, EOM intact  Ears: External ears normal. Canals clear. TM's normal.  Nose: Nares normal  Mouth: normal  Neck: Supple, no cervical adenopathy, no thyromegaly  Lungs: clear to auscultation  Cardiac: regular rate and rhythm, normal S1 and S2, no murmur         ASSESSMENT/PLAN:  Patient here for post PCI follow-up.  Had 2 stents to LAD, one stent to PL branch and POBA of D1 in December 2020..                    Postprocedure course was uneventful.  Patient is  feeling well and had no cardiac complaints today.  Reviewed recent echocardiogram and result discussed with patient.  Normal biventricular function no significant valvular abnormalities.  Ascending aorta 3.7 cm.  When indexed to her body surface area of 1.8 m  the index is 20 mm/m .  This is mild dilation.  Patient was started on Zetia 10 mg daily for lipid control.  Her current LDL is 182.  Patient will be returning to the lipid clinic for possible PCSK9 inhibitor therapy.  From a cardiac standpoint she is doing well and advised to continue current medications.  As she had 2 stents to LAD she is advised to continue the dual antiplatelet therapy for a total of 2 years.  Patient will return to clinic next year in December with a repeat echocardiogram to reassess the aortic size.  She is advised to continue rest of her current medications.  Per orders.   Return to Clinic 1 year with an echocardiogram.  Total visit duration 30 minutes.  This include face-to-face interview, physical exam, chart review, review of echocardiogram, coronary angiogram and documentation.      Please do not hesitate to contact me if you have any questions/concerns.     Sincerely,     VAISHALI Juan MD

## 2021-12-16 NOTE — PATIENT INSTRUCTIONS
Thank you for coming to the [facility] @ [facility]; please note the following instructions:    1.  Dr. Peña recommends to follow up in 1 year with an echo prior.  The cardiology team will contact you to schedule when the time gets closer.    2.  Continue to take Plavix    3.  Call [phone]  to set up Physical Therapy        If you have any questions regarding your visit please contact your care team:     Cardiology  Telephone Number   Calista FONG., RN  Lesele YORK, RN   Valeria HARTMANN, RMA  Raquel SERRATO, RMLIAT HARPER, LPN   [phone] (option 1)   For scheduling appts:     [phone] (select option 1)       For the Device Clinic (Pacemakers and ICD's)  RN's :  Arin Cantu   During business hours: [phone]    *After business hours:  [phone] (select option 4)      Normal test result notifications will be released via [employer] or mailed within 7 business days.  All other test results, will be communicated via telephone once reviewed by your cardiologist.    If you need a medication refill please contact your pharmacy.  Please allow 3 business days for your refill to be completed.    As always, thank you for trusting us with your health care needs!

## 2021-12-16 NOTE — PROGRESS NOTES
SUBJECTIVE:  Carolin Mcbride is a 87 year old female who presents for follow-up.  Status post PCI.    Patient had 2 stent to LAD, 1 stent torPL branch andPOBA of D1 on 12/8/2020.     Initially patient presented for the management of hyperlipidemia.  Her LDL was over 200.  Patient had a TIA and she was initially managed on clopidogrel for a short course.  Her CT MRI and echo were normal.  Also had a Zio patch which did not show significant arrhythmia or atrial fibrillation.  Patient was very reluctant to take statin as she had severe myalgia with Baycol.  She was started on 20 mg of Lipitor which she discontinued due to some nonspecific muscle ache.  Patient was restarted on Lipitor 20 mg and then subsequently it was increased to 40 mg daily.  Patient is tolerating this dose very well.  As part of CAD evaluation patient had a nuclear scan.  She had an apical perfusion defect which was fixed.  The question was whether it was an artifact or real.  Subsequently patient had an echocardiogram which showed apical wall motion abnormality.  Subsequent angiogram showed LAD D1 disease and RPL disease for which she had intervention.      Patient had some myalgia on 40 mg of Lipitor.  Her CK was normal medication was discontinued and he was referred to lipid clinic for further management.  Today patient had no cardiac complaints.  She is doing well from this aspect.  Still complaining of myalgia and muscle weakness.   The lipid clinic she was started on Zetia 10 mg daily.  Latest lipid profile LDL is 182.  Patient will be returning to lipid clinic.  Today patient had no cardiac complaints.  Overall she is doing well.    Patient Active Problem List    Diagnosis Date Noted     Statin intolerance 08/10/2021     Priority: Medium     Status post coronary angiogram 12/08/2020     Priority: Medium     Dyspnea on exertion 11/24/2020     Priority: Medium     Added automatically from request for surgery [id number]       Abnormal nuclear  cardiac imaging test 11/24/2020     Priority: Medium     Added automatically from request for surgery [id number]       Microscopic hematuria 08/11/2020     Priority: Medium     Proteinuria, unspecified type 08/11/2020     Priority: Medium     Leg weakness, bilateral 04/13/2020     Priority: Medium     Recurrent and persistent hematuria 10/31/2019     Priority: Medium     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 minimally only for Tree pollens (all other environmental allergens NEGATIVE)       Diagnostic skin and sensitization tests (aka ALLERGENS)      Priority: Medium     CKD (chronic kidney disease) stage 3, GFR 30-59 ml/min (H) 05/22/2014     Priority: Medium     Hyperlipidemia with target LDL less than 100 04/08/2013     Priority: Medium     Osteoporosis      Priority: Medium     5 years Fosamax 1-1-2011 to 12-. Medication holiday recommended.         Hypertension goal BP (blood pressure) < 140/90 06/26/2012     Priority: Medium     Advance care planning 03/13/2012     Priority: Medium     Advance Care Planning 12/20/2016: ACP Review of Chart / Resources Provided:  Reviewed chart for advance care plan.  Carolin Mcbride has been provided information and resources to begin or update their advance care plan.  Advance Care Planning 03/13/2012: Discussed advance care planning with patient; information given to patient to review. 3/13/2012 . Lara Roque MA                  CAD (coronary artery disease)      Priority: Medium     Stenting of 2 arteries and balloon angioplasty 12-8-2020.        Pseudophakia OU c YAG OU 06/02/2011     Priority: Medium    .  Current  "Outpatient Medications   Medication Sig     aspirin 81 MG tablet Take 1 tablet (81 mg) by mouth daily     citalopram (CELEXA) 10 MG tablet Take 1 tablet (10 mg) by mouth daily     clopidogrel (PLAVIX) 75 MG tablet Take 4 tablets (300 mg) on the first day for a loading dose then decrease to 1 tablet (75 mg) daily     ezetimibe (ZETIA) 10 MG tablet Take 1 tablet (10 mg) by mouth daily     Hypromellose (ARTIFICIAL TEARS OP) Apply 1 drop to eye as needed Both eyes.     losartan (COZAAR) 100 MG tablet Take 1 tablet by mouth once daily     nitroGLYcerin (NITROSTAT) 0.4 MG sublingual tablet One tablet under the tongue every 5 minutes if needed for chest pain. May repeat every 5 minutes for a maximum of 3 doses in 15 minutes\"     STATIN NOT PRESCRIBED (INTENTIONAL) Please choose reason not prescribed from choices below.     triamcinolone (KENALOG) 0.1 % external ointment Apply topically 2 times daily Apply to rash on leg.     VITAMIN D 400 UNIT OR TABS 1 cap twice a day      Current Facility-Administered Medications   Medication     triamcinolone acetonide (KENALOG-10) injection 10 mg     Past Medical History:   Diagnosis Date     Adjustment reaction with anxiety and depression 2001     CAD (coronary artery disease) 2009    asymptomatic, on CT scan     Cyst, ovarian      Diagnostic skin and sensitization tests (aka ALLERGENS) 9/29/14 IgE tests pos. minimally only for Tree pollens (all other environmental allergens NEGATIVE)     HTN (hypertension)      Hyperlipidemia      Myositis 2001    related to past Baycol use- resolved     Osteoporosis      Seasonal allergic rhinitis     9/29/14 IgE tests pos. minimally only for Tree pollens (all other environmental allergens NEGATIVE)     Past Surgical History:   Procedure Laterality Date     APPENDECTOMY       CHOLECYSTECTOMY, OPEN       COLONOSCOPY  2009    neg     CV CORONARY ANGIOGRAM N/A 12/8/2020    Procedure: CV CORONARY ANGIOGRAM;  Surgeon: Isra Weber MD;  " Location:  HEART CARDIAC CATH LAB     CV PCI STENT DRUG ELUTING N/A 12/8/2020    Procedure: Percutaneous Coronary Intervention Stent Drug Eluting;  Surgeon: Isra Weber MD;  Location:  HEART CARDIAC CATH LAB     CYSTOCELE REPAIR  2003    TVT SPARC     Allergies   Allergen Reactions     Baycol [Cerivastatin Sodium]       muscle mass loss       Crestor [Hmg-Coa-R Inhibitors]      Also intolerant of simvastatin and atorvastatin with recurrent leg weakness and pain.     Darvocet [Acetaminophen]      Severe nausea     Macrobid [Nitrofuran Derivatives]      Hives     Social History     Socioeconomic History     Marital status:      Spouse name: Shashank     Number of children: 3     Years of education: Not on file     Highest education level: Not on file   Occupational History     Employer: RETIRED   Tobacco Use     Smoking status: Never Smoker     Smokeless tobacco: Never Used     Tobacco comment: Never smoked; nonsmoking household   Substance and Sexual Activity     Alcohol use: No     Comment: very rare     Drug use: No     Comment: never     Sexual activity: Yes     Partners: Male     Birth control/protection: None   Other Topics Concern     Parent/sibling w/ CABG, MI or angioplasty before 65F 55M? No      Service No     Blood Transfusions No     Caffeine Concern No     Occupational Exposure No     Hobby Hazards No     Sleep Concern No     Stress Concern No     Weight Concern Yes     Special Diet No     Back Care No     Exercise Yes     Comment: Curves     Bike Helmet No     Seat Belt Yes     Self-Exams Yes   Social History Narrative     Not on file     Social Determinants of Health     Financial Resource Strain: Not on file   Food Insecurity: Not on file   Transportation Needs: Not on file   Physical Activity: Not on file   Stress: Not on file   Social Connections: Not on file   Intimate Partner Violence: Not on file   Housing Stability: Not on file     Family History   Problem  Relation Age of Onset     Cancer Mother         ovarian      Heart Disease Sister         CABG x 3     Neurologic Disorder Sister         Fibromyalgia     Cancer Father         stomach/interstines      Musculoskeletal Disorder Brother         back      Heart Disease Brother 60        CABG x 4     Glaucoma No family hx of      Macular Degeneration No family hx of           REVIEW OF SYSTEMS:  General: negative, fever, chills, night sweats  Skin: negative, acne, rash and scaling  Eyes: negative, double vision, eye pain and photophobia  Ears/Nose/Throat: negative, nasal congestion and purulent rhinorrhea  Respiratory: No dyspnea on exertion, No cough, No hemoptysis and negative  Cardiovascular: negative, palpitations, tachycardia, irregular heart beat, chest pain, exertional chest pain or pressure, paroxysmal nocturnal dyspnea, dyspnea on exertion and orthopnea       OBJECTIVE:  Blood pressure (!) 152/79, pulse 68, weight 75.3 kg (166 lb), SpO2 98 %, not currently breastfeeding.  General Appearance: healthy, alert, active and no distress  Head: Normocephalic. No masses, lesions, tenderness or abnormalities  Eyes: conjuctiva clear, PERRL, EOM intact  Ears: External ears normal. Canals clear. TM's normal.  Nose: Nares normal  Mouth: normal  Neck: Supple, no cervical adenopathy, no thyromegaly  Lungs: clear to auscultation  Cardiac: regular rate and rhythm, normal S1 and S2, no murmur         ASSESSMENT/PLAN:  Patient here for post PCI follow-up.  Had 2 stents to LAD, one stent to PL branch and POBA of D1 in December 2020..                    Postprocedure course was uneventful.  Patient is feeling well and had no cardiac complaints today.  Reviewed recent echocardiogram and result discussed with patient.  Normal biventricular function no significant valvular abnormalities.  Ascending aorta 3.7 cm.  When indexed to her body surface area of 1.8 m  the index is 20 mm/m .  This is mild dilation.  Patient was started on Zetia  10 mg daily for lipid control.  Her current LDL is 182.  Patient will be returning to the lipid clinic for possible PCSK9 inhibitor therapy.  From a cardiac standpoint she is doing well and advised to continue current medications.  As she had 2 stents to LAD she is advised to continue the dual antiplatelet therapy for a total of 2 years.  Patient will return to clinic next year in December with a repeat echocardiogram to reassess the aortic size.  She is advised to continue rest of her current medications.  Per orders.   Return to Clinic 1 year with an echocardiogram.  Total visit duration 30 minutes.  This include face-to-face interview, physical exam, chart review, review of echocardiogram, coronary angiogram and documentation.

## 2021-12-16 NOTE — NURSING NOTE
"Chief Complaint   Patient presents with     Heart Problem     per patient little sob., and echo prior        Initial BP (!) 152/79 (BP Location: Left arm, Patient Position: Sitting, Cuff Size: Adult Large)   Pulse 68   Wt 75.3 kg (166 lb)   LMP  (LMP Unknown)   SpO2 98%   BMI 28.94 kg/m   Estimated body mass index is 28.94 kg/m  as calculated from the following:    Height as of 12/6/21: 1.613 m (5' 3.5\").    Weight as of this encounter: 75.3 kg (166 lb)..  BP completed using cuff size: large    Dilcia Brandon L.P.N.    "

## 2022-01-03 ENCOUNTER — TELEPHONE (OUTPATIENT)
Dept: CARDIOLOGY | Facility: CLINIC | Age: 87
End: 2022-01-03
Payer: COMMERCIAL

## 2022-01-03 NOTE — TELEPHONE ENCOUNTER
Coronary angiogram scheduled for 12/8/20 at 6:30 am.  Patient is informed.  Mailed coronary angiogram     Left Heart Catheterization: Patient given Coronary Angiogram and Angioplasty: A Patient's Guide booklet. Patient was instructed regarding left heart catheterization, including discussion of the indication, procedure, preparation, intra-procedural steps, and recovery at home. Patient demonstrated understanding of this information and agreed to call with further questions or concerns.  Patient stated she understood all health information given and agreed to call with further questions or concerns.    Letter and packet sent.  Orders placed    Calista Li RN  Cardiology Care Coordinator  [facility]  [phone] option 1       Ear Wedge Repair Text: A wedge excision was completed by carrying down an excision through the full thickness of the ear and cartilage with an inward facing Burow's triangle. The wound was then closed in a layered fashion.

## 2022-01-04 ENCOUNTER — CARE COORDINATION (OUTPATIENT)
Dept: CARDIOLOGY | Facility: CLINIC | Age: 87
End: 2022-01-04
Payer: COMMERCIAL

## 2022-01-04 ENCOUNTER — TELEPHONE (OUTPATIENT)
Dept: CARDIOLOGY | Facility: CLINIC | Age: 87
End: 2022-01-04
Payer: COMMERCIAL

## 2022-01-04 DIAGNOSIS — E78.5 HYPERLIPIDEMIA WITH TARGET LDL LESS THAN 70: Primary | ICD-10-CM

## 2022-01-04 NOTE — PROGRESS NOTES
Patient had recent labs drawn last month and per Dr. Gomez, her lipids are quite elevated and he recommends she start a PCSK9 inhibitor. 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 She is currently taking zetia alone, but her LDL is 182 and she has coronary artery disease with stents.      I called patient to discuss the PCSK9 inhibitor and the process of obtaining prior authorization.  Patient does wish to proceed with the PA.  Informed her the process may take several days and if denied, we will appeal the decision.

## 2022-01-04 NOTE — TELEPHONE ENCOUNTER
Prior Authorization Specialty Medication Request    Medication/Dose: Repatha  ICD code (if different than what is on RX):  E78.5  Previously Tried and Failed:  Crestor, simvastatin, and atorvastatin have caused patient muscle weakness and pain. Patient has experienced muscle wasting with baycol. Currently on zetia but her LDL is 182 and she has CAD with stents.    Important Lab Values:   Results for BERNADETTE MENDEZ (MRN [id number]) as of 1/4/2022 16:13   Ref. Range 12/6/2021 15:44   HDL Cholesterol Latest Ref Range: >=50 mg/dL 49 (L)   LDL Cholesterol Calculated Latest Ref Range: <=100 mg/dL 182 (H)   Non HDL Cholesterol Latest Ref Range: <130 mg/dL 221 (H)   Triglycerides Latest Ref Range: <150 mg/dL 195 (H)       Rationale: Elevated lipids and cholesterol, increases patient's risk for worsening of present heart disease and stroke. Pt already has CAD with stents. Patient also has a history of TIA.    Insurance Name: [facility]  Insurance ID: [id number]  Insurance Phone Number: [phone]

## 2022-01-05 NOTE — TELEPHONE ENCOUNTER
Prior Authorization Approval    Authorization Effective Date: 12/6/2021  Authorization Expiration Date: 1/4/2025  Medication: evolocumab (REPATHA) 140 MG/ML prefilled autoinjector--APPROVED  Approved Dose/Quantity:   Reference #:     Insurance Company: [employer] - Phone [phone] Fax [phone]  Expected CoPay:       CoPay Card Available:      Foundation Assistance Needed:    Which Pharmacy is filling the prescription (Not needed for infusion/clinic administered): [facility] [address] [place] [address] [facility]  Pharmacy Notified: Yes  Patient Notified: Yes **Instructed pharmacy to notify patient when script is ready to /ship.**

## 2022-01-05 NOTE — TELEPHONE ENCOUNTER
PA Initiation    Medication: evolocumab (REPATHA) 140 MG/ML prefilled autoinjector   Insurance Company: [employer] - Phone [phone] Fax [phone]  Pharmacy Filling the Rx: [facility] [address] [place], [place] [address], [place] - [address] [facility]  Filling Pharmacy Phone: [phone]  Filling Pharmacy Fax: [phone]  Start Date: 1/5/2022

## 2022-01-06 ENCOUNTER — CARE COORDINATION (OUTPATIENT)
Dept: CARDIOLOGY | Facility: CLINIC | Age: 87
End: 2022-01-06
Payer: COMMERCIAL

## 2022-01-06 DIAGNOSIS — E78.5 HYPERLIPIDEMIA WITH TARGET LDL LESS THAN 100: Primary | ICD-10-CM

## 2022-01-06 DIAGNOSIS — I25.119 CORONARY ARTERY DISEASE INVOLVING NATIVE CORONARY ARTERY OF NATIVE HEART WITH ANGINA PECTORIS (H): ICD-10-CM

## 2022-01-06 DIAGNOSIS — N18.31 STAGE 3A CHRONIC KIDNEY DISEASE (H): ICD-10-CM

## 2022-01-06 NOTE — PROGRESS NOTES
Called patient to make sure she was aware that the Repatha had been approved.  She reported her pharmacy called and informed her the copay would be $347/3 months initially and after that would be $90.  She thought this was due to the beginning of the year deductible.  We discussed that dosing instructions, one injection every 2 weeks and one week after the 2nd injection, she should get fasting labs drawn at any [place] lab.  She is nervous about starting it as she had so many side effects from statins and baycol in the past.  Instructed her to start the injections early next week and if she had problems, she could call us as opposed to stating it on a weekend.  She was agreeable and will call if she has problems or questions.

## 2022-02-01 ENCOUNTER — LAB (OUTPATIENT)
Dept: LAB | Facility: CLINIC | Age: 87
End: 2022-02-01
Payer: COMMERCIAL

## 2022-02-01 DIAGNOSIS — N18.31 STAGE 3A CHRONIC KIDNEY DISEASE (H): ICD-10-CM

## 2022-02-01 DIAGNOSIS — E78.5 HYPERLIPIDEMIA WITH TARGET LDL LESS THAN 100: ICD-10-CM

## 2022-02-01 DIAGNOSIS — I25.119 CORONARY ARTERY DISEASE INVOLVING NATIVE CORONARY ARTERY OF NATIVE HEART WITH ANGINA PECTORIS (H): ICD-10-CM

## 2022-02-01 LAB
ALBUMIN SERPL-MCNC: 3.9 G/DL (ref 3.4–5)
ALP SERPL-CCNC: 83 U/L (ref 40–150)
ALT SERPL W P-5'-P-CCNC: 25 U/L (ref 0–50)
ANION GAP SERPL CALCULATED.3IONS-SCNC: 3 MMOL/L (ref 3–14)
AST SERPL W P-5'-P-CCNC: 10 U/L (ref 0–45)
BILIRUB SERPL-MCNC: 0.3 MG/DL (ref 0.2–1.3)
BUN SERPL-MCNC: 26 MG/DL (ref 7–30)
CALCIUM SERPL-MCNC: 9 MG/DL (ref 8.5–10.1)
CHLORIDE BLD-SCNC: 109 MMOL/L (ref 94–109)
CHOLEST SERPL-MCNC: 147 MG/DL
CO2 SERPL-SCNC: 27 MMOL/L (ref 20–32)
CREAT SERPL-MCNC: 0.99 MG/DL (ref 0.52–1.04)
FASTING STATUS PATIENT QL REPORTED: YES
GFR SERPL CREATININE-BSD FRML MDRD: 55 ML/MIN/1.73M2
GLUCOSE BLD-MCNC: 110 MG/DL (ref 70–99)
HDLC SERPL-MCNC: 50 MG/DL
LDLC SERPL CALC-MCNC: 72 MG/DL
NONHDLC SERPL-MCNC: 97 MG/DL
POTASSIUM BLD-SCNC: 4.2 MMOL/L (ref 3.4–5.3)
PROT SERPL-MCNC: 7.1 G/DL (ref 6.8–8.8)
SODIUM SERPL-SCNC: 139 MMOL/L (ref 133–144)
TRIGL SERPL-MCNC: 127 MG/DL

## 2022-02-01 PROCEDURE — 80061 LIPID PANEL: CPT

## 2022-02-01 PROCEDURE — 80053 COMPREHEN METABOLIC PANEL: CPT

## 2022-02-01 PROCEDURE — 36415 COLL VENOUS BLD VENIPUNCTURE: CPT

## 2022-02-02 DIAGNOSIS — E78.5 HYPERLIPIDEMIA WITH TARGET LDL LESS THAN 70: Primary | ICD-10-CM

## 2022-02-08 ENCOUNTER — TELEPHONE (OUTPATIENT)
Dept: CARDIOLOGY | Facility: CLINIC | Age: 87
End: 2022-02-08
Payer: COMMERCIAL

## 2022-02-08 ENCOUNTER — TELEPHONE (OUTPATIENT)
Dept: NURSING | Facility: CLINIC | Age: 87
End: 2022-02-08
Payer: COMMERCIAL

## 2022-02-08 NOTE — TELEPHONE ENCOUNTER
Patient is currently doing injections at home twice a month per her cardiologist Dr. Gomez. She is wondering what to do with the needles.     Information is looked up on the [place] dept of health with options for drop off sites, mail in options, and disposing in a laundry detergent container. Patient verbalizes understanding, she will see what will work best for her and will call with any further questions.    Leona Sue RN  [facility] Nurse Advisors

## 2022-02-08 NOTE — TELEPHONE ENCOUNTER
Health Call Center    Phone Message    May a detailed message be left on voicemail: yes     Reason for Call: Medication Question or concern regarding medication   Prescription Clarification  Name of Medication:REPATHA  Prescribing Provider: Patricia   Pharmacy: [facility] in [place]   What on the order needs clarification? Questions on how to dispose of the injection pen       Other: Pt has reached out to PCP clinic, [facility], pharmacy, [facility] and has not found help. Please advise.        Action Taken: Message routed to:  [facility] CARDIOLOGY ADULT [facility] [[id number]]    Travel Screening: Not Applicable

## 2022-02-08 NOTE — TELEPHONE ENCOUNTER
Spoke with pt to discuss disposal options for repatha injection. Instructed pt to either find a sharps container at her local pharmacy or use an old laundry detergent container that is labeled as sharp. Pt reports understanding and states she will use an empty laundry detergent container.     Manuel Lin RN   Cardiology Nurse Coordinator

## 2022-03-10 ENCOUNTER — TELEPHONE (OUTPATIENT)
Dept: CARDIOLOGY | Facility: CLINIC | Age: 87
End: 2022-03-10
Payer: COMMERCIAL

## 2022-03-10 NOTE — TELEPHONE ENCOUNTER
[facility] Call [facility]    Phone Message    May a detailed message be left on voicemail: yes     Reason for Call: Other: Pt is doing injections and would like a call back as she wants to know if she should be taking zetia with her Repatha injections      Action Taken: Message routed to:  [facility] ([facility]): Cardio    Travel Screening: Not Applicable

## 2022-03-11 ENCOUNTER — TELEPHONE (OUTPATIENT)
Dept: CARDIOLOGY | Facility: CLINIC | Age: 87
End: 2022-03-11
Payer: COMMERCIAL

## 2022-03-11 NOTE — TELEPHONE ENCOUNTER
[facility] Call [facility]    Phone Message    May a detailed message be left on voicemail: yes     Reason for Call: Other: pt is returning a call she received today. Patient has more questions regarding Medications.      Action Taken: Other: cardiology    Travel Screening: Not Applicable

## 2022-03-11 NOTE — TELEPHONE ENCOUNTER
Called pt.     Pt wondering if she needs to be on both zetia and repatha. She notes cost is over 100 dollars for both.    Will forward to Dr. Gomez to ask.

## 2022-05-02 ENCOUNTER — LAB (OUTPATIENT)
Dept: LAB | Facility: CLINIC | Age: 87
End: 2022-05-02
Payer: COMMERCIAL

## 2022-05-02 ENCOUNTER — TELEPHONE (OUTPATIENT)
Dept: CARDIOLOGY | Facility: CLINIC | Age: 87
End: 2022-05-02

## 2022-05-02 DIAGNOSIS — I25.10 CORONARY ARTERY DISEASE INVOLVING NATIVE CORONARY ARTERY OF NATIVE HEART WITHOUT ANGINA PECTORIS: ICD-10-CM

## 2022-05-02 DIAGNOSIS — E78.5 HYPERLIPIDEMIA WITH TARGET LDL LESS THAN 70: ICD-10-CM

## 2022-05-02 DIAGNOSIS — E78.5 HYPERLIPIDEMIA WITH TARGET LDL LESS THAN 100: Primary | ICD-10-CM

## 2022-05-02 LAB
CHOLEST SERPL-MCNC: 200 MG/DL
FASTING STATUS PATIENT QL REPORTED: YES
HDLC SERPL-MCNC: 49 MG/DL
LDLC SERPL CALC-MCNC: 112 MG/DL
NONHDLC SERPL-MCNC: 151 MG/DL
TRIGL SERPL-MCNC: 197 MG/DL

## 2022-05-02 PROCEDURE — 36415 COLL VENOUS BLD VENIPUNCTURE: CPT

## 2022-05-02 PROCEDURE — 80061 LIPID PANEL: CPT

## 2022-05-02 NOTE — TELEPHONE ENCOUNTER
[facility] Call [facility]    Phone Message    May a detailed message be left on voicemail: yes     Reason for Call: Other: Carolin calling to find out if she still needs to stay on the Repatha based on her labs that she had done today.  Please call her back to discuss     Action Taken: Message routed to:  Other: Cardiology    Travel Screening: Not Applicable

## 2022-05-02 NOTE — TELEPHONE ENCOUNTER
"Pending Prescriptions:                       Disp   Refills    nitroGLYcerin (NITROSTAT) 0.4 MG sublingu*25 tab*3            Sig: One tablet under the tongue every 5 minutes if           needed for chest pain. May repeat every 5 minutes           for a maximum of 3 doses in 15 minutes\"      (PHARMACY REQUESTING ASAP)    Last Visit 12/16/2021 sugey/[facility]y  Last Filled 12/8/2020  Quantity 25  Req. Received 5/2/2022    EVERTON Sorensen      "

## 2022-05-03 RX ORDER — NITROGLYCERIN 0.4 MG/1
TABLET SUBLINGUAL
Qty: 25 TABLET | Refills: 0 | Status: SHIPPED | OUTPATIENT
Start: 2022-05-03

## 2022-05-03 NOTE — TELEPHONE ENCOUNTER
[facility] Call [facility]    Phone Message    May a detailed message be left on voicemail: yes     Reason for Call: Other: Patient is calling to see if she still needs to take repatha and if yes it needs to be sent over to the pharmacy with a 3 month supply. She had her labs completed yesterday.      Action Taken: Other: cardiology    Travel Screening: Not Applicable

## 2022-05-10 ENCOUNTER — TELEPHONE (OUTPATIENT)
Dept: CARDIOLOGY | Facility: CLINIC | Age: 87
End: 2022-05-10
Payer: COMMERCIAL

## 2022-05-10 ENCOUNTER — NURSE TRIAGE (OUTPATIENT)
Dept: CARDIOLOGY | Facility: CLINIC | Age: 87
End: 2022-05-10
Payer: COMMERCIAL

## 2022-05-10 NOTE — TELEPHONE ENCOUNTER
Called pt. She reiterated she's been experiencing lightheadedness, upset stomach, and weakness. Pt states this does not occur daily--she is not sure of the exact frequency of symptoms.    Pt states her BP this morning was 122/67 and her HR was 73.    Writer advised pt to continue monitoring her BP and to let us know if SBP is 100 or less.    Writer will forward encounter to Dr. Gomez to notify and see if he has further recommendations.

## 2022-05-10 NOTE — TELEPHONE ENCOUNTER
"Patient was transferred to speak with Triage regarding headaches, lightheadedness, upset stomach, weakness in her legs. She states this does not happen daily but has been occurring sporadic for the last three months. She was told to report this to Cardiology. No symptoms today. She is unsure what would be causing her symptoms. The last time she took her BP was a month ago which was normal. Patient did not want to take her BP at the time of the call. She takes all her medications as prescribed. RN advised a message will be routed to her care team to call and discuss further.     1. REASON FOR CALL or QUESTION: \"What is your reason for calling today?\" or \"How can I best help you?\" or \"What question do you have that I can help answer?\" Patient has concerns about headaches, lightheadedness, upset stomach off and on for the last three months.     "

## 2022-05-10 NOTE — TELEPHONE ENCOUNTER
"Called pt to relay Dr. Gomez' message:     \"We saw patient last year     She has a history of TIA and CAD (S/P sPCI and stents)     If she does not feel well or complaints aggravate then she needs to go to ER.\"    Pt verbalized understanding of Dr. Gomez' recommendations. For now, she stated she will make an appointment with her PCP and will let us know if her symptoms change or worsen.  "

## 2022-05-18 ENCOUNTER — TELEPHONE (OUTPATIENT)
Dept: CARDIOLOGY | Facility: CLINIC | Age: 87
End: 2022-05-18
Payer: COMMERCIAL

## 2022-05-18 NOTE — TELEPHONE ENCOUNTER
[facility] Call [facility]    Phone Message    May a detailed message be left on voicemail: yes     Reason for Call: [facility] is the Shipping Pharmacy for [employer]-who makes Repatha 140 mg auto injector.    The  is replacing the pt's broken injector free of charge and they need an order for the injector replacement.  You can send order electronically or call it in verbally or fax it --whatever is easiest.   Fax # [phone]    Action Taken: Cardiology    Travel Screening: Not Applicable

## 2022-05-20 DIAGNOSIS — E78.5 HYPERLIPIDEMIA WITH TARGET LDL LESS THAN 70: ICD-10-CM

## 2022-05-20 NOTE — TELEPHONE ENCOUNTER
[facility] Call [facility]    Phone Message    May a detailed message be left on voicemail: yes     Reason for Call: Medication Refill Request    Has the patient contacted the pharmacy for the refill? Yes   Name of medication being requested: Repatha  Provider who prescribed the medication: Patricia Phillips [phone]  Case #:  [id number]  Date medication is needed: 05/20/22    One of patients injections failed so she is needing a replacement.         Action Taken: Other: routed ump refill    Travel Screening: Not Applicable

## 2022-05-20 NOTE — TELEPHONE ENCOUNTER
evolocumab (REPATHA) 140 MG/ML prefilled autoinjector  Last Written Prescription Date:   1/4/2022  Last Fill Quantity: 6,   # refills: 3  Last Office Visit :  12/16/2021  Future Office visit:  None    Routing refill request to provider for review/approval because:  Drug not on the [facility], [facility] or [facility] refill protocol or controlled substance      Portia Vizcarra RN  Central Triage Red Flags/Med Refills

## 2022-05-22 DIAGNOSIS — I10 HYPERTENSION, BENIGN ESSENTIAL, GOAL BELOW 140/90: ICD-10-CM

## 2022-05-24 RX ORDER — LOSARTAN POTASSIUM 100 MG/1
100 TABLET ORAL DAILY
Qty: 90 TABLET | Refills: 0 | Status: SHIPPED | OUTPATIENT
Start: 2022-05-24 | End: 2022-08-16

## 2022-05-24 NOTE — TELEPHONE ENCOUNTER
"Routing refill request to provider for review/approval because:  Requested Prescriptions   Pending Prescriptions Disp Refills    losartan (COZAAR) 100 MG tablet 90 tablet 0     Sig: Take 1 tablet (100 mg) by mouth daily        Angiotensin-II Receptors Failed - 5/22/2022  3:06 PM        Failed - Last blood pressure under 140/90 in past 12 months       BP Readings from Last 3 Encounters:   12/16/21 (!) 152/79   12/07/21 (!) 144/72   09/02/21 139/85                 Passed - Recent (12 mo) or future (30 days) visit within the authorizing provider's specialty     Patient has had an office visit with the authorizing provider or a provider within the authorizing providers department within the previous 12 mos or has a future within next 30 days. See \"Patient Info\" tab in inbasket, or \"Choose Columns\" in Meds & Orders section of the refill encounter.              Passed - Medication is active on med list        Passed - Patient is age 18 or older        Passed - No active pregnancy on record        Passed - Normal serum creatinine on file in past 12 months     Recent Labs   Lab Test 02/01/22  0938   CR 0.99       Ok to refill medication if creatinine is low          Passed - Normal serum potassium on file in past 12 months       Recent Labs   Lab Test 02/01/22  0938   POTASSIUM 4.2                    Passed - No positive pregnancy test in past 12 months                Annetta SETH, RN        "

## 2022-05-31 ENCOUNTER — TELEPHONE (OUTPATIENT)
Dept: CARDIOLOGY | Facility: CLINIC | Age: 87
End: 2022-05-31
Payer: COMMERCIAL

## 2022-05-31 DIAGNOSIS — E78.5 HYPERLIPIDEMIA WITH TARGET LDL LESS THAN 70: ICD-10-CM

## 2022-05-31 NOTE — TELEPHONE ENCOUNTER
Health Call Center    Phone Message    May a detailed message be left on voicemail: yes     Reason for Call: Other: Patients prescription (evolocumab (REPATHA) 140 MG/ML prefilled autoinjector) was sent to the wrong pharmacy. Patient would like medication to be sent to [facility] (Phone number: [phone]) (Case number: [id number]) This pharmacy is replacing her Repatha. Patient would also like to know if  would like any bloodwork to be done. Please call patient back to discuss further, thank you.      Action Taken: Message routed to:  Other: Cardiology    Travel Screening: Not Applicable

## 2022-05-31 NOTE — TELEPHONE ENCOUNTER
Called pt and left VM to notify that I couldn't find the pharmacy she noted. Pt to have pharmacy transfer prescription.    Also instructed pt to make Patricia follow up in September with fasting labs prior.

## 2022-05-31 NOTE — TELEPHONE ENCOUNTER
The patient called back and said that the pharmacy will need to be called to get the Repatha prescription   [facility] pharmacy Ph # [phone]  [facility] Ph # [phone]    Please place fasting lab orders, pt scheduled both appt's in September

## 2022-06-07 ENCOUNTER — OFFICE VISIT (OUTPATIENT)
Dept: FAMILY MEDICINE | Facility: CLINIC | Age: 87
End: 2022-06-07
Payer: COMMERCIAL

## 2022-06-07 VITALS
WEIGHT: 168.2 LBS | HEART RATE: 72 BPM | DIASTOLIC BLOOD PRESSURE: 68 MMHG | RESPIRATION RATE: 16 BRPM | SYSTOLIC BLOOD PRESSURE: 144 MMHG | OXYGEN SATURATION: 98 % | TEMPERATURE: 97.9 F | BODY MASS INDEX: 28.71 KG/M2 | HEIGHT: 64 IN

## 2022-06-07 DIAGNOSIS — K59.00 CONSTIPATION, UNSPECIFIED CONSTIPATION TYPE: ICD-10-CM

## 2022-06-07 DIAGNOSIS — I10 HYPERTENSION, BENIGN ESSENTIAL, GOAL BELOW 140/90: ICD-10-CM

## 2022-06-07 DIAGNOSIS — E78.5 HYPERLIPIDEMIA LDL GOAL <70: Primary | ICD-10-CM

## 2022-06-07 LAB
ALBUMIN SERPL-MCNC: 3.9 G/DL (ref 3.4–5)
ALP SERPL-CCNC: 81 U/L (ref 40–150)
ALT SERPL W P-5'-P-CCNC: 23 U/L (ref 0–50)
ANION GAP SERPL CALCULATED.3IONS-SCNC: 7 MMOL/L (ref 3–14)
AST SERPL W P-5'-P-CCNC: 14 U/L (ref 0–45)
BILIRUB SERPL-MCNC: 0.3 MG/DL (ref 0.2–1.3)
BUN SERPL-MCNC: 25 MG/DL (ref 7–30)
CALCIUM SERPL-MCNC: 9.6 MG/DL (ref 8.5–10.1)
CHLORIDE BLD-SCNC: 109 MMOL/L (ref 94–109)
CHOLEST SERPL-MCNC: 177 MG/DL
CO2 SERPL-SCNC: 27 MMOL/L (ref 20–32)
CREAT SERPL-MCNC: 1.05 MG/DL (ref 0.52–1.04)
FASTING STATUS PATIENT QL REPORTED: NO
GFR SERPL CREATININE-BSD FRML MDRD: 51 ML/MIN/1.73M2
GLUCOSE BLD-MCNC: 96 MG/DL (ref 70–99)
HDLC SERPL-MCNC: 52 MG/DL
LDLC SERPL CALC-MCNC: 90 MG/DL
NONHDLC SERPL-MCNC: 125 MG/DL
POTASSIUM BLD-SCNC: 4.2 MMOL/L (ref 3.4–5.3)
PROT SERPL-MCNC: 7.2 G/DL (ref 6.8–8.8)
SODIUM SERPL-SCNC: 143 MMOL/L (ref 133–144)
TRIGL SERPL-MCNC: 177 MG/DL

## 2022-06-07 PROCEDURE — 82043 UR ALBUMIN QUANTITATIVE: CPT | Performed by: FAMILY MEDICINE

## 2022-06-07 PROCEDURE — 36415 COLL VENOUS BLD VENIPUNCTURE: CPT | Performed by: FAMILY MEDICINE

## 2022-06-07 PROCEDURE — 99214 OFFICE O/P EST MOD 30 MIN: CPT | Performed by: FAMILY MEDICINE

## 2022-06-07 PROCEDURE — 80053 COMPREHEN METABOLIC PANEL: CPT | Performed by: FAMILY MEDICINE

## 2022-06-07 PROCEDURE — 80061 LIPID PANEL: CPT | Performed by: FAMILY MEDICINE

## 2022-06-07 RX ORDER — POLYETHYLENE GLYCOL 3350 17 G/17G
1 POWDER, FOR SOLUTION ORAL DAILY
Qty: 850 G | Refills: 6 | Status: SHIPPED | OUTPATIENT
Start: 2022-06-07

## 2022-06-07 ASSESSMENT — PATIENT HEALTH QUESTIONNAIRE - PHQ9
SUM OF ALL RESPONSES TO PHQ QUESTIONS 1-9: 0
10. IF YOU CHECKED OFF ANY PROBLEMS, HOW DIFFICULT HAVE THESE PROBLEMS MADE IT FOR YOU TO DO YOUR WORK, TAKE CARE OF THINGS AT HOME, OR GET ALONG WITH OTHER PEOPLE: NOT DIFFICULT AT ALL
SUM OF ALL RESPONSES TO PHQ QUESTIONS 1-9: 0

## 2022-06-07 ASSESSMENT — PAIN SCALES - GENERAL: PAINLEVEL: NO PAIN (0)

## 2022-06-07 NOTE — LETTER
June 8, 2022      Carolin Mcbride  [address] [address]        Dear ,    We are writing to inform you of your test results.    Your cholesterol has improved and near goal with the Repatha. Your kidney test is mildly abnormal but stable compared to previous tests. Your liver and electrolytes were normal. Please follow up in about 6 months for routine Medicare Wellness visit.     Resulted Orders   Albumin Random Urine Quantitative with Creat Ratio   Result Value Ref Range    Creatinine Urine mg/dL 147 mg/dL    Albumin Urine mg/L 325 mg/L    Albumin Urine mg/g Cr 221.09 (H) 0.00 - 25.00 mg/g Cr   Lipid panel reflex to direct LDL Non-fasting   Result Value Ref Range    Cholesterol 177 <200 mg/dL    Triglycerides 177 (H) <150 mg/dL    Direct Measure HDL 52 >=50 mg/dL    LDL Cholesterol Calculated 90 <=100 mg/dL    Non HDL Cholesterol 125 <130 mg/dL    Patient Fasting > 8hrs? No     Narrative    Cholesterol  Desirable:  <200 mg/dL    Triglycerides  Normal:  Less than 150 mg/dL  Borderline High:  150-199 mg/dL  High:  200-499 mg/dL  Very High:  Greater than or equal to 500 mg/dL    Direct Measure HDL  Female:  Greater than or equal to 50 mg/dL   Male:  Greater than or equal to 40 mg/dL    LDL Cholesterol  Desirable:  <100mg/dL  Above Desirable:  100-129 mg/dL   Borderline High:  130-159 mg/dL   High:  160-189 mg/dL   Very High:  >= 190 mg/dL    Non HDL Cholesterol  Desirable:  130 mg/dL  Above Desirable:  130-159 mg/dL  Borderline High:  160-189 mg/dL  High:  190-219 mg/dL  Very High:  Greater than or equal to 220 mg/dL   Comprehensive metabolic panel (BMP + Alb, Alk Phos, ALT, AST, Total. Bili, TP)   Result Value Ref Range    Sodium 143 133 - 144 mmol/L    Potassium 4.2 3.4 - 5.3 mmol/L    Chloride 109 94 - 109 mmol/L    Carbon Dioxide (CO2) 27 20 - 32 mmol/L    Anion Gap 7 3 - 14 mmol/L    Urea Nitrogen 25 7 - 30 mg/dL    Creatinine 1.05 (H) 0.52 - 1.04 mg/dL    Calcium 9.6 8.5 - 10.1 mg/dL    Glucose  96 70 - 99 mg/dL    Alkaline Phosphatase 81 40 - 150 U/L    AST 14 0 - 45 U/L    ALT 23 0 - 50 U/L    Protein Total 7.2 6.8 - 8.8 g/dL    Albumin 3.9 3.4 - 5.0 g/dL    Bilirubin Total 0.3 0.2 - 1.3 mg/dL    GFR Estimate 51 (L) >60 mL/min/1.73m2      Comment:      Effective December 21, 2021 eGFRcr in adults is calculated using the 2021 CKD-EPI creatinine equation which includes age and gender ( et al., NEJM, DOI: 10.1056/INJFbi4094197)       If you have any questions or concerns, please call the clinic at the number listed above.       Sincerely,      Merrill Patten MD

## 2022-06-07 NOTE — PROGRESS NOTES
"  Lata Coe is a 87 year old who presents for the following health issues:    History of Present Illness       Reason for visit:  Cholesterol, talk about medications    She eats 2-3 servings of fruits and vegetables daily.She consumes 0 sweetened beverage(s) daily.She exercises with enough effort to increase her heart rate 20 to 29 minutes per day.  She exercises with enough effort to increase her heart rate 7 days per week.   She is taking medications regularly.    Today's PHQ-9         PHQ-9 Total Score: 0    PHQ-9 Q9 Thoughts of better off dead/self-harm past 2 weeks :   Not at all    How difficult have these problems made it for you to do your work, take care of things at home, or get along with other people: Not difficult at all       Hyperlipidemia Follow-Up      Are you regularly taking any medication or supplement to lower your cholesterol?   Yes- Repatha    Are you having muscle aches or other side effects that you think could be caused by your cholesterol lowering medication?  No    Hypertension Follow-up      Do you check your blood pressure regularly outside of the clinic? Yes     Are you following a low salt diet? Yes    Are your blood pressures ever more than 140 on the top number (systolic) OR more   than 90 on the bottom number (diastolic), for example 140/90? No    Review of Systems   Constitutional, HEENT, cardiovascular, pulmonary, GI, , musculoskeletal, neuro, skin, endocrine and psych systems are negative, except as otherwise noted.      Objective    BP (!) 144/68 (BP Location: Left arm, Patient Position: Sitting, Cuff Size: Adult Regular)   Pulse 72   Temp 97.9  F (36.6  C) (Tympanic)   Resp 16   Ht 1.626 m (5' 4\")   Wt 76.3 kg (168 lb 3.2 oz)   LMP  (LMP Unknown)   SpO2 98%   BMI 28.87 kg/m    Body mass index is 28.87 kg/m .  Physical Exam   GENERAL: healthy, alert and no distress  NECK: no adenopathy, no asymmetry, masses, or scars and thyroid normal to palpation  RESP: " lungs clear to auscultation - no rales, rhonchi or wheezes  CV: regular rate and rhythm, normal S1 S2, no S3 or S4, no murmur, click or rub, no peripheral edema and peripheral pulses strong  ABDOMEN: soft, nontender, no hepatosplenomegaly, no masses and bowel sounds normal  MS: no gross musculoskeletal defects noted, no edema    A/P:  (E78.5) Hyperlipidemia LDL goal <70  (primary encounter diagnosis)  Comment:   Plan: Lipid panel reflex to direct LDL Non-fasting,         Comprehensive metabolic panel (BMP + Alb, Alk         Phos, ALT, AST, Total. Bili, TP)        Follows Cardiology. On Repatha.    (I10) Hypertension, benign essential, goal below 140/90  Comment:   Plan: Albumin Random Urine Quantitative with Creat         Ratio, Comprehensive metabolic panel (BMP +         Alb, Alk Phos, ALT, AST, Total. Bili, TP)        Borderline today. Home bp readings at goal per patient. No changes today.    (K59.00) Constipation, unspecified constipation type  Comment:   Plan: polyethylene glycol (MIRALAX) 17 GM/Dose powder            Merrill Patten MD

## 2022-06-08 LAB
CREAT UR-MCNC: 147 MG/DL
MICROALBUMIN UR-MCNC: 325 MG/L
MICROALBUMIN/CREAT UR: 221.09 MG/G CR (ref 0–25)

## 2022-06-14 ENCOUNTER — TELEPHONE (OUTPATIENT)
Dept: CARDIOLOGY | Facility: CLINIC | Age: 87
End: 2022-06-14
Payer: COMMERCIAL

## 2022-06-14 NOTE — TELEPHONE ENCOUNTER
[facility] Pharmacy number is: [facility] at [facility]    FAX: [phone]    PHONE: [phone]    Please send order as needed in below message: She needs a one time order for this broken injector.

## 2022-06-14 NOTE — TELEPHONE ENCOUNTER
[facility] Call Center    Phone Message    May a detailed message be left on voicemail: yes     Reason for Call: Pt's Repatha injector is broken and pt needs verbal ok to [employer] for a new injector, for them to be able to mail it to her.    [employer]-the company that sends the actual injector to the pt.   [employer] phone [phone]    Please call [facility] to verbally ok that they can send the pt a replacement injector free of charge. She can't take her meds without having the injector and she has been trying to get one for some time now and nothing has been working.    Do not send the order to any other pharmacy--You need to send order to [employer] [facility] for the replacement injector.  If you are faxing, then, call the phone number to get fax as pt. doesn't have the fax number for [employer].    Injector was ordered originally by Dr. Gomez.    Thank you.      Action Taken: Cardiology    Travel Screening: Not Applicable

## 2022-08-02 ENCOUNTER — OFFICE VISIT (OUTPATIENT)
Dept: OPHTHALMOLOGY | Facility: CLINIC | Age: 87
End: 2022-08-02
Payer: COMMERCIAL

## 2022-08-02 DIAGNOSIS — H02.831 DERMATOCHALASIS OF BOTH UPPER EYELIDS: ICD-10-CM

## 2022-08-02 DIAGNOSIS — H18.523 ANTERIOR BASEMENT MEMBRANE DYSTROPHY OF BOTH EYES: ICD-10-CM

## 2022-08-02 DIAGNOSIS — H52.4 MYOPIA OF BOTH EYES WITH REGULAR ASTIGMATISM AND PRESBYOPIA: ICD-10-CM

## 2022-08-02 DIAGNOSIS — Z96.1 PSEUDOPHAKIA: ICD-10-CM

## 2022-08-02 DIAGNOSIS — H52.13 MYOPIA OF BOTH EYES WITH REGULAR ASTIGMATISM AND PRESBYOPIA: ICD-10-CM

## 2022-08-02 DIAGNOSIS — H02.834 DERMATOCHALASIS OF BOTH UPPER EYELIDS: ICD-10-CM

## 2022-08-02 DIAGNOSIS — H43.813 PVD (POSTERIOR VITREOUS DETACHMENT), BILATERAL: ICD-10-CM

## 2022-08-02 DIAGNOSIS — H53.039 STRABISMIC AMBLYOPIA, UNSPECIFIED LATERALITY: ICD-10-CM

## 2022-08-02 DIAGNOSIS — Z01.00 EXAMINATION OF EYES AND VISION: Primary | ICD-10-CM

## 2022-08-02 DIAGNOSIS — H52.223 MYOPIA OF BOTH EYES WITH REGULAR ASTIGMATISM AND PRESBYOPIA: ICD-10-CM

## 2022-08-02 PROCEDURE — 92015 DETERMINE REFRACTIVE STATE: CPT | Performed by: STUDENT IN AN ORGANIZED HEALTH CARE EDUCATION/TRAINING PROGRAM

## 2022-08-02 PROCEDURE — 92012 INTRM OPH EXAM EST PATIENT: CPT | Performed by: STUDENT IN AN ORGANIZED HEALTH CARE EDUCATION/TRAINING PROGRAM

## 2022-08-02 ASSESSMENT — REFRACTION_WEARINGRX
OD_CYLINDER: +1.00
OD_AXIS: 105
OS_ADD: +3.00
OS_SPHERE: -0.50
OS_CYLINDER: SPHERE
OD_SPHERE: PLANO
SPECS_TYPE: PAL
OD_ADD: +3.00

## 2022-08-02 ASSESSMENT — VISUAL ACUITY
CORRECTION_TYPE: GLASSES
OS_CC: 20/80
OD_CC+: -3
OS_CC+: -1
OD_CC: 20/25
METHOD: SNELLEN - LINEAR

## 2022-08-02 ASSESSMENT — REFRACTION_MANIFEST
OD_CYLINDER: +1.25
OS_ADD: +3.00
OD_AXIS: 093
OS_SPHERE: -0.75
OD_SPHERE: -0.25
OS_AXIS: 115
OD_ADD: +3.00
OS_CYLINDER: +0.75

## 2022-08-02 ASSESSMENT — EXTERNAL EXAM - LEFT EYE: OS_EXAM: NORMAL

## 2022-08-02 ASSESSMENT — TONOMETRY
OS_IOP_MMHG: 17
OD_IOP_MMHG: 17
IOP_METHOD: APPLANATION

## 2022-08-02 ASSESSMENT — EXTERNAL EXAM - RIGHT EYE: OD_EXAM: NORMAL

## 2022-08-02 ASSESSMENT — CONF VISUAL FIELD
METHOD: COUNTING FINGERS
OD_NORMAL: 1
OS_NORMAL: 1

## 2022-08-02 ASSESSMENT — CUP TO DISC RATIO
OS_RATIO: 0.4
OD_RATIO: 0.5

## 2022-08-02 NOTE — PATIENT INSTRUCTIONS
Continue artificial tears up to four times a day as needed    Referral to oculoplastics for droopy lid evaluation/treatment    Glasses prescription given - optional to update    Nevin Ricci MD  [phone]

## 2022-08-02 NOTE — PROGRESS NOTES
Current Eye Medications:  Artificial tears 3-4 times daily both eyes.     Subjective:  Complete eye exam. Having clear matter both eyes in the mornings for last year plus. Patient is worried maybe upper eyelids may be hanging down to far, effecting vision. Vision is doing pretty well in distance and near right eye. Left eye is lazy, so always been poor. No eye pain in either eye.      Objective:  See Ophthalmology Exam.      Assessment:  Carolin Mcbride is a 87 year old female who presents with:   Encounter Diagnoses   Name Primary?     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

## 2022-08-02 NOTE — LETTER
8/2/2022         RE: Carolin Mcbride  [address] [facility] [address]        Dear Colleague,    Thank you for referring your patient, Carolin Mcbride, to the [facility]. Please see a copy of my visit note below.     Current Eye Medications:  Artificial tears 3-4 times daily both eyes.     Subjective:  Complete eye exam. Having clear matter both eyes in the mornings for last year plus. Patient is worried maybe upper eyelids may be hanging down to far, effecting vision. Vision is doing pretty well in distance and near right eye. Left eye is lazy, so always been poor. No eye pain in either eye.      Objective:  See Ophthalmology Exam.      Assessment:  Carolin Mcbride is a 87 year old female who presents with:   Encounter Diagnoses   Name Primary?     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        Sincerely,        Nevin Ricci MD

## 2022-08-14 DIAGNOSIS — I10 HYPERTENSION, BENIGN ESSENTIAL, GOAL BELOW 140/90: ICD-10-CM

## 2022-08-15 ENCOUNTER — OFFICE VISIT (OUTPATIENT)
Dept: URGENT CARE | Facility: URGENT CARE | Age: 87
End: 2022-08-15
Payer: COMMERCIAL

## 2022-08-15 ENCOUNTER — NURSE TRIAGE (OUTPATIENT)
Dept: FAMILY MEDICINE | Facility: CLINIC | Age: 87
End: 2022-08-15

## 2022-08-15 ENCOUNTER — ANCILLARY PROCEDURE (OUTPATIENT)
Dept: GENERAL RADIOLOGY | Facility: CLINIC | Age: 87
End: 2022-08-15
Attending: EMERGENCY MEDICINE
Payer: COMMERCIAL

## 2022-08-15 VITALS
WEIGHT: 166.2 LBS | DIASTOLIC BLOOD PRESSURE: 70 MMHG | BODY MASS INDEX: 28.53 KG/M2 | HEART RATE: 75 BPM | TEMPERATURE: 97.9 F | SYSTOLIC BLOOD PRESSURE: 162 MMHG | RESPIRATION RATE: 14 BRPM | OXYGEN SATURATION: 97 %
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
## 2022-08-15 DIAGNOSIS — M25.531 RIGHT WRIST PAIN: Primary | ICD-10-CM

## 2022-08-15 DIAGNOSIS — I10 HYPERTENSION GOAL BP (BLOOD PRESSURE) < 140/90: ICD-10-CM

## 2022-08-15 DIAGNOSIS — M25.531 RIGHT WRIST PAIN: ICD-10-CM

## 2022-08-15 DIAGNOSIS — M67.431 GANGLION CYST OF WRIST, RIGHT: ICD-10-CM

## 2022-08-15 PROCEDURE — 73110 X-RAY EXAM OF WRIST: CPT | Mod: TC | Performed by: RADIOLOGY

## 2022-08-15 PROCEDURE — 99214 OFFICE O/P EST MOD 30 MIN: CPT | Performed by: EMERGENCY MEDICINE

## 2022-08-15 NOTE — TELEPHONE ENCOUNTER
Patient calling regarding a fall she had on 8/6, states she fell near the entrance of [employer].    Patient states she has bruising on her shoulder and knee.     Her primary concern is a lump located on the outermost bone of her right wrist. States lump is round and about nickel sized. Area is only painful if you touch it. Patient states she has full ROM of her hand, can clench hand into a fist and rotate wrist, move fingers normally. Patient states she has been applying ice to the area.     She denies any numbness or tingling.    Advised patient to be seen in urgent care today or tomorrow for evaluation and imaging of wrist.    Patient is agreeable to plan, states she will come to [facility] or tomorrow.   Vanessa Vera RN  [facility]    Reason for Disposition    High-risk adult (e.g., age > 60 years, osteoporosis, chronic steroid use)    Additional Information    Negative: Major bleeding (actively dripping or spurting) that can't be stopped    Negative: Amputation or bone sticking through the skin    Negative: Serious injury with multiple fractures (broken bones)    Negative: Sounds like a life-threatening emergency to the triager    Negative: Finger injury is main concern    Negative: Caused by an animal bite    Negative: Wound looks infected    Negative: Cast problems or questions    Negative: Bullet, stabbed by knife or other serious penetrating wound    Negative: High pressure injection injury (e.g., from paint gun, usually work-related)    Negative: Looks like a broken bone or dislocated joint (crooked or deformed)    Negative: Skin is split open or gaping (length > 1/2 inch or 12 mm)    Negative: Bleeding won't stop after 10 minutes of direct pressure (using correct technique)    Negative: Dirt in the wound and not removed after 15 minutes of scrubbing    Negative: Numbness (loss of sensation) of finger(s)    Negative: Sounds like a serious injury to the triager    Negative:  "Looks infected (e.g., spreading redness, red streak, pus)    Negative: SEVERE pain (e.g., excruciating)    Negative: Large swelling or bruise and size > palm of person's hand    Negative: No prior tetanus shots (or is not fully vaccinated) and any wound (e.g., cut or scrape)    Negative: HIV positive or severe immunodeficiency (severely weak immune system) and DIRTY cut or scrape    Negative: Patient wants to be seen    Negative: Injury interferes with work or school    Answer Assessment - Initial Assessment Questions  1. MECHANISM: \"How did the injury happen?\"      Fell outside [employer]  2. ONSET: \"When did the injury happen?\" (Minutes or hours ago)       8/6/22  3. APPEARANCE of INJURY: \"What does the injury look like?\"       Lump to the wrist  4. SEVERITY: \"Can you use the hand normally?\" \"Can you bend your fingers into a ball and then fully open them?\"      Yes  5. SIZE: For cuts, bruises, or swelling, ask: \"How large is it?\" (e.g., inches or centimeters;  entire hand or wrist)       Nickel sized  6. PAIN: \"Is there pain?\" If Yes, ask: \"How bad is the pain?\"  (Scale 1-10; or mild, moderate, severe)      Pain present only when touching  7. TETANUS: For any breaks in the skin, ask: \"When was the last tetanus booster?\"      NA  8. OTHER SYMPTOMS: \"Do you have any other symptoms?\"       See triage note  9. PREGNANCY: \"Is there any chance you are pregnant?\" \"When was your last menstrual period?\"      NA    Protocols used: HAND AND WRIST INJURY-A-[place]      "

## 2022-08-15 NOTE — PROGRESS NOTES
Assessment & Plan     Diagnosis:    (M25.531) Right wrist pain  (primary encounter diagnosis)    (M67.431) Ganglion cyst of wrist, right    (W19.XXXA) Fall, initial encounter  (I10) Hypertension goal BP (blood pressure) < 140/90      Medical Decision Making:  Carolin Mcbride is a 87 year old female who presents for evaluation of wrist pain after fall last Sunday (8 days ago). CMS is intact distally in the extremity.  Xrays reveal no acute fractures.  On exam the patient does have swelling and tenderness with mobile cystlike structure I suspect is a ganglion cyst on her wrist.  Discussed avoiding thumping this with a book and follow-up with PCP or surgery for definitive management.     Given patient's fall, elderly age history of blood thinner use, I did assess her upper arm which appears bruised as well as did a head to toe examination for trauma.    Rest, ice, and elevation treatment was discussed with the patient. The patients head to toe trauma exam is otherwise negative for serious underlying disease of the head, neck, chest, abdomen, extremities, pelvis. Close follow-up with patient's primary care physician per discharge precautions. Contusion discharge instructions given for home.     Patient voices understanding and agreement with the plan including reasons to go to the ER immediately as well as to be seen by a more consistent care-giver, such as their PCP, if the symptoms persist more than 3 days.       Johnny Reyes PA-C  [facility]    Subjective     Carolin Mcbride is a 87 year old female who presents to clinic today for the following health issues:  Chief Complaint   Patient presents with     Urgent Care     Wrist Pain     Right wrist pain and have a lump on right wrist-[place] a week ago [place] [employer], got bruised everywhere       HPI  Patient reports last Sunday (8 days ago) she [place] [employer] and landed on her right wrist and arm.  She notes that she did not hit her head, lose  consciousness, have any subsequent neck pain, throwing up or other concerns.  She states that since then she has been walking without difficulty; complains primarily of right wrist pain.  Denies any numbness or weakness, vision changes, chest pain, abdominal pain, changes in bowel or bladder habits, difficulties moving the shoulder, elbow or wrist of the right upper arm.  She is on Plavix and aspirin, is adamant that she had no head injury here or concerning headaches.    Review of Systems    See HPI    Objective      Vitals: BP (!) 162/70 (BP Location: Right arm, Patient Position: Sitting, Cuff Size: Adult Regular)   Pulse 75   Temp 97.9  F (36.6  C) (Tympanic)   Resp 14   Wt 75.4 kg (166 lb 3.2 oz)   LMP  (LMP Unknown)   SpO2 97%   BMI 28.53 kg/m        Patient Vitals for the past 24 hrs:   BP Temp Temp src Pulse Resp SpO2 Weight   08/15/22 1641 (!) 162/70 -- -- -- -- -- --   08/15/22 1600 (!) 182/76 97.9  F (36.6  C) Tympanic 75 14 97 % 75.4 kg (166 lb 3.2 oz)       Vital signs reviewed by: Johnny Reyes PA-C    Physical Exam   Constitutional: Patient is alert and cooperative. No acute distress.  Head: Atraumatic.  No raccoon eyes or ash signs.  Neck: Normal range of motion.  No C-spine tenderness to palpation.  Ears: Right TM is pale/grey. Left TM is pale/grey.  No hemotympanum.  External ear canals are normal.   Mouth: Mucous membranes are moist. Normal tongue and tonsil. Posterior oropharynx is clear.  Eyes: Conjunctivae, EOMI and lids are normal. PERRL.  Cardiovascular: Regular rate and rhythm.  Pulmonary/Chest: Lungs are clear to auscultation throughout. Effort normal. No respiratory distress. No wheezes, rales or rhonchi.  GI: Abdomen is soft and non-tender throughout.  Neurological: Alert and oriented x3. CN 3-7 and 9-12 intact. Strength and sensation are intact and symmetric in the bilateral upper and lower extremities.   MSK: Tenderness, swelling and ecchymosis over the right dorsal forearm,  right in the center between ulna and radius; there is a slightly mobile cystlike structure.  No fluctuance or areas of pointing.  No erythema, warmth or lymphangitis.  Normal range of motion at the wrist and elbow.  Distal CMS intact.  No snuffbox tenderness.  Nontender to palpation of the knees, elbows, left wrist or shoulders.  Skin: No rash noted on visualized skin.  Psychiatric:The patient has a normal mood and affect.     Labs/Imaging:  Results for orders placed or performed in visit on 08/15/22   XR Wrist Right G/E 3 Views     Status: None    Narrative    WRIST RIGHT THREE OR MORE VIEWS  8/15/2022 4:08 PM    HISTORY: Right wrist pain    COMPARISON: None.      Impression    IMPRESSION: No acute fracture. Old healed fracture deformity of the  distal radius. Moderate ulnar positive variance. Small ununited  fracture of the ulnar styloid process. Advanced degenerative changes  at the STT and first CMC joints.    LUZ MARIA ROSS MD         SYSTEM ID:  [id number]     Reading per radiology        Johnny Reyes PA-C, August 15, 2022

## 2022-08-15 NOTE — TELEPHONE ENCOUNTER
Routing refill request to provider for review/approval because:  Drug not on the [facility] refill protocol   BP Readings from Last 3 Encounters:   06/07/22 (!) 144/68   12/16/21 (!) 152/79   12/07/21 (!) 144/72     Creatinine   Date Value Ref Range Status   06/07/2022 1.05 (H) 0.52 - 1.04 mg/dL Final   04/12/2021 1.12 (H) 0.52 - 1.04 mg/dL Final

## 2022-08-16 RX ORDER — LOSARTAN POTASSIUM 100 MG/1
TABLET ORAL
Qty: 90 TABLET | Refills: 0 | Status: SHIPPED | OUTPATIENT
Start: 2022-08-16 | End: 2022-09-26

## 2022-09-14 ENCOUNTER — HOSPITAL ENCOUNTER (OUTPATIENT)
Facility: AMBULATORY SURGERY CENTER | Age: 87
End: 2022-09-14
Attending: OPHTHALMOLOGY | Admitting: OPHTHALMOLOGY
Payer: COMMERCIAL

## 2022-09-14 ENCOUNTER — OFFICE VISIT (OUTPATIENT)
Dept: OPHTHALMOLOGY | Facility: CLINIC | Age: 87
End: 2022-09-14
Payer: COMMERCIAL

## 2022-09-14 DIAGNOSIS — H02.834 DERMATOCHALASIS OF BOTH UPPER EYELIDS: ICD-10-CM

## 2022-09-14 DIAGNOSIS — H02.403 INVOLUTIONAL PTOSIS, ACQUIRED, BILATERAL: Primary | ICD-10-CM

## 2022-09-14 DIAGNOSIS — H02.831 DERMATOCHALASIS OF BOTH UPPER EYELIDS: ICD-10-CM

## 2022-09-14 DIAGNOSIS — H02.403 INVOLUTIONAL PTOSIS, ACQUIRED, BILATERAL: ICD-10-CM

## 2022-09-14 PROCEDURE — 92081 LIMITED VISUAL FIELD XM: CPT | Performed by: OPHTHALMOLOGY

## 2022-09-14 PROCEDURE — 99214 OFFICE O/P EST MOD 30 MIN: CPT | Performed by: OPHTHALMOLOGY

## 2022-09-14 PROCEDURE — 92285 EXTERNAL OCULAR PHOTOGRAPHY: CPT | Performed by: OPHTHALMOLOGY

## 2022-09-14 ASSESSMENT — VISUAL ACUITY
OS_CC+: -1
OD_CC: 20/30
CORRECTION_TYPE: GLASSES
OS_CC: 20/80
OD_CC+: -1
METHOD: SNELLEN - LINEAR

## 2022-09-14 ASSESSMENT — TONOMETRY
IOP_METHOD: ICARE
OD_IOP_MMHG: 16
OS_IOP_MMHG: 11

## 2022-09-14 ASSESSMENT — EXTERNAL EXAM - LEFT EYE: OS_EXAM: BROW PTOSIS, WITH FRONTALIS RELAXED, BROW IS BELOW SUPERIOR ORBITAL RIM AND LATERALLY INLINE WITH UPPER LASHES

## 2022-09-14 ASSESSMENT — CONF VISUAL FIELD: COMMENTS: TANGENT VISUAL FIELD PERFORMED TODAY.

## 2022-09-14 ASSESSMENT — SLIT LAMP EXAM - LIDS
COMMENTS: HEAVY DERMATOCHALASIS RESTING ON LASHES, TRUE PTOSIS
COMMENTS: HEAVY DERMATOCHALASIS RESTING ON LASHES, TRUE PTOSIS

## 2022-09-14 ASSESSMENT — EXTERNAL EXAM - RIGHT EYE: OD_EXAM: NORMAL

## 2022-09-14 NOTE — PROGRESS NOTES
Oculoplastic Clinic New Patient    Patient: Carolin Mcbride MRN# [id number]   YOB: 1934 Age: 88 year old   Date of Visit: Sep 14, 2022    CC: Droopy eyelids obstructing vision.              HPI:     Chief Complaint(s) and History of Present Illness(es)     Droopy Eye Lid Evaluation     Laterality: right upper lid and left upper lid              Comments     Patient referred by Dr. Ricci for dermatochalasis consultation. Patient   has noticed for the past year that her lids have been drooping to the   point of interfering with her vision. Patient notes better vision when   raising eyebrows and has difficulty reading.      Carolin Mcbride is a 88 year old female who has noted gradual onset of droopy eyelids over the past years. The droopy eyelid is interfering with activities of daily living including driving, and reading. The patient denies double vision, variability of the eyelid position, or dry eye symptoms.     EXAM:     MRD1: 2 mm right eye and 1 mm left eye   Dermatochalasis with excess skin touching eyelashes   Brow ptosis with brow resting below superior orbital rim worse on the left    VISUAL FIELD:  Right eye untaped:30 degrees Right eye taped:50 degrees  Left eye untaped:20 degrees Left eye taped:50 degrees    Assessment & Plan     Carolin Mcbride is a 88 year old female with the following diagnoses:   1. Involutional ptosis, acquired, bilateral    2. Dermatochalasis of both upper eyelids       Both upper eyelid blepharoplasty (skin, NF)  and Bilateral ptosis repair external levator approach    ANTICOAGULATION:    Aspirin  Plavix  Likely will be able to come off Plavix in December, can do after. She will ask cards if can hold Aspirin as well       PHOTOS DEMONSTRATE:    Significant dermatochalasis with lids resting on eyelashes and obstructing visual axis  Blepharoptosis  Brow ptosis with thicker brow skin and hairs below the lateral superior orbital rim    Attending Physician  Attestation: Complete documentation of historical and exam elements from today's encounter can be found in the full encounter summary report (not reduplicated in this progress note). I personally obtained the chief complaint(s) and history of present illness. I confirmed and edited as necessary the review of systems, past medical/surgical history, family history, social history, and examination findings as documented by others; and I examined the patient myself. I personally reviewed the relevant tests, images, and reports as documented above. I formulated and edited as necessary the assessment and plan and discussed the findings and management plan with the patient. John Figueroa MD      Today with Carolin Mcbride I reviewed the indications, risks, benefits, and alternatives of the proposed surgical procedure including, but not limited to, failure obtain the desired result  and need for additional surgery, bleeding, infection, loss of vision, loss of the eye, and the remote possibility of permanent damage to any organ system or death with the use of anesthesia.  I provided multiple opportunities for the questions, answered all questions to the best of my ability, and confirmed that my answers and my discussion were understood.

## 2022-09-14 NOTE — NURSING NOTE
Chief Complaints and History of Present Illnesses   Patient presents with     Droopy Eye Lid Evaluation       Chief Complaint(s) and History of Present Illness(es)     Droopy Eye Lid Evaluation     Laterality: right upper lid and left upper lid              Comments     Patient referred by Dr. Ricci for dermatochalasis consultation. Patient has noticed for the past year that her lids have been drooping to the point of interfering with her vision. Patient notes better vision when raising eyebrows and has difficulty reading.                    Brenden Link, Ophthalmic Assistant

## 2022-09-14 NOTE — LETTER
2022         RE:  :  MRN: Carolin Mcbride  1934  [id number]     Dear Dr. Ricci,    Thank you for asking me to see your patient, Carolin Mcbride, for an oculoplastic   consultation.  My assessment and plan are below.  For further details, please see my attached clinic note.           HPI:     Chief Complaint(s) and History of Present Illness(es)     Droopy Eye Lid Evaluation     Laterality: right upper lid and left upper lid              Comments     Patient referred by Dr. Ricci for dermatochalasis consultation. Patient   has noticed for the past year that her lids have been drooping to the   point of interfering with her vision. Patient notes better vision when   raising eyebrows and has difficulty reading.      Carolin Mcbride is a 88 year old female who has noted gradual onset of droopy eyelids over the past years. The droopy eyelid is interfering with activities of daily living including driving, and reading. The patient denies double vision, variability of the eyelid position, or dry eye symptoms.     EXAM:     MRD1: 2 mm right eye and 1 mm left eye   Dermatochalasis with excess skin touching eyelashes   Brow ptosis with brow resting below superior orbital rim worse on the left    VISUAL FIELD:  Right eye untaped:30 degrees Right eye taped:50 degrees  Left eye untaped:20 degrees Left eye taped:50 degrees    Assessment & Plan     Carolin Mcbride is a 88 year old female with the following diagnoses:   1. Involutional ptosis, acquired, bilateral    2. Dermatochalasis of both upper eyelids       Both upper eyelid blepharoplasty (skin, NF)  and Bilateral ptosis repair external levator approach    ANTICOAGULATION:    Aspirin  Plavix  Likely will be able to come off Plavix in December, can do after. She will ask cards if can hold Aspirin as well      Again, thank you for allowing me to participate in the care of your patient.      Sincerely,    John Figueroa MD  Department of Ophthalmology and  Visual Neurosciences  [facility]    CC: Nevin Ricci MD  [address] [facility] [address]  Via In Basket

## 2022-09-19 ENCOUNTER — LAB (OUTPATIENT)
Dept: LAB | Facility: CLINIC | Age: 87
End: 2022-09-19
Payer: COMMERCIAL

## 2022-09-19 DIAGNOSIS — E78.5 HYPERLIPIDEMIA WITH TARGET LDL LESS THAN 70: ICD-10-CM

## 2022-09-19 LAB
ALBUMIN SERPL-MCNC: 3.6 G/DL (ref 3.4–5)
ALP SERPL-CCNC: 78 U/L (ref 40–150)
ALT SERPL W P-5'-P-CCNC: 18 U/L (ref 0–50)
ANION GAP SERPL CALCULATED.3IONS-SCNC: 4 MMOL/L (ref 3–14)
AST SERPL W P-5'-P-CCNC: 13 U/L (ref 0–45)
BILIRUB SERPL-MCNC: 0.3 MG/DL (ref 0.2–1.3)
BUN SERPL-MCNC: 27 MG/DL (ref 7–30)
CALCIUM SERPL-MCNC: 9 MG/DL (ref 8.5–10.1)
CHLORIDE BLD-SCNC: 111 MMOL/L (ref 94–109)
CHOLEST SERPL-MCNC: 192 MG/DL
CO2 SERPL-SCNC: 27 MMOL/L (ref 20–32)
CREAT SERPL-MCNC: 1.03 MG/DL (ref 0.52–1.04)
FASTING STATUS PATIENT QL REPORTED: YES
GFR SERPL CREATININE-BSD FRML MDRD: 52 ML/MIN/1.73M2
GLUCOSE BLD-MCNC: 116 MG/DL (ref 70–99)
HDLC SERPL-MCNC: 51 MG/DL
LDLC SERPL CALC-MCNC: 113 MG/DL
LDLC SERPL CALC-MCNC: 118 MG/DL
NONHDLC SERPL-MCNC: 141 MG/DL
POTASSIUM BLD-SCNC: 4.6 MMOL/L (ref 3.4–5.3)
PROT SERPL-MCNC: 6.7 G/DL (ref 6.8–8.8)
SODIUM SERPL-SCNC: 142 MMOL/L (ref 133–144)
TRIGL SERPL-MCNC: 138 MG/DL

## 2022-09-19 PROCEDURE — 83721 ASSAY OF BLOOD LIPOPROTEIN: CPT | Mod: 59

## 2022-09-19 PROCEDURE — 36415 COLL VENOUS BLD VENIPUNCTURE: CPT

## 2022-09-19 PROCEDURE — 80053 COMPREHEN METABOLIC PANEL: CPT

## 2022-09-19 PROCEDURE — 80061 LIPID PANEL: CPT

## 2022-09-25 DIAGNOSIS — I10 HYPERTENSION, BENIGN ESSENTIAL, GOAL BELOW 140/90: ICD-10-CM

## 2022-09-26 ENCOUNTER — OFFICE VISIT (OUTPATIENT)
Dept: CARDIOLOGY | Facility: CLINIC | Age: 87
End: 2022-09-26
Attending: INTERNAL MEDICINE
Payer: COMMERCIAL

## 2022-09-26 VITALS
OXYGEN SATURATION: 98 % | BODY MASS INDEX: 29.46 KG/M2 | HEIGHT: 63 IN | DIASTOLIC BLOOD PRESSURE: 70 MMHG | HEART RATE: 70 BPM | WEIGHT: 166.3 LBS | SYSTOLIC BLOOD PRESSURE: 156 MMHG

## 2022-09-26 DIAGNOSIS — E78.5 HYPERLIPIDEMIA WITH TARGET LDL LESS THAN 70: Primary | ICD-10-CM

## 2022-09-26 DIAGNOSIS — I25.10 CORONARY ARTERY DISEASE INVOLVING NATIVE CORONARY ARTERY OF NATIVE HEART WITHOUT ANGINA PECTORIS: ICD-10-CM

## 2022-09-26 PROCEDURE — 99214 OFFICE O/P EST MOD 30 MIN: CPT | Performed by: INTERNAL MEDICINE

## 2022-09-26 PROCEDURE — G0463 HOSPITAL OUTPT CLINIC VISIT: HCPCS

## 2022-09-26 RX ORDER — LOSARTAN POTASSIUM 100 MG/1
TABLET ORAL
Qty: 90 TABLET | Refills: 0 | Status: SHIPPED | OUTPATIENT
Start: 2022-09-26 | End: 2022-12-26

## 2022-09-26 ASSESSMENT — PAIN SCALES - GENERAL: PAINLEVEL: NO PAIN (0)

## 2022-09-26 NOTE — PROGRESS NOTES
HPI:     I had the privilege to evaluate Ms Carolin Mcbride, who is a 88 yr old female with a Hx of CAD resulting in  2 stent to LAD, 1 stent torPL branch andPOBA of D1 on 12/8/2020.     Initially patient presented for the management of hyperlipidemia.  Her LDL was over 200.  Patient had a TIA and she was initially managed on clopidogrel for a short course.  Her CT MRI and echo were normal.  Also had a Zio patch which did not show significant arrhythmia or atrial fibrillation.  In summary her side effects with statin therapy:  Patient was very reluctant to take statin as she had severe myalgia with Baycol.  She was started on 20 mg of Lipitor which she discontinued due to some nonspecific muscle ache  Patient was restarted on Lipitor 20 mg and then subsequently it was increased to 40 mg daily.  Patient is tolerating this dose very well.  As part of CAD evaluation patient had a nuclear scan.  She had an apical perfusion defect which was fixed.  The question was whether it was an artifact or real.  Subsequently patient had an echocardiogram which showed apical wall motion abnormality.  Subsequent angiogram showed LAD D1 disease and RPL disease for which she had intervention.   However during the last weeks patient experienced  some myalgia on 40 mg of Lipitor.  Her CK was normal medication was discontinued.     Today patient had no cardiac complaints: no chest pain, no shortness of breath, no palpitations.  .  She is doing well from this aspect.  Still complaining of myalgia and muscle weakness.       PAST MEDICAL HISTORY:  Past Medical History:   Diagnosis Date     Adjustment reaction with anxiety and depression 2001     CAD (coronary artery disease) 2009    asymptomatic, on CT scan     Cyst, ovarian      Diagnostic skin and sensitization tests (aka ALLERGENS) 9/29/14 IgE tests pos. minimally only for Tree pollens (all other environmental allergens NEGATIVE)     HTN (hypertension)      Hyperlipidemia      Myositis  "2001    related to past Baycol use- resolved     Osteoporosis      Seasonal allergic rhinitis     9/29/14 IgE tests pos. minimally only for Tree pollens (all other environmental allergens NEGATIVE)       CURRENT MEDICATIONS:  Current Outpatient Medications   Medication Sig Dispense Refill     aspirin 81 MG tablet Take 1 tablet (81 mg) by mouth daily 90 tablet 3     citalopram (CELEXA) 10 MG tablet Take 1 tablet (10 mg) by mouth daily 90 tablet 3     clopidogrel (PLAVIX) 75 MG tablet Take 1 tablet (75 mg) by mouth daily Take 4 tablets (300 mg) on the first day for a loading dose then decrease to 1 tablet (75 mg) daily 90 tablet 3     evolocumab (REPATHA) 140 MG/ML prefilled autoinjector Inject 1 mL (140 mg) Subcutaneous every 14 days 6 mL 0     Hypromellose (ARTIFICIAL TEARS OP) Apply 1 drop to eye as needed Both eyes.       losartan (COZAAR) 100 MG tablet Take 1 tablet by mouth once daily 90 tablet 0     polyethylene glycol (MIRALAX) 17 GM/Dose powder Take 17 g (1 capful) by mouth daily 850 g 6     triamcinolone (KENALOG) 0.1 % external ointment Apply topically 2 times daily Apply to rash on leg. 30 g 1     VITAMIN D 400 UNIT OR TABS 1 cap twice a day        nitroGLYcerin (NITROSTAT) 0.4 MG sublingual tablet One tablet under the tongue every 5 minutes if needed for chest pain. May repeat every 5 minutes for a maximum of 3 doses in 15 minutes\" (Patient not taking: Reported on 9/26/2022) 25 tablet 0     STATIN NOT PRESCRIBED (INTENTIONAL) Please choose reason not prescribed from choices below. (Patient not taking: Reported on 9/26/2022)         PAST SURGICAL HISTORY:  Past Surgical History:   Procedure Laterality Date     APPENDECTOMY       CHOLECYSTECTOMY, OPEN       COLONOSCOPY  2009    neg     CV CORONARY ANGIOGRAM N/A 12/8/2020    Procedure: CV CORONARY ANGIOGRAM;  Surgeon: Isra Weber MD;  Location:  HEART CARDIAC CATH LAB     CV PCI STENT DRUG ELUTING N/A 12/8/2020    Procedure: Percutaneous " Coronary Intervention Stent Drug Eluting;  Surgeon: Isra Weber MD;  Location:  HEART CARDIAC CATH LAB     CYSTOCELE REPAIR  2003    TVT SPARC       ALLERGIES     Allergies   Allergen Reactions     Baycol [Cerivastatin Sodium]       muscle mass loss       Crestor [Hmg-Coa-R Inhibitors]      Also intolerant of simvastatin and atorvastatin with recurrent leg weakness and pain.     Darvocet [Acetaminophen]      Severe nausea     Macrobid [Nitrofuran Derivatives]      Hives       FAMILY HISTORY:  Family History   Problem Relation Age of Onset     Cancer Mother         ovarian      Heart Disease Sister         CABG x 3     Neurologic Disorder Sister         Fibromyalgia     Cancer Father         stomach/interstines      Musculoskeletal Disorder Brother         back      Heart Disease Brother 60        CABG x 4     Glaucoma No family hx of      Macular Degeneration No family hx of        SOCIAL HISTORY:  Social History     Socioeconomic History     Marital status:      Spouse name: Shashank     Number of children: 3     Years of education: None     Highest education level: None   Occupational History     Employer: RETIRED   Tobacco Use     Smoking status: Never Smoker     Smokeless tobacco: Never Used     Tobacco comment: Never smoked; nonsmoking household   Substance and Sexual Activity     Alcohol use: No     Comment: very rare     Drug use: No     Comment: never     Sexual activity: Yes     Partners: Male     Birth control/protection: None   Other Topics Concern     Parent/sibling w/ CABG, MI or angioplasty before 65F 55M? No      Service No     Blood Transfusions No     Caffeine Concern No     Occupational Exposure No     Hobby Hazards No     Sleep Concern No     Stress Concern No     Weight Concern Yes     Special Diet No     Back Care No     Exercise Yes     Comment: Curves     Bike Helmet No     Seat Belt Yes     Self-Exams Yes       ROS:   Constitutional: No fever, chills, or  "sweats. No weight gain/loss   ENT: No visual disturbance, ear ache, epistaxis, sore throat  Allergies/Immunologic: Negative.   Respiratory: No cough, hemoptysia  Cardiovascular: As per HPI  GI: No nausea, vomiting, hematemesis, melena, or hematochezia  : No urinary frequency, dysuria, or hematuria  Integument: Negative  Psychiatric: Negative  Neuro: Negative  Endocrinology: Negative   Musculoskeletal: Negative    EXAM:  BP (!) 156/70 (BP Location: Right arm, Patient Position: Chair, Cuff Size: Adult Regular)   Pulse 70   Ht 1.599 m (5' 2.95\")   Wt 75.4 kg (166 lb 4.8 oz)   LMP  (LMP Unknown)   SpO2 98%   BMI 29.50 kg/m    In general, the patient is a pleasant female in no apparent distress.    HEENT: NC/AT.  PERRLA.  EOMI.  Sclerae white, not injected.  Nares clear.  Pharynx without erythema or exudate.  Dentition intact.    Neck: No adenopathy.  No thyromegaly. Carotids +4/4 bilaterally without bruits.  No jugular venous distension.   Heart: RRR. Normal S1, S2 splits physiologically. No murmur, rub, click, or gallop. The PMI is in the 5th ICS in the midclavicular line. There is no heave.    Lungs: CTA.  No ronchi, wheezes, rales.  No dullness to percussion.   Abdomen: Soft, nontender, nondistended. No organomegaly.  No bruits.   Extremities: No clubbing, cyanosis, or edema.  The pulses are +4/4 at the radial, brachial, femoral, popliteal, DP, and PT sites bilaterally.  No bruits are noted.  Neurologic: Alert and oriented to person/place/time, normal speech, gait and affect  Skin: No petechiae, purpura or rash.    BP at the end of the visit: 124/82    Labs:  LIPID RESULTS:  Lab Results   Component Value Date    CHOL 192 09/19/2022    CHOL 234 (H) 04/12/2021    HDL 51 09/19/2022    HDL 47 (L) 04/12/2021     (H) 09/19/2022     (H) 09/19/2022     (H) 04/12/2021    TRIG 138 09/19/2022    TRIG 227 (H) 04/12/2021    CHOLHDLRATIO 2.4 07/13/2015    NHDL 141 (H) 09/19/2022    NHDL 187 (H) " 04/12/2021       LIVER ENZYME RESULTS:  Lab Results   Component Value Date    AST 13 09/19/2022    AST 8 12/08/2020    ALT 18 09/19/2022    ALT 30 04/12/2021       CBC RESULTS:  Lab Results   Component Value Date    WBC 7.1 12/06/2021    WBC 6.5 04/12/2021    RBC 4.59 12/06/2021    RBC 4.45 04/12/2021    HGB 13.0 12/06/2021    HGB 12.7 04/12/2021    HCT 40.3 12/06/2021    HCT 38.8 04/12/2021    MCV 88 12/06/2021    MCV 87 04/12/2021    MCH 28.3 12/06/2021    MCH 28.5 04/12/2021    MCHC 32.3 12/06/2021    MCHC 32.7 04/12/2021    RDW 12.9 12/06/2021    RDW 12.4 04/12/2021     12/06/2021     04/12/2021       BMP RESULTS:  Lab Results   Component Value Date     09/19/2022     04/12/2021    POTASSIUM 4.6 09/19/2022    POTASSIUM 4.6 04/12/2021    CHLORIDE 111 (H) 09/19/2022    CHLORIDE 107 04/12/2021    CO2 27 09/19/2022    CO2 28 04/12/2021    ANIONGAP 4 09/19/2022    ANIONGAP 3 04/12/2021     (H) 09/19/2022    GLC 98 04/12/2021    BUN 27 09/19/2022    BUN 21 04/12/2021    CR 1.03 09/19/2022    CR 1.12 (H) 04/12/2021    GFRESTIMATED 52 (L) 09/19/2022    GFRESTIMATED 44 (L) 04/12/2021    GFRESTBLACK 51 (L) 04/12/2021    LISY 9.0 09/19/2022    LISY 9.0 04/12/2021          INR RESULTS:  Lab Results   Component Value Date    INR 0.98 12/08/2020         Assessment and Plan:     We discussed the results with patient.  We discussed the importance of a heart healthy diet and lifestyle.  We continue with same medical regimen.    Follow-up within 1 year.    Eben Gomez MD, PhD  Professor of Medicine  Division of Cardiology        CC  Patient Care Team:  Merrill Patten MD as PCP - General (Family Medicine)  VAISHALI Juan MD as Assigned Heart and Vascular Provider  Nevin Ricci MD as Assigned Surgical Provider  Merrill Patten MD as Assigned PCP  John Figueroa MD as MD (Ophthalmology)  Radha Maurice, RN as Specialty Care Coordinator (Cardiology)  EBEN GOMEZ

## 2022-09-26 NOTE — PATIENT INSTRUCTIONS
September 26, 2022    Cardiology Provider You Saw During Your Visit: Dr. Gomez      Medication Changes: None      Follow Up: In 1 year with fasting labs prior to visit      Follow the American Heart Association Diet and Lifestyle Recommendations:  -Limit saturated fat, trans fat, sodium, red meat, sweets and sugar-sweetened beverages. If you choose to eat red meat, compare labels and select the leanest cuts available.  -Aim for at least 150 minutes of moderate physical activity or 75 minutes of vigorous physical activity - or an equal combination of both - each week.      To Reach Us:  -During business hours: [phone], press option # 1 to schedule an appointment or to leave a message for your care team.     -After hours, weekends or holidays: [phone], press option #4 and ask to speak to the on-call cardiologist. Inform them you are a patient at the [facility].      Radha Maurcie RN  Cardiology Care Coordinator - General Cardiology  [facility]

## 2022-09-26 NOTE — NURSING NOTE
September 26, 2022    Medication Changes: None      Follow Up: In 1 year with fasting labs prior to visit      Patient verbalized understanding of all health information given and agreed to call with further questions or concerns.      Radha Maurice RN

## 2022-09-26 NOTE — LETTER
9/26/2022      RE: Carolin Mcbride  [address] [address]       Dear Colleague,    Thank you for the opportunity to participate in the care of your patient, Carolin Mcbride, at the [facility] [place] at [facility]. Please see a copy of my visit note below.     HPI:     I had the privilege to evaluate Ms Carolin Mcbride, who is a 88 yr old female with a Hx of CAD resulting in  2 stent to LAD, 1 stent torPL branch andPOBA of D1 on 12/8/2020.     Initially patient presented for the management of hyperlipidemia.  Her LDL was over 200.  Patient had a TIA and she was initially managed on clopidogrel for a short course.  Her CT MRI and echo were normal.  Also had a Zio patch which did not show significant arrhythmia or atrial fibrillation.  In summary her side effects with statin therapy:  Patient was very reluctant to take statin as she had severe myalgia with Baycol.  She was started on 20 mg of Lipitor which she discontinued due to some nonspecific muscle ache  Patient was restarted on Lipitor 20 mg and then subsequently it was increased to 40 mg daily.  Patient is tolerating this dose very well.  As part of CAD evaluation patient had a nuclear scan.  She had an apical perfusion defect which was fixed.  The question was whether it was an artifact or real.  Subsequently patient had an echocardiogram which showed apical wall motion abnormality.  Subsequent angiogram showed LAD D1 disease and RPL disease for which she had intervention.   However during the last weeks patient experienced  some myalgia on 40 mg of Lipitor.  Her CK was normal medication was discontinued.     Today patient had no cardiac complaints: no chest pain, no shortness of breath, no palpitations.  .  She is doing well from this aspect.  Still complaining of myalgia and muscle weakness.       PAST MEDICAL HISTORY:  Past Medical History:   Diagnosis Date     Adjustment reaction  Charleen, I would call pharmacy to see which ones are covered.  I am happy to start Ozempic but it will likely need a prior auth   "with anxiety and depression 2001     CAD (coronary artery disease) 2009    asymptomatic, on CT scan     Cyst, ovarian      Diagnostic skin and sensitization tests (aka ALLERGENS) 9/29/14 IgE tests pos. minimally only for Tree pollens (all other environmental allergens NEGATIVE)     HTN (hypertension)      Hyperlipidemia      Myositis 2001    related to past Baycol use- resolved     Osteoporosis      Seasonal allergic rhinitis     9/29/14 IgE tests pos. minimally only for Tree pollens (all other environmental allergens NEGATIVE)       CURRENT MEDICATIONS:  Current Outpatient Medications   Medication Sig Dispense Refill     aspirin 81 MG tablet Take 1 tablet (81 mg) by mouth daily 90 tablet 3     citalopram (CELEXA) 10 MG tablet Take 1 tablet (10 mg) by mouth daily 90 tablet 3     clopidogrel (PLAVIX) 75 MG tablet Take 1 tablet (75 mg) by mouth daily Take 4 tablets (300 mg) on the first day for a loading dose then decrease to 1 tablet (75 mg) daily 90 tablet 3     evolocumab (REPATHA) 140 MG/ML prefilled autoinjector Inject 1 mL (140 mg) Subcutaneous every 14 days 6 mL 0     Hypromellose (ARTIFICIAL TEARS OP) Apply 1 drop to eye as needed Both eyes.       losartan (COZAAR) 100 MG tablet Take 1 tablet by mouth once daily 90 tablet 0     polyethylene glycol (MIRALAX) 17 GM/Dose powder Take 17 g (1 capful) by mouth daily 850 g 6     triamcinolone (KENALOG) 0.1 % external ointment Apply topically 2 times daily Apply to rash on leg. 30 g 1     VITAMIN D 400 UNIT OR TABS 1 cap twice a day        nitroGLYcerin (NITROSTAT) 0.4 MG sublingual tablet One tablet under the tongue every 5 minutes if needed for chest pain. May repeat every 5 minutes for a maximum of 3 doses in 15 minutes\" (Patient not taking: Reported on 9/26/2022) 25 tablet 0     STATIN NOT PRESCRIBED (INTENTIONAL) Please choose reason not prescribed from choices below. (Patient not taking: Reported on 9/26/2022)         PAST SURGICAL HISTORY:  Past Surgical " History:   Procedure Laterality Date     APPENDECTOMY       CHOLECYSTECTOMY, OPEN       COLONOSCOPY  2009    neg     CV CORONARY ANGIOGRAM N/A 12/8/2020    Procedure: CV CORONARY ANGIOGRAM;  Surgeon: Isra Weber MD;  Location:  HEART CARDIAC CATH LAB     CV PCI STENT DRUG ELUTING N/A 12/8/2020    Procedure: Percutaneous Coronary Intervention Stent Drug Eluting;  Surgeon: Isra Weber MD;  Location:  HEART CARDIAC CATH LAB     CYSTOCELE REPAIR  2003    TVT SPARC       ALLERGIES     Allergies   Allergen Reactions     Baycol [Cerivastatin Sodium]       muscle mass loss       Crestor [Hmg-Coa-R Inhibitors]      Also intolerant of simvastatin and atorvastatin with recurrent leg weakness and pain.     Darvocet [Acetaminophen]      Severe nausea     Macrobid [Nitrofuran Derivatives]      Hives       FAMILY HISTORY:  Family History   Problem Relation Age of Onset     Cancer Mother         ovarian      Heart Disease Sister         CABG x 3     Neurologic Disorder Sister         Fibromyalgia     Cancer Father         stomach/interstines      Musculoskeletal Disorder Brother         back      Heart Disease Brother 60        CABG x 4     Glaucoma No family hx of      Macular Degeneration No family hx of        SOCIAL HISTORY:  Social History     Socioeconomic History     Marital status:      Spouse name: Shashank     Number of children: 3     Years of education: None     Highest education level: None   Occupational History     Employer: RETIRED   Tobacco Use     Smoking status: Never Smoker     Smokeless tobacco: Never Used     Tobacco comment: Never smoked; nonsmoking household   Substance and Sexual Activity     Alcohol use: No     Comment: very rare     Drug use: No     Comment: never     Sexual activity: Yes     Partners: Male     Birth control/protection: None   Other Topics Concern     Parent/sibling w/ CABG, MI or angioplasty before 65F 55M? No      Service No      "Blood Transfusions No     Caffeine Concern No     Occupational Exposure No     Hobby Hazards No     Sleep Concern No     Stress Concern No     Weight Concern Yes     Special Diet No     Back Care No     Exercise Yes     Comment: Curves     Bike Helmet No     Seat Belt Yes     Self-Exams Yes       ROS:   Constitutional: No fever, chills, or sweats. No weight gain/loss   ENT: No visual disturbance, ear ache, epistaxis, sore throat  Allergies/Immunologic: Negative.   Respiratory: No cough, hemoptysia  Cardiovascular: As per HPI  GI: No nausea, vomiting, hematemesis, melena, or hematochezia  : No urinary frequency, dysuria, or hematuria  Integument: Negative  Psychiatric: Negative  Neuro: Negative  Endocrinology: Negative   Musculoskeletal: Negative    EXAM:  BP (!) 156/70 (BP Location: Right arm, Patient Position: Chair, Cuff Size: Adult Regular)   Pulse 70   Ht 1.599 m (5' 2.95\")   Wt 75.4 kg (166 lb 4.8 oz)   LMP  (LMP Unknown)   SpO2 98%   BMI 29.50 kg/m    In general, the patient is a pleasant female in no apparent distress.    HEENT: NC/AT.  PERRLA.  EOMI.  Sclerae white, not injected.  Nares clear.  Pharynx without erythema or exudate.  Dentition intact.    Neck: No adenopathy.  No thyromegaly. Carotids +4/4 bilaterally without bruits.  No jugular venous distension.   Heart: RRR. Normal S1, S2 splits physiologically. No murmur, rub, click, or gallop. The PMI is in the 5th ICS in the midclavicular line. There is no heave.    Lungs: CTA.  No ronchi, wheezes, rales.  No dullness to percussion.   Abdomen: Soft, nontender, nondistended. No organomegaly.  No bruits.   Extremities: No clubbing, cyanosis, or edema.  The pulses are +4/4 at the radial, brachial, femoral, popliteal, DP, and PT sites bilaterally.  No bruits are noted.  Neurologic: Alert and oriented to person/place/time, normal speech, gait and affect  Skin: No petechiae, purpura or rash.    BP at the end of the visit: 124/82    Labs:  LIPID " RESULTS:  Lab Results   Component Value Date    CHOL 192 09/19/2022    CHOL 234 (H) 04/12/2021    HDL 51 09/19/2022    HDL 47 (L) 04/12/2021     (H) 09/19/2022     (H) 09/19/2022     (H) 04/12/2021    TRIG 138 09/19/2022    TRIG 227 (H) 04/12/2021    CHOLHDLRATIO 2.4 07/13/2015    NHDL 141 (H) 09/19/2022    NHDL 187 (H) 04/12/2021       LIVER ENZYME RESULTS:  Lab Results   Component Value Date    AST 13 09/19/2022    AST 8 12/08/2020    ALT 18 09/19/2022    ALT 30 04/12/2021       CBC RESULTS:  Lab Results   Component Value Date    WBC 7.1 12/06/2021    WBC 6.5 04/12/2021    RBC 4.59 12/06/2021    RBC 4.45 04/12/2021    HGB 13.0 12/06/2021    HGB 12.7 04/12/2021    HCT 40.3 12/06/2021    HCT 38.8 04/12/2021    MCV 88 12/06/2021    MCV 87 04/12/2021    MCH 28.3 12/06/2021    MCH 28.5 04/12/2021    MCHC 32.3 12/06/2021    MCHC 32.7 04/12/2021    RDW 12.9 12/06/2021    RDW 12.4 04/12/2021     12/06/2021     04/12/2021       BMP RESULTS:  Lab Results   Component Value Date     09/19/2022     04/12/2021    POTASSIUM 4.6 09/19/2022    POTASSIUM 4.6 04/12/2021    CHLORIDE 111 (H) 09/19/2022    CHLORIDE 107 04/12/2021    CO2 27 09/19/2022    CO2 28 04/12/2021    ANIONGAP 4 09/19/2022    ANIONGAP 3 04/12/2021     (H) 09/19/2022    GLC 98 04/12/2021    BUN 27 09/19/2022    BUN 21 04/12/2021    CR 1.03 09/19/2022    CR 1.12 (H) 04/12/2021    GFRESTIMATED 52 (L) 09/19/2022    GFRESTIMATED 44 (L) 04/12/2021    GFRESTBLACK 51 (L) 04/12/2021    LISY 9.0 09/19/2022    LISY 9.0 04/12/2021          INR RESULTS:  Lab Results   Component Value Date    INR 0.98 12/08/2020     Assessment and Plan:     We discussed the results with patient.  We discussed the importance of a heart healthy diet and lifestyle.  We continue with same medical regimen.    Follow-up within 1 year.    Eben Gomez MD, PhD  Professor of Medicine  Division of Cardiology    CC  Patient Care Team:  Merrill Patten MD  as PCP - General (Family Medicine)  VAISHALI Juan MD as Assigned Heart and Vascular Provider  Nevin Ricci MD as Assigned Surgical Provider  eMrrill Patten MD as Assigned PCP  John Figueroa MD as MD (Ophthalmology)  Radha Maurice, RN as Specialty Care Coordinator (Cardiology)

## 2022-09-26 NOTE — NURSING NOTE
Chief Complaint   Patient presents with     Follow Up     Patricia F/U     Vitals were taken and medications reconciled.    Julio Carrillo, EMT  3:01 PM

## 2022-09-27 ENCOUNTER — ANCILLARY PROCEDURE (OUTPATIENT)
Dept: MAMMOGRAPHY | Facility: CLINIC | Age: 87
End: 2022-09-27
Attending: FAMILY MEDICINE
Payer: COMMERCIAL

## 2022-09-27 DIAGNOSIS — Z12.31 VISIT FOR SCREENING MAMMOGRAM: ICD-10-CM

## 2022-09-27 PROCEDURE — 77067 SCR MAMMO BI INCL CAD: CPT | Mod: TC | Performed by: RADIOLOGY

## 2022-09-27 PROCEDURE — 77063 BREAST TOMOSYNTHESIS BI: CPT | Mod: TC | Performed by: RADIOLOGY

## 2022-11-03 NOTE — TELEPHONE ENCOUNTER
Action    Action Taken Attempted to reach patient about appt. LVM.   Chika Marcos on 11/3/2022 at 8:05 AM         DIAGNOSIS: sciatic nerve   APPOINTMENT DATE: 11/05/2022   NOTES STATUS DETAILS   OFFICE NOTE from referring provider N/A    OFFICE NOTE from other specialist N/A    DISCHARGE SUMMARY from hospital N/A    DISCHARGE REPORT from the ER N/A    OPERATIVE REPORT N/A    EMG report N/A    MEDICATION LIST N/A    MRI N/A    DEXA (osteoporosis/bone health) N/A    CT SCAN N/A    XRAYS (IMAGES & REPORTS) N/A

## 2022-11-05 ENCOUNTER — ANCILLARY PROCEDURE (OUTPATIENT)
Dept: GENERAL RADIOLOGY | Facility: CLINIC | Age: 87
End: 2022-11-05
Attending: FAMILY MEDICINE
Payer: COMMERCIAL

## 2022-11-05 ENCOUNTER — OFFICE VISIT (OUTPATIENT)
Dept: ORTHOPEDICS | Facility: CLINIC | Age: 87
End: 2022-11-05
Payer: COMMERCIAL

## 2022-11-05 ENCOUNTER — PRE VISIT (OUTPATIENT)
Dept: ORTHOPEDICS | Facility: CLINIC | Age: 87
End: 2022-11-05

## 2022-11-05 DIAGNOSIS — M54.10 RADICULAR PAIN OF RIGHT LOWER EXTREMITY: ICD-10-CM

## 2022-11-05 DIAGNOSIS — M54.10 RADICULAR PAIN OF RIGHT LOWER EXTREMITY: Primary | ICD-10-CM

## 2022-11-05 DIAGNOSIS — M47.816 LUMBAR SPONDYLOSIS: ICD-10-CM

## 2022-11-05 PROCEDURE — 99204 OFFICE O/P NEW MOD 45 MIN: CPT | Performed by: FAMILY MEDICINE

## 2022-11-05 PROCEDURE — 72100 X-RAY EXAM L-S SPINE 2/3 VWS: CPT | Performed by: RADIOLOGY

## 2022-11-05 RX ORDER — PREDNISONE 20 MG/1
40 TABLET ORAL DAILY
Qty: 10 TABLET | Refills: 0 | Status: SHIPPED | OUTPATIENT
Start: 2022-11-05 | End: 2022-12-13

## 2022-11-05 RX ORDER — METHYLPREDNISOLONE 4 MG
TABLET, DOSE PACK ORAL
Qty: 21 TABLET | Refills: 0 | Status: SHIPPED | OUTPATIENT
Start: 2022-11-05 | End: 2022-11-05

## 2022-11-05 ASSESSMENT — PAIN SCALES - GENERAL: PAINLEVEL: EXTREME PAIN (8)

## 2022-11-05 NOTE — LETTER
11/5/2022         RE: Carolin Mcbride  [address] [facility] [address]        Dear Colleague,    Thank you for referring your patient, Carolin Mcbride, to the [facility] [place]. Please see a copy of my visit note below.    CHIEF COMPLAINT:  Pain of the Right Hip, Pain of the Right Lower Leg, and Pain of the Right Thigh       HISTORY OF PRESENT ILLNESS  Ms. Mcbride is a pleasant 88 year old year old female who presents to clinic today with right lower extremity pain.  Carolin explains that she has been experiencing right posterior buttock pain, lateral hip pain and lateral lower leg pain over the past 4 months.  She states that this is fairly persistent in nature and described as sharp pain.  No numbness or tingling present.  She states it is worse specifically with standing for prolonged times as well as ambulation.    Similar episode back in 2021.  She states that she did go to physical therapy at [place] and they provided her with a number of home exercises.  She is post have a second appointment on 7/20, but it looks like she failed to show up for this based on PT notes.  She continues to perform his exercises to no avail.    No lower extremity weakness.  No numbness.  No saddle anesthesia, no bowel or bladder dysfunction.    Initially she states she be traveling to [facility] next Saturday and is hoping for relief for this time.    She has history of CAD, stent placement x3 and is on Plavix.    Additional history: as documented    Review of Systems: A 10-point review of systems was obtained and is negative except for as noted in the HPI.     MEDICAL HISTORY  Patient Active Problem List   Diagnosis     Pseudophakia OU c YAG OU     CAD (coronary artery disease)     Advance care planning     Hypertension goal BP (blood pressure) < 140/90     Hyperlipidemia with target LDL less than 100     Osteoporosis     CKD (chronic kidney disease) stage 3, GFR 30-59 ml/min (H)  "    Seasonal allergic rhinitis     Diagnostic skin and sensitization tests (aka ALLERGENS)     Pastrana's neuroma     Strabismic amblyopia     Anterior basement membrane dystrophy, OU     PVD (posterior vitreous detachment) OU     Granuloma annulare     Recurrent and persistent hematuria     Leg weakness, bilateral     Microscopic hematuria     Proteinuria, unspecified type     Dyspnea on exertion     Abnormal nuclear cardiac imaging test     Status post coronary angiogram     Statin intolerance     Dermatochalasis of both upper eyelids     Involutional ptosis, acquired, bilateral       Current Outpatient Medications   Medication Sig Dispense Refill     aspirin 81 MG tablet Take 1 tablet (81 mg) by mouth daily 90 tablet 3     citalopram (CELEXA) 10 MG tablet Take 1 tablet (10 mg) by mouth daily 90 tablet 3     clopidogrel (PLAVIX) 75 MG tablet Take 1 tablet (75 mg) by mouth daily Take 4 tablets (300 mg) on the first day for a loading dose then decrease to 1 tablet (75 mg) daily 90 tablet 3     evolocumab (REPATHA) 140 MG/ML prefilled autoinjector Inject 1 mL (140 mg) Subcutaneous every 14 days 6 mL 3     Hypromellose (ARTIFICIAL TEARS OP) Apply 1 drop to eye as needed Both eyes.       losartan (COZAAR) 100 MG tablet Take 1 tablet by mouth once daily 90 tablet 0     polyethylene glycol (MIRALAX) 17 GM/Dose powder Take 17 g (1 capful) by mouth daily 850 g 6     predniSONE (DELTASONE) 20 MG tablet Take 2 tablets (40 mg) by mouth daily 10 tablet 0     STATIN NOT PRESCRIBED (INTENTIONAL) Please choose reason not prescribed from choices below.       triamcinolone (KENALOG) 0.1 % external ointment Apply topically 2 times daily Apply to rash on leg. 30 g 1     VITAMIN D 400 UNIT OR TABS 1 cap twice a day        nitroGLYcerin (NITROSTAT) 0.4 MG sublingual tablet One tablet under the tongue every 5 minutes if needed for chest pain. May repeat every 5 minutes for a maximum of 3 doses in 15 minutes\" (Patient not taking: Reported " on 9/26/2022) 25 tablet 0       Allergies   Allergen Reactions     Baycol [Cerivastatin Sodium]       muscle mass loss       Crestor [Hmg-Coa-R Inhibitors]      Also intolerant of simvastatin and atorvastatin with recurrent leg weakness and pain.     Darvocet [Acetaminophen]      Severe nausea     Macrobid [Nitrofuran Derivatives]      Hives       Family History   Problem Relation Age of Onset     Cancer Mother         ovarian      Heart Disease Sister         CABG x 3     Neurologic Disorder Sister         Fibromyalgia     Cancer Father         stomach/interstines      Musculoskeletal Disorder Brother         back      Heart Disease Brother 60        CABG x 4     Glaucoma No family hx of      Macular Degeneration No family hx of        Additional medical/Social/Surgical histories reviewed in [facility] and updated as appropriate.       PHYSICAL EXAM  LMP  (LMP Unknown)     General  - normal appearance, in no obvious distress  CV  - normal peripheral perfusion  Pulm  - normal respiratory pattern, non-labored  Musculoskeletal - lumbar spine  - stance: normal gait without limp, no obvious leg length discrepancy, normal heel and toe walk  - inspection: normal bone and joint alignment, no obvious scoliosis  - palpation: no paravertebral or bony tenderness  - ROM: Normal flexion, normal extension, sidebending and rotation cause pain at the right buttock  - strength: lower extremities 5/5 in all planes  - special tests:  (-) straight leg raise  (-) slump test  Musculoskeletal -right hip  - inspection: no swelling of anterolateral hip,  normal bone and joint alignment, no obvious deformity  - palpation: no lateral or anterior hip tenderness  - ROM: normal flexion, extension, IR, ER, abduction, adduction, not painful, no crepitus  - strength: 5/5 in all planes  - special tests:  (-) LUZ MARIA  (-) FADIR  no pain with axial femoral load  Neuro  - No lower extremity sensory deficits, grossly normal coordination, normal muscle  tone  Skin  - no ecchymosis, erythema, warmth, or induration, no obvious rash  Psych  - interactive, appropriate, normal mood and affect    IMAGING : X-ray lumbar 2 view today. Final results and radiologist's interpretation, available in the [facility] health record. Images were reviewed with the patient/family members in the office today. My personal interpretation of the performed imaging is significant lumbar facet arthritis diffusely, L4-L5 and more prominently L5-S1 to space narrowing.  Hips fairly well-preserved with mild degenerative changes.     ASSESSMENT & PLAN  Ms. Mcbride is a 88 year old year old female who presents to clinic today with right-sided lower extremity pain over the past 4 months and more remotely back in 2021.     Diagnosis: Radicular pain of right lower extremity, lumbar spondylosis    Suspected lumbar etiology, possibly neuroforaminal stenosis affecting L5 or S1 dermatomes.  I have recommended we pursue an MRI of her lumbar spine given chronicity as well as her interest in pursuing injection.  Additionally we discussed more acute relief with prednisone 40 mg over the next 5 days to try to improve her condition before her trip in 1 week.  Lastly we discussed possible return to formal PT, but in the interim she can continue with her home exercise program.    I will discuss MRI results with her after completed and determine her candidacy for epidural injection if desired.  Avoid NSAIDs due to history of anticoagulation, age.    It was a pleasure seeing Carolin today.    Ethan Chapa DO, [facility]  Primary Care Sports Medicine        Again, thank you for allowing me to participate in the care of your patient.        Sincerely,        Ethan Chapa DO

## 2022-11-05 NOTE — NURSING NOTE
Chief Complaint   Patient presents with     Right Hip - Pain     Right Lower Leg - Pain     Right Thigh - Pain       There were no vitals filed for this visit.    There is no height or weight on file to calculate BMI.      LISE Heath NREMT

## 2022-11-05 NOTE — PROGRESS NOTES
CHIEF COMPLAINT:  Pain of the Right Hip, Pain of the Right Lower Leg, and Pain of the Right Thigh       HISTORY OF PRESENT ILLNESS  Ms. Mcbride is a pleasant 88 year old year old female who presents to clinic today with right lower extremity pain.  Carolin explains that she has been experiencing right posterior buttock pain, lateral hip pain and lateral lower leg pain over the past 4 months.  She states that this is fairly persistent in nature and described as sharp pain.  No numbness or tingling present.  She states it is worse specifically with standing for prolonged times as well as ambulation.    Similar episode back in 2021.  She states that she did go to physical therapy at [place] and they provided her with a number of home exercises.  She is post have a second appointment on 7/20, but it looks like she failed to show up for this based on PT notes.  She continues to perform his exercises to no avail.    No lower extremity weakness.  No numbness.  No saddle anesthesia, no bowel or bladder dysfunction.    Initially she states she be traveling to [facility] next Saturday and is hoping for relief for this time.    She has history of CAD, stent placement x3 and is on Plavix.    Additional history: as documented    Review of Systems: A 10-point review of systems was obtained and is negative except for as noted in the HPI.     MEDICAL HISTORY  Patient Active Problem List   Diagnosis     Pseudophakia OU c YAG OU     CAD (coronary artery disease)     Advance care planning     Hypertension goal BP (blood pressure) < 140/90     Hyperlipidemia with target LDL less than 100     Osteoporosis     CKD (chronic kidney disease) stage 3, GFR 30-59 ml/min (H)     Seasonal allergic rhinitis     Diagnostic skin and sensitization tests (aka ALLERGENS)     Pastrana's neuroma     Strabismic amblyopia     Anterior basement membrane dystrophy, OU     PVD (posterior vitreous detachment) OU     Granuloma annulare     Recurrent  "and persistent hematuria     Leg weakness, bilateral     Microscopic hematuria     Proteinuria, unspecified type     Dyspnea on exertion     Abnormal nuclear cardiac imaging test     Status post coronary angiogram     Statin intolerance     Dermatochalasis of both upper eyelids     Involutional ptosis, acquired, bilateral       Current Outpatient Medications   Medication Sig Dispense Refill     aspirin 81 MG tablet Take 1 tablet (81 mg) by mouth daily 90 tablet 3     citalopram (CELEXA) 10 MG tablet Take 1 tablet (10 mg) by mouth daily 90 tablet 3     clopidogrel (PLAVIX) 75 MG tablet Take 1 tablet (75 mg) by mouth daily Take 4 tablets (300 mg) on the first day for a loading dose then decrease to 1 tablet (75 mg) daily 90 tablet 3     evolocumab (REPATHA) 140 MG/ML prefilled autoinjector Inject 1 mL (140 mg) Subcutaneous every 14 days 6 mL 3     Hypromellose (ARTIFICIAL TEARS OP) Apply 1 drop to eye as needed Both eyes.       losartan (COZAAR) 100 MG tablet Take 1 tablet by mouth once daily 90 tablet 0     polyethylene glycol (MIRALAX) 17 GM/Dose powder Take 17 g (1 capful) by mouth daily 850 g 6     predniSONE (DELTASONE) 20 MG tablet Take 2 tablets (40 mg) by mouth daily 10 tablet 0     STATIN NOT PRESCRIBED (INTENTIONAL) Please choose reason not prescribed from choices below.       triamcinolone (KENALOG) 0.1 % external ointment Apply topically 2 times daily Apply to rash on leg. 30 g 1     VITAMIN D 400 UNIT OR TABS 1 cap twice a day        nitroGLYcerin (NITROSTAT) 0.4 MG sublingual tablet One tablet under the tongue every 5 minutes if needed for chest pain. May repeat every 5 minutes for a maximum of 3 doses in 15 minutes\" (Patient not taking: Reported on 9/26/2022) 25 tablet 0       Allergies   Allergen Reactions     Baycol [Cerivastatin Sodium]       muscle mass loss       Crestor [Hmg-Coa-R Inhibitors]      Also intolerant of simvastatin and atorvastatin with recurrent leg weakness and pain.     Darvocet " [Acetaminophen]      Severe nausea     Macrobid [Nitrofuran Derivatives]      Hives       Family History   Problem Relation Age of Onset     Cancer Mother         ovarian      Heart Disease Sister         CABG x 3     Neurologic Disorder Sister         Fibromyalgia     Cancer Father         stomach/interstines      Musculoskeletal Disorder Brother         back      Heart Disease Brother 60        CABG x 4     Glaucoma No family hx of      Macular Degeneration No family hx of        Additional medical/Social/Surgical histories reviewed in [facility] and updated as appropriate.       PHYSICAL EXAM  LMP  (LMP Unknown)     General  - normal appearance, in no obvious distress  CV  - normal peripheral perfusion  Pulm  - normal respiratory pattern, non-labored  Musculoskeletal - lumbar spine  - stance: normal gait without limp, no obvious leg length discrepancy, normal heel and toe walk  - inspection: normal bone and joint alignment, no obvious scoliosis  - palpation: no paravertebral or bony tenderness  - ROM: Normal flexion, normal extension, sidebending and rotation cause pain at the right buttock  - strength: lower extremities 5/5 in all planes  - special tests:  (-) straight leg raise  (-) slump test  Musculoskeletal -right hip  - inspection: no swelling of anterolateral hip,  normal bone and joint alignment, no obvious deformity  - palpation: no lateral or anterior hip tenderness  - ROM: normal flexion, extension, IR, ER, abduction, adduction, not painful, no crepitus  - strength: 5/5 in all planes  - special tests:  (-) LUZ MARIA  (-) FADIR  no pain with axial femoral load  Neuro  - No lower extremity sensory deficits, grossly normal coordination, normal muscle tone  Skin  - no ecchymosis, erythema, warmth, or induration, no obvious rash  Psych  - interactive, appropriate, normal mood and affect    IMAGING : X-ray lumbar 2 view today. Final results and radiologist's interpretation, available in the [facility] health record. Images  were reviewed with the patient/family members in the office today. My personal interpretation of the performed imaging is significant lumbar facet arthritis diffusely, L4-L5 and more prominently L5-S1 to space narrowing.  Hips fairly well-preserved with mild degenerative changes.     ASSESSMENT & PLAN  Ms. Mcbride is a 88 year old year old female who presents to clinic today with right-sided lower extremity pain over the past 4 months and more remotely back in 2021.     Diagnosis: Radicular pain of right lower extremity, lumbar spondylosis    Suspected lumbar etiology, possibly neuroforaminal stenosis affecting L5 or S1 dermatomes.  I have recommended we pursue an MRI of her lumbar spine given chronicity as well as her interest in pursuing injection.  Additionally we discussed more acute relief with prednisone 40 mg over the next 5 days to try to improve her condition before her trip in 1 week.  Lastly we discussed possible return to formal PT, but in the interim she can continue with her home exercise program.    I will discuss MRI results with her after completed and determine her candidacy for epidural injection if desired.  Avoid NSAIDs due to history of anticoagulation, age.    It was a pleasure seeing Carolin today.    Ethan Chapa DO, CAQSM  Primary Care Sports Medicine

## 2022-11-05 NOTE — PATIENT INSTRUCTIONS
WHAT IS A HERNIATED DISK?    A herniated disk is a disk that has bulged out from its proper place in your neck or back. Disks are rubbery cushions between the bones of the spine (vertebrae). Disks act as shock absorbers between each of the bones of the spine. When a disk bulges out, it may press on nearby nerves and cause pain and other symptoms.    Sometimes a herniated disk is called a ruptured disk.    WHAT IS THE CAUSE?    A herniated disk most often results from wear and tear on the spine as you get older. Sometimes it s caused by an injury. You may be more likely to have a herniated disk if you keep straining your back. This could happen, for example, from not using proper technique when you lift, push, or pull something heavy. Being overweight can also put extra stress on your back. You may also be at higher risk for a herniated disk if:    You are a smoker.  You sit for long periods of time without lower back support.  You drive a lot--for example, you are a .  WHAT ARE THE SYMPTOMS?    Symptoms of a herniated disk may start slowly or suddenly. Where you have symptoms depends on where the herniated disk is in your spine. The most common symptoms are numbness, tingling, pain, or weakness in your buttocks, shoulders, legs, or arms.    HOW IS IT DIAGNOSED?    Your healthcare provider will ask about your symptoms, activities, and medical history. Your provider will examine your spine. Tests may include:    Tests of the movement and reflexes of your arms and legs  X-rays or other types of scans of your spine  Electromyogram, which is a test of electrical activity in your muscles  HOW IS IT TREATED?    Your healthcare provider may recommend:    Rest. It's best to try to stay active, so try not to rest in bed longer than 1 to 2 days or the time your provider recommends.  Medicine. Several types of medicines may help lessen back pain. It may be medicine you take by mouth, or your provider may give a  steroid shot into your spine. Take all medicine as recommended by your healthcare provider.  Physical therapy. This may include massage, traction (force applied to your spine to help relieve pressure on your nerves), or other treatments. You may be given exercises to help strengthen your back so you are less likely to hurt it. You may learn how to protect your back when you are working or playing sports.  A neck collar or neck brace. Wearing a brace for a short time may help keep your neck in the right position while it is healing.  With treatment, the pain should get better within a few weeks, but you may keep having some pain for a few months. If you keep having symptoms, your provider may recommend surgery, but usually surgery isn t needed.    HOW CAN I TAKE CARE OF MYSELF?    To help relieve pain:    Take pain medicine according to your healthcare provider s instructions.  Put an ice pack, gel pack, or package of frozen vegetables wrapped in a cloth on the painful area every 3 to 4 hours for up to 20 minutes at a time. After a few days, a heating pad set on low, or a covered hot water bottle, may also help.  Always use good posture to keep extra pressure off your spine.    Stand up straight with your shoulders back and your belly in. If you have to stand for a long time, move around often and shift your weight from one foot to another. If possible, put one foot up on a footrest that is about 6 to 8 inches high. This keeps your back straight and puts less pressure on your spine.  Sit in chairs that give good support for your lower back Keep your feet flat on the floor or up on a foot rest. Get up every 20 minutes or so and stretch.  When you need to lift something heavy, don't bend from your waist. Bend your knees and squat down by the thing you are lifting. Keep your back as straight as possible. Use your thigh muscles instead of your back to do the lifting. Don t twist. Always keep things close to your body  when you lift, lower, or carry them.    When you sleep, find the position that s most comfortable for you and that supports your back. For example:    Lie flat on your back on a firm mattress or on a mattress with a stiff board under it. Put a pillow under your knees when you lie on your back.  Lie on your belly with a pillow under your chest  Lie on your side with a pillow between your legs.  If you cannot get comfortable, try lying flat on your back with your legs raised so that your knees are bent at a 90-degree angle. This is the same angle they would be if you were sitting up straight in a chair. One way to rest in this position is to lie on the floor, bend your knees, and rest your lower legs on the seat of a chair.  Follow your healthcare provider's instructions, including any exercises recommended by your provider. Ask your provider:    How and when you will hear your test results  How long it will take to recover  What activities you should avoid and when you can return to your normal activities  How to take care of yourself at home  What symptoms or problems you should watch for and what to do if you have them  Make sure you know when you should come back for a checkup.    HOW CAN I HELP PREVENT A HERNIATED DISK?    Keep your muscles strong so that they can help support your spine better. Walking and swimming are examples of good exercise for strengthening and protecting your spine.  Lose weight if you are overweight.  Practice good posture.        Herniated Disc Exercises    Side plank: Lie on your side with your legs, hips, and shoulders in a straight line. Prop yourself up onto your forearm with your elbow directly under your shoulder. Lift your hips off the floor and balance on your forearm and the outside of your foot. Try to hold this position for 15 seconds and then slowly lower your hip to the ground. Switch sides and repeat. Work up to holding for 1 minute. This exercise can be made easier by  starting with your knees and hips flexed toward your chest.    Gluteal stretch: Lie on your back with both knees bent. Rest your right ankle over the knee of your left leg. Grasp the thigh of the left leg and pull toward your chest. You will feel a stretch along the buttocks and possibly along the outside of your hip. Hold the stretch for 15 to 30 seconds. Then repeat the exercise with your left ankle over your right knee. Do the exercise 3 times with each leg.    Quadruped arm and leg raise: Get down on your hands and knees. Pull in your belly button and tighten your abdominal muscles to stiffen your spine. While keeping your abdominals tight, raise one arm and the opposite leg away from you. Hold this position for 5 seconds. Lower your arm and leg slowly and change sides. Do this 10 times on each side.    Extension exercise  Lie face down on the floor for 5 minutes. If this hurts too much, lie face down with a pillow under your stomach. This should relieve your leg or back pain. When you can lie on your stomach for 5 minutes without a pillow, you can continue with Part B of this exercise.    After lying on your stomach for 5 minutes, prop yourself up on your elbows for another 5 minutes. If you can do this without having more leg or buttock pain, you can start doing part C of this exercise.    Lie on your stomach with your hands under your shoulders. Then press down on your hands and extend your elbows while keeping your hips flat on the floor. Hold for 1 second and lower yourself to the floor. Do 3 to 5 sets of 10 repetitions. Rest for 1 minute between sets. You should have no pain in your legs when you do this, but it is normal to feel some pain in your lower back.    Do this exercise several times a day.    Dead bug: Lie on your back with your knees bent, arms at your sides, and feet flat on the floor. Draw in your abdomen and tighten your abdominal muscles. While keeping your abdominal muscles tight and knees  bent, lift one leg several inches off the floor, hold for 5 seconds, and then lower it. Repeat this exercise with the opposite leg. Then lift your arm over your head, hold for 5 seconds, and then lower it. Repeat with the opposite arm. Do 5 repetitions with each leg and arm.  Once this exercise gets easy, raise one leg and the opposite arm together. Hold for 5 seconds. Lower your arm and leg and raise the opposite arm and leg up and hold for 5 seconds. Do 3 sets of 5 repetitions.    Walking is also good exercise for you.    If you have a herniated disk, you should not drive or sit for more than 30 minutes at a time.    Developed by [employer].  Published by [employer].  Copyright  2014 [employer] and/or one of its subsidiaries. All rights reserved.

## 2022-11-08 ENCOUNTER — TELEPHONE (OUTPATIENT)
Dept: ORTHOPEDICS | Facility: CLINIC | Age: 87
End: 2022-11-08

## 2022-11-08 NOTE — TELEPHONE ENCOUNTER
Left Voicemail (1st Attempt) for the patient to call back and schedule the following:    Appointment type: mri  Specialty phone number: [phone]    Irina santana Procedure   Orthopedics, Podiatry, Sports Medicine, Ent ,Eye , Audiology, Adult Endocrine & Diabetes, Nutrition & Medication Therapy Management Specialties   [facility]

## 2022-12-09 ENCOUNTER — TELEPHONE (OUTPATIENT)
Dept: CARDIOLOGY | Facility: CLINIC | Age: 87
End: 2022-12-09

## 2022-12-09 NOTE — TELEPHONE ENCOUNTER
Spoke with pt. She will check with surgeon how many days they want her off plavix prior to eye surgery and let us know.    Will discuss with Dr Peña once we know number of days to be off.

## 2022-12-09 NOTE — TELEPHONE ENCOUNTER
[facility] Call Center    Phone Message    May a detailed message be left on voicemail: yes     Reason for Call: Medication Question or concern regarding medication   Prescription Clarification  Name of Medication: Blood Thinner  Prescribing Provider:    Pharmacy: NA   What on the order needs clarification? Carolin calling to report that she will be having surgery on 12/19 and they are wanting her to come off her blood thinner. She was only to be on it for 2 years, which would be this December, and she does have an upcoming appt with Dr. Peña on 12/15.  Carolin is just wanting to make sure this would be ok.  Please call her back to discuss          Action Taken: Message routed to:  Other: Cardiology    Travel Screening: Not Applicable

## 2022-12-13 ENCOUNTER — OFFICE VISIT (OUTPATIENT)
Dept: FAMILY MEDICINE | Facility: CLINIC | Age: 87
End: 2022-12-13
Payer: COMMERCIAL

## 2022-12-13 VITALS
RESPIRATION RATE: 24 BRPM | BODY MASS INDEX: 29.77 KG/M2 | OXYGEN SATURATION: 99 % | HEART RATE: 78 BPM | HEIGHT: 63 IN | DIASTOLIC BLOOD PRESSURE: 80 MMHG | WEIGHT: 168 LBS | TEMPERATURE: 98.3 F | SYSTOLIC BLOOD PRESSURE: 155 MMHG

## 2022-12-13 DIAGNOSIS — I10 HYPERTENSION, BENIGN ESSENTIAL, GOAL BELOW 140/90: ICD-10-CM

## 2022-12-13 DIAGNOSIS — Z01.818 PREOP GENERAL PHYSICAL EXAM: Primary | ICD-10-CM

## 2022-12-13 DIAGNOSIS — E78.5 HYPERLIPIDEMIA WITH TARGET LDL LESS THAN 70: ICD-10-CM

## 2022-12-13 DIAGNOSIS — I25.119 CORONARY ARTERY DISEASE INVOLVING NATIVE CORONARY ARTERY OF NATIVE HEART WITH ANGINA PECTORIS (H): ICD-10-CM

## 2022-12-13 DIAGNOSIS — Z23 ENCOUNTER FOR IMMUNIZATION: ICD-10-CM

## 2022-12-13 DIAGNOSIS — F33.0 MAJOR DEPRESSIVE DISORDER, RECURRENT EPISODE, MILD (H): ICD-10-CM

## 2022-12-13 PROCEDURE — 90715 TDAP VACCINE 7 YRS/> IM: CPT | Performed by: FAMILY MEDICINE

## 2022-12-13 PROCEDURE — 90471 IMMUNIZATION ADMIN: CPT | Performed by: FAMILY MEDICINE

## 2022-12-13 PROCEDURE — 99214 OFFICE O/P EST MOD 30 MIN: CPT | Mod: 25 | Performed by: FAMILY MEDICINE

## 2022-12-13 ASSESSMENT — PAIN SCALES - GENERAL: PAINLEVEL: NO PAIN (0)

## 2022-12-13 NOTE — PATIENT INSTRUCTIONS
For informational purposes only. Not to replace the advice of your health care provider. Copyright   2003,  [facility]. All rights reserved. Clinically reviewed by Sharon Bethea MD. [employer] [id number] - REV .  Preparing for Your Surgery  Getting started  A nurse will call you to review your health history and instructions. They will give you an arrival time based on your scheduled surgery time. Please be ready to share:    Your doctor's clinic name and phone number    Your medical, surgical, and anesthesia history    A list of allergies and sensitivities    A list of medicines, including herbal treatments and over-the-counter drugs    Whether the patient has a legal guardian (ask how to send us the papers in advance)  Please tell us if you're pregnant--or if there's any chance you might be pregnant. Some surgeries may injure a fetus (unborn baby), so they require a pregnancy test. Surgeries that are safe for a fetus don't always need a test, and you can choose whether to have one.   If you have a child who's having surgery, please ask for a copy of Preparing for Your Child's Surgery.    Preparing for surgery    Within 10 to 30 days of surgery: Have a pre-op exam (sometimes called an H&P, or History and Physical). This can be done at a clinic or pre-operative center.  ? If you're having a , you may not need this exam. Talk to your care team.    At your pre-op exam, talk to your care team about all medicines you take. If you need to stop any medicines before surgery, ask when to start taking them again.  ? We do this for your safety. Many medicines can make you bleed too much during surgery. Some change how well surgery (anesthesia) drugs work.    Call your insurance company to let them know you're having surgery. (If you don't have insurance, call [phone].)    Call your clinic if there's any change in your health. This includes signs of a cold or flu (sore throat, runny nose,  cough, rash, fever). It also includes a scrape or scratch near the surgery site.    If you have questions on the day of surgery, call your hospital or surgery center.  Eating and drinking guidelines  For your safety: Unless your surgeon tells you otherwise, follow the guidelines below.    Eat and drink as usual until 8 hours before you arrive for surgery. After that, no food or milk.    Drink clear liquids until 2 hours before you arrive. These are liquids you can see through, like water, Gatorade, and Propel Water. They also include plain black coffee and tea (no cream or milk), candy, and breath mints. You can spit out gum when you arrive.    If you drink alcohol: Stop drinking it the night before surgery.    If your care team tells you to take medicine on the morning of surgery, it's okay to take it with a sip of water.  Preventing infection    Shower or bathe the night before and morning of your surgery. Follow the instructions your clinic gave you. (If no instructions, use regular soap.)    Don't shave or clip hair near your surgery site. We'll remove the hair if needed.    Don't smoke or vape the morning of surgery. You may chew nicotine gum up to 2 hours before surgery. A nicotine patch is okay.  ? Note: Some surgeries require you to completely quit smoking and nicotine. Check with your surgeon.    Your care team will make every effort to keep you safe from infection. We will:  ? Clean our hands often with soap and water (or an alcohol-based hand rub).  ? Clean the skin at your surgery site with a special soap that kills germs.  ? Give you a special gown to keep you warm. (Cold raises the risk of infection.)  ? Wear special hair covers, masks, gowns and gloves during surgery.  ? Give antibiotic medicine, if prescribed. Not all surgeries need antibiotics.  What to bring on the day of surgery    Photo ID and insurance card    Copy of your health care directive, if you have one    Glasses and hearing aids (bring  cases)  ? You can't wear contacts during surgery    Inhaler and eye drops, if you use them (tell us about these when you arrive)    CPAP machine or breathing device, if you use them    A few personal items, if spending the night    If you have . . .  ? A pacemaker, ICD (cardiac defibrillator) or other implant: Bring the ID card.  ? An implanted stimulator: Bring the remote control.  ? A legal guardian: Bring a copy of the certified (court-stamped) guardianship papers.  Please remove any jewelry, including body piercings. Leave jewelry and other valuables at home.  If you're going home the day of surgery    You must have a responsible adult drive you home. They should stay with you overnight as well.    If you don't have someone to stay with you, and you aren't safe to go home alone, we may keep you overnight. Insurance often won't pay for this.  After surgery  If it's hard to control your pain or you need more pain medicine, please call your surgeon's office.  Questions?   If you have any questions for your care team, list them here: _________________________________________________________________________________________________________________________________________________________________________ ____________________________________ ____________________________________ ____________________________________

## 2022-12-13 NOTE — PROGRESS NOTES
[address] [address]  Phone: [phone]  Primary Provider: Devin Blackman  Pre-op Performing Provider: DEVIN BLACKMAN      PREOPERATIVE EVALUATION:  Today's date: 12/13/2022    Carolin Mcbride is a 88 year old female who presents for a preoperative evaluation.    Surgical Information:  Surgery/Procedure: Both upper eyelid blepharoplasty and ptosis repair  Surgery Location: [facility]  Surgeon: John Figueroa MD  Surgery Date: 12/19/2022  Time of Surgery: 8:00 AM  Where patient plans to recover: At home with family  Fax number for surgical facility: Note does not need to be faxed, will be available electronically in Epic.    Type of Anesthesia Anticipated: Local with MAC    Assessment & Plan     The proposed surgical procedure is considered LOW risk.    Preop general physical exam  As below.    Major depressive disorder, recurrent episode, mild (H)  Continue citalopram.    Hyperlipidemia with target LDL less than 70  On Repatha injections.    Hypertension, benign essential, goal below 140/90  Not controlled in clinic but per patient controlled at home 110's-120's/70's.    Coronary artery disease involving native coronary artery of native heart with angina pectoris (H)  Following Cardiology in 2 days. Will have input on Plavix and aspirin for surgery. Patient s/p stents in 12/2020.    Encounter for immunization    - TDAP VACCINE (Adacel, Boostrix)      RECOMMENDATION:  APPROVAL GIVEN to proceed with proposed procedure, without further diagnostic evaluation.      Subjective     HPI related to upcoming procedure: h/o bilateral ptosis and dermatochalasis causing vision issues      Health Care Directive:  Patient does not have a Health Care Directive or Living Will:         Review of Systems  CONSTITUTIONAL: NEGATIVE for fever, chills, change in weight  INTEGUMENTARY/SKIN: NEGATIVE for worrisome rashes, moles or  lesions  EYES: NEGATIVE for vision changes or irritation  ENT/MOUTH: NEGATIVE for ear, mouth and throat problems  RESP: NEGATIVE for significant cough or SOB  CV: NEGATIVE for chest pain, palpitations or peripheral edema  GI: NEGATIVE for nausea, abdominal pain, heartburn, or change in bowel habits  : NEGATIVE for frequency, dysuria, or hematuria  MUSCULOSKELETAL: NEGATIVE for significant arthralgias or myalgia  NEURO: NEGATIVE for weakness, dizziness or paresthesias  ENDOCRINE: NEGATIVE for temperature intolerance, skin/hair changes  HEME: NEGATIVE for bleeding problems  PSYCHIATRIC: NEGATIVE for changes in mood or affect    Patient Active Problem List    Diagnosis Date Noted     Dermatochalasis of both upper eyelids 09/14/2022     Priority: Medium     Added automatically from request for surgery [id number]       Involutional ptosis, acquired, bilateral 09/14/2022     Priority: Medium     Added automatically from request for surgery [id number]       Statin intolerance 08/10/2021     Priority: Medium     Status post coronary angiogram 12/08/2020     Priority: Medium     Dyspnea on exertion 11/24/2020     Priority: Medium     Added automatically from request for surgery [id number]       Abnormal nuclear cardiac imaging test 11/24/2020     Priority: Medium     Added automatically from request for surgery [id number]       Microscopic hematuria 08/11/2020     Priority: Medium     Proteinuria, unspecified type 08/11/2020     Priority: Medium     Leg weakness, bilateral 04/13/2020     Priority: Medium     Recurrent and persistent hematuria 10/31/2019     Priority: Medium     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 minimally only for Tree pollens (all other environmental allergens NEGATIVE)       Diagnostic skin and sensitization tests (aka ALLERGENS)      Priority: Medium     CKD (chronic kidney disease) stage 3, GFR 30-59 ml/min (H) 05/22/2014     Priority: Medium     Hyperlipidemia with target LDL less than 100 04/08/2013     Priority: Medium     Intolerant to multiple statin medications. Zetia prescribed.        Osteoporosis      Priority: Medium     5 years Fosamax 1-1-2011 to 12-. Medication holiday recommended.         Hypertension goal BP (blood pressure) < 140/90 06/26/2012     Priority: Medium     Advance care planning 03/13/2012     Priority: Medium     Advance Care Planning 12/20/2016: ACP Review of Chart / Resources Provided:  Reviewed chart for advance care plan.  Carolin LIAT Mcbride has been provided information and resources to begin or update their advance care plan.  Advance Care Planning 03/13/2012: Discussed advance care planning with patient; information given to patient to review. 3/13/2012 . Lara Roque MA                  CAD (coronary artery disease)      Priority: Medium     Stenting of 2 arteries and balloon angioplasty 12-8-2020. Dual antiplatelet therapy recommended for 2 years.        Pseudophakia OU c YAG OU 06/02/2011     Priority: Medium      Past Medical History:   Diagnosis Date     Adjustment reaction with anxiety and depression 01/01/2001     CAD (coronary artery disease) 01/01/2009    asymptomatic, on CT scan     Cyst, ovarian      Diagnostic skin and sensitization tests (aka ALLERGENS) 9/29/14 IgE tests pos. minimally only for Tree pollens (all other environmental allergens NEGATIVE)     HTN (hypertension)      Hyperlipidemia      Myositis 01/01/2001    related to past Baycol use- resolved     Osteoporosis      Seasonal allergic rhinitis     9/29/14 IgE tests pos. minimally only for Tree pollens (all other environmental allergens NEGATIVE)      "Stented coronary artery      Past Surgical History:   Procedure Laterality Date     APPENDECTOMY       CHOLECYSTECTOMY, OPEN       COLONOSCOPY  2009    neg     CV CORONARY ANGIOGRAM N/A 12/8/2020    Procedure: CV CORONARY ANGIOGRAM;  Surgeon: Isra Weber MD;  Location: [facility] CARDIAC CATH LAB     CV PCI STENT DRUG ELUTING N/A 12/8/2020    Procedure: Percutaneous Coronary Intervention Stent Drug Eluting;  Surgeon: Isra Weber MD;  Location: [facility] CARDIAC CATH LAB     CYSTOCELE REPAIR  2003    TVT SPARC     Current Outpatient Medications   Medication Sig Dispense Refill     aspirin 81 MG tablet Take 1 tablet (81 mg) by mouth daily 90 tablet 3     citalopram (CELEXA) 10 MG tablet Take 1 tablet (10 mg) by mouth daily 90 tablet 3     clopidogrel (PLAVIX) 75 MG tablet Take 1 tablet (75 mg) by mouth daily Take 4 tablets (300 mg) on the first day for a loading dose then decrease to 1 tablet (75 mg) daily 90 tablet 3     evolocumab (REPATHA) 140 MG/ML prefilled autoinjector Inject 1 mL (140 mg) Subcutaneous every 14 days 6 mL 3     Hypromellose (ARTIFICIAL TEARS OP) Apply 1 drop to eye as needed Both eyes.       losartan (COZAAR) 100 MG tablet Take 1 tablet by mouth once daily 90 tablet 0     nitroGLYcerin (NITROSTAT) 0.4 MG sublingual tablet One tablet under the tongue every 5 minutes if needed for chest pain. May repeat every 5 minutes for a maximum of 3 doses in 15 minutes\" (Patient not taking: Reported on 9/26/2022) 25 tablet 0     polyethylene glycol (MIRALAX) 17 GM/Dose powder Take 17 g (1 capful) by mouth daily 850 g 6     predniSONE (DELTASONE) 20 MG tablet Take 2 tablets (40 mg) by mouth daily 10 tablet 0     STATIN NOT PRESCRIBED (INTENTIONAL) Please choose reason not prescribed from choices below.       triamcinolone (KENALOG) 0.1 % external ointment Apply topically 2 times daily Apply to rash on leg. 30 g 1     VITAMIN D 400 UNIT OR TABS 1 cap twice a day    " "      Allergies   Allergen Reactions     Baycol [Cerivastatin Sodium]       muscle mass loss       Crestor [Hmg-Coa-R Inhibitors]      Also intolerant of simvastatin and atorvastatin with recurrent leg weakness and pain.     Darvocet [Acetaminophen]      Severe nausea     Macrobid [Nitrofuran Derivatives]      Hives        Social History     Tobacco Use     Smoking status: Never     Smokeless tobacco: Never     Tobacco comments:     Never smoked; nonsmoking household   Substance Use Topics     Alcohol use: No     Comment: very rare     Family History   Problem Relation Age of Onset     Cancer Mother         ovarian      Heart Disease Sister         CABG x 3     Neurologic Disorder Sister         Fibromyalgia     Cancer Father         stomach/interstines      Musculoskeletal Disorder Brother         back      Heart Disease Brother 60        CABG x 4     Glaucoma No family hx of      Macular Degeneration No family hx of      History   Drug Use No     Comment: never         Objective     BP (!) 155/80   Pulse 78   Temp 98.3  F (36.8  C) (Oral)   Resp 24   Ht 1.601 m (5' 3.03\")   Wt 76.2 kg (168 lb)   LMP  (LMP Unknown)   SpO2 99%   BMI 29.73 kg/m      Physical Exam    GENERAL APPEARANCE: healthy, alert and no distress     EYES: EOMI, PERRL     HENT: ear canals and TM's normal and nose and mouth without ulcers or lesions     NECK: no adenopathy, no asymmetry, masses, or scars and thyroid normal to palpation     RESP: lungs clear to auscultation - no rales, rhonchi or wheezes     CV: regular rates and rhythm, normal S1 S2, no S3 or S4 and no murmur, click or rub     ABDOMEN:  soft, nontender, no HSM or masses and bowel sounds normal     MS: extremities normal- no gross deformities noted, no evidence of inflammation in joints, FROM in all extremities.     SKIN: no suspicious lesions or rashes     NEURO: Normal strength and tone, sensory exam grossly normal, mentation intact and speech normal     PSYCH: mentation " appears normal. and affect normal/bright     LYMPHATICS: No cervical adenopathy    Recent Labs   Lab Test 09/19/22  0936 06/07/22  1604 02/01/22  0938 12/06/21  1544 08/31/21  0950 04/12/21  1308   HGB  --   --   --  13.0  --  12.7   PLT  --   --   --  300  --  278    143   < > 138   < > 138   POTASSIUM 4.6 4.2   < > 4.3   < > 4.6   CR 1.03 1.05*   < > 0.98   < > 1.12*    < > = values in this interval not displayed.        Diagnostics:  No labs were ordered during this visit.       Revised Cardiac Risk Index (RCRI):  The patient has the following serious cardiovascular risks for perioperative complications:   - No serious cardiac risks = 0 points     RCRI Interpretation: 1 point: Class II (low risk - 0.9% complication rate)           Signed Electronically by: Merrill Patten MD  Copy of this evaluation report is provided to requesting physician.      Answers for HPI/ROS submitted by the patient on 12/13/2022  If you checked off any problems, how difficult have these problems made it for you to do your work, take care of things at home, or get along with other people?: Not difficult at all  PHQ9 TOTAL SCORE: 0

## 2022-12-13 NOTE — NURSING NOTE
Prior to immunization administration, verified patients identity using patient s name and date of birth. Please see Immunization Activity for additional information. yes    Screening Questionnaire for Adult Immunization    Are you sick today?   No   Do you have allergies to medications, food, a vaccine component or latex?   Yes   Have you ever had a serious reaction after receiving a vaccination?   No   Do you have a long-term health problem with heart, lung, kidney, or metabolic disease (e.g., diabetes), asthma, a blood disorder, no spleen, complement component deficiency, a cochlear implant, or a spinal fluid leak?  Are you on long-term aspirin therapy?   No   Do you have cancer, leukemia, HIV/AIDS, or any other immune system problem?   No   Do you have a parent, brother, or sister with an immune system problem?   No   In the past 3 months, have you taken medications that affect  your immune system, such as prednisone, other steroids, or anticancer drugs; drugs for the treatment of rheumatoid arthritis, Crohn s disease, or psoriasis; or have you had radiation treatments?   No   Have you had a seizure, or a brain or other nervous system problem?   No   During the past year, have you received a transfusion of blood or blood    products, or been given immune (gamma) globulin or antiviral drug?   No   For women: Are you pregnant or is there a chance you could become       pregnant during the next month?   No   Have you received any vaccinations in the past 4 weeks?   No     Immunization questionnaire was positive for at least one answer.  Notified  Sandor.        Per orders of Dr. Patten, injection of TDAP given by Diane L. Schoenherr, RN. Patient instructed to remain in clinic for 15 minutes afterwards, and to report any adverse reaction to me immediately.       Screening performed by Diane L. Schoenherr, RN on 12/13/2022 at 10:31 AM.

## 2022-12-15 ENCOUNTER — OFFICE VISIT (OUTPATIENT)
Dept: CARDIOLOGY | Facility: CLINIC | Age: 87
End: 2022-12-15
Attending: INTERNAL MEDICINE
Payer: COMMERCIAL

## 2022-12-15 VITALS
HEIGHT: 63 IN | HEART RATE: 77 BPM | BODY MASS INDEX: 29.77 KG/M2 | DIASTOLIC BLOOD PRESSURE: 73 MMHG | OXYGEN SATURATION: 100 % | SYSTOLIC BLOOD PRESSURE: 163 MMHG | WEIGHT: 168 LBS

## 2022-12-15 DIAGNOSIS — Z98.61 STATUS POST PERCUTANEOUS TRANSLUMINAL CORONARY ANGIOPLASTY: Primary | ICD-10-CM

## 2022-12-15 DIAGNOSIS — E78.5 HYPERLIPIDEMIA WITH TARGET LDL LESS THAN 70: ICD-10-CM

## 2022-12-15 DIAGNOSIS — I25.5 ISCHEMIC CARDIOMYOPATHY: ICD-10-CM

## 2022-12-15 LAB — LVEF ECHO: NORMAL

## 2022-12-15 PROCEDURE — 93306 TTE W/DOPPLER COMPLETE: CPT | Performed by: INTERNAL MEDICINE

## 2022-12-15 PROCEDURE — 99214 OFFICE O/P EST MOD 30 MIN: CPT | Performed by: INTERNAL MEDICINE

## 2022-12-15 RX ORDER — CEFAZOLIN SODIUM 2 G/100ML
2 INJECTION, SOLUTION INTRAVENOUS SEE ADMIN INSTRUCTIONS
Status: CANCELLED | OUTPATIENT
Start: 2022-12-15

## 2022-12-15 RX ORDER — CEFAZOLIN SODIUM 2 G/100ML
2 INJECTION, SOLUTION INTRAVENOUS
Status: CANCELLED | OUTPATIENT
Start: 2022-12-15

## 2022-12-15 NOTE — NURSING NOTE
Med Reconcile: Reviewed and verified all current medications with the patient. The updated medication list was printed and given to the patient.  Return Appointment: Patient given instructions regarding scheduling next clinic visit. Patient demonstrated understanding of this information and agreed to call with further questions or concerns.  Follow-up in 1 year  BP rechecked and it was still elevated, 167/70 patient states that she checks her BP at home daily and it runs 110's/70's.  Advised patient that if elevated >140 at home to call clinic to discuss.  Patient verbalized understanding.  Patient stated she understood all health information given and agreed to call with further questions or concerns.  Calista Li RN  Cardiology Care Coordinator  [facility]  [phone] option 1

## 2022-12-15 NOTE — PROGRESS NOTES
SUBJECTIVE:  Carolin Mcbride is a 88 year old female who presents for follow-up. Status post PCI.     Patient had 2 stent to LAD, 1 stent torPL branch andPOBA of D1 on 12/8/2020.     Initially patient presented for the management of hyperlipidemia.  Her LDL was over 200.  Patient had a TIA and she was initially managed on clopidogrel for a short course.  Her CT MRI and echo were normal.  Also had a Zio patch which did not show significant arrhythmia or atrial fibrillation.  Patient was very reluctant to take statin as she had severe myalgia with Baycol.  She was started on 20 mg of Lipitor which she discontinued due to some nonspecific muscle ache.  Patient was restarted on Lipitor 20 mg and then subsequently it was increased to 40 mg daily.  Patient is tolerating this dose very well.  As part of CAD evaluation patient had a nuclear scan.  She had an apical perfusion defect which was fixed.  The question was whether it was an artifact or real.  Subsequently patient had an echocardiogram which showed apical wall motion abnormality.  Subsequent angiogram showed LAD D1 disease and RPL disease for which she had intervention.      Patient had some myalgia on 40 mg of Lipitor.  Her CK was normal medication was discontinued and he was referred to lipid clinic for further management.  Today patient had no cardiac complaints.  She is doing well from this aspect.  Still complaining of myalgia and muscle weakness.   The lipid clinic she was started on Zetia 10 mg daily.  Latest lipid profile LDL is 182.    Subsequently patient was started on Repatha and he she is currently on this medication.  Today patient had no cardiac complaints.  Overall she is doing very well.  Her myalgia subsided.    Patient Active Problem List    Diagnosis Date Noted     Dermatochalasis of both upper eyelids 09/14/2022     Priority: Medium     Added automatically from request for surgery [id number]       Involutional ptosis, acquired, bilateral  09/14/2022     Priority: Medium     Added automatically from request for surgery [id number]       Statin intolerance 08/10/2021     Priority: Medium     Status post coronary angiogram 12/08/2020     Priority: Medium     Dyspnea on exertion 11/24/2020     Priority: Medium     Added automatically from request for surgery [id number]       Abnormal nuclear cardiac imaging test 11/24/2020     Priority: Medium     Added automatically from request for surgery [id number]       Microscopic hematuria 08/11/2020     Priority: Medium     Proteinuria, unspecified type 08/11/2020     Priority: Medium     Leg weakness, bilateral 04/13/2020     Priority: Medium     Recurrent and persistent hematuria 10/31/2019     Priority: Medium     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 minimally only for Tree pollens (all other environmental allergens NEGATIVE)       Diagnostic skin and sensitization tests (aka ALLERGENS)      Priority: Medium     CKD (chronic kidney disease) stage 3, GFR 30-59 ml/min (H) 05/22/2014     Priority: Medium     Hyperlipidemia with target LDL less than 100 04/08/2013     Priority: Medium     Intolerant to multiple statin medications. Zetia prescribed.        Osteoporosis      Priority: Medium     5 years Fosamax 1-1-2011 to 12-. Medication holiday recommended.         Hypertension goal BP (blood pressure) < 140/90 06/26/2012     Priority: Medium     Advance care planning 03/13/2012     Priority: Medium     Advance Care Planning 12/20/2016: ACP Review of Chart / Resources Provided:  Reviewed chart for advance care plan.  Carolin Mcbride has been provided information and  "resources to begin or update their advance care plan.  Advance Care Planning 03/13/2012: Discussed advance care planning with patient; information given to patient to review. 3/13/2012 . Lara Roque MA                  CAD (coronary artery disease)      Priority: Medium     Stenting of 2 arteries and balloon angioplasty 12-8-2020. Dual antiplatelet therapy recommended for 2 years.        Pseudophakia OU c YAG OU 06/02/2011     Priority: Medium    .  Current Outpatient Medications   Medication Sig     aspirin 81 MG tablet Take 1 tablet (81 mg) by mouth daily     citalopram (CELEXA) 10 MG tablet Take 1 tablet (10 mg) by mouth daily     clopidogrel (PLAVIX) 75 MG tablet Take 1 tablet (75 mg) by mouth daily Take 4 tablets (300 mg) on the first day for a loading dose then decrease to 1 tablet (75 mg) daily     evolocumab (REPATHA) 140 MG/ML prefilled autoinjector Inject 1 mL (140 mg) Subcutaneous every 14 days     Hypromellose (ARTIFICIAL TEARS OP) Apply 1 drop to eye as needed Both eyes.     losartan (COZAAR) 100 MG tablet Take 1 tablet by mouth once daily     nitroGLYcerin (NITROSTAT) 0.4 MG sublingual tablet One tablet under the tongue every 5 minutes if needed for chest pain. May repeat every 5 minutes for a maximum of 3 doses in 15 minutes\"     polyethylene glycol (MIRALAX) 17 GM/Dose powder Take 17 g (1 capful) by mouth daily     VITAMIN D 400 UNIT OR TABS 1 cap twice a day      STATIN NOT PRESCRIBED (INTENTIONAL) Please choose reason not prescribed from choices below. (Patient not taking: Reported on 12/13/2022)     Current Facility-Administered Medications   Medication     triamcinolone acetonide (KENALOG-10) injection 10 mg     Past Medical History:   Diagnosis Date     Adjustment reaction with anxiety and depression 01/01/2001     CAD (coronary artery disease) 01/01/2009    asymptomatic, on CT scan     Cyst, ovarian      Diagnostic skin and sensitization tests (aka ALLERGENS) 9/29/14 IgE tests pos. minimally " only for Tree pollens (all other environmental allergens NEGATIVE)     HTN (hypertension)      Hyperlipidemia      Myositis 01/01/2001    related to past Baycol use- resolved     Osteoporosis      Seasonal allergic rhinitis     9/29/14 IgE tests pos. minimally only for Tree pollens (all other environmental allergens NEGATIVE)     Stented coronary artery      Past Surgical History:   Procedure Laterality Date     APPENDECTOMY       CHOLECYSTECTOMY, OPEN       COLONOSCOPY  2009    neg     CV CORONARY ANGIOGRAM N/A 12/8/2020    Procedure: CV CORONARY ANGIOGRAM;  Surgeon: Isra Weber MD;  Location: [facility] CARDIAC CATH LAB     CV PCI STENT DRUG ELUTING N/A 12/8/2020    Procedure: Percutaneous Coronary Intervention Stent Drug Eluting;  Surgeon: Isra Weber MD;  Location: [facility] CARDIAC CATH LAB     CYSTOCELE REPAIR  2003    TVT SPARC     Allergies   Allergen Reactions     Baycol [Cerivastatin Sodium]       muscle mass loss       Crestor [Hmg-Coa-R Inhibitors]      Also intolerant of simvastatin and atorvastatin with recurrent leg weakness and pain.     Darvocet [Acetaminophen]      Severe nausea     Macrobid [Nitrofuran Derivatives]      Hives     Social History     Socioeconomic History     Marital status:      Spouse name: Shashank     Number of children: 3     Years of education: Not on file     Highest education level: Not on file   Occupational History     Employer: RETIRED   Tobacco Use     Smoking status: Never     Smokeless tobacco: Never     Tobacco comments:     Never smoked; nonsmoking household   Substance and Sexual Activity     Alcohol use: No     Comment: very rare     Drug use: No     Comment: never     Sexual activity: Yes     Partners: Male     Birth control/protection: None   Other Topics Concern     Parent/sibling w/ CABG, MI or angioplasty before 65F 55M? No      Service No     Blood Transfusions No     Caffeine Concern No     Occupational  Exposure No     Hobby Hazards No     Sleep Concern No     Stress Concern No     Weight Concern Yes     Special Diet No     Back Care No     Exercise Yes     Comment: Curves     Bike Helmet No     Seat Belt Yes     Self-Exams Yes   Social History Narrative     Not on file     Social Determinants of Health     Financial Resource Strain: Not on file   Food Insecurity: Not on file   Transportation Needs: Not on file   Physical Activity: Not on file   Stress: Not on file   Social Connections: Not on file   Intimate Partner Violence: Not on file   Housing Stability: Not on file     Family History   Problem Relation Age of Onset     Cancer Mother         ovarian      Heart Disease Sister         CABG x 3     Neurologic Disorder Sister         Fibromyalgia     Cancer Father         stomach/interstines      Musculoskeletal Disorder Brother         back      Heart Disease Brother 60        CABG x 4     Glaucoma No family hx of      Macular Degeneration No family hx of           REVIEW OF SYSTEMS:  General: negative, fever, chills, night sweats  Skin: negative, acne, rash and scaling  Eyes: negative, double vision, eye pain and photophobia  Ears/Nose/Throat: negative, nasal congestion and purulent rhinorrhea  Respiratory: No dyspnea on exertion, No cough, No hemoptysis and negative  Cardiovascular: negative, palpitations, tachycardia, irregular heart beat, chest pain, exertional chest pain or pressure, paroxysmal nocturnal dyspnea, dyspnea on exertion and orthopnea       OBJECTIVE:  not currently breastfeeding.  General Appearance: healthy, alert, active and no distress  Head: Normocephalic. No masses, lesions, tenderness or abnormalities  Eyes: conjuctiva clear, PERRL, EOM intact  Ears: External ears normal. Canals clear. TM's normal.  Nose: Nares normal  Mouth: normal  Neck: Supple, no cervical adenopathy, no thyromegaly  Lungs: clear to auscultation  Cardiac: regular rate and rhythm, normal S1 and S2, no murmur        ASSESSMENT/plan:  Patient here for follow-up.  Status post PCI.  She had 2 stents to LAD 1 stent to PL branch and POBA of D1 in December 2020.  Also with hypertension lipidemia and severe intolerance to statin.  Currently patient is on Repatha.  Today patient had no cardiac complaints.  Overall she is doing well.  Patient completed 2 years of dual antiplatelet therapy.  We will discontinue Plavix now.  She is advised to continue rest of her medications.  Echocardiogram done today reviewed and result discussed with patient.  Normal biventricular function.  No significant valvular abnormalities.  Patient will return to clinic in 1 year for a follow-up.  Total visit duration 20 minutes.  This include face-to-face interview, physical exam, chart review, review of echocardiogram and documentation.

## 2022-12-15 NOTE — PATIENT INSTRUCTIONS
Thank you for coming to the [facility] at [place]; please note the following instructions:    1. Dr. Peña recommends to follow up in 1 year.  The cardiology team will contact you to schedule when the time gets closer.          If you have any questions regarding your visit, please contact your care team:     CARDIOLOGY  TELEPHONE NUMBER   Calista CAMACHO, Registered Nurse  Leslee YORK, Registered Nurse  Raquel SERRATO, Registered Medical Assistant  Lauryn DALAL, Certified Medical Assistant  Tianna SIEGEL, Visit Facilitator [phone] (select option 1)    *After hours: [phone]   For Scheduling Appts:     [phone] (select option 1)    *After hours: [phone]   For the Device Clinic (Pacemakers and ICD's)  Arin CRYSTAL, Registered Nurse   During business hours: [phone]    *After business hours:  [phone] (select option 4)      Normal test result notifications will be released via [employer] or mailed within 7 business days.  All other test results, will be communicated via telephone once reviewed by your cardiologist.    If you need a medication refill, please contact your pharmacy.  Please allow 3 business days for your refill to be completed.    As always, thank you for trusting us with your health care needs!

## 2022-12-15 NOTE — LETTER
12/15/2022      RE: Carolin Mcbride  [address] [address]       Dear Colleague,    Thank you for the opportunity to participate in the care of your patient, Carolin Mcbride, at the [facility]. Please see a copy of my visit note below.       SUBJECTIVE:  Carolin Mcbride is a 88 year old female who presents for follow-up. Status post PCI.     Patient had 2 stent to LAD, 1 stent torPL branch andPOBA of D1 on 12/8/2020.     Initially patient presented for the management of hyperlipidemia.  Her LDL was over 200.  Patient had a TIA and she was initially managed on clopidogrel for a short course.  Her CT MRI and echo were normal.  Also had a Zio patch which did not show significant arrhythmia or atrial fibrillation.  Patient was very reluctant to take statin as she had severe myalgia with Baycol.  She was started on 20 mg of Lipitor which she discontinued due to some nonspecific muscle ache.  Patient was restarted on Lipitor 20 mg and then subsequently it was increased to 40 mg daily.  Patient is tolerating this dose very well.  As part of CAD evaluation patient had a nuclear scan.  She had an apical perfusion defect which was fixed.  The question was whether it was an artifact or real.  Subsequently patient had an echocardiogram which showed apical wall motion abnormality.  Subsequent angiogram showed LAD D1 disease and RPL disease for which she had intervention.      Patient had some myalgia on 40 mg of Lipitor.  Her CK was normal medication was discontinued and he was referred to lipid clinic for further management.  Today patient had no cardiac complaints.  She is doing well from this aspect.  Still complaining of myalgia and muscle weakness.   The lipid clinic she was started on Zetia 10 mg daily.  Latest lipid profile LDL is 182.    Subsequently patient was started on Repatha and he she is currently on this  medication.  Today patient had no cardiac complaints.  Overall she is doing very well.  Her myalgia subsided.    Patient Active Problem List    Diagnosis Date Noted     Dermatochalasis of both upper eyelids 09/14/2022     Priority: Medium     Added automatically from request for surgery [id number]       Involutional ptosis, acquired, bilateral 09/14/2022     Priority: Medium     Added automatically from request for surgery [id number]       Statin intolerance 08/10/2021     Priority: Medium     Status post coronary angiogram 12/08/2020     Priority: Medium     Dyspnea on exertion 11/24/2020     Priority: Medium     Added automatically from request for surgery [id number]       Abnormal nuclear cardiac imaging test 11/24/2020     Priority: Medium     Added automatically from request for surgery [id number]       Microscopic hematuria 08/11/2020     Priority: Medium     Proteinuria, unspecified type 08/11/2020     Priority: Medium     Leg weakness, bilateral 04/13/2020     Priority: Medium     Recurrent and persistent hematuria 10/31/2019     Priority: Medium     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 minimally only for Tree pollens (all other environmental allergens NEGATIVE)       Diagnostic skin and sensitization tests (aka ALLERGENS)      Priority: Medium     CKD (chronic kidney disease) stage 3, GFR 30-59 ml/min (H) 05/22/2014     Priority: Medium     Hyperlipidemia with target LDL less than 100 04/08/2013     Priority: Medium     Intolerant to multiple statin medications. Zetia prescribed.        Osteoporosis      Priority: Medium     5 years Fosamax 1-1-2011 to 12-.  "Medication holiday recommended.         Hypertension goal BP (blood pressure) < 140/90 06/26/2012     Priority: Medium     Advance care planning 03/13/2012     Priority: Medium     Advance Care Planning 12/20/2016: ACP Review of Chart / Resources Provided:  Reviewed chart for advance care plan.  Carolin Mcbride has been provided information and resources to begin or update their advance care plan.  Advance Care Planning 03/13/2012: Discussed advance care planning with patient; information given to patient to review. 3/13/2012 . Lara Roque MA                  CAD (coronary artery disease)      Priority: Medium     Stenting of 2 arteries and balloon angioplasty 12-8-2020. Dual antiplatelet therapy recommended for 2 years.        Pseudophakia OU c YAG OU 06/02/2011     Priority: Medium    .  Current Outpatient Medications   Medication Sig     aspirin 81 MG tablet Take 1 tablet (81 mg) by mouth daily     citalopram (CELEXA) 10 MG tablet Take 1 tablet (10 mg) by mouth daily     clopidogrel (PLAVIX) 75 MG tablet Take 1 tablet (75 mg) by mouth daily Take 4 tablets (300 mg) on the first day for a loading dose then decrease to 1 tablet (75 mg) daily     evolocumab (REPATHA) 140 MG/ML prefilled autoinjector Inject 1 mL (140 mg) Subcutaneous every 14 days     Hypromellose (ARTIFICIAL TEARS OP) Apply 1 drop to eye as needed Both eyes.     losartan (COZAAR) 100 MG tablet Take 1 tablet by mouth once daily     nitroGLYcerin (NITROSTAT) 0.4 MG sublingual tablet One tablet under the tongue every 5 minutes if needed for chest pain. May repeat every 5 minutes for a maximum of 3 doses in 15 minutes\"     polyethylene glycol (MIRALAX) 17 GM/Dose powder Take 17 g (1 capful) by mouth daily     VITAMIN D 400 UNIT OR TABS 1 cap twice a day      STATIN NOT PRESCRIBED (INTENTIONAL) Please choose reason not prescribed from choices below. (Patient not taking: Reported on 12/13/2022)     Current Facility-Administered Medications   Medication "     triamcinolone acetonide (KENALOG-10) injection 10 mg     Past Medical History:   Diagnosis Date     Adjustment reaction with anxiety and depression 01/01/2001     CAD (coronary artery disease) 01/01/2009    asymptomatic, on CT scan     Cyst, ovarian      Diagnostic skin and sensitization tests (aka ALLERGENS) 9/29/14 IgE tests pos. minimally only for Tree pollens (all other environmental allergens NEGATIVE)     HTN (hypertension)      Hyperlipidemia      Myositis 01/01/2001    related to past Baycol use- resolved     Osteoporosis      Seasonal allergic rhinitis     9/29/14 IgE tests pos. minimally only for Tree pollens (all other environmental allergens NEGATIVE)     Stented coronary artery      Past Surgical History:   Procedure Laterality Date     APPENDECTOMY       CHOLECYSTECTOMY, OPEN       COLONOSCOPY  2009    neg     CV CORONARY ANGIOGRAM N/A 12/8/2020    Procedure: CV CORONARY ANGIOGRAM;  Surgeon: Isra Weber MD;  Location: [facility] CARDIAC CATH LAB     CV PCI STENT DRUG ELUTING N/A 12/8/2020    Procedure: Percutaneous Coronary Intervention Stent Drug Eluting;  Surgeon: Isra Weber MD;  Location:  HEART CARDIAC CATH LAB     CYSTOCELE REPAIR  2003    TVT SPARC     Allergies   Allergen Reactions     Baycol [Cerivastatin Sodium]       muscle mass loss       Crestor [Hmg-Coa-R Inhibitors]      Also intolerant of simvastatin and atorvastatin with recurrent leg weakness and pain.     Darvocet [Acetaminophen]      Severe nausea     Macrobid [Nitrofuran Derivatives]      Hives     Social History     Socioeconomic History     Marital status:      Spouse name: Shashank     Number of children: 3     Years of education: Not on file     Highest education level: Not on file   Occupational History     Employer: RETIRED   Tobacco Use     Smoking status: Never     Smokeless tobacco: Never     Tobacco comments:     Never smoked; nonsmoking household   Substance and Sexual  Activity     Alcohol use: No     Comment: very rare     Drug use: No     Comment: never     Sexual activity: Yes     Partners: Male     Birth control/protection: None   Other Topics Concern     Parent/sibling w/ CABG, MI or angioplasty before 65F 55M? No      Service No     Blood Transfusions No     Caffeine Concern No     Occupational Exposure No     Hobby Hazards No     Sleep Concern No     Stress Concern No     Weight Concern Yes     Special Diet No     Back Care No     Exercise Yes     Comment: Curves     Bike Helmet No     Seat Belt Yes     Self-Exams Yes   Social History Narrative     Not on file     Social Determinants of Health     Financial Resource Strain: Not on file   Food Insecurity: Not on file   Transportation Needs: Not on file   Physical Activity: Not on file   Stress: Not on file   Social Connections: Not on file   Intimate Partner Violence: Not on file   Housing Stability: Not on file     Family History   Problem Relation Age of Onset     Cancer Mother         ovarian      Heart Disease Sister         CABG x 3     Neurologic Disorder Sister         Fibromyalgia     Cancer Father         stomach/interstines      Musculoskeletal Disorder Brother         back      Heart Disease Brother 60        CABG x 4     Glaucoma No family hx of      Macular Degeneration No family hx of           REVIEW OF SYSTEMS:  General: negative, fever, chills, night sweats  Skin: negative, acne, rash and scaling  Eyes: negative, double vision, eye pain and photophobia  Ears/Nose/Throat: negative, nasal congestion and purulent rhinorrhea  Respiratory: No dyspnea on exertion, No cough, No hemoptysis and negative  Cardiovascular: negative, palpitations, tachycardia, irregular heart beat, chest pain, exertional chest pain or pressure, paroxysmal nocturnal dyspnea, dyspnea on exertion and orthopnea       OBJECTIVE:  not currently breastfeeding.  General Appearance: healthy, alert, active and no distress  Head:  Normocephalic. No masses, lesions, tenderness or abnormalities  Eyes: conjuctiva clear, PERRL, EOM intact  Ears: External ears normal. Canals clear. TM's normal.  Nose: Nares normal  Mouth: normal  Neck: Supple, no cervical adenopathy, no thyromegaly  Lungs: clear to auscultation  Cardiac: regular rate and rhythm, normal S1 and S2, no murmur       ASSESSMENT/plan:  Patient here for follow-up.  Status post PCI.  She had 2 stents to LAD 1 stent to PL branch and POBA of D1 in December 2020.  Also with hypertension lipidemia and severe intolerance to statin.  Currently patient is on Repatha.  Today patient had no cardiac complaints.  Overall she is doing well.  Patient completed 2 years of dual antiplatelet therapy.  We will discontinue Plavix now.  She is advised to continue rest of her medications.  Echocardiogram done today reviewed and result discussed with patient.  Normal biventricular function.  No significant valvular abnormalities.  Patient will return to clinic in 1 year for a follow-up.  Total visit duration 20 minutes.  This include face-to-face interview, physical exam, chart review, review of echocardiogram and documentation.      Please do not hesitate to contact me if you have any questions/concerns.     Sincerely,     VAISHALI Juan MD

## 2022-12-15 NOTE — NURSING NOTE
"Chief Complaint   Patient presents with     Annual Visit     S/P Echo  No Sx       Initial BP (!) 163/73   Pulse 77   Ht 1.6 m (5' 3\")   Wt 76.2 kg (168 lb)   LMP  (LMP Unknown)   SpO2 100%   BMI 29.76 kg/m   Estimated body mass index is 29.76 kg/m  as calculated from the following:    Height as of this encounter: 1.6 m (5' 3\").    Weight as of this encounter: 76.2 kg (168 lb)..  BP completed using cuff size: rachid Trejo CMA (AAMA)  "

## 2022-12-16 RX ORDER — FENTANYL CITRATE 50 UG/ML
25 INJECTION, SOLUTION INTRAMUSCULAR; INTRAVENOUS EVERY 5 MIN PRN
Status: CANCELLED | OUTPATIENT
Start: 2022-12-16

## 2022-12-16 RX ORDER — LIDOCAINE 40 MG/G
CREAM TOPICAL
Status: CANCELLED | OUTPATIENT
Start: 2022-12-16

## 2022-12-16 RX ORDER — FENTANYL CITRATE 50 UG/ML
50 INJECTION, SOLUTION INTRAMUSCULAR; INTRAVENOUS EVERY 5 MIN PRN
Status: CANCELLED | OUTPATIENT
Start: 2022-12-16

## 2022-12-16 RX ORDER — ONDANSETRON 2 MG/ML
4 INJECTION INTRAMUSCULAR; INTRAVENOUS EVERY 30 MIN PRN
Status: CANCELLED | OUTPATIENT
Start: 2022-12-16

## 2022-12-16 RX ORDER — FENTANYL CITRATE 50 UG/ML
25 INJECTION, SOLUTION INTRAMUSCULAR; INTRAVENOUS
Status: CANCELLED | OUTPATIENT
Start: 2022-12-16

## 2022-12-16 RX ORDER — METOPROLOL TARTRATE 1 MG/ML
1-2 INJECTION, SOLUTION INTRAVENOUS EVERY 5 MIN PRN
Status: CANCELLED | OUTPATIENT
Start: 2022-12-16

## 2022-12-16 RX ORDER — SODIUM CHLORIDE, SODIUM LACTATE, POTASSIUM CHLORIDE, CALCIUM CHLORIDE 600; 310; 30; 20 MG/100ML; MG/100ML; MG/100ML; MG/100ML
INJECTION, SOLUTION INTRAVENOUS CONTINUOUS
Status: CANCELLED | OUTPATIENT
Start: 2022-12-16

## 2022-12-16 RX ORDER — ONDANSETRON 4 MG/1
4 TABLET, ORALLY DISINTEGRATING ORAL EVERY 30 MIN PRN
Status: CANCELLED | OUTPATIENT
Start: 2022-12-16

## 2022-12-16 RX ORDER — MEPERIDINE HYDROCHLORIDE 25 MG/ML
12.5 INJECTION INTRAMUSCULAR; INTRAVENOUS; SUBCUTANEOUS
Status: CANCELLED | OUTPATIENT
Start: 2022-12-16

## 2022-12-17 ENCOUNTER — NURSE TRIAGE (OUTPATIENT)
Dept: NURSING | Facility: CLINIC | Age: 87
End: 2022-12-17

## 2022-12-17 NOTE — TELEPHONE ENCOUNTER
Pt calling back after being seen in the ED this morning after triage, had  Ct scan, EKG, and labs works done. Was advised there was nothing indicating that pt can't follow through with Monday surgery.  States BP was high in the ED, current /184, HR 80, have already taken BP meds today prior to ED visit.   Denies changes in symptoms since ED visit other than feeling weak when trying to walk. Denies headache or dizziness  Rechecked BP using other arm, now reads 119/80 HR 74.      Advised pt to monitor BP, call back if symptoms worsen, follow up with PCP/surgeon on Monday. Pt verbalized understanding.     Ismael Mack, RN, BSN  12/17/2022 at 2:51 PM  [facility]

## 2022-12-17 NOTE — TELEPHONE ENCOUNTER
"Patient calling regarding fainting last night. It was after dinner last night she was in the bathroom and passed out. Patient hit her face and has cuts on her nose and two black eyes on the bottom part of her eyes. Patient iced her face after getting up. Patient was at the heart doctor on Thursday and was taken off of her blood thinner as she has completed her treatment and everything was fine at that appointment. Patient did vomit after her episode of fainting. Echo Thursday was normal.   Patient had no symptoms prior to fainting. Patient was not bearing down to have a bowel movement.   Protocol recommends ED now  Patient will have  drive her to [facility] ED which is closest to her home.   Routing message to surgeon   Patient will call back after ED visit to see if surgery on Monday still appropriate.   Rama Naranjo RN   12/17/22 11:13 AM  [facility] Nurse Advisor      Reason for Disposition    Any head or face injury    Additional Information    Negative: Still unconscious    Negative: Difficult to awaken or acting confused (e.g., disoriented, slurred speech)    Negative: Shock suspected (e.g., cold/pale/clammy skin, too weak to stand, low BP, rapid pulse)    Negative: Difficulty breathing    Negative: Bluish (or gray) lips or face now    Negative: Chest pain    Negative: Extra heart beats or heart is beating fast (i.e.,\"palpitations\")    Negative: Bleeding (e.g., vomiting blood, rectal bleeding or tarry stools, severe vaginal bleeding)(Exception: fainted from sight of small amount of blood; small cut or abrasion)    Negative: Fainted suddenly after medicine, allergic food or bee sting    Negative: Age > 50 years (Exception: occurred > 1 hour ago AND now feels completely fine)    Negative: History of heart problems (e.g., congestive heart failure, heart attack)     Now feeling fine, fainting episode yesterday 6:30 pm    Negative: [1] Fainted > 15 minutes ago AND [2] still feels too weak or dizzy to " stand    Negative: Sounds like a life-threatening emergency to the triager    Negative: [1] Diabetes mellitus AND [2] fainting from low blood sugar (i.e., < 70 mg/dl or 3.9 mmol/l)    Negative: Seizure suspected (e.g., muscle jerking or shaking followed by confusion)    Negative: Heat exhaustion suspected (i.e., dehydration from heat exposure)    Negative: [1] Fainted > 15 minutes ago AND [2] still looks pale (pale skin, pallor)    Negative: [1] Fainted > 15 minutes ago AND [2] still feels weak or dizzy    Negative: Occurred during exercise    Protocols used: [id number]

## 2022-12-19 ENCOUNTER — HOSPITAL ENCOUNTER (OUTPATIENT)
Facility: AMBULATORY SURGERY CENTER | Age: 87
End: 2022-12-19
Attending: OPHTHALMOLOGY | Admitting: OPHTHALMOLOGY
Payer: COMMERCIAL

## 2022-12-19 ENCOUNTER — TELEPHONE (OUTPATIENT)
Dept: OPHTHALMOLOGY | Facility: CLINIC | Age: 87
End: 2022-12-19

## 2022-12-19 ENCOUNTER — TELEPHONE (OUTPATIENT)
Dept: FAMILY MEDICINE | Facility: CLINIC | Age: 87
End: 2022-12-19

## 2022-12-19 DIAGNOSIS — H02.403 INVOLUTIONAL PTOSIS, ACQUIRED, BILATERAL: ICD-10-CM

## 2022-12-19 DIAGNOSIS — H02.831 DERMATOCHALASIS OF BOTH UPPER EYELIDS: Primary | ICD-10-CM

## 2022-12-19 DIAGNOSIS — H02.834 DERMATOCHALASIS OF BOTH UPPER EYELIDS: Primary | ICD-10-CM

## 2022-12-19 NOTE — TELEPHONE ENCOUNTER
Patient calling stating she is scheduled for eyelid surgery at 8 a.m. this morning.     Patient stating she does not feel they will want to do the surgery as she has ongoing swelling and bruising around eyes since falling on 12/17/22.    See notes below. Denies change in symptoms.     Patient is requesting to reschedule surgery. Reports surgery is scheduled at [facility].    Reviewed [facility] ph.  opens at 7 a.m. (in next 5 minutes). Patient will call at 7 a.m..    Caller verbalized understanding. Denies further questions.      Vianey Macedo RN  [facility]

## 2022-12-19 NOTE — TELEPHONE ENCOUNTER
[facility] Call Center    Phone Message    May a detailed message be left on voicemail: yes     Reason for Call: Other:    Pt has surgery scheduled this morning but fell in the bathroom and now her eyes are black and blue.Please note that Pt wants to cancel surgery and reschedule when she is better.. Writer sent Pt to Faiza to leave a voicemail and also tried the priority line with no luck.        Action Taken: Other:  eye    Travel Screening: Not Applicable

## 2022-12-19 NOTE — TELEPHONE ENCOUNTER
Reason for Call:  Other call back    Detailed comments: Carolin called she was at the emergency room on due to passing out, she now has 2 black eyes. She was told that she needed to do a follow up with Dr. Patten this week but he has no openings. Can you please check with Dr. Patten and see if he can see patient this week. Please call and discuss with patient    Phone Number Patient can be reached at: Home number on file [phone] (home)    Best Time: As soon as possible    Can we leave a detailed message on this number? YES    Call taken on 12/19/2022 at 11:36 AM by Blanca Munoz

## 2022-12-19 NOTE — TELEPHONE ENCOUNTER
Spoke with the patient about her black eyes after her fall this weekend. She is going to go back into the cardiologist to double check if she needs to go back on her plavix. She wants to fully heal before she goes into surgery.    Date Scheduled: 1-16-23  Facility: [facility]  Surgeon: Dr. Figueroa   Post-op appointment scheduled:    scheduled?: No  Surgery packet/instructions confirmed received?  Yes  Special Considerations:   Faiza Murillo, Surgery Scheduling Coordinator

## 2022-12-20 ENCOUNTER — ALLIED HEALTH/NURSE VISIT (OUTPATIENT)
Dept: CARDIOLOGY | Facility: CLINIC | Age: 87
End: 2022-12-20
Attending: INTERNAL MEDICINE
Payer: COMMERCIAL

## 2022-12-20 DIAGNOSIS — R55 SYNCOPE: ICD-10-CM

## 2022-12-20 PROCEDURE — 93242 EXT ECG>48HR<7D RECORDING: CPT | Performed by: INTERNAL MEDICINE

## 2022-12-20 PROCEDURE — 93244 EXT ECG>48HR<7D REV&INTERPJ: CPT | Performed by: INTERNAL MEDICINE

## 2022-12-20 NOTE — PATIENT INSTRUCTIONS
We have sent you a Zio Patch (heart) monitor for you to wear for 7 days.  You may remove the heart monitor after 7 days.  Please see the enclosed instructions for further information, as well as instructions for removal of the heart monitor and return mailing directions.  If you should have questions regarding your monitor, please call [employer] at [phone].  The Cardiology Nurse will contact you with your results (please see result notification details at bottom of summary).    *PLEASE DO NOT SHOWER OR INDUCE EXCESSIVE SWEATING IN THE FIRST 24 HOURS OF WEARING*    Please also call your insurance company for any billing questions regarding the monitor.

## 2022-12-20 NOTE — TELEPHONE ENCOUNTER
Called and left a voicemail  Message to return our call to schedule an appt as her provider approved double booking her this week with him.  Priya Soriano MA  [facility]  2nd Floor  Primary Care

## 2022-12-22 ENCOUNTER — OFFICE VISIT (OUTPATIENT)
Dept: FAMILY MEDICINE | Facility: CLINIC | Age: 87
End: 2022-12-22
Payer: COMMERCIAL

## 2022-12-22 ENCOUNTER — TELEPHONE (OUTPATIENT)
Dept: CARDIOLOGY | Facility: CLINIC | Age: 87
End: 2022-12-22

## 2022-12-22 VITALS
OXYGEN SATURATION: 99 % | DIASTOLIC BLOOD PRESSURE: 81 MMHG | BODY MASS INDEX: 28.53 KG/M2 | RESPIRATION RATE: 16 BRPM | SYSTOLIC BLOOD PRESSURE: 157 MMHG | HEART RATE: 74 BPM | WEIGHT: 167.13 LBS | HEIGHT: 64 IN | TEMPERATURE: 97.1 F

## 2022-12-22 DIAGNOSIS — I10 HYPERTENSION, BENIGN ESSENTIAL, GOAL BELOW 140/90: ICD-10-CM

## 2022-12-22 DIAGNOSIS — S05.10XD: ICD-10-CM

## 2022-12-22 DIAGNOSIS — R55 SYNCOPE, UNSPECIFIED SYNCOPE TYPE: Primary | ICD-10-CM

## 2022-12-22 PROCEDURE — 99214 OFFICE O/P EST MOD 30 MIN: CPT | Performed by: FAMILY MEDICINE

## 2022-12-22 RX ORDER — AMLODIPINE BESYLATE 2.5 MG/1
2.5 TABLET ORAL EVERY EVENING
Qty: 30 TABLET | Refills: 11 | Status: SHIPPED | OUTPATIENT
Start: 2022-12-22 | End: 2023-07-12

## 2022-12-22 ASSESSMENT — PAIN SCALES - GENERAL: PAINLEVEL: NO PAIN (0)

## 2022-12-22 NOTE — TELEPHONE ENCOUNTER
Writer spoke with Dr. Peña.  Dr. Peña is agreeable to start amlodipine 2.5 mg daily.  Informed patient, patient verbalized understanding.  Patient states she is feeling well and has had no recurring symptoms of syncope.    Calista Li RN  Cardiology Care Coordinator  [facility]  [phone] option 1

## 2022-12-22 NOTE — PROGRESS NOTES
"  Lata Coe is a 88 year [id number] 81 presenting for the following health issues:  Hospital F/U      hospitals       Hospital Follow-up Visit:    Hospital/Nursing Home/IP Rehab Facility: [facility] Emergency Room  Date of Admission: 12/17/2022  Date of Discharge: 12/17/2022  Reason(s) for Admission: Syncope     Was your hospitalization related to COVID-19? No   Problems taking medications regularly:  None  Medication changes since discharge: None  Problems adhering to non-medication therapy:  None    Summary of hospitalization:  CareEverywhere information obtained and reviewed  Diagnostic Tests/Treatments reviewed.  Follow up needed: none  Other Healthcare Providers Involved in Patient s Care:         None  Update since discharge: improved.     Plan of care communicated with patient      Review of Systems   Constitutional, HEENT, cardiovascular, pulmonary, GI, , musculoskeletal, neuro, skin, endocrine and psych systems are negative, except as otherwise noted.      Objective    BP (!) 157/81 (BP Location: Left arm, Patient Position: Sitting, Cuff Size: Adult Large)   Pulse 74   Temp 97.1  F (36.2  C) (Tympanic)   Resp 16   Ht 1.625 m (5' 3.98\")   Wt 75.8 kg (167 lb 2 oz)   LMP  (LMP Unknown)   SpO2 99%   BMI 28.71 kg/m    Body mass index is 28.71 kg/m .  Physical Exam   GENERAL: healthy, alert and no distress  NECK: no adenopathy, no asymmetry, masses, or scars and thyroid normal to palpation  RESP: lungs clear to auscultation - no rales, rhonchi or wheezes  CV: regular rate and rhythm, normal S1 S2, no S3 or S4, no murmur, click or rub, no peripheral edema and peripheral pulses strong  ABDOMEN: soft, nontender, no hepatosplenomegaly, no masses and bowel sounds normal  MS: no gross musculoskeletal defects noted, no edema    A/P:  (R55) Syncope, unspecified syncope type  (primary encounter diagnosis)  Comment:   Plan: US Carotid Bilateral        Reviewed recent echo and scan of brain showing no " significant concerns. Cardiology has ordered Zio patch to r/o arrhythmias. Will add ultrasound of carotids to r/o significant carotid stenosis. Possible vasovagal. Discussed getting up slowly from a sitting or lying down position and staying hydrated.    (S05.10XD) Traumatic periorbital ecchymosis, subsequent encounter  Comment:   Plan: improving.    (I10) Hypertension, benign essential, goal below 140/90  Comment:   Plan: amLODIPine (NORVASC) 2.5 MG tablet        Not controlled. Patient stated bp's in 160's at home. Will add amlodipine 2.5 mg daily in evening. Patient will f/u with Cardiology in 2-3 weeks for recheck.    Merrill Patten MD

## 2022-12-22 NOTE — TELEPHONE ENCOUNTER
[facility] Call Center    Phone Message    May a detailed message be left on voicemail: yes     Reason for Call: Medication Question or concern regarding medication   Prescription Clarification  Name of Medication: amLODIPine (NORVASC) 2.5 MG tablet  Prescribing Provider: Merrill Padron    Pharmacy:    What on the order needs clarification? The patient said her PMD prescribed this medication and she would like  to know how Dr Juan feel about it before she starts taking this.  Please call pt soon as she will not start this until she hears from Dr Woo.        Action Taken: Other: Cardiology    Travel Screening: Not Applicable     Thank you!  [facility]

## 2022-12-24 DIAGNOSIS — I10 HYPERTENSION, BENIGN ESSENTIAL, GOAL BELOW 140/90: ICD-10-CM

## 2022-12-26 RX ORDER — LOSARTAN POTASSIUM 100 MG/1
TABLET ORAL
Qty: 90 TABLET | Refills: 0 | Status: SHIPPED | OUTPATIENT
Start: 2022-12-26 | End: 2023-03-03

## 2022-12-27 ENCOUNTER — ANCILLARY PROCEDURE (OUTPATIENT)
Dept: MRI IMAGING | Facility: CLINIC | Age: 87
End: 2022-12-27
Attending: FAMILY MEDICINE
Payer: COMMERCIAL

## 2022-12-27 ENCOUNTER — ANCILLARY PROCEDURE (OUTPATIENT)
Dept: ULTRASOUND IMAGING | Facility: CLINIC | Age: 87
End: 2022-12-27
Attending: FAMILY MEDICINE
Payer: COMMERCIAL

## 2022-12-27 DIAGNOSIS — M54.10 RADICULAR PAIN OF RIGHT LOWER EXTREMITY: ICD-10-CM

## 2022-12-27 DIAGNOSIS — R55 SYNCOPE, UNSPECIFIED SYNCOPE TYPE: ICD-10-CM

## 2022-12-27 PROCEDURE — 72148 MRI LUMBAR SPINE W/O DYE: CPT | Performed by: RADIOLOGY

## 2022-12-27 PROCEDURE — 93880 EXTRACRANIAL BILAT STUDY: CPT | Performed by: RADIOLOGY

## 2022-12-27 NOTE — LETTER
December 27, 2022      Carolin LIAT Reed  [address] [address]        Dear ,    We are writing to inform you of your test results.    Your carotid ultrasound showed no significant blockage. No concerning findings.      Resulted Orders   US Carotid Bilateral    Narrative    Exam: Bilateral carotid duplex Doppler ultrasound dated 12/27/2022  1:07 PM    Clinical history: Syncope, unspecified syncope type    Comparison Study: None available    Ordering provider: DEVIN BLACKMAN    Technique: Grayscale (B-mode) and duplex and spectral Doppler  ultrasound of the extracranial internal carotid, external carotid,  vertebral artery origins, right brachiocephalic/subclavian and left  subclavian arteries. Velocity measurements obtained with angle  correction at or less than 60 degrees.    Findings:    Right side:     Plaque Morphology: Hypoechoic thin plaque at the carotid bifurcation  and just extending into the origin of the internal carotid artery.       Proximal CCA: 73/8 cm/sec     Mid CCA: 76/12 cm/sec     Distal CCA: 67/9 cm/sec          External CA: 149/10 cm/sec       Proximal ICA: 65/10 cm/sec     Mid ICA: 79/16 cm/sec     Distal ICA: 87/16 cm/sec       Vertebral artery: 60.2/12 cm/sec    ICA/CCA ratio: 1.14    Left side:     Plaque Morphology: Mixed echogenicity plaque involving the common  carotid artery, bifurcation, and proximal internal carotid artery.       Proximal CCA: 81/11 cm/sec     Mid CCA: 96.8/19 cm/sec     Distal CCA: 89/16 cm/sec          External CA: 111/5 cm/sec       Proximal ICA: 74/15 cm/sec     Mid ICA: 97/25 cm/sec     Distal ICA: 102/23 cm/sec       Vertebral artery: 53.3/8.16 cm/sec     ICA/CCA ratio: 1.05      Impression    Impression:    1. Right side:        Degree of stenosis of the internal carotid artery: Less than 50 %.    2. Left side:         Degree of stenosis of the internal carotid artery: Less than 50 %.    Intersocietal Accreditation Commission Updated Recommendations  for  Carotid Stenosis Interpretation Criteria - October 2021.  https://intersocietal.org/wp-content/uploads/2021/10/IAC-Vascular-Test  ha-Ltktomfpcfbbk_Lhqbicd-Zrvgghfqbpurwrp-for-Carotid-Stenosis-Interpre  ation-Criteria.pdf    Society for Vascular Surgery Clinical Practice Guidelines for  Management of Extracranial Cerebrovascular Disease. Journal of  Vascular Surgery Vol. 75, Issue 1, Supplement p26S?[id number] Published  online: June 18, 2021 (additional criteria adjustment for >70% ICA  stenosis)         Normal            ICA PSV: < 180 cm/s            Plaque: None            ICA/CCA PSV Ratio: < 2.0            ICA EDV: < 40 cm/s         < 50%            ICA PSV: < 180 cm/s            Plaque: < 50%            ICA/CCA PSV Ratio: < 2.0            ICA EDV: < 40 cm/s         50 - 69%            ICA PSV: < 180 - 230 cm/s            ICA/CCA PSV Ratio: 2.0 - 4.0            ICA EDV: 40 - 100 cm/s         > 70%            ICA PSV: > 230 cm/s AND             ICA/CCA PSV Ratio: > 4.0 or ICA EDV: > 100 cm/s         Total occlusion            ICA PSV: Undetectable            ICA/CCA PSV Ratio: N/A            ICA EDV: N/A    EMILY ESPINOZA         SYSTEM ID:  [id number]       If you have any questions or concerns, please call the clinic at the number listed above.       Sincerely,      Merrill Patten MD

## 2022-12-27 NOTE — TELEPHONE ENCOUNTER
Clopidogrel Bisulfate 75 MG Oral Tablet  Last Written Prescription Date:  ?  Last Fill Quantity: ?,   # refills: ?  Last Office Visit :  12/15/2022  Future Office visit:   1/19/2023    Routing refill request to provider for review/approval because:  Drug not active on patient's medication list      Portia Vizcarra RN  Central Triage Red Flags/Med Refills

## 2022-12-28 RX ORDER — CLOPIDOGREL BISULFATE 75 MG/1
TABLET ORAL
OUTPATIENT
Start: 2022-12-28

## 2023-01-05 ENCOUNTER — TELEPHONE (OUTPATIENT)
Dept: OPHTHALMOLOGY | Facility: CLINIC | Age: 88
End: 2023-01-05

## 2023-01-05 NOTE — TELEPHONE ENCOUNTER
[facility] Call Center    Phone Message    May a detailed message be left on voicemail: yes     Reason for Call: Other: Patient states since her fall, she is not back to normal. She is still waiting for her results from her heart monitor, she has some blood pressure issues, and recent nausea and diarrhea.  Patient is wondering if she should wait longer to have her procedure.  Please call.  Thank you.     Action Taken: Message routed to:  [facility] ([facility] Ophthalmology    Travel Screening: Not Applicable

## 2023-01-09 NOTE — TELEPHONE ENCOUNTER
Spoke with the patient about her upcoming surgery. She is having an MRI and seeing the cardiologist to discuss her recent health issues. She has opted to cancel surgery at this time. I told her to call back when she is feeling better and we can get her back on the books.   Faiza Murillo, Surgery Scheduling Coordinator

## 2023-01-10 ENCOUNTER — TELEPHONE (OUTPATIENT)
Dept: CARDIOLOGY | Facility: CLINIC | Age: 88
End: 2023-01-10

## 2023-01-10 DIAGNOSIS — I47.10 PAROXYSMAL SUPRAVENTRICULAR TACHYCARDIA (H): Primary | ICD-10-CM

## 2023-01-10 NOTE — TELEPHONE ENCOUNTER
----- Message from VAISHALI Juan MD sent at 1/8/2023  9:59 AM CST -----  Will add Toprol-XL 25 mg daily.  Recurrent runs of SVT though patient is asymptomatic.  Her blood pressure is also mildly elevated so this will be appropriate.  Clinically

## 2023-01-11 RX ORDER — METOPROLOL SUCCINATE 25 MG/1
25 TABLET, EXTENDED RELEASE ORAL DAILY
Qty: 30 TABLET | Refills: 1 | Status: SHIPPED | OUTPATIENT
Start: 2023-01-11 | End: 2023-03-07

## 2023-01-11 NOTE — TELEPHONE ENCOUNTER
Spoke with pt. Reviewed results and recommendation about new medication. Pt agreed, verbalized understanding.     Confirmed appt next week,answered all questions

## 2023-01-17 ENCOUNTER — OFFICE VISIT (OUTPATIENT)
Dept: ORTHOPEDICS | Facility: CLINIC | Age: 88
End: 2023-01-17
Payer: COMMERCIAL

## 2023-01-17 VITALS — WEIGHT: 167 LBS | BODY MASS INDEX: 28.51 KG/M2 | HEIGHT: 64 IN

## 2023-01-17 DIAGNOSIS — M54.10 RADICULAR PAIN OF RIGHT LOWER EXTREMITY: ICD-10-CM

## 2023-01-17 DIAGNOSIS — M48.062 LUMBAR STENOSIS WITH NEUROGENIC CLAUDICATION: Primary | ICD-10-CM

## 2023-01-17 PROCEDURE — 99214 OFFICE O/P EST MOD 30 MIN: CPT | Performed by: FAMILY MEDICINE

## 2023-01-17 NOTE — PROGRESS NOTES
ESTABLISHED PATIENT FOLLOW-UP:  RECHECK of the Lower Back       HISTORY OF PRESENT ILLNESS  Ms. Mcbride is a pleasant 88 year old year old female who presents to clinic today for follow-up of right sided low back pain after her MRI on 12/27/2022. She reports that her primary location of pain is her right ischial tuberosity.     Date of injury/onset: Chronic  Date last seen: 11/5/2022  Following Therapeutic Plan: Yes, stretching exercises, use of magnets in her pocket and rest.   Pain: worse, change in intensity and frequency.   Function: worsening.   Interval History: She denies tingling and numbing sensations.     Review of Systems:    Do you have fever, chills, weight loss? No    Do you have any vision problems? Yes, wears glasses.     Do you have any chest pain or edema? No    Do you have any shortness of breath or wheezing?  No    Do you have stomach problems? No    Do you have any numbness or focal weakness? No    Do you have diabetes? No    Do you have problems with bleeding or clotting? No    Do you have an rashes or other skin lesions? No    MEDICAL HISTORY  Patient Active Problem List   Diagnosis     Pseudophakia OU c YAG OU     CAD (coronary artery disease)     Advance care planning     Hypertension goal BP (blood pressure) < 140/90     Hyperlipidemia with target LDL less than 100     Osteoporosis     CKD (chronic kidney disease) stage 3, GFR 30-59 ml/min (H)     Seasonal allergic rhinitis     Diagnostic skin and sensitization tests (aka ALLERGENS)     Pastrana's neuroma     Strabismic amblyopia     Anterior basement membrane dystrophy, OU     PVD (posterior vitreous detachment) OU     Granuloma annulare     Recurrent and persistent hematuria     Leg weakness, bilateral     Microscopic hematuria     Proteinuria, unspecified type     Dyspnea on exertion     Abnormal nuclear cardiac imaging test     Status post coronary angiogram     Statin intolerance     Dermatochalasis of both upper eyelids     Involutional  "ptosis, acquired, bilateral       Current Outpatient Medications   Medication Sig Dispense Refill     amLODIPine (NORVASC) 2.5 MG tablet Take 1 tablet (2.5 mg) by mouth every evening For blood pressure. 30 tablet 11     aspirin 81 MG tablet Take 1 tablet (81 mg) by mouth daily 90 tablet 3     citalopram (CELEXA) 10 MG tablet Take 1 tablet (10 mg) by mouth daily 90 tablet 3     evolocumab (REPATHA) 140 MG/ML prefilled autoinjector Inject 1 mL (140 mg) Subcutaneous every 14 days 6 mL 3     Hypromellose (ARTIFICIAL TEARS OP) Apply 1 drop to eye as needed Both eyes.       losartan (COZAAR) 100 MG tablet Take 1 tablet by mouth once daily 90 tablet 0     metoprolol succinate ER (TOPROL XL) 25 MG 24 hr tablet Take 1 tablet (25 mg) by mouth daily 30 tablet 1     nitroGLYcerin (NITROSTAT) 0.4 MG sublingual tablet One tablet under the tongue every 5 minutes if needed for chest pain. May repeat every 5 minutes for a maximum of 3 doses in 15 minutes\" 25 tablet 0     polyethylene glycol (MIRALAX) 17 GM/Dose powder Take 17 g (1 capful) by mouth daily 850 g 6     STATIN NOT PRESCRIBED (INTENTIONAL) Please choose reason not prescribed from choices below.       VITAMIN D 400 UNIT OR TABS 1 cap twice a day          Allergies   Allergen Reactions     Baycol [Cerivastatin Sodium]       muscle mass loss       Crestor [Hmg-Coa-R Inhibitors]      Also intolerant of simvastatin and atorvastatin with recurrent leg weakness and pain.     Darvocet [Acetaminophen]      Severe nausea     Macrobid [Nitrofuran Derivatives]      Hives       Family History   Problem Relation Age of Onset     Cancer Mother         ovarian      Heart Disease Sister         CABG x 3     Neurologic Disorder Sister         Fibromyalgia     Cancer Father         stomach/interstines      Musculoskeletal Disorder Brother         back      Heart Disease Brother 60        CABG x 4     Glaucoma No family hx of      Macular Degeneration No family hx of        Additional " "medical/Social/Surgical histories reviewed in [facility] and updated as appropriate.       PHYSICAL EXAM  Ht 1.625 m (5' 3.98\")   Wt 75.8 kg (167 lb)   LMP  (LMP Unknown)   BMI 28.68 kg/m        General  - normal appearance, in no obvious distress  Musculoskeletal - lumbar spine  - stance: normal gait without limp, no obvious leg length discrepancy, normal heel and toe walk  - inspection: normal bone and joint alignment, no obvious scoliosis  - palpation: no paravertebral or bony tenderness  - ROM: Normal flexion, normal extension, sidebending and rotation cause pain at the right buttock  - strength: lower extremities 5/5 in all planes  - special tests:  (-) straight leg raise  (-) slump test  Musculoskeletal -right hip  - inspection: no swelling of anterolateral hip,  normal bone and joint alignment, no obvious deformity  - palpation: no lateral or anterior hip tenderness  - ROM: normal flexion, extension, IR, ER, abduction, adduction, not painful, no crepitus  - strength: 5/5 in all planes  - special tests:  (-) LUZ MARIA  (-) FADIR  no pain with axial femoral load  Neuro  - No lower extremity sensory deficits, grossly normal coordination, normal muscle tone  Skin  - no ecchymosis, erythema, warmth, or induration, no obvious rash  Psych  - interactive, appropriate, normal mood and affect    IMAGING :   MR LUMBAR SPINE W/O CONTRAST 12/27/2022 12:27 PM     Provided History: Low back pain; Low back pain, no complicating  feature; No chronic LBP duration >= 3 months; Radicular pain of right  lower extremity     ICD-10: Radicular pain of right lower extremity     Comparison: None available     Technique: Sagittal T1-weighted, sagittal STIR, sagittal  diffusion-weighted (with ADC map), axial T1-weighted, and 3D  volumetric axial and sagittal reconstructed T2-weighted images of the  lumbar spine were obtained without intravenous contrast.      Findings: There are 5 lumbar-type vertebrae assumed for the purposes  of this " dictation. S1 has a transitional appearance and is partially  lumbarized.  The tip of the conus medullaris is at L1.  There is grade  1 anterolisthesis of L4 on L5..  There is mild disc height narrowing  at L4-5 and L5-S1.  Normal marrow signal.     On a level by level basis:     T12-L1: No spinal canal or neuroforaminal stenosis.     L1-2: No spinal canal or neuroforaminal stenosis.     L2-3: No spinal canal or neuroforaminal stenosis.     L3-4: Minimal disc bulge with facet arthropathy but no significant  spinal canal stenosis or neural foraminal narrowing.     L4-5: Anterolisthesis with marked facet arthropathy and thickening of  ligamentum flavum causing moderate spinal canal stenosis. Neural  foramen are mildly narrowed bilaterally.     L5-S1: Disc bulge with facet arthropathy causing mild spinal canal  stenosis. The neural foramen are mildly narrowed bilaterally.     Paraspinous tissues are within normal limits.                                                                      Impression: Multilevel lumbar spondylosis greatest at L4-5 where there  is moderate spinal canal stenosis.     ASSESSMENT & PLAN  Ms. Mcbride is a 88 year old year old female who presents to clinic today with RECHECK of the Lower Back    MRI reviewed revealing L4-5 anterolisthesis with facet arthropathy causing moderate spinal canal stenosis.    Diagnosis:  Lumbar stenosis with neurogenic claudication    Treatment options discussed at this time and surely would be interested in pursuing a lumbar epidural steroid injection given severity of her symptoms.  I placed an order for this at the L4-L5 right interlaminar level.  We may consider additional injection at the L5 transforaminal region on the right if the above is only partially successful.    Additionally I would like her to start formal physical therapy and she wishes to do so with an external referral.  She would like to consider [facility] and/or [facility] which I do  believe would be just fine.  Lastly discussed OTC versus prescription analgesics and she wishes to hold off with any oral medications due to her history of medication intolerances.  May use heat, ice as needed.    Follow-up 2 weeks after MAXIMO.    45 minutes on date of the encounter doing chart review, history and examination, independent imaging review, documentation, and additional activities noted above.      It was a pleasure seeing Carolin.    Ethan Chapa DO, CAQSM  Primary Care Sports Medicine

## 2023-01-17 NOTE — LETTER
1/17/2023      RE: Carolin Mcbride  [address] [facility] [address]     Dear Colleague,    Thank you for referring your patient, Carolin Mcbride, to the [facility] [place]. Please see a copy of my visit note below.    ESTABLISHED PATIENT FOLLOW-UP:  RECHECK of the Lower Back       HISTORY OF PRESENT ILLNESS  Ms. Mcbride is a pleasant 88 year old year old female who presents to clinic today for follow-up of right sided low back pain after her MRI on 12/27/2022. She reports that her primary location of pain is her right ischial tuberosity.     Date of injury/onset: Chronic  Date last seen: 11/5/2022  Following Therapeutic Plan: Yes, stretching exercises, use of magnets in her pocket and rest.   Pain: worse, change in intensity and frequency.   Function: worsening.   Interval History: She denies tingling and numbing sensations.     Review of Systems:    Do you have fever, chills, weight loss? No    Do you have any vision problems? Yes, wears glasses.     Do you have any chest pain or edema? No    Do you have any shortness of breath or wheezing?  No    Do you have stomach problems? No    Do you have any numbness or focal weakness? No    Do you have diabetes? No    Do you have problems with bleeding or clotting? No    Do you have an rashes or other skin lesions? No    MEDICAL HISTORY  Patient Active Problem List   Diagnosis     Pseudophakia OU c YAG OU     CAD (coronary artery disease)     Advance care planning     Hypertension goal BP (blood pressure) < 140/90     Hyperlipidemia with target LDL less than 100     Osteoporosis     CKD (chronic kidney disease) stage 3, GFR 30-59 ml/min (H)     Seasonal allergic rhinitis     Diagnostic skin and sensitization tests (aka ALLERGENS)     Pastrana's neuroma     Strabismic amblyopia     Anterior basement membrane dystrophy, OU     PVD (posterior vitreous detachment) OU     Granuloma annulare     Recurrent and persistent hematuria     Leg  "weakness, bilateral     Microscopic hematuria     Proteinuria, unspecified type     Dyspnea on exertion     Abnormal nuclear cardiac imaging test     Status post coronary angiogram     Statin intolerance     Dermatochalasis of both upper eyelids     Involutional ptosis, acquired, bilateral       Current Outpatient Medications   Medication Sig Dispense Refill     amLODIPine (NORVASC) 2.5 MG tablet Take 1 tablet (2.5 mg) by mouth every evening For blood pressure. 30 tablet 11     aspirin 81 MG tablet Take 1 tablet (81 mg) by mouth daily 90 tablet 3     citalopram (CELEXA) 10 MG tablet Take 1 tablet (10 mg) by mouth daily 90 tablet 3     evolocumab (REPATHA) 140 MG/ML prefilled autoinjector Inject 1 mL (140 mg) Subcutaneous every 14 days 6 mL 3     Hypromellose (ARTIFICIAL TEARS OP) Apply 1 drop to eye as needed Both eyes.       losartan (COZAAR) 100 MG tablet Take 1 tablet by mouth once daily 90 tablet 0     metoprolol succinate ER (TOPROL XL) 25 MG 24 hr tablet Take 1 tablet (25 mg) by mouth daily 30 tablet 1     nitroGLYcerin (NITROSTAT) 0.4 MG sublingual tablet One tablet under the tongue every 5 minutes if needed for chest pain. May repeat every 5 minutes for a maximum of 3 doses in 15 minutes\" 25 tablet 0     polyethylene glycol (MIRALAX) 17 GM/Dose powder Take 17 g (1 capful) by mouth daily 850 g 6     STATIN NOT PRESCRIBED (INTENTIONAL) Please choose reason not prescribed from choices below.       VITAMIN D 400 UNIT OR TABS 1 cap twice a day          Allergies   Allergen Reactions     Baycol [Cerivastatin Sodium]       muscle mass loss       Crestor [Hmg-Coa-R Inhibitors]      Also intolerant of simvastatin and atorvastatin with recurrent leg weakness and pain.     Darvocet [Acetaminophen]      Severe nausea     Macrobid [Nitrofuran Derivatives]      Hives       Family History   Problem Relation Age of Onset     Cancer Mother         ovarian      Heart Disease Sister         CABG x 3     Neurologic Disorder " "Sister         Fibromyalgia     Cancer Father         stomach/interstines      Musculoskeletal Disorder Brother         back      Heart Disease Brother 60        CABG x 4     Glaucoma No family hx of      Macular Degeneration No family hx of        Additional medical/Social/Surgical histories reviewed in [facility] and updated as appropriate.       PHYSICAL EXAM  Ht 1.625 m (5' 3.98\")   Wt 75.8 kg (167 lb)   LMP  (LMP Unknown)   BMI 28.68 kg/m        General  - normal appearance, in no obvious distress  Musculoskeletal - lumbar spine  - stance: normal gait without limp, no obvious leg length discrepancy, normal heel and toe walk  - inspection: normal bone and joint alignment, no obvious scoliosis  - palpation: no paravertebral or bony tenderness  - ROM: Normal flexion, normal extension, sidebending and rotation cause pain at the right buttock  - strength: lower extremities 5/5 in all planes  - special tests:  (-) straight leg raise  (-) slump test  Musculoskeletal -right hip  - inspection: no swelling of anterolateral hip,  normal bone and joint alignment, no obvious deformity  - palpation: no lateral or anterior hip tenderness  - ROM: normal flexion, extension, IR, ER, abduction, adduction, not painful, no crepitus  - strength: 5/5 in all planes  - special tests:  (-) LUZ MARIA  (-) FADIR  no pain with axial femoral load  Neuro  - No lower extremity sensory deficits, grossly normal coordination, normal muscle tone  Skin  - no ecchymosis, erythema, warmth, or induration, no obvious rash  Psych  - interactive, appropriate, normal mood and affect    IMAGING :   MR LUMBAR SPINE W/O CONTRAST 12/27/2022 12:27 PM     Provided History: Low back pain; Low back pain, no complicating  feature; No chronic LBP duration >= 3 months; Radicular pain of right  lower extremity     ICD-10: Radicular pain of right lower extremity     Comparison: None available     Technique: Sagittal T1-weighted, sagittal STIR, sagittal  diffusion-weighted " (with ADC map), axial T1-weighted, and 3D  volumetric axial and sagittal reconstructed T2-weighted images of the  lumbar spine were obtained without intravenous contrast.      Findings: There are 5 lumbar-type vertebrae assumed for the purposes  of this dictation. S1 has a transitional appearance and is partially  lumbarized.  The tip of the conus medullaris is at L1.  There is grade  1 anterolisthesis of L4 on L5..  There is mild disc height narrowing  at L4-5 and L5-S1.  Normal marrow signal.     On a level by level basis:     T12-L1: No spinal canal or neuroforaminal stenosis.     L1-2: No spinal canal or neuroforaminal stenosis.     L2-3: No spinal canal or neuroforaminal stenosis.     L3-4: Minimal disc bulge with facet arthropathy but no significant  spinal canal stenosis or neural foraminal narrowing.     L4-5: Anterolisthesis with marked facet arthropathy and thickening of  ligamentum flavum causing moderate spinal canal stenosis. Neural  foramen are mildly narrowed bilaterally.     L5-S1: Disc bulge with facet arthropathy causing mild spinal canal  stenosis. The neural foramen are mildly narrowed bilaterally.     Paraspinous tissues are within normal limits.                                                                      Impression: Multilevel lumbar spondylosis greatest at L4-5 where there  is moderate spinal canal stenosis.     ASSESSMENT & PLAN  Ms. Mcbride is a 88 year old year old female who presents to clinic today with RECHECK of the Lower Back    MRI reviewed revealing L4-5 anterolisthesis with facet arthropathy causing moderate spinal canal stenosis.    Diagnosis:  Lumbar stenosis with neurogenic claudication    Treatment options discussed at this time and surely would be interested in pursuing a lumbar epidural steroid injection given severity of her symptoms.  I placed an order for this at the L4-L5 right interlaminar level.  We may consider additional injection at the L5 transforaminal  region on the right if the above is only partially successful.    Additionally I would like her to start formal physical therapy and she wishes to do so with an external referral.  She would like to consider [facility] and/or [facility] which I do believe would be just fine.  Lastly discussed OTC versus prescription analgesics and she wishes to hold off with any oral medications due to her history of medication intolerances.  May use heat, ice as needed.    Follow-up 2 weeks after MAXIMO.    45 minutes on date of the encounter doing chart review, history and examination, independent imaging review, documentation, and additional activities noted above.      It was a pleasure seeing Carolin.    Ethan Chapa DO, CAQSM  Primary Care Sports Medicine

## 2023-01-19 ENCOUNTER — OFFICE VISIT (OUTPATIENT)
Dept: CARDIOLOGY | Facility: CLINIC | Age: 88
End: 2023-01-19
Payer: COMMERCIAL

## 2023-01-19 VITALS
WEIGHT: 167 LBS | HEART RATE: 61 BPM | OXYGEN SATURATION: 100 % | DIASTOLIC BLOOD PRESSURE: 63 MMHG | BODY MASS INDEX: 28.51 KG/M2 | HEIGHT: 64 IN | SYSTOLIC BLOOD PRESSURE: 130 MMHG

## 2023-01-19 DIAGNOSIS — R55 SYNCOPE, UNSPECIFIED SYNCOPE TYPE: ICD-10-CM

## 2023-01-19 DIAGNOSIS — Z98.61 STATUS POST PERCUTANEOUS TRANSLUMINAL CORONARY ANGIOPLASTY: Primary | ICD-10-CM

## 2023-01-19 PROCEDURE — 99213 OFFICE O/P EST LOW 20 MIN: CPT | Performed by: INTERNAL MEDICINE

## 2023-01-19 NOTE — NURSING NOTE
"Chief Complaint   Patient presents with     FU Cardiac testing     S/P Zio results  Sx: BRYAN when walking fast, seems to get lightheaded more often - vomiting once too, hasn't been exercising.        Initial BP (!) 143/57   Pulse 60   Ht 1.626 m (5' 4\")   Wt 75.8 kg (167 lb)   LMP  (LMP Unknown)   SpO2 100%   BMI 28.67 kg/m   Estimated body mass index is 28.67 kg/m  as calculated from the following:    Height as of this encounter: 1.626 m (5' 4\").    Weight as of this encounter: 75.8 kg (167 lb)..  BP completed using cuff size: toño Trejo CMA (AAMA)  "

## 2023-01-19 NOTE — PROGRESS NOTES
SUBJECTIVE:  Carolin Mcbride is a 88 year old female who presents for follow-up.  December last year patient had a syncopal episode and was hospitalized.  As per patient she went to the toilet.  While passing urine patient transiently passed out.  Sustained significant injury to her face and eyebrows.  Evaluations were negative for stroke or any other abnormality.     Status post PCI.     Patient had 2 stent to LAD, 1 stent torPL branch andPOBA of D1 on 12/8/2020.     Initially patient presented for the management of hyperlipidemia.  Her LDL was over 200.  Patient had a TIA and she was initially managed on clopidogrel for a short course.  Her CT MRI and echo were normal.  Also had a Zio patch which did not show significant arrhythmia or atrial fibrillation.  Patient was very reluctant to take statin as she had severe myalgia with Baycol.  She was started on 20 mg of Lipitor which she discontinued due to some nonspecific muscle ache.  Patient was restarted on Lipitor 20 mg and then subsequently it was increased to 40 mg daily.  Patient is tolerating this dose very well.  As part of CAD evaluation patient had a nuclear scan.  She had an apical perfusion defect which was fixed.  The question was whether it was an artifact or real.  Subsequently patient had an echocardiogram which showed apical wall motion abnormality.  Subsequent angiogram showed LAD D1 disease and RPL disease for which she had intervention.      Patient had some myalgia on 40 mg of Lipitor.  Her CK was normal medication was discontinued and he was referred to lipid clinic for further management.  Today patient had no cardiac complaints.  She is doing well from this aspect.  Still complaining of myalgia and muscle weakness.   The lipid clinic she was started on Zetia 10 mg daily.  Latest lipid profile LDL is 182.      Subsequently patient was started on Repatha and this is controlling her LDL well.  Patient had no cardiac complaints today.    As  per patient she used to pass out and summertime due to dehydration.  But never happened in wintertime.  Also always used to have premonitory symptoms but last episode where she passed out in the bathroom there was no premonitory symptoms.  Patient very concerned and scared after this.    Patient Active Problem List    Diagnosis Date Noted     Dermatochalasis of both upper eyelids 09/14/2022     Priority: Medium     Added automatically from request for surgery [id number]       Involutional ptosis, acquired, bilateral 09/14/2022     Priority: Medium     Added automatically from request for surgery [id number]       Statin intolerance 08/10/2021     Priority: Medium     Status post coronary angiogram 12/08/2020     Priority: Medium     Dyspnea on exertion 11/24/2020     Priority: Medium     Added automatically from request for surgery [id number]       Abnormal nuclear cardiac imaging test 11/24/2020     Priority: Medium     Added automatically from request for surgery [id number]       Microscopic hematuria 08/11/2020     Priority: Medium     Proteinuria, unspecified type 08/11/2020     Priority: Medium     Leg weakness, bilateral 04/13/2020     Priority: Medium     Recurrent and persistent hematuria 10/31/2019     Priority: Medium     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 minimally only for Tree pollens (all other environmental allergens NEGATIVE)       Diagnostic skin and sensitization tests (aka ALLERGENS)      Priority: Medium     CKD (chronic kidney disease) stage 3, GFR 30-59 ml/min (H) 05/22/2014     Priority: Medium     Hyperlipidemia with target LDL less than 100 04/08/2013  "    Priority: Medium     Intolerant to multiple statin medications. Zetia prescribed.        Osteoporosis      Priority: Medium     5 years Fosamax 1-1-2011 to 12-. Medication holiday recommended.         Hypertension goal BP (blood pressure) < 140/90 06/26/2012     Priority: Medium     Advance care planning 03/13/2012     Priority: Medium     Advance Care Planning 12/20/2016: ACP Review of Chart / Resources Provided:  Reviewed chart for advance care plan.  Carolin Mcbride has been provided information and resources to begin or update their advance care plan.  Advance Care Planning 03/13/2012: Discussed advance care planning with patient; information given to patient to review. 3/13/2012 . Lara Roque MA                  CAD (coronary artery disease)      Priority: Medium     Stenting of 2 arteries and balloon angioplasty 12-8-2020. Dual antiplatelet therapy recommended for 2 years.        Pseudophakia OU c YAG OU 06/02/2011     Priority: Medium    .  Current Outpatient Medications   Medication Sig     amLODIPine (NORVASC) 2.5 MG tablet Take 1 tablet (2.5 mg) by mouth every evening For blood pressure.     aspirin 81 MG tablet Take 1 tablet (81 mg) by mouth daily     citalopram (CELEXA) 10 MG tablet Take 1 tablet (10 mg) by mouth daily     evolocumab (REPATHA) 140 MG/ML prefilled autoinjector Inject 1 mL (140 mg) Subcutaneous every 14 days     Hypromellose (ARTIFICIAL TEARS OP) Apply 1 drop to eye as needed Both eyes.     losartan (COZAAR) 100 MG tablet Take 1 tablet by mouth once daily     metoprolol succinate ER (TOPROL XL) 25 MG 24 hr tablet Take 1 tablet (25 mg) by mouth daily     nitroGLYcerin (NITROSTAT) 0.4 MG sublingual tablet One tablet under the tongue every 5 minutes if needed for chest pain. May repeat every 5 minutes for a maximum of 3 doses in 15 minutes\"     polyethylene glycol (MIRALAX) 17 GM/Dose powder Take 17 g (1 capful) by mouth daily     STATIN NOT PRESCRIBED (INTENTIONAL) Please " choose reason not prescribed from choices below.     VITAMIN D 400 UNIT OR TABS 1 cap twice a day      Current Facility-Administered Medications   Medication     triamcinolone acetonide (KENALOG-10) injection 10 mg     Past Medical History:   Diagnosis Date     Adjustment reaction with anxiety and depression 01/01/2001     CAD (coronary artery disease) 01/01/2009    asymptomatic, on CT scan     Cyst, ovarian      Diagnostic skin and sensitization tests (aka ALLERGENS) 9/29/14 IgE tests pos. minimally only for Tree pollens (all other environmental allergens NEGATIVE)     HTN (hypertension)      Hyperlipidemia      Myositis 01/01/2001    related to past Baycol use- resolved     Osteoporosis      Seasonal allergic rhinitis     9/29/14 IgE tests pos. minimally only for Tree pollens (all other environmental allergens NEGATIVE)     Stented coronary artery      Past Surgical History:   Procedure Laterality Date     APPENDECTOMY       CHOLECYSTECTOMY, OPEN       COLONOSCOPY  2009    neg     CV CORONARY ANGIOGRAM N/A 12/8/2020    Procedure: CV CORONARY ANGIOGRAM;  Surgeon: Isra Weber MD;  Location:  HEART CARDIAC CATH LAB     CV PCI STENT DRUG ELUTING N/A 12/8/2020    Procedure: Percutaneous Coronary Intervention Stent Drug Eluting;  Surgeon: Isra Weber MD;  Location:  HEART CARD[facility] CATH LAB     CYSTOCELE REPAIR  2003    TVT SPARC     Allergies   Allergen Reactions     Baycol [Cerivastatin Sodium]       muscle mass loss       Crestor [Hmg-Coa-R Inhibitors]      Also intolerant of simvastatin and atorvastatin with recurrent leg weakness and pain.     Darvocet [Acetaminophen]      Severe nausea     Macrobid [Nitrofuran Derivatives]      Hives     Social History     Socioeconomic History     Marital status:      Spouse name: Shashank     Number of children: 3     Years of education: Not on file     Highest education level: Not on file   Occupational History     Employer: RETIRED    Tobacco Use     Smoking status: Never     Passive exposure: Never     Smokeless tobacco: Never     Tobacco comments:     Never smoked; nonsmoking household   Vaping Use     Vaping Use: Never used   Substance and Sexual Activity     Alcohol use: No     Comment: very rare     Drug use: No     Comment: never     Sexual activity: Yes     Partners: Male     Birth control/protection: None   Other Topics Concern     Parent/sibling w/ CABG, MI or angioplasty before 65F 55M? No      Service No     Blood Transfusions No     Caffeine Concern No     Occupational Exposure No     Hobby Hazards No     Sleep Concern No     Stress Concern No     Weight Concern Yes     Special Diet No     Back Care No     Exercise Yes     Comment: Curves     Bike Helmet No     Seat Belt Yes     Self-Exams Yes   Social History Narrative     Not on file     Social Determinants of Health     Financial Resource Strain: Not on file   Food Insecurity: Not on file   Transportation Needs: Not on file   Physical Activity: Not on file   Stress: Not on file   Social Connections: Not on file   Intimate Partner Violence: Not on file   Housing Stability: Not on file     Family History   Problem Relation Age of Onset     Cancer Mother         ovarian      Heart Disease Sister         CABG x 3     Neurologic Disorder Sister         Fibromyalgia     Cancer Father         stomach/interstines      Musculoskeletal Disorder Brother         back      Heart Disease Brother 60        CABG x 4     Glaucoma No family hx of      Macular Degeneration No family hx of           REVIEW OF SYSTEMS:  General: negative, fever, chills, night sweats  Skin: negative, acne, rash and scaling  Eyes: negative, double vision, eye pain and photophobia  Ears/Nose/Throat: negative, nasal congestion and purulent rhinorrhea  Respiratory: No dyspnea on exertion, No cough, No hemoptysis and negative  Cardiovascular: negative, palpitations, tachycardia, irregular heart beat, chest pain,  "exertional chest pain or pressure, paroxysmal nocturnal dyspnea, dyspnea on exertion and orthopnea       OBJECTIVE:  Blood pressure (!) 143/57, pulse 60, height 1.626 m (5' 4\"), weight 75.8 kg (167 lb), SpO2 100 %, not currently breastfeeding.  General Appearance: healthy, alert, active and no distress  Head: Normocephalic. No masses, lesions, tenderness or abnormalities  Eyes: conjuctiva clear, PERRL, EOM intact  Ears: External ears normal. Canals clear. TM's normal.  Nose: Nares normal  Mouth: normal  Neck: Supple, no cervical adenopathy, no thyromegaly  Lungs: clear to auscultation  Cardiac: regular rate and rhythm, normal S1 and S2, no murmur       ASSESSMENT/PLAN:  Patient here post hospital discharge follow-up.  December of last year patient was admitted to [facility] after an episode of syncope.  Patient used to have syncopal episodes and some at times.  Always had premonitory symptoms.  But in the last episode she had no premonitory symptoms.  She was sitting on the toilet and suddenly passed out and sustained significant traumatic  Patient is recent echocardiogram reviewed.  Normal sinus rhythm.  Normal LV function.  No significant valvular abnormalities.  Patient had a Zio patch which showed 28 episodes of SVT.  As patient complained of palpitation she was started on Toprol-XL 25 mg daily.  Patient is tolerating this medication well.  Patient's  and son goes to [facility] for their care.  Son with long-haul COVID managed by [facility].  Patient will be going to [facility] for further evaluation of her syncope.  From history it does not appear to be a cardiac cause.  As patient is going to [facility] no further evaluation was arranged here.  For CAD and other issues patient will keep her yearly follow-up.  Total visit duration 20 minutes.  This included face-to-face interview, physical exam, chart review, review of echocardiogram, Zio patch and documentation.    "

## 2023-01-19 NOTE — LETTER
1/19/2023      RE: Carolin Mcbride  [address] [facility] [id number]       Dear Colleague,    Thank you for the opportunity to participate in the care of your patient, Carolin Mcbride, at the [facility] at [facility]. Please see a copy of my visit note below.       SUBJECTIVE:  Carolin Mcbride is a 88 year old female who presents for follow-up.  December last year patient had a syncopal episode and was hospitalized.  As per patient she went to the toilet.  While passing urine patient transiently passed out.  Sustained significant injury to her face and eyebrows.  Evaluations were negative for stroke or any other abnormality.     Status post PCI.     Patient had 2 stent to LAD, 1 stent torPL branch andPOBA of D1 on 12/8/2020.     Initially patient presented for the management of hyperlipidemia.  Her LDL was over 200.  Patient had a TIA and she was initially managed on clopidogrel for a short course.  Her CT MRI and echo were normal.  Also had a Zio patch which did not show significant arrhythmia or atrial fibrillation.  Patient was very reluctant to take statin as she had severe myalgia with Baycol.  She was started on 20 mg of Lipitor which she discontinued due to some nonspecific muscle ache.  Patient was restarted on Lipitor 20 mg and then subsequently it was increased to 40 mg daily.  Patient is tolerating this dose very well.  As part of CAD evaluation patient had a nuclear scan.  She had an apical perfusion defect which was fixed.  The question was whether it was an artifact or real.  Subsequently patient had an echocardiogram which showed apical wall motion abnormality.  Subsequent angiogram showed LAD D1 disease and RPL disease for which she had intervention.      Patient had some myalgia on 40 mg of Lipitor.  Her CK was normal medication was discontinued and he was referred to lipid clinic for further management.  Today patient had no  cardiac complaints.  She is doing well from this aspect.  Still complaining of myalgia and muscle weakness.   The lipid clinic she was started on Zetia 10 mg daily.  Latest lipid profile LDL is 182.      Subsequently patient was started on Repatha and this is controlling her LDL well.  Patient had no cardiac complaints today.    As per patient she used to pass out and summertime due to dehydration.  But never happened in wintertime.  Also always used to have premonitory symptoms but last episode where she passed out in the bathroom there was no premonitory symptoms.  Patient very concerned and scared after this.    Patient Active Problem List    Diagnosis Date Noted     Dermatochalasis of both upper eyelids 09/14/2022     Priority: Medium     Added automatically from request for surgery [id number]       Involutional ptosis, acquired, bilateral 09/14/2022     Priority: Medium     Added automatically from request for surgery [id number]       Statin intolerance 08/10/2021     Priority: Medium     Status post coronary angiogram 12/08/2020     Priority: Medium     Dyspnea on exertion 11/24/2020     Priority: Medium     Added automatically from request for surgery [id number]       Abnormal nuclear cardiac imaging test 11/24/2020     Priority: Medium     Added automatically from request for surgery [id number]       Microscopic hematuria 08/11/2020     Priority: Medium     Proteinuria, unspecified type 08/11/2020     Priority: Medium     Leg weakness, bilateral 04/13/2020     Priority: Medium     Recurrent and persistent hematuria 10/31/2019     Priority: Medium     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 minimally only for Tree pollens (all other environmental allergens NEGATIVE)       Diagnostic skin and sensitization tests (aka ALLERGENS)      Priority: Medium     CKD (chronic kidney disease) stage 3, GFR 30-59 ml/min (H) 05/22/2014     Priority: Medium     Hyperlipidemia with target LDL less than 100 04/08/2013     Priority: Medium     Intolerant to multiple statin medications. Zetia prescribed.        Osteoporosis      Priority: Medium     5 years Fosamax 1-1-2011 to 12-. Medication holiday recommended.         Hypertension goal BP (blood pressure) < 140/90 06/26/2012     Priority: Medium     Advance care planning 03/13/2012     Priority: Medium     Advance Care Planning 12/20/2016: ACP Review of Chart / Resources Provided:  Reviewed chart for advance care plan.  Carolin LIAT Mcbride has been provided information and resources to begin or update their advance care plan.  Advance Care Planning 03/13/2012: Discussed advance care planning with patient; information given to patient to review. 3/13/2012 . Lara Roque MA                  CAD (coronary artery disease)      Priority: Medium     Stenting of 2 arteries and balloon angioplasty 12-8-2020. Dual antiplatelet therapy recommended for 2 years.        Pseudophakia OU c YAG OU 06/02/2011     Priority: Medium    .  Current Outpatient Medications   Medication Sig     amLODIPine (NORVASC) 2.5 MG tablet Take 1 tablet (2.5 mg) by mouth every evening For blood pressure.     aspirin 81 MG tablet Take 1 tablet (81 mg) by mouth daily     citalopram (CELEXA) 10 MG tablet Take 1 tablet (10 mg) by mouth daily     evolocumab (REPATHA) 140 MG/ML prefilled autoinjector Inject 1 mL (140 mg) Subcutaneous every 14 days     Hypromellose (ARTIFICIAL TEARS OP) Apply 1 drop to eye as needed Both eyes.     losartan (COZAAR) 100 MG tablet Take 1 tablet by mouth once daily     metoprolol succinate ER (TOPROL XL) 25 MG 24 hr tablet Take 1 tablet (25 mg) by mouth  "daily     nitroGLYcerin (NITROSTAT) 0.4 MG sublingual tablet One tablet under the tongue every 5 minutes if needed for chest pain. May repeat every 5 minutes for a maximum of 3 doses in 15 minutes\"     polyethylene glycol (MIRALAX) 17 GM/Dose powder Take 17 g (1 capful) by mouth daily     STATIN NOT PRESCRIBED (INTENTIONAL) Please choose reason not prescribed from choices below.     VITAMIN D 400 UNIT OR TABS 1 cap twice a day      Current Facility-Administered Medications   Medication     triamcinolone acetonide (KENALOG-10) injection 10 mg     Past Medical History:   Diagnosis Date     Adjustment reaction with anxiety and depression 01/01/2001     CAD (coronary artery disease) 01/01/2009    asymptomatic, on CT scan     Cyst, ovarian      Diagnostic skin and sensitization tests (aka ALLERGENS) 9/29/14 IgE tests pos. minimally only for Tree pollens (all other environmental allergens NEGATIVE)     HTN (hypertension)      Hyperlipidemia      Myositis 01/01/2001    related to past Baycol use- resolved     Osteoporosis      Seasonal allergic rhinitis     9/29/14 IgE tests pos. minimally only for Tree pollens (all other environmental allergens NEGATIVE)     Stented coronary artery      Past Surgical History:   Procedure Laterality Date     APPENDECTOMY       CHOLECYSTECTOMY, OPEN       COLONOSCOPY  2009    neg     CV CORONARY ANGIOGRAM N/A 12/8/2020    Procedure: CV CORONARY ANGIOGRAM;  Surgeon: Isra Weber MD;  Location:  HEART CARDIAC CATH LAB     CV PCI STENT DRUG ELUTING N/A 12/8/2020    Procedure: Percutaneous Coronary Intervention Stent Drug Eluting;  Surgeon: Isra Weber MD;  Location:  HEART CARDIAC CATH LAB     CYSTOCELE REPAIR  2003    TVT SPARC     Allergies   Allergen Reactions     Baycol [Cerivastatin Sodium]       muscle mass loss       Crestor [Hmg-Coa-R Inhibitors]      Also intolerant of simvastatin and atorvastatin with recurrent leg weakness and pain.     " Darvocet [Acetaminophen]      Severe nausea     Macrobid [Nitrofuran Derivatives]      Hives     Social History     Socioeconomic History     Marital status:      Spouse name: Shashank     Number of children: 3     Years of education: Not on file     Highest education level: Not on file   Occupational History     Employer: RETIRED   Tobacco Use     Smoking status: Never     Passive exposure: Never     Smokeless tobacco: Never     Tobacco comments:     Never smoked; nonsmoking household   Vaping Use     Vaping Use: Never used   Substance and Sexual Activity     Alcohol use: No     Comment: very rare     Drug use: No     Comment: never     Sexual activity: Yes     Partners: Male     Birth control/protection: None   Other Topics Concern     Parent/sibling w/ CABG, MI or angioplasty before 65F 55M? No      Service No     Blood Transfusions No     Caffeine Concern No     Occupational Exposure No     Hobby Hazards No     Sleep Concern No     Stress Concern No     Weight Concern Yes     Special Diet No     Back Care No     Exercise Yes     Comment: Curves     Bike Helmet No     Seat Belt Yes     Self-Exams Yes   Social History Narrative     Not on file     Social Determinants of Health     Financial Resource Strain: Not on file   Food Insecurity: Not on file   Transportation Needs: Not on file   Physical Activity: Not on file   Stress: Not on file   Social Connections: Not on file   Intimate Partner Violence: Not on file   Housing Stability: Not on file     Family History   Problem Relation Age of Onset     Cancer Mother         ovarian      Heart Disease Sister         CABG x 3     Neurologic Disorder Sister         Fibromyalgia     Cancer Father         stomach/interstines      Musculoskeletal Disorder Brother         back      Heart Disease Brother 60        CABG x 4     Glaucoma No family hx of      Macular Degeneration No family hx of           REVIEW OF SYSTEMS:  General: negative, fever, chills, night  "sweats  Skin: negative, acne, rash and scaling  Eyes: negative, double vision, eye pain and photophobia  Ears/Nose/Throat: negative, nasal congestion and purulent rhinorrhea  Respiratory: No dyspnea on exertion, No cough, No hemoptysis and negative  Cardiovascular: negative, palpitations, tachycardia, irregular heart beat, chest pain, exertional chest pain or pressure, paroxysmal nocturnal dyspnea, dyspnea on exertion and orthopnea       OBJECTIVE:  Blood pressure (!) 143/57, pulse 60, height 1.626 m (5' 4\"), weight 75.8 kg (167 lb), SpO2 100 %, not currently breastfeeding.  General Appearance: healthy, alert, active and no distress  Head: Normocephalic. No masses, lesions, tenderness or abnormalities  Eyes: conjuctiva clear, PERRL, EOM intact  Ears: External ears normal. Canals clear. TM's normal.  Nose: Nares normal  Mouth: normal  Neck: Supple, no cervical adenopathy, no thyromegaly  Lungs: clear to auscultation  Cardiac: regular rate and rhythm, normal S1 and S2, no murmur       ASSESSMENT/PLAN:  Patient here post hospital discharge follow-up.  December of last year patient was admitted to [facility] after an episode of syncope.  Patient used to have syncopal episodes and some at times.  Always had premonitory symptoms.  But in the last episode she had no premonitory symptoms.  She was sitting on the toilet and suddenly passed out and sustained significant traumatic  Patient is recent echocardiogram reviewed.  Normal sinus rhythm.  Normal LV function.  No significant valvular abnormalities.  Patient had a Zio patch which showed 28 episodes of SVT.  As patient complained of palpitation she was started on Toprol-XL 25 mg daily.  Patient is tolerating this medication well.  Patient's  and son goes to [facility] for their care.  Son with long-hasridhar COVID managed by [facility].  Patient will be going to [facility] for further evaluation of her syncope.  From history it does not appear to be a cardiac " cause.  As patient is going to [facility] no further evaluation was arranged here.  For CAD and other issues patient will keep her yearly follow-up.  Total visit duration 20 minutes.  This included face-to-face interview, physical exam, chart review, review of echocardiogram, Zio patch and documentation.        Please do not hesitate to contact me if you have any questions/concerns.     Sincerely,     VAISHALI Juan MD

## 2023-01-19 NOTE — PATIENT INSTRUCTIONS
Thank you for coming to the [facility] at [place]; please note the following instructions:      1.  Dr. Peña recommends to follow up in 1 year.  The cardiology team will contact you to schedule when the time gets closer.          If you have any questions regarding your visit, please contact your care team:     CARDIOLOGY  TELEPHONE NUMBER   Calista CAMACHO, Registered Nurse  Leslee YORK, Registered Nurse  Lois WU, Registered Nurse  Raquel SERRATO, Registered Medical Assistant  Lauryn DALAL, Certified Medical Assistant  Tianna SIEGEL, Visit Facilitator [phone] (select option 1)    *After hours: [phone]   For Scheduling Appts:     [phone] (select option 1)    *After hours: [phone]   For the Device Clinic (Pacemakers and ICD's)  Arin CRYSTAL, Registered Nurse   During business hours: [phone]    *After business hours:  [phone] (select option 4)      Normal test result notifications will be released via [employer] or mailed within 7 business days.  All other test results, will be communicated via telephone once reviewed by your cardiologist.    If you need a medication refill, please contact your pharmacy.  Please allow 3 business days for your refill to be completed.    As always, thank you for trusting us with your health care needs!

## 2023-01-30 ENCOUNTER — THERAPY VISIT (OUTPATIENT)
Dept: PHYSICAL THERAPY | Facility: CLINIC | Age: 88
End: 2023-01-30
Attending: FAMILY MEDICINE
Payer: COMMERCIAL

## 2023-01-30 DIAGNOSIS — M48.062 LUMBAR STENOSIS WITH NEUROGENIC CLAUDICATION: ICD-10-CM

## 2023-01-30 DIAGNOSIS — M48.062 SPINAL STENOSIS OF LUMBAR REGION WITH NEUROGENIC CLAUDICATION: Primary | ICD-10-CM

## 2023-01-30 DIAGNOSIS — M54.16 LUMBAR RADICULOPATHY: ICD-10-CM

## 2023-01-30 DIAGNOSIS — M54.10 RADICULAR PAIN OF RIGHT LOWER EXTREMITY: ICD-10-CM

## 2023-01-30 PROBLEM — M48.061 SPINAL STENOSIS OF LUMBAR REGION: Status: ACTIVE | Noted: 2023-01-30

## 2023-01-30 PROCEDURE — 97110 THERAPEUTIC EXERCISES: CPT | Mod: GP | Performed by: PHYSICAL THERAPIST

## 2023-01-30 PROCEDURE — 97161 PT EVAL LOW COMPLEX 20 MIN: CPT | Mod: GP | Performed by: PHYSICAL THERAPIST

## 2023-01-30 NOTE — PROGRESS NOTES
[facility] Initial Evaluation -- Lumbar    Date: January 30, 2023  Carolin Mcbride is a 88 year old female with a lumbar condition.   Referral: Dr. Chapa  Work mechanical stresses:    Employment status:  retired  Leisure mechanical stresses: stretching - DKTC, SKTC, piriformis stretching  Functional disability score (PASTOR/STarT Back):  PASTOR 15%, Brian Medium (6)  VAS score (0-10): 1-2/10 at rest; severe when standing  Patient goals/expectations:  To be able to stand 30 minutes or more without symptoms    HISTORY:    Present symptoms: R gluteal pain with symptoms down the R LE to the posterior knee  Pain quality (sharp/shooting/stabbing/aching/burning/cramping):  sharp   Paresthesia (yes/no):  no    Present since (onset date):November 10,2022.     Symptoms (improving/unchanging/worsening):  worsening.     Symptoms commenced as a result of: unknown  Condition occurred in the following environment:   Unknown      Symptoms at onset (back/thigh/leg): R glute into the posterior thigh  Constant symptoms (back/thigh/leg): R glute  Intermittent symptoms (back/thigh/leg): thigh    Symptoms are made worse with the following: Always Standing, Sometimes Lying (L side with the R LE extended) and Time of day - No effect   Symptoms are made better with the following: Other - avoiding standing    Disturbed sleep (yes/no):  no Sleeping postures (prone/sup/side R/L): L side     Previous episodes (0/1-5/6-10/11+): no Year of first episode: 2022    Previous history: none  Previous treatments: self stretching, heat/ice without relief.      Specific Questions:  Cough/Sneeze/Strain (pos/neg): no  Bowel/Bladder (normal/abnormal): no alteration since onset of symptoms  Gait (normal/abnormal): normal  Medications (nil/NSAIDS/analg/steroids/anticoag/other):  Other - Anti-depressants, High blood pressure, asprin and high cholesterol (Repatha injection)  Medical allergies:  See EPIC  General health (excellent/good/fair/poor):   good  Pertinent medical history:  Heart problems, High blood pressure, Osteoarthritis and high cholesterol  Imaging (None/Xray/MRI/Other):  X-rays, MRI - T12-L1: No spinal canal or neuroforaminal stenosis.     L1-2: No spinal canal or neuroforaminal stenosis.     L2-3: No spinal canal or neuroforaminal stenosis.     L3-4: Minimal disc bulge with facet arthropathy but no significant  spinal canal stenosis or neural foraminal narrowing.     L4-5: Anterolisthesis with marked facet arthropathy and thickening of  ligamentum flavum causing moderate spinal canal stenosis. Neural  foramen are mildly narrowed bilaterally.     L5-S1: Disc bulge with facet arthropathy causing mild spinal canal  stenosis. The neural foramen are mildly narrowed bilaterally.     Paraspinous tissues are within normal limits.                                                                      Impression: Multilevel lumbar spondylosis greatest at L4-5 where there  is moderate spinal canal stenosis.  Recent or major surgery (yes/no):  no  Night pain (yes/no): no  Accidents (yes/no): yes - blacked out and hit her head on December 16, 2022; still has episodes where she will become dizzy and nauseated.   Unexplained weight loss (yes/no): no  Barriers at home: no  Other red flags: no    EXAMINATION    Posture:   Sitting (good/fair/poor): poor  Standing (good/fair/poor):fair  Lordosis (red/acc/normal): accentuated  Correction of posture (better/worse/no effect): no effect (not able to slouch)    Lateral Shift (right/left/nil): nil  Relevant (yes/no):  no  Other Observations: increased lumbar lordosis    Neurological:    Motor deficit:  5/5 MMT throughout B LEs  Reflexes:  Symmetrical and brisk  Sensory deficit:  Symmetrical to light touch  Dural signs:  Can feel a pulling in the anterior lateral thigh during seated SLR (- slump)    Movement Loss:   Christiano Mod Min Nil Pain   Flexion x    Major curve reversal   Extension    x Inc R buttock   Side Gliding R    x  Stretch on the R   Side Gliding L x x   Pain R buttock      Test Movements:   During: produces, abolishes, increases, decreases, no effect, centralizing, peripheralizing   After: better, worse, no better, no worse, no effect, centralized, peripheralized    Pretest symptoms standing: not tested   Symptoms During Symptoms After ROM increased ROM decreased No Effect   FIS        Rep FIS        EIS        Rep EIS          Pretest symptoms lying: not tested    Symptoms During Symptoms After ROM increased ROM decreased No Effect   EVONNE        Rep EVONNE        EIL        Rep EIL          If required, pretest symptoms: not tested   Symptoms During Symptoms After ROM increased ROM decreased No Effect   SGIS - R        Rep SGIS - R        SGIS - L        Rep SGIS - L          Static Tests:  Sitting slouched:  Not able to slouch   Sitting erect:  Not assessed  Standing slouched: not assessed    Standing erect:  Not assessed  Lying prone in extension:  Not assessed   Long sitting:  Not assessed    Other Tests:   Flexion in sitting - NE during, NE after, inc ROM before increased symptoms  Sustained rotation in flexion - felt better than FISit - dec pull in anterior thigh with seated SLR.  Inc EIS and R SGIS before inc ROM (min loss of motions)    Provisional Classification:  Inconclusive/Other - Mechanically Inconclusive and Spinal Stenosis    Principle of Management:  Education:  Discussed working to regain lumbar flexion to open the joint spaces in the spine.  Trial exercise prior to painful activity (standing) to determine if able to stand longer as a result of doing the exercise.  We did discuss that she may develop lower back pain with the exercise even though she has not had low back pain.     Equipment provided:  no  Mechanical therapy (Y/N):  yes   Extension principle:    Lateral Principle:  Rotation in flexion with knees to the L 3 minutes, 6 times a day  Flexion principle:  FISit 10 reps (if not progressing with  rotation in flexion then add this exercise  Other:      ASSESSMENT/PLAN:    Patient is a 88 year old female with lumbar complaints.    Patient has the following significant findings with corresponding treatment plan.                Diagnosis 1:  Lumbar radiculopathy, lumbar stenosis with neurogenic claudication    Pain -  manual therapy, self management, education, directional preference exercise and home program  Decreased ROM/flexibility - manual therapy, therapeutic exercise and home program  Decreased strength - therapeutic exercise, therapeutic activities and home program  Impaired muscle performance - neuro re-education and home program  Decreased function - therapeutic activities and home program  Impaired posture - neuro re-education, therapeutic activities and home program    Therapy Evaluation Codes:   1) History comprised of:   Personal factors that impact the plan of care:      None.    Comorbidity factors that impact the plan of care are:      Heart problems, High blood pressure and high cholesterol.     Medications impacting care: Anti-depressant, High blood pressure and high cholesterol (injection).  2) Examination of Body Systems comprised of:   Body structures and functions that impact the plan of care:      Lumbar spine.   Activity limitations that impact the plan of care are:      Standing, Walking and prolonged sitting.  3) Clinical presentation characteristics are:   Evolving/Changing.  4) Decision-Making    Not completed today  Cumulative Therapy Evaluation is: Low complexity.    Previous and current functional limitations:  (See Goal Flow Sheet for this information)    Short term and Long term goals: (See Goal Flow Sheet for this information)     Communication ability:  Patient appears to be able to clearly communicate and understand verbal and written communication and follow directions correctly.  Treatment Explanation - The following has been discussed with the patient:   RX ordered/plan of  care  Anticipated outcomes  Possible risks and side effects  This patient would benefit from PT intervention to resume normal activities.   Rehab potential is good.    Frequency:  2 X week, once daily  Duration:  for 2 weeks tapering to 1 X a week over 4 weeks  Discharge Plan:  Achieve all LTG.  Independent in home treatment program.  Reach maximal therapeutic benefit.    Please refer to the daily flowsheet for treatment today, total treatment time and time spent performing 1:1 timed codes.

## 2023-01-30 NOTE — PROGRESS NOTES
[facility]    OUTPATIENT Physical Therapy ORTHOPEDIC EVALUATION  PLAN OF TREATMENT FOR OUTPATIENT REHABILITATION  (COMPLETE FOR INITIAL CLAIMS ONLY)  Patient's Last Name, First Name, M.I.  YOB: 1934  Carolin Mcbride    Provider s Name:  [facility]   Medical Record No.  [id number]   Start of Care Date:  01/30/23   Onset Date:   01/17/23 (MD referral)   Treatment Diagnosis:  lumbalr stenosis with neurogenic claudication, lumbar radiculopathy Medical Diagnosis:     Radicular pain of right lower extremity  Lumbar stenosis with neurogenic claudication  Lumbar radiculopathy  Spinal stenosis of lumbar region with neurogenic claudication       Goals:     01/30/23 0500   Body Part   Goals listed below are for low back   Goal #1   Goal #1 standing   Previous Functional Level No restrictions   Current Functional Level Minutes patient can stand   Performance level symptoms begin shortly after standing and will progressively worsen, also ,move down into the R thigh   STG Target Performance Minutes patient will be able to stand   Performance level 15 minutes   Rationale for housekeeping tasks such as vacuuming, bed making, mowing, gardening;for meal preparation   Due date 03/01/23   LTG Target Performance Minutes patient will be able to stand   Performance Level 30 minutes without increased pain   Rationale for housekeeping tasks such as vacuuming, bed making, mowing;for meal preparation;for safe household ambulation;for safe community ambulation   Due date 03/31/23           Therapy Frequency:  2 times a week for 2 weeks then 1 time a week for 4 weeks  Predicted Duration of Therapy Intervention:  8 weeks    Radha Mendes, PT                 I CERTIFY THE NEED FOR THESE SERVICES FURNISHED UNDER        THIS PLAN OF TREATMENT AND WHILE UNDER MY CARE     (Physician  co-signature of this document indicates review and certification of the therapy plan).                     Certification Date From:  01/30/23   Certification Date To:  03/31/23    Referring Provider:  Ethan Chapa    Initial Assessment        See Epic Evaluation SOC Date: 01/30/23

## 2023-02-02 ENCOUNTER — THERAPY VISIT (OUTPATIENT)
Dept: PHYSICAL THERAPY | Facility: CLINIC | Age: 88
End: 2023-02-02
Payer: COMMERCIAL

## 2023-02-02 DIAGNOSIS — M48.062 SPINAL STENOSIS OF LUMBAR REGION WITH NEUROGENIC CLAUDICATION: ICD-10-CM

## 2023-02-02 DIAGNOSIS — M54.16 LUMBAR RADICULOPATHY: Primary | ICD-10-CM

## 2023-02-02 PROCEDURE — 97110 THERAPEUTIC EXERCISES: CPT | Mod: GP | Performed by: PHYSICAL THERAPIST

## 2023-02-17 ENCOUNTER — ANCILLARY PROCEDURE (OUTPATIENT)
Dept: GENERAL RADIOLOGY | Facility: CLINIC | Age: 88
End: 2023-02-17
Attending: FAMILY MEDICINE
Payer: COMMERCIAL

## 2023-02-17 DIAGNOSIS — M54.10 RADICULAR PAIN OF RIGHT LOWER EXTREMITY: ICD-10-CM

## 2023-02-17 DIAGNOSIS — M48.062 LUMBAR STENOSIS WITH NEUROGENIC CLAUDICATION: ICD-10-CM

## 2023-02-17 PROCEDURE — 62323 NJX INTERLAMINAR LMBR/SAC: CPT | Performed by: RADIOLOGY

## 2023-02-17 RX ORDER — IOPAMIDOL 408 MG/ML
2 INJECTION, SOLUTION INTRATHECAL ONCE
Status: COMPLETED | OUTPATIENT
Start: 2023-02-17 | End: 2023-02-17

## 2023-02-17 RX ORDER — METHYLPREDNISOLONE ACETATE 80 MG/ML
80 INJECTION, SUSPENSION INTRA-ARTICULAR; INTRALESIONAL; INTRAMUSCULAR; SOFT TISSUE ONCE
Status: COMPLETED | OUTPATIENT
Start: 2023-02-17 | End: 2023-02-17

## 2023-02-17 RX ORDER — LIDOCAINE HYDROCHLORIDE 10 MG/ML
5 INJECTION, SOLUTION EPIDURAL; INFILTRATION; INTRACAUDAL; PERINEURAL ONCE
Status: COMPLETED | OUTPATIENT
Start: 2023-02-17 | End: 2023-02-17

## 2023-02-17 RX ORDER — BUPIVACAINE HYDROCHLORIDE 5 MG/ML
2 INJECTION, SOLUTION PERINEURAL ONCE
Status: COMPLETED | OUTPATIENT
Start: 2023-02-17 | End: 2023-02-17

## 2023-02-17 RX ADMIN — IOPAMIDOL 2 ML: 408 INJECTION, SOLUTION INTRATHECAL at 08:21

## 2023-02-17 RX ADMIN — BUPIVACAINE HYDROCHLORIDE 10 MG: 5 INJECTION, SOLUTION PERINEURAL at 08:21

## 2023-02-17 RX ADMIN — LIDOCAINE HYDROCHLORIDE 5 ML: 10 INJECTION, SOLUTION EPIDURAL; INFILTRATION; INTRACAUDAL; PERINEURAL at 08:22

## 2023-02-17 RX ADMIN — METHYLPREDNISOLONE ACETATE 80 MG: 80 INJECTION, SUSPENSION INTRA-ARTICULAR; INTRALESIONAL; INTRAMUSCULAR; SOFT TISSUE at 08:22

## 2023-02-17 NOTE — PROGRESS NOTES
Carolin was seen in X-ray today for a lumbar epidural injection. Patient rated pain before procedure 9/10. After procedure patient rated pain 7/10.   This pain level is acceptable to patient. Patient discharged home with .

## 2023-02-17 NOTE — DISCHARGE SUMMARY
AFTER YOU GO HOME    ? DO relax; minimize your activity for 24 hours  ? You may resume normal activity tomorrow  ? You may remove the bandage in the evening or next morning  ? You may resume bathing the next day  ? Drink at least 4 extra glasses of fluid today if not on fluid restrictions  ? DO NOT drive or operate machinery at home or at work for at least 24 hours      VISIT THE EMERGENCY ROOM OR URGENT CARE IF:    ? There is redness or swelling at the injection site  ? There is discharge from the injection site  ? You develop a temperature of 101  F or greater      ADDITIONAL INSTRUCTIONS:     ? You may resume your Coumadin or other blood thinner at your regular dose today.  Follow up with your physician to have your INR rechecked if indicated.  ? If you gain no relief from the injection after two (2) weeks, follow-up with your provider for your options.        Contacts:    During business hours from 8 to 5 pm, you may call [phone] to reach a nurse advisor at [facility].  After hours, call [facility]  [phone].  Ask for the Radiologist on-call.  Someone is on-call 24 hrs/day.  [facility] Toll Free Number   [phone]

## 2023-03-03 ENCOUNTER — TELEPHONE (OUTPATIENT)
Dept: FAMILY MEDICINE | Facility: CLINIC | Age: 88
End: 2023-03-03
Payer: COMMERCIAL

## 2023-03-03 DIAGNOSIS — I47.10 PAROXYSMAL SUPRAVENTRICULAR TACHYCARDIA (H): Primary | ICD-10-CM

## 2023-03-03 DIAGNOSIS — I10 HYPERTENSION, BENIGN ESSENTIAL, GOAL BELOW 140/90: ICD-10-CM

## 2023-03-03 RX ORDER — LOSARTAN POTASSIUM 100 MG/1
TABLET ORAL
Qty: 90 TABLET | Refills: 0 | Status: SHIPPED | OUTPATIENT
Start: 2023-03-03 | End: 2023-08-07

## 2023-03-03 NOTE — TELEPHONE ENCOUNTER
Cardiology team with [facility] returned call back to office regarding outreach and message below.    Recommended medication instructions:    1)  Patient to HOLD Celexa starting 2 DAYS PRIOR to procedure.    2) Patient to HOLD Metoprolol, Amlodipine, and Losartan DAY OF procedure.      [facility] will discuss with patient when to resume medications after procedure.      Writer called to patient and reviewed medication instructions, as noted above.  Patient voiced good understanding. Phone number for Cardiovascular Medicine department at [facility] given to patient, to outreach for any additional questions or concerns.      Nedra Charles RN  [facility]

## 2023-03-03 NOTE — TELEPHONE ENCOUNTER
Patient calling with question about upcoming procedure scheduled at [facility]. Patient specifically wondering if she should take her losartan 100 mg, metoprolol 25 mg and amlodipine 2.5 mg prior to her procedure Tuesday. RN contacted the Department of Cardiovascular Medicine in [place], [place] at [phone] to relay patient has questions about procedure. Jenna took down information and stated would send it to the cardiology team and then contact patient.    DL MontelongoN, RN, PHN  [facility] [place]

## 2023-03-07 RX ORDER — METOPROLOL SUCCINATE 25 MG/1
25 TABLET, EXTENDED RELEASE ORAL DAILY
Qty: 90 TABLET | Refills: 3 | Status: SHIPPED | OUTPATIENT
Start: 2023-03-07 | End: 2023-12-07

## 2023-03-19 PROBLEM — M48.061 SPINAL STENOSIS OF LUMBAR REGION: Status: RESOLVED | Noted: 2023-01-30 | Resolved: 2023-03-19

## 2023-03-19 PROBLEM — M54.16 LUMBAR RADICULOPATHY: Status: RESOLVED | Noted: 2023-01-30 | Resolved: 2023-03-19

## 2023-03-19 NOTE — PROGRESS NOTES
Discharge Note    Progress reporting period is from initial evaluation date (please see noted date below) to Feb 2, 2023.  Linked Episodes   Type: Episode: Status: Noted: Resolved: Last update: Updated by:   PHYSICAL THERAPY lumbar radiculopathy 1/30/2023 Active 1/30/2023 2/2/2023 11:15 AM Radha Mendes, PT      Comments:       Carolin failed to follow up and current status is unknown.  Please see information below for last relevant information on current status.  Patient seen for 2 visits.    SUBJECTIVE  Subjective changes noted by patient:  Patient notes that she is continuing to feel symptoms in the R glute into the lateral hip and into proximal lateral hip. She was very consistent with her home program.  Had some symptoms during but generally felt better after (Has not had any light headedness in 2 weeks now)  .  Patient called to cancel her remaining PT appointments as she had attended chiropractic care and that really helped with her symptoms.  Thus, planned to continue with her chiropractic care.  Current pain level is 5/10.     Previous pain level was   (severe in standing; standing tolerated is progressively lessening).   Changes in function:  Yes (See Goal flowsheet attached for changes in current functional level)  Adverse reaction to treatment or activity: None    OBJECTIVE  Changes noted in objective findings: LROM EIS major loss with sharp pain     ASSESSMENT/PLAN  Diagnosis: lumbar stenosis with neurogenic claudication, lumbar radiculopathy   Updated problem list and treatment plan:   Pain - HEP  Decreased ROM/flexibility - HEP  Decreased function - HEP  STG/LTGs have been met or progress has been made towards goals:  Yes, please see goal flowsheet for most current information  Assessment of Progress: current status is unknown.    Last current status: Pt has not made progress   Self Management Plans:  HEP  I have re-evaluated this patient and find that the nature, scope, duration and  intensity of the therapy is appropriate for the medical condition of the patient.  Carolin continues to require the following intervention to meet STG and LTG's:  HEP.    Recommendations:  Discharge with current home program.  Patient to follow up with MD as needed.    Please refer to the daily flowsheet for treatment today, total treatment time and time spent performing 1:1 timed codes.

## 2023-04-11 ENCOUNTER — TELEPHONE (OUTPATIENT)
Dept: FAMILY MEDICINE | Facility: CLINIC | Age: 88
End: 2023-04-11
Payer: COMMERCIAL

## 2023-04-11 NOTE — TELEPHONE ENCOUNTER
Please let patient know that there is no need to change her medications. Patient can take her medications as scheduled.    Merrill Patten MD

## 2023-04-11 NOTE — TELEPHONE ENCOUNTER
Provider: Patient is going to [place] for a auto reflex test.  She was to contact her provider to instructor her how to adjust her medications.  Please advise. Thank you. Zohreh Barth R.N.

## 2023-04-20 ENCOUNTER — TELEPHONE (OUTPATIENT)
Dept: CARDIOLOGY | Facility: CLINIC | Age: 88
End: 2023-04-20
Payer: COMMERCIAL

## 2023-04-20 NOTE — TELEPHONE ENCOUNTER
[facility] Call Center    Phone Message    May a detailed message be left on voicemail: yes     Reason for Call: Other: Pt would like a call back to discuss changing her repatha to praluent  as she stated she heard it was cheaper and would like to discuss the switch, please reach out to pt to discuss, Pt had spoke to Dr Vasquez and he said for her to also discus the programs she can be on to get meds even cheaper,     Action Taken: Message routed to:  [facility] Cardio    Travel Screening: Not Applicable

## 2023-04-20 NOTE — TELEPHONE ENCOUNTER
Called pt and notified her that if her insurance covers Repatha they will not cover Praluent.    Writer will send message to Daphnie to see if pt would qualify for financial assistance for Repatha.

## 2023-04-21 NOTE — TELEPHONE ENCOUNTER
Called pt and notified her of karin approval. Stated she will be getting more information in the mail.

## 2023-04-21 NOTE — TELEPHONE ENCOUNTER
Enrolled the patient in a karin through the [employer]:        Patient will need to provide her filling pharmacy with the follow processing information to apply the karin:    CARD NO.  [id number]     BIN  [id number]     N  [id number]     [facility]  [phone]

## 2023-06-02 ENCOUNTER — TELEPHONE (OUTPATIENT)
Dept: OPHTHALMOLOGY | Facility: CLINIC | Age: 88
End: 2023-06-02
Payer: COMMERCIAL

## 2023-06-02 NOTE — TELEPHONE ENCOUNTER
Patient called and LVM stating she wants to reschedule her surgery that was suppose to take place in early winter. Will route to clinic to see if patient needs to be seen again before scheduling.

## 2023-06-05 ENCOUNTER — HOSPITAL ENCOUNTER (OUTPATIENT)
Facility: AMBULATORY SURGERY CENTER | Age: 88
End: 2023-06-05
Attending: OPHTHALMOLOGY | Admitting: OPHTHALMOLOGY
Payer: COMMERCIAL

## 2023-06-05 NOTE — TELEPHONE ENCOUNTER
Scheduled surgery for 8/21 in  with Dr. Figueroa.    H&P with PCP within 30 days of the surgery date. Patient verbalized understanding H&P is needed.    Post-op scheduled for 9/6 with Dr. Burdick in .    Patient has surgery packet already.    No further questions/concerns at this time.       Beckie Salas on 6/523 at 10:07 AM  Surgery Scheduling  Ph: [phone]

## 2023-06-15 ENCOUNTER — NURSE TRIAGE (OUTPATIENT)
Dept: FAMILY MEDICINE | Facility: CLINIC | Age: 88
End: 2023-06-15
Payer: COMMERCIAL

## 2023-06-15 PROCEDURE — 99207 REFERRAL TO ACUTE AND DIAGNOSTIC SERVICES: CPT | Performed by: FAMILY MEDICINE

## 2023-06-15 NOTE — TELEPHONE ENCOUNTER
RN spoke with [facility] provider regarding pt and provider referral. [facility] unable to accept pt d/t lack of availability for MRI today. RN called pt informed [facility] unable to evaluate today, recommend pt be seen in ED. Pt states her symptoms are improving, especially weakness. Pt states she is no longer having to hold onto furniture when walking and was able to get dressed without any assistance. Pt declined evaluation in ED today.     Vilma Morales RN

## 2023-06-15 NOTE — TELEPHONE ENCOUNTER
Nurse Triage SBAR    Is this a 2nd Level Triage? YES, LICENSED PRACTITIONER REVIEW IS REQUIRED    Situation:   R leg weakness, gradual onset over 1 week.    Background:   Pt states she had to lower herself to the ground this AM d/t R leg weakness when getting out of bed a few hours ago. Endorses pain from R buttock to knee rated at 7/10. Pt continues to have continuous weakness and is having to hold onto furniture for support when ambulating.  Weakness and pain started a week ago, has gradually gotten worse. Pt when to chiropractor yesterday for adjustment with hopes of improving weakness/pain, but is now worse. Pt states she has hx of sciatic nerve pain.  Pt denies facial drooping, arm weakness, no slurred speech, chest pain, SOB, palpitations, h/a, dizziness, loss of bowel/bladder control, confusion, no recent medication changes or vision changes.     Assessment:   Pt needs further evaluation, ED vs UC vs [facility]    Protocol Recommended Disposition:   Go To Office Now    Recommendation:   RN will second level triage to ensure most appropriate disposition. Pt to await RN return call, pt verbalized understanding.      Routed to provider (PCP out of office, routed to covering provider pool)    Does the patient meet one of the following criteria for [facility] visit consideration? 16+ years old, with an [facility] PCP     TIP  Providers, please consider if this condition is appropriate for management at one of our Acute and Diagnostic Services sites.     If patient is a good candidate, please use dotphrase <dot>triageresponse and select Refer to [facility] to document.    Reason for Disposition    Neurologic deficit of gradual onset (e.g., days to weeks), ANY of the following: * Weakness of the face, arm, or leg on one side of the body* Numbness of the face, arm, or leg on one side of the body* Loss of speech or garbled speech    Additional Information    Negative: Difficult to awaken or acting confused (e.g., disoriented, slurred  "speech)    Negative: New neurologic deficit that is present NOW, sudden onset of ANY of the following: * Weakness of the face, arm, or leg on one side of the body* Numbness of the face, arm, or leg on one side of the body* Loss of speech or garbled speech    Negative: Sounds like a life-threatening emergency to the triager    Negative: Confusion, disorientation, or hallucinations is main symptom    Negative: Dizziness is main symptom    Negative: Followed a head injury within last 3 days    Negative: Headache (with neurologic deficit)    Negative: Unable to urinate (or only a few drops) and bladder feels very full    Negative: Loss of bladder or bowel control (urine or bowel incontinence; wetting self, leaking stool) of new-onset    Negative: Back pain with numbness (loss of sensation) in groin or rectal area    Negative: Neurologic deficit that was brief (now gone), ANY of the following: * Weakness of the face, arm, or leg on one side of the body * Numbness of the face, arm, or leg on one side of the body * Loss of speech or garbled speech    Negative: Patient sounds very sick or weak to the triager    Answer Assessment - Initial Assessment Questions  1. SYMPTOM: \"What is the main symptom you are concerned about?\" (e.g., weakness, numbness)      R leg weakness  2. ONSET: \"When did this start?\" (minutes, hours, days; while sleeping)      This morning a couple hours ago.   3. LAST NORMAL: \"When was the last time you (the patient) were normal (no symptoms)?\"      No, some mild weakness.   4. PATTERN \"Does this come and go, or has it been constant since it started?\"  \"Is it present now?\"      constant  5. CARDIAC SYMPTOMS: \"Have you had any of the following symptoms: chest pain, difficulty breathing, palpitations?\"      none  6. NEUROLOGIC SYMPTOMS: \"Have you had any of the following symptoms: headache, dizziness, vision loss, double vision, changes in speech, unsteady on your feet?\"      Unsteady on feet, having to " "hold onto something when walking. Pain in butt, down to knee  7. OTHER SYMPTOMS: \"Do you have any other symptoms?\"      See above  8. PREGNANCY: \"Is there any chance you are pregnant?\" \"When was your last menstrual period?\"      n/a    Protocols used: NEUROLOGIC DEFICIT-A-OH      "

## 2023-06-15 NOTE — TELEPHONE ENCOUNTER
Provider Response to 2nd Level Triage Request    I have reviewed the RN documentation. My recommendation is:  Patient can be seen at [facility]     Alena Dodge MD MPH

## 2023-06-16 ENCOUNTER — TELEPHONE (OUTPATIENT)
Dept: ORTHOPEDICS | Facility: CLINIC | Age: 88
End: 2023-06-16
Payer: COMMERCIAL

## 2023-06-16 ENCOUNTER — TELEPHONE (OUTPATIENT)
Dept: CARDIOLOGY | Facility: CLINIC | Age: 88
End: 2023-06-16
Payer: COMMERCIAL

## 2023-06-16 NOTE — TELEPHONE ENCOUNTER
"Patient is calling requesting Dr. Patten prescribe her steroids to help with sciatica inflammation or pain meds to help her pain.  Patient is rating her pain 10/10.    She has a specialty visit in July.  Patient went to ED yesterday to [facility].  She was given lidocaine patches that are not helping the pain.  Tylenol and Ibuprofen are not helpful either. \"I was in ED for 4 hours and they did nothing.\"    Informed that Dr. Patten is out today and can't prescribe a new medication without evaluating her.  Writer advised patient to go to a different ED.    Jennifer Ball RN  [facility]        "

## 2023-06-16 NOTE — TELEPHONE ENCOUNTER
Hello,  I'm with ortho con.  Pt is scheduled with Dr. Chapa and is in a lot of pain.  She saying Tylenol and Ibuprofen do not help.  She is asking about steroids or other to help as she waits for this appt.  Pt can be reached at .

## 2023-06-16 NOTE — TELEPHONE ENCOUNTER
She never followed up after epidural injection. I would need to see her once before ordering to confirm a few things unfortunately.

## 2023-06-16 NOTE — TELEPHONE ENCOUNTER
Hello,  I'm with ortho con.  Pt of Dr. Chapa is looking for another lumbar injection, can you help?  She was at the ER yesterday and now wants to schedule inj as before.  Pt is having her leg give out.   Please call pt at . Or   Please call her.

## 2023-06-17 NOTE — TELEPHONE ENCOUNTER
I attempted to call the patient to assist in scheduling the patient for a virtual or in person slot Follow-up with Dr. Chapa on 6/20/23 in a same day slot per Dr. Chapa (OK for virtual or in person visit) No answer, left message to call back for scheduling. Vianey Thurston, JAS on 6/17/2023 at 10:53 AM

## 2023-06-19 ENCOUNTER — VIRTUAL VISIT (OUTPATIENT)
Dept: ORTHOPEDICS | Facility: CLINIC | Age: 88
End: 2023-06-19
Payer: COMMERCIAL

## 2023-06-19 DIAGNOSIS — M48.062 LUMBAR STENOSIS WITH NEUROGENIC CLAUDICATION: Primary | ICD-10-CM

## 2023-06-19 PROCEDURE — 99442 PR PHYSICIAN TELEPHONE EVALUATION 11-20 MIN: CPT | Mod: 95 | Performed by: FAMILY MEDICINE

## 2023-06-19 RX ORDER — HYDROCODONE BITARTRATE AND ACETAMINOPHEN 5; 325 MG/1; MG/1
1 TABLET ORAL EVERY 6 HOURS PRN
Qty: 15 TABLET | Refills: 0 | Status: SHIPPED | OUTPATIENT
Start: 2023-06-19 | End: 2023-06-26

## 2023-06-19 NOTE — PROGRESS NOTES
ESTABLISHED PATIENT FOLLOW-UP VIRTUAL:    This encounter was conducted via telephone in lieu of face-to-face encounter due to precautions implemented during COVID-19 pandemic.     What phone number would you like to be contacted at? [phone]    How would you like to obtain your AVS? Mail a copy    HISTORY OF PRESENT ILLNESS  Ms. Mcbride is a pleasant 88 year old year old female who presents via telephone today for follow-up of low back pain.    Date of injury: Chronic  Date last seen: 1/17/23  Following Therapeutic Plan: Previous MAXIMO on 2/17/23  Pain: Increasing  Function: Worsening  Interval History: Pt notes increasing low back pain over the last 3 weeks. Noted significant relief from the MAXIMO in feb 2023.    Additional medical/Social/Surgical histories reviewed in [facility] and updated as appropriate.    REVIEW OF SYSTEMS (6/19/2023)  CONSTITUTIONAL: Denies fever and weight loss  GASTROINTESTINAL: Denies abdominal pain, nausea, vomiting  MUSCULOSKELETAL: See HPI  SKIN: Denies any recent rash or lesion  NEUROLOGICAL: Denies numbness or focal weakness    IMAGING :   MR LUMBAR SPINE W/O CONTRAST 12/27/2022 12:27 PM     Provided History: Low back pain; Low back pain, no complicating  feature; No chronic LBP duration >= 3 months; Radicular pain of right  lower extremity     ICD-10: Radicular pain of right lower extremity     Comparison: None available     Technique: Sagittal T1-weighted, sagittal STIR, sagittal  diffusion-weighted (with ADC map), axial T1-weighted, and 3D  volumetric axial and sagittal reconstructed T2-weighted images of the  lumbar spine were obtained without intravenous contrast.      Findings: There are 5 lumbar-type vertebrae assumed for the purposes  of this dictation. S1 has a transitional appearance and is partially  lumbarized.  The tip of the conus medullaris is at L1.  There is grade  1 anterolisthesis of L4 on L5..  There is mild disc height narrowing  at L4-5 and L5-S1.  Normal marrow  signal.     On a level by level basis:     T12-L1: No spinal canal or neuroforaminal stenosis.     L1-2: No spinal canal or neuroforaminal stenosis.     L2-3: No spinal canal or neuroforaminal stenosis.     L3-4: Minimal disc bulge with facet arthropathy but no significant  spinal canal stenosis or neural foraminal narrowing.     L4-5: Anterolisthesis with marked facet arthropathy and thickening of  ligamentum flavum causing moderate spinal canal stenosis. Neural  foramen are mildly narrowed bilaterally.     L5-S1: Disc bulge with facet arthropathy causing mild spinal canal  stenosis. The neural foramen are mildly narrowed bilaterally.     Paraspinous tissues are within normal limits.                                                                   Impression: Multilevel lumbar spondylosis greatest at L4-5 where there  is moderate spinal canal stenosis.     MARIBEL BEST MD      ASSESSMENT & PLAN  Ms. Mcbride is a 88 year old year old female who presents via telephone today to discuss acute on chronic low back pain with radicular component to the right lower extremity.    Diagnosis:   Lumbar stenosis with neurogenic claudication    Cass explains symptoms are severe and consistent with previous pain pattern which was improved by last epidural steroid injection on 2/17/2023.  Discussed repeating injection again.  Order was placed. Red flags discussed and currently she only has radicular pain but no red flags.      MAXIMO ordered and indicated urgent need, repeat at L4-L5 level.    Norco sparingly for breakthrough pain, discussed stool softener with this    Continue prednisone course    Recommended use of walker, declined need    Follow up 2 weeks after MAXIMO    It was a pleasure seeing Carolin.    Total Telephone Time: 15 min  Ethan Chapa DO, JENNIE  Primary Care Sports Medicine  Department of Orthopedic Surgery  [facility]

## 2023-06-19 NOTE — TELEPHONE ENCOUNTER
Pt  is calling regarding his wife. She is in extreme pain and has been in to ER 2x she has appt on 06/20 but doesn't think she can make it in due to the extreme pain. Please call to advise

## 2023-06-19 NOTE — LETTER
6/19/2023       RE: Carolin Mcbride  [address] [facility] [address]     Dear Colleague,    Thank you for referring your patient, Carolin Mcbride, to the [facility] SPORTS MEDICINE CLINIC [place] at [facility]. Please see a copy of my visit note below.    ESTABLISHED PATIENT FOLLOW-UP VIRTUAL:    This encounter was conducted via telephone in lieu of face-to-face encounter due to precautions implemented during COVID-19 pandemic.     What phone number would you like to be contacted at? [phone]    How would you like to obtain your AVS? Mail a copy    HISTORY OF PRESENT ILLNESS  Ms. Mcbride is a pleasant 88 year old year old female who presents via telephone today for follow-up of low back pain.    Date of injury: Chronic  Date last seen: 1/17/23  Following Therapeutic Plan: Previous MAXIMO on 2/17/23  Pain: Increasing  Function: Worsening  Interval History: Pt notes increasing low back pain over the last 3 weeks. Noted significant relief from the MAXIMO in feb 2023.    Additional medical/Social/Surgical histories reviewed in [facility] and updated as appropriate.    REVIEW OF SYSTEMS (6/19/2023)  CONSTITUTIONAL: Denies fever and weight loss  GASTROINTESTINAL: Denies abdominal pain, nausea, vomiting  MUSCULOSKELETAL: See HPI  SKIN: Denies any recent rash or lesion  NEUROLOGICAL: Denies numbness or focal weakness    IMAGING :   MR LUMBAR SPINE W/O CONTRAST 12/27/2022 12:27 PM     Provided History: Low back pain; Low back pain, no complicating  feature; No chronic LBP duration >= 3 months; Radicular pain of right  lower extremity     ICD-10: Radicular pain of right lower extremity     Comparison: None available     Technique: Sagittal T1-weighted, sagittal STIR, sagittal  diffusion-weighted (with ADC map), axial T1-weighted, and 3D  volumetric axial and sagittal reconstructed T2-weighted images of the  lumbar spine were obtained without intravenous contrast.      Findings:  There are 5 lumbar-type vertebrae assumed for the purposes  of this dictation. S1 has a transitional appearance and is partially  lumbarized.  The tip of the conus medullaris is at L1.  There is grade  1 anterolisthesis of L4 on L5..  There is mild disc height narrowing  at L4-5 and L5-S1.  Normal marrow signal.     On a level by level basis:     T12-L1: No spinal canal or neuroforaminal stenosis.     L1-2: No spinal canal or neuroforaminal stenosis.     L2-3: No spinal canal or neuroforaminal stenosis.     L3-4: Minimal disc bulge with facet arthropathy but no significant  spinal canal stenosis or neural foraminal narrowing.     L4-5: Anterolisthesis with marked facet arthropathy and thickening of  ligamentum flavum causing moderate spinal canal stenosis. Neural  foramen are mildly narrowed bilaterally.     L5-S1: Disc bulge with facet arthropathy causing mild spinal canal  stenosis. The neural foramen are mildly narrowed bilaterally.     Paraspinous tissues are within normal limits.                                                                   Impression: Multilevel lumbar spondylosis greatest at L4-5 where there  is moderate spinal canal stenosis.     MARIBEL BEST MD      ASSESSMENT & PLAN  Ms. Mcbride is a 88 year old year old female who presents via telephone today to discuss acute on chronic low back pain with radicular component to the right lower extremity.    Diagnosis:   Lumbar stenosis with neurogenic claudication    Cass explains symptoms are severe and consistent with previous pain pattern which was improved by last epidural steroid injection on 2/17/2023.  Discussed repeating injection again.  Order was placed. Red flags discussed and currently she only has radicular pain but no red flags.    MAXIMO ordered and indicated urgent need, repeat at L4-L5 level.  Norco sparingly for breakthrough pain, discussed stool softener with this  Continue prednisone course  Recommended use of walker, declined  need  Follow up 2 weeks after MAXIMO    It was a pleasure seeing Carolin.    Total Telephone Time: 15 min  Ethan Chapa DO, JENNIE  Primary Care Sports Medicine  Department of Orthopedic Surgery  [facility]        Again, thank you for allowing me to participate in the care of your patient.      Sincerely,    Ethan Chapa DO

## 2023-06-20 ENCOUNTER — TELEPHONE (OUTPATIENT)
Dept: MEDSURG UNIT | Facility: CLINIC | Age: 88
End: 2023-06-20

## 2023-06-22 ENCOUNTER — HOSPITAL ENCOUNTER (OUTPATIENT)
Dept: GENERAL RADIOLOGY | Facility: CLINIC | Age: 88
Discharge: HOME | End: 2023-06-22
Attending: FAMILY MEDICINE
Payer: COMMERCIAL

## 2023-06-22 ENCOUNTER — HOSPITAL ENCOUNTER (OUTPATIENT)
Facility: CLINIC | Age: 88
Discharge: HOME | End: 2023-06-22
Payer: COMMERCIAL

## 2023-06-22 VITALS
HEART RATE: 50 BPM | SYSTOLIC BLOOD PRESSURE: 187 MMHG | TEMPERATURE: 98 F | RESPIRATION RATE: 16 BRPM | DIASTOLIC BLOOD PRESSURE: 71 MMHG

## 2023-06-22 VITALS — HEART RATE: 53 BPM | OXYGEN SATURATION: 99 % | DIASTOLIC BLOOD PRESSURE: 77 MMHG | SYSTOLIC BLOOD PRESSURE: 201 MMHG

## 2023-06-22 DIAGNOSIS — M48.062 LUMBAR STENOSIS WITH NEUROGENIC CLAUDICATION: ICD-10-CM

## 2023-06-22 PROCEDURE — 62323 NJX INTERLAMINAR LMBR/SAC: CPT

## 2023-06-22 PROCEDURE — 250N000011 HC RX IP 250 OP 636: Performed by: FAMILY MEDICINE

## 2023-06-22 PROCEDURE — 999N000154 HC STATISTIC RADIOLOGY XRAY, US, CT, MAR, NM

## 2023-06-22 PROCEDURE — 250N000009 HC RX 250: Performed by: FAMILY MEDICINE

## 2023-06-22 PROCEDURE — 255N000002 HC RX 255 OP 636: Mod: JZ | Performed by: FAMILY MEDICINE

## 2023-06-22 RX ORDER — BETAMETHASONE SODIUM PHOSPHATE AND BETAMETHASONE ACETATE 3; 3 MG/ML; MG/ML
3 INJECTION, SUSPENSION INTRA-ARTICULAR; INTRALESIONAL; INTRAMUSCULAR; SOFT TISSUE ONCE
Status: COMPLETED | OUTPATIENT
Start: 2023-06-22 | End: 2023-06-22

## 2023-06-22 RX ORDER — IOPAMIDOL 408 MG/ML
10 INJECTION, SOLUTION INTRATHECAL ONCE
Status: COMPLETED | OUTPATIENT
Start: 2023-06-22 | End: 2023-06-22

## 2023-06-22 RX ADMIN — IOPAMIDOL 2 ML: 408 INJECTION, SOLUTION INTRATHECAL at 09:25

## 2023-06-22 RX ADMIN — LIDOCAINE HYDROCHLORIDE 5 ML: 10 INJECTION, SOLUTION EPIDURAL; INFILTRATION; INTRACAUDAL; PERINEURAL at 09:25

## 2023-06-22 RX ADMIN — BETAMETHASONE SODIUM PHOSPHATE AND BETAMETHASONE ACETATE 3 ML: 3; 3 INJECTION, SUSPENSION INTRA-ARTICULAR; INTRALESIONAL; INTRAMUSCULAR at 09:25

## 2023-06-22 ASSESSMENT — ACTIVITIES OF DAILY LIVING (ADL): ADLS_ACUITY_SCORE: 35

## 2023-06-22 NOTE — PROCEDURES
[facility]    Procedure: Fluoroscopic-Guided Lumbar Epidural Steroid Injection    Date/Time: 6/22/2023 9:39 AM    Performed by: Willy Trejo PA-C  Authorized by: Willy Trejo PA-C      UNIVERSAL PROTOCOL   Site Marked: Yes  Prior Images Obtained and Reviewed:  Yes  Required items: Required blood products, implants, devices and special equipment available    Patient identity confirmed:  Verbally with patient  NA - No sedation, light sedation, or local anesthesia  Confirmation Checklist:  Patient's identity using two indicators, relevant allergies, procedure was appropriate and matched the consent or emergent situation and correct equipment/implants were available  Time out: Immediately prior to the procedure a time out was called    Universal Protocol: the Joint Commission Universal Protocol was followed    Preparation: Patient was prepped and draped in usual sterile fashion    ESBL (mL):  0     ANESTHESIA    Anesthesia: Local infiltration  Local Anesthetic:  Lidocaine 1% without epinephrine  Anesthetic Total (mL):  3      SEDATION    Patient Sedated: No    See dictated procedure note for full details.    PROCEDURE  Describe Procedure: Fluoroscopic-Guided Lumbar Epidural Steroid Injection at L4-L5 via right paramedian interlaminar approach.  Patient Tolerance:  Patient tolerated the procedure well with no immediate complications  Length of time physician/provider present for 1:1 monitoring during sedation: 0

## 2023-06-22 NOTE — PROGRESS NOTES
Care Suites Discharge Nursing Note    Patient Information  Name: Carolin Mcbride  Age: 88 year old     Discharge Education:  Discharge instructions reviewed: Yes  Additional education/resources provided: none  Patient/patient representative verbalizes understanding: Yes  Patient discharging on new medications: No  Medication education completed: N/A    Discharge Plans:   Discharge location: home  Discharge ride contacted: Yes  Approximate discharge time: 1000    Discharge Criteria:  Discharge criteria met and vital signs stable: Yes    Patient Belongs:  Patient belongings returned to patient: Yes    Miladis Linn RN

## 2023-06-22 NOTE — DISCHARGE INSTRUCTIONS
Steroid Injection Discharge Instructions     After you go home:    You may resume your normal diet.    Care of Puncture Site:    If you have a bandaid on your puncture site, you may remove it the next morning  You may shower tomorrow  No bath tubs, whirlpools or swimming pool for at least 48 hours  Use ice packs as needed for discomfort     Activity:    Minimize your activity today. You may gradually resume your normal activity as tolerated  Avoid vigorous or strenuous activity until your symptoms improve or as directed by your doctor  Do NOT drive a vehicle for a few hours after the injection - or longer if you develop numbness in your arm or leg    Medicines:    You may resume all medications, including blood thinners  Resume your Warfarin/Coumadin at your regular dose today. Follow up with your provider to have your INR rechecked  Resume your Platelet Inhibitors and Aspirin tomorrow at your regular dose  For minor discomfort, you may take Acetaminophen (Tylenol) or Ibuprofen (Advil)    Pain:     You may experience increased or different pain over the next 24-48 hours  For the next 48 hrs - you may use ice packs for discomfort     Call your primary care doctor if:    You have severe pain that does not improve with pain medication  You have chills or a fever greater than 101 F (38 C)  The site is red, swollen, hot or tender  Increase in pain, weakness or numbness  New problems with your bowel or bladder  Any questions or concerns    What to watch for:    It can be normal to have some bruising or slight swelling at the puncture site.   After the procedure, you may have some new weakness or numbness down your arm/leg from the numbing medicine. This should resolve in a few hours.   You may feel some temporary relief from the numbing medicine, but that will wear off within a few hours.  Your symptoms may return to pre procedure level, or can even be worse for the first 1-2 days.  For many people, the steroid begins to  provide some relief within 2-3 days, but it can take up to 2 weeks to obtain the full results.  Some people will get lasting relief from a single injection. Others may require up to 3 injections to get results. If you have more than one steroid injection, they should be given 2 weeks apart.  If you have no improvement in your symptoms after two weeks, please contact the doctor who ordered this procedure to discuss the next steps.  Side effects of your steroid injection are mild and will go away in 2-3 days  Insomnia  Irritability  Flushed face  Water retention  Restlessness  Difficulty sleeping  Increased appetite  Increased blood sugar  If you are diabetic, monitor your blood sugar closely. Contact the provider who manages your diabetes to help you control your blood sugar if needed.    If you have questions or concerns call:                  [facility] Radiology Dept @ [phone]                                    between 8am-4:30pm Mon-Fri    If you have urgent questions outside of these normal business hours, please contact the [facility] on call doctor @ [phone]      The provider who performed your procedure was __Hugh PATINO____.

## 2023-06-26 ENCOUNTER — TELEPHONE (OUTPATIENT)
Dept: ORTHOPEDICS | Facility: CLINIC | Age: 88
End: 2023-06-26
Payer: COMMERCIAL

## 2023-06-26 DIAGNOSIS — M48.062 LUMBAR STENOSIS WITH NEUROGENIC CLAUDICATION: ICD-10-CM

## 2023-06-26 RX ORDER — HYDROCODONE BITARTRATE AND ACETAMINOPHEN 5; 325 MG/1; MG/1
1 TABLET ORAL EVERY 6 HOURS PRN
Qty: 15 TABLET | Refills: 0 | Status: SHIPPED | OUTPATIENT
Start: 2023-06-26 | End: 2024-07-16

## 2023-06-26 NOTE — TELEPHONE ENCOUNTER
Vianey,    Let her know to schedule an MRI for next week, then spine team after MRI completed.  If she has no improvement, she should complete MRI next week and schedule with spine team afterward.  If better, she can cancel MRI.    I did refill her norco.      Thanks

## 2023-06-26 NOTE — TELEPHONE ENCOUNTER
SPINE PATIENTS - NEW PROTOCOL PREVISIT    RECORDS RECEIVED FROM: Internal   REASON FOR VISIT: Lumbar stenosis with neurogenic claudication   Date of Appt: 07/14/2023   NOTES (FOR ALL VISITS) STATUS DETAILS   OFFICE NOTE from referring provider Internal 06/19/2023 Dr Chapa [facility]    OFFICE NOTE from other specialist N/A    DISCHARGE SUMMARY from hospital N/A    DISCHARGE REPORT from ER Care Everywhere 06/16/2023 [facility]    OPERATIVE REPORT N/A    EMG REPORT N/A    MEDICATION LIST N/A    IMAGING  (FOR ALL VISITS)     MRI (HEAD, NECK, SPINE) Internal 06/28/2023 lumbar spine  12/27/2022 lumbar spine   XRAY (SPINE) *NEUROSURGERY* N/A    CT (HEAD, NECK, SPINE) N/A

## 2023-06-26 NOTE — TELEPHONE ENCOUNTER
I returned the patient's phone call and informed her that Dr. Chapa would recommend MRI next week and Follow-up with spine team if she's still experiencing back pain. If her pain has improved she can cancel these appointments. I provided the phone numbers for MRI and spine team schedule, I encouraged her to do this today. Patient understood. I also informed her that the [place] prescription was refilled. I also explained these instructions to her . All questions were answered at this time. Vianey Thurston ATC on 6/26/2023 at 10:23 AM

## 2023-06-26 NOTE — TELEPHONE ENCOUNTER
Patient had an injection done last Thursday and only had relief for a few days and is now have the same pain. Wondering what she can do now since the injection didn't work.

## 2023-06-28 ENCOUNTER — TELEPHONE (OUTPATIENT)
Dept: ORTHOPEDICS | Facility: CLINIC | Age: 88
End: 2023-06-28

## 2023-06-28 ENCOUNTER — ANCILLARY PROCEDURE (OUTPATIENT)
Dept: MRI IMAGING | Facility: CLINIC | Age: 88
End: 2023-06-28
Attending: FAMILY MEDICINE
Payer: COMMERCIAL

## 2023-06-28 ENCOUNTER — TELEPHONE (OUTPATIENT)
Dept: CARDIOLOGY | Facility: CLINIC | Age: 88
End: 2023-06-28

## 2023-06-28 DIAGNOSIS — M48.062 LUMBAR STENOSIS WITH NEUROGENIC CLAUDICATION: Primary | ICD-10-CM

## 2023-06-28 DIAGNOSIS — M48.062 LUMBAR STENOSIS WITH NEUROGENIC CLAUDICATION: ICD-10-CM

## 2023-06-28 PROCEDURE — 72148 MRI LUMBAR SPINE W/O DYE: CPT | Performed by: RADIOLOGY

## 2023-06-28 RX ORDER — TIZANIDINE HYDROCHLORIDE 4 MG/1
4 CAPSULE, GELATIN COATED ORAL
Qty: 20 CAPSULE | Refills: 0 | Status: SHIPPED | OUTPATIENT
Start: 2023-06-28 | End: 2023-06-30

## 2023-06-28 NOTE — TELEPHONE ENCOUNTER
[facility] Call Center    Phone Message    May a detailed message be left on voicemail: yes     Reason for Call: Medication Question or concern regarding medication   Prescription Clarification  Name of Medication: Pain med  Prescribing Provider: Sammi   Pharmacy: [facility]   What on the order needs clarification?  calling intoday to get a RX for Carolin for pain meds. Anything will help except for Hydrocodone          Action Taken: Other: 63640    Travel Screening: Not Applicable

## 2023-06-28 NOTE — LETTER
June 28, 2023      TO: Carolin Mcbride  [address] [facility] [address]         Dear Carolin,      Our records indicate that it is time for you to schedule your visit with the [facility] in the CARDIOVASCULAR CLINIC at [facility] ([facility]    Please schedule your annual return visit and additional testing in September with Dr. Gomez at the [facility] at [address] [place] [address].    To make your appointment, please call: [phone], option 1. Monday - Friday, 8 A.M. - 5:00 P.M (Central Time Zone).    Please check with your insurance company about your benefits.  Some insurance plans provide different coverage levels for services at [facility]-based clinics.    We look forward to hearing from you.    Sincerely,  [facility]  [address] [place] [address], [place], [place], [address]  Ph: [phone] (Option 1)  Fax: [phone]       It was a pleasure to see you at your last clinic visit. Please do not hesitate to call me if you have any questions or concerns.    Sincerely,      Eben Gomez MD

## 2023-06-28 NOTE — TELEPHONE ENCOUNTER
Spoke to Nikita Carolin's  and informed him that Dr. Chapa placed a prescription for Zanaflex to the [facility] pharmacy in [place]. He appreciated the call and had no further questions.      Андрей Rodriguez ATC

## 2023-06-28 NOTE — TELEPHONE ENCOUNTER
Patient's  has called back and said patient has had success with muscle relaxers also.  Please add this to the request for pain medication.

## 2023-06-28 NOTE — TELEPHONE ENCOUNTER
Left Voicemail (2nd Attempt) and Sent Letter (2nd Attempt) for the patient to call back and schedule the following:    Appointment type: Cardiology- Return General  Provider: Patricia  Return date: 9/26/23  Specialty phone number: [phone]  Additional appointment(s) needed: fasting labs  Additonal Notes: none    Renetta Arguelles, Visit Facilitator/MA.

## 2023-06-30 ENCOUNTER — TELEPHONE (OUTPATIENT)
Dept: ORTHOPEDICS | Facility: CLINIC | Age: 88
End: 2023-06-30
Payer: COMMERCIAL

## 2023-06-30 DIAGNOSIS — M48.062 LUMBAR STENOSIS WITH NEUROGENIC CLAUDICATION: Primary | ICD-10-CM

## 2023-06-30 RX ORDER — TIZANIDINE HYDROCHLORIDE 4 MG/1
4 CAPSULE, GELATIN COATED ORAL
Qty: 30 CAPSULE | Refills: 0 | Status: SHIPPED | OUTPATIENT
Start: 2023-06-30 | End: 2023-09-26

## 2023-06-30 NOTE — TELEPHONE ENCOUNTER
Hello,  I'm with ortho con.  Pt of Dr. Chapa was prescribed some muscle relaxers   Pt had an MRI for sciatica.   Could more muscle relaxers be prescribed to use over the weekend.  They are helping.  Pt uses [facility] in [place].  Ph# [phone].

## 2023-06-30 NOTE — TELEPHONE ENCOUNTER
I spoke with the patient and I let her know that a refill of the muscle relaxer's were sent to the pharmacy. I also let her know her recent MRI results below:    Please tell Carolin that I refilled her muscle relaxants.  She should be careful as this can make her drowsy.  She should also know that her most recent MRI did not show any changes from previous over a year ago.  The plan will still be to follow-up with neurosurgery team however.       I let her know that she should follow up with neurosurgery and she stated that she doesn't have an appointment until 7/14. I let her know that it typically takes several months for people to get into this clinic. I let her know that she can call them and see if there is an earlier availability but I do not have any control over their schedule. She understood this and had no further questions at this time.     JAS Ferraro

## 2023-06-30 NOTE — TELEPHONE ENCOUNTER
Hello,  I'm with ortho con.  Asha with [facility] is calling about the tizanidine.  This was filled a a tablet which she has been on beofre.  She is asking if you call in another Rx that it be for the tablet.  Ph# [phone], Reji.

## 2023-07-01 DIAGNOSIS — F33.0 MAJOR DEPRESSIVE DISORDER, RECURRENT EPISODE, MILD (H): ICD-10-CM

## 2023-07-03 ENCOUNTER — TELEPHONE (OUTPATIENT)
Dept: ORTHOPEDICS | Facility: CLINIC | Age: 88
End: 2023-07-03
Payer: COMMERCIAL

## 2023-07-03 ENCOUNTER — TELEPHONE (OUTPATIENT)
Dept: NEUROSURGERY | Facility: CLINIC | Age: 88
End: 2023-07-03
Payer: COMMERCIAL

## 2023-07-03 RX ORDER — CITALOPRAM HYDROBROMIDE 10 MG/1
TABLET ORAL
Qty: 90 TABLET | Refills: 0 | Status: SHIPPED | OUTPATIENT
Start: 2023-07-03 | End: 2023-09-14

## 2023-07-03 NOTE — TELEPHONE ENCOUNTER
Pt spouse called in -    Pt scheduled with Gladys Abraham PA-C on 7/14/23. Pt spouse states she has sciatic pain, unable to sleep, been an issue for awhile. He wants her admitted to the hospital.     Spouse updated that we have not seen the pt, we can not give medical advice. Advised that if pain is severe and uncontrolled, to seek care in the ER.

## 2023-07-11 ENCOUNTER — TELEPHONE (OUTPATIENT)
Dept: FAMILY MEDICINE | Facility: CLINIC | Age: 88
End: 2023-07-11

## 2023-07-11 ENCOUNTER — MEDICAL CORRESPONDENCE (OUTPATIENT)
Dept: HEALTH INFORMATION MANAGEMENT | Facility: CLINIC | Age: 88
End: 2023-07-11

## 2023-07-11 NOTE — TELEPHONE ENCOUNTER
Home Care is calling regarding an established patient with [facility].        7/11/2023   Home Care Information   Date of Home Care episode start 7/11/2023   Current following provider Dr. Merrill Patten    Name/Phone Number Jade YARA  [phone]   Home Care agency [facility]     Requesting orders from: Merrill Patten  Provider is following patient: No       Orders Requested    Skilled Nursing  Request for initial certification (first set of orders)   Frequency: 2 times/5 days for 5 days then  2 times/1 week for 1 week then   1 time/1 week for 2 weeks then   1-3 PRN visits      Physical Therapy  Request for Eval and Treat      Occupational Therapy  Request for Eval and Treat    Patient was in [facility] from 7/4-7/10 for unplanned R L3-5 decompression and discectomy.    Information was gathered and will be sent to provider for review.  RN will contact Home Care with information after provider review.  Confirmed ok to leave a detailed message with call back.  Contact information confirmed and updated as needed.    Kristina M Kjellberg, RN

## 2023-07-12 ENCOUNTER — TELEPHONE (OUTPATIENT)
Dept: FAMILY MEDICINE | Facility: CLINIC | Age: 88
End: 2023-07-12
Payer: COMMERCIAL

## 2023-07-12 ENCOUNTER — MEDICAL CORRESPONDENCE (OUTPATIENT)
Dept: HEALTH INFORMATION MANAGEMENT | Facility: CLINIC | Age: 88
End: 2023-07-12
Payer: COMMERCIAL

## 2023-07-12 DIAGNOSIS — I10 HYPERTENSION, BENIGN ESSENTIAL, GOAL BELOW 140/90: Primary | ICD-10-CM

## 2023-07-12 RX ORDER — AMLODIPINE BESYLATE 5 MG/1
5 TABLET ORAL DAILY
Qty: 30 TABLET | Refills: 5 | Status: SHIPPED | OUTPATIENT
Start: 2023-07-12 | End: 2023-07-13

## 2023-07-12 NOTE — TELEPHONE ENCOUNTER
Called home care nurse to update amlodipine orders to 5mg once a day. Home care nurse YARA Hansen stated she was taking amlodipine 10mg in the hospital (started 07/08/23) and discharge instructions to take amlodipine 10mg one a day (two-5mg)- which she has been doing. Patient has ED/Hosp follow up with [facility] tomorrow 07/13/2023- I told the home care nurse that the provider can figure out how many MG of amlodipine to take at her visit here tomorrow and go over any questions that way.    Rocio Urias RN on 7/12/2023 at 4:01 PM

## 2023-07-12 NOTE — TELEPHONE ENCOUNTER
Home Care is calling regarding an established patient with  [facility].        7/11/2023   Home Care Information   Date of Home Care episode start 7/11/2023   Current following provider Dr. Merrill Patten    Name/Phone Number Jade YARA  [phone]   Home Care agency [facility]     Requesting orders from: Merrill Patten  Provider is following patient: Yes  Is this a 60-day recertification request?  No    Orders Requested    Physical Therapy  Request for initial certification (first set of orders)   Frequency: 2x/wk for 1 wks  then 1x/wk for 3 wks  she would also like to report that pt had blood pressure of 182/72 today,    Pt told her her blood pressure has been running high since surgery.  Pt is not having any symptoms associated with elevated blood pressure. Pt had not taken her amlodipine before the reading. She did take amlodipine while PT was there.   Information was gathered and will be sent to provider for review.  RN will contact Home Care with information after provider review.  Confirmed ok to leave a detailed message with call back.  Contact information confirmed and updated as needed.    Adele Ambriz RN

## 2023-07-12 NOTE — TELEPHONE ENCOUNTER
Please okay orders. For blood pressure, please have patient increase amlodipine dose from 2.5 mg daily to 5 mg daily. New dose has been sent to her pharmacy. Will recheck blood pressure at her scheduled appointment in August.    Merrill Patten MD

## 2023-07-12 NOTE — TELEPHONE ENCOUNTER
Called and LVM with [facility]- okaying home care orders per Dr. Patten. See Below    Rocio Urias RN on 7/12/2023 at 3:46 PM

## 2023-07-13 ENCOUNTER — OFFICE VISIT (OUTPATIENT)
Dept: FAMILY MEDICINE | Facility: CLINIC | Age: 88
End: 2023-07-13
Payer: COMMERCIAL

## 2023-07-13 VITALS
TEMPERATURE: 97.7 F | HEART RATE: 61 BPM | DIASTOLIC BLOOD PRESSURE: 80 MMHG | OXYGEN SATURATION: 98 % | RESPIRATION RATE: 16 BRPM | SYSTOLIC BLOOD PRESSURE: 160 MMHG

## 2023-07-13 DIAGNOSIS — K59.03 DRUG-INDUCED CONSTIPATION: ICD-10-CM

## 2023-07-13 DIAGNOSIS — I10 HYPERTENSION, BENIGN ESSENTIAL, GOAL BELOW 140/90: ICD-10-CM

## 2023-07-13 DIAGNOSIS — N18.31 STAGE 3A CHRONIC KIDNEY DISEASE (H): ICD-10-CM

## 2023-07-13 DIAGNOSIS — Z98.890 S/P LUMBAR DISCECTOMY: Primary | ICD-10-CM

## 2023-07-13 PROCEDURE — 99214 OFFICE O/P EST MOD 30 MIN: CPT | Performed by: PHYSICIAN ASSISTANT

## 2023-07-13 RX ORDER — GABAPENTIN 100 MG/1
100 CAPSULE ORAL
COMMUNITY
Start: 2023-07-06 | End: 2023-09-26

## 2023-07-13 RX ORDER — DOCUSATE SODIUM 100 MG/1
100 CAPSULE, LIQUID FILLED ORAL 3 TIMES DAILY PRN
Qty: 30 CAPSULE | Refills: 1 | Status: SHIPPED | OUTPATIENT
Start: 2023-07-13

## 2023-07-13 RX ORDER — TIZANIDINE 2 MG/1
2 TABLET ORAL EVERY 8 HOURS PRN
COMMUNITY
Start: 2023-07-10 | End: 2023-09-26

## 2023-07-13 RX ORDER — AMLODIPINE BESYLATE 10 MG/1
10 TABLET ORAL DAILY
Qty: 90 TABLET | Refills: 1 | Status: SHIPPED | OUTPATIENT
Start: 2023-07-13 | End: 2023-12-07

## 2023-07-13 ASSESSMENT — PATIENT HEALTH QUESTIONNAIRE - PHQ9
SUM OF ALL RESPONSES TO PHQ QUESTIONS 1-9: 0
SUM OF ALL RESPONSES TO PHQ QUESTIONS 1-9: 0

## 2023-07-13 ASSESSMENT — PAIN SCALES - GENERAL: PAINLEVEL: EXTREME PAIN (8)

## 2023-07-13 NOTE — PROGRESS NOTES
"  Assessment & Plan   Problem List Items Addressed This Visit        Urinary    CKD (chronic kidney disease) stage 3, GFR 30-59 ml/min (H)   Other Visit Diagnoses     S/P lumbar discectomy    -  Primary    Hypertension, benign essential, goal below 140/90        Relevant Medications    amLODIPine (NORVASC) 10 MG tablet    Drug-induced constipation        Relevant Medications    docusate sodium (COLACE) 100 MG capsule         No signs of DVT or PE on exam  Patient is healing well  incision without sings of infection. Clean dressing was applied , as the old one got dirty and was peeling off.   May shower and replace dressing after each shower. Apply Neosporin ointment before dressing application.  Increase Amlodipine to 10 mg daily   Continue all other BP medications without changes   Surgeon follow up is on 7/21/23  Follow up on BP in 3-4 weeks      30 minutes spent by me on the date of the encounter doing chart review, history and exam, documentation and further activities per the note     MED REC REQUIREDPost Medication Reconciliation Status:   BMI:   Estimated body mass index is 28.67 kg/m  as calculated from the following:    Height as of 1/19/23: 1.626 m (5' 4\").    Weight as of 1/19/23: 75.8 kg (167 lb).   Weight management plan: Discussed healthy diet and exercise guidelines        Gifty Clay PA-C  [facility]    Lata Coe is a 88 year old, presenting for the following health issues:  Hospital F/U        7/13/2023    12:21 PM   Additional Questions   Roomed by edgardo   Accompanied by self         7/13/2023    12:21 PM   Patient Reported Additional Medications   Patient reports taking the following new medications none     HPI         Hospital Follow-up Visit:    Hospital/Nursing Home/IP Rehab Facility: [facility]   Date of Admission: 07/03/2023  Date of Discharge: 07/17/2023  Reason(s) for Admission: back pain- admitted for emergency lumbar disk " discection    Was your hospitalization related to COVID-19? No   Problems taking medications regularly:  None  Medication changes since discharge: increased Amlodipine to 10 mg daily   Problems adhering to non-medication therapy:  None    Summary of hospitalization:  CareEverywhere information obtained and reviewed  Diagnostic Tests/Treatments reviewed.  Follow up needed: surgion  Other Healthcare Providers Involved in Patient s Care:         Homecare, PT  Update since discharge: stable. Dressing is peeling off and causing pain in the surgical site  Plan of care communicated with patient     Hypertension Follow-up      Do you check your blood pressure regularly outside of the clinic? Yes     Are you following a low salt diet? No    Are your blood pressures ever more than 140 on the top number (systolic) OR more   than 90 on the bottom number (diastolic), for example 140/90? Yes            Review of Systems   Constitutional, HEENT, cardiovascular, pulmonary, gi and gu systems are negative, except as otherwise noted.      Objective    BP (!) 170/78 (BP Location: Right arm, Patient Position: Sitting, Cuff Size: Adult Regular)   Pulse 61   Temp 97.7  F (36.5  C) (Oral)   Resp 16   LMP  (LMP Unknown)   SpO2 98%   There is no height or weight on file to calculate BMI.  Physical Exam   GENERAL: healthy, alert and no distress  NECK: no adenopathy, no asymmetry, masses, or scars and thyroid normal to palpation  RESP: lungs clear to auscultation - no rales, rhonchi or wheezes  CV: regular rate and rhythm, normal S1 S2, no S3 or S4, no murmur, click or rub, no peripheral edema and peripheral pulses strong  ABDOMEN: soft, nontender, no hepatosplenomegaly, no masses and bowel sounds normal  MS: no gross musculoskeletal defects noted, no edema. Bilateral lower leg-no erythema, no swelling, no edema    SKIN: well healing incision- no erythema, no swelling, no bleeding right drainage. Mild maceration of the skin due to wet  dressing                    Answers for HPI/ROS submitted by the patient on 7/13/2023  PHQ9 TOTAL SCORE: 0

## 2023-07-13 NOTE — PATIENT INSTRUCTIONS
No signs of DVT or PE on exam  Patient is healing well  incision without sings of infection. Clean dressing was applied , as the old one got dirty and was peeling off.   May shower and replace dressing after each shower. Apply Neosporin ointment before dressing application.  Increase Amlodipine to 10 mg daily   Continue all other BP medications without changes   Surgeon follow up is on 7/21/23  Follow up on BP in 3-4 weeks

## 2023-07-14 ENCOUNTER — PRE VISIT (OUTPATIENT)
Dept: NEUROSURGERY | Facility: CLINIC | Age: 88
End: 2023-07-14

## 2023-07-14 ENCOUNTER — MEDICAL CORRESPONDENCE (OUTPATIENT)
Dept: HEALTH INFORMATION MANAGEMENT | Facility: CLINIC | Age: 88
End: 2023-07-14

## 2023-07-14 ENCOUNTER — TELEPHONE (OUTPATIENT)
Dept: FAMILY MEDICINE | Facility: CLINIC | Age: 88
End: 2023-07-14

## 2023-07-14 NOTE — TELEPHONE ENCOUNTER
Home Care is calling regarding an established patient with  [facility].        7/11/2023   Home Care Information   Date of Home Care episode start 7/11/2023   Current following provider Dr. Merrill Patten    Name/Phone Number Jade YARA  [phone]   Home Care agency [facility]     Requesting orders from: Merrill Patten  Provider is following patient: Yes  Is this a 60-day recertification request?  No    Orders Requested    Occupational Therapy  Request for initial certification (first set of orders)   Frequency: 1x/wk for 3 wks      Confirmed ok to leave a detailed message with call back.  Contact information confirmed and updated as needed.       Vanessa Vera RN

## 2023-07-17 ENCOUNTER — MEDICAL CORRESPONDENCE (OUTPATIENT)
Dept: HEALTH INFORMATION MANAGEMENT | Facility: CLINIC | Age: 88
End: 2023-07-17
Payer: COMMERCIAL

## 2023-07-19 ENCOUNTER — MEDICAL CORRESPONDENCE (OUTPATIENT)
Dept: HEALTH INFORMATION MANAGEMENT | Facility: CLINIC | Age: 88
End: 2023-07-19
Payer: COMMERCIAL

## 2023-08-03 ENCOUNTER — MEDICAL CORRESPONDENCE (OUTPATIENT)
Dept: HEALTH INFORMATION MANAGEMENT | Facility: CLINIC | Age: 88
End: 2023-08-03
Payer: COMMERCIAL

## 2023-08-05 DIAGNOSIS — I10 HYPERTENSION, BENIGN ESSENTIAL, GOAL BELOW 140/90: ICD-10-CM

## 2023-08-07 ENCOUNTER — TELEPHONE (OUTPATIENT)
Dept: FAMILY MEDICINE | Facility: CLINIC | Age: 88
End: 2023-08-07
Payer: COMMERCIAL

## 2023-08-07 RX ORDER — LOSARTAN POTASSIUM 100 MG/1
TABLET ORAL
Qty: 90 TABLET | Refills: 0 | Status: SHIPPED | OUTPATIENT
Start: 2023-08-07 | End: 2023-10-31

## 2023-08-07 NOTE — TELEPHONE ENCOUNTER
Home Care is calling regarding an established patient with [facility].          8/7/2023 7/11/2023   Home Care Information   Date of Home Care episode start  7/11/2023   Current following provider Dr. Merrill Patten    Name/Phone Number Jose Miguel, PT - [phone] Jade RN  [phone]   Home Care agency [facility]     Requesting orders from: Merrill Patten  Provider is following patient: Yes  Is this a 60-day recertification request?  No    Orders Requested    Physical Therapy  Request for initial certification (first set of orders)   Frequency:  1x/wk for 4 wks      Information was gathered and will be sent to provider for review.      RN will contact Home Care with information after provider review.  Confirmed ok to leave a detailed message with call back.  Contact information confirmed and updated as needed.      DL NavarreteN, RN  [facility]

## 2023-08-08 ENCOUNTER — MEDICAL CORRESPONDENCE (OUTPATIENT)
Dept: HEALTH INFORMATION MANAGEMENT | Facility: CLINIC | Age: 88
End: 2023-08-08

## 2023-08-17 ENCOUNTER — TELEPHONE (OUTPATIENT)
Dept: FAMILY MEDICINE | Facility: CLINIC | Age: 88
End: 2023-08-17
Payer: COMMERCIAL

## 2023-08-17 NOTE — TELEPHONE ENCOUNTER
Pt called and stated she needs a pre -op physical before 09/06/2023 . Pt was wondering if you can get her in on 09/01/2023 for same day pt has surgery on 09/06/2023.    Ora Sullivan TC

## 2023-08-17 NOTE — TELEPHONE ENCOUNTER
Okay to schedule patient in same-day slot.    Merrill Patten MD     [facility]  [address] [address] [place] [id number]  [phone]      March 12, 2017    Dottie Webb  [address] [facility] [address]    Dear Dottie,  We are happy to inform you that your PAP smear result from 3/7/17 is normal.  We are now able to do a follow up test on PAP smears. The DNA test is for HPV (Human Papilloma Virus). Cervical cancer is closely linked with certain types of HPV. Your result showed no evidence of high risk HPV.  Therefore we recommend you return in 3 years for your next pap smear.  You will still need to return to the clinic every year for an annual exam and other preventive tests.  Please contact the clinic with any questions.  Sincerely,  Iraida Stark MD/arley

## 2023-08-22 ENCOUNTER — MEDICAL CORRESPONDENCE (OUTPATIENT)
Dept: HEALTH INFORMATION MANAGEMENT | Facility: CLINIC | Age: 88
End: 2023-08-22
Payer: COMMERCIAL

## 2023-08-24 ENCOUNTER — OFFICE VISIT (OUTPATIENT)
Dept: FAMILY MEDICINE | Facility: CLINIC | Age: 88
End: 2023-08-24
Payer: COMMERCIAL

## 2023-08-24 ENCOUNTER — ANCILLARY PROCEDURE (OUTPATIENT)
Dept: GENERAL RADIOLOGY | Facility: CLINIC | Age: 88
End: 2023-08-24
Attending: FAMILY MEDICINE
Payer: COMMERCIAL

## 2023-08-24 ENCOUNTER — TELEPHONE (OUTPATIENT)
Dept: FAMILY MEDICINE | Facility: CLINIC | Age: 88
End: 2023-08-24

## 2023-08-24 VITALS
SYSTOLIC BLOOD PRESSURE: 183 MMHG | DIASTOLIC BLOOD PRESSURE: 78 MMHG | WEIGHT: 160.8 LBS | HEART RATE: 67 BPM | TEMPERATURE: 98.8 F | BODY MASS INDEX: 27.6 KG/M2 | OXYGEN SATURATION: 98 %

## 2023-08-24 DIAGNOSIS — M48.062 SPINAL STENOSIS OF LUMBAR REGION WITH NEUROGENIC CLAUDICATION: ICD-10-CM

## 2023-08-24 DIAGNOSIS — E78.5 HYPERLIPIDEMIA WITH TARGET LDL LESS THAN 100: ICD-10-CM

## 2023-08-24 DIAGNOSIS — Z01.818 PREOP GENERAL PHYSICAL EXAM: ICD-10-CM

## 2023-08-24 DIAGNOSIS — I25.10 CORONARY ARTERY DISEASE INVOLVING NATIVE CORONARY ARTERY OF NATIVE HEART WITHOUT ANGINA PECTORIS: ICD-10-CM

## 2023-08-24 DIAGNOSIS — Z01.818 PREOP GENERAL PHYSICAL EXAM: Primary | ICD-10-CM

## 2023-08-24 DIAGNOSIS — I10 HYPERTENSION GOAL BP (BLOOD PRESSURE) < 140/90: ICD-10-CM

## 2023-08-24 DIAGNOSIS — N18.31 CHRONIC KIDNEY DISEASE, STAGE 3A (H): ICD-10-CM

## 2023-08-24 LAB
ALBUMIN UR-MCNC: 100 MG/DL
ANION GAP SERPL CALCULATED.3IONS-SCNC: 12 MMOL/L (ref 7–15)
APPEARANCE UR: CLEAR
APTT PPP: 26 SECONDS (ref 22–38)
BACTERIA #/AREA URNS HPF: ABNORMAL /HPF
BILIRUB UR QL STRIP: NEGATIVE
BUN SERPL-MCNC: 13.7 MG/DL (ref 8–23)
CALCIUM SERPL-MCNC: 9.7 MG/DL (ref 8.8–10.2)
CHLORIDE SERPL-SCNC: 105 MMOL/L (ref 98–107)
CHOLEST SERPL-MCNC: 199 MG/DL
COLOR UR AUTO: YELLOW
CREAT SERPL-MCNC: 0.88 MG/DL (ref 0.51–0.95)
CREAT UR-MCNC: 52.5 MG/DL
DEPRECATED HCO3 PLAS-SCNC: 24 MMOL/L (ref 22–29)
ERYTHROCYTE [DISTWIDTH] IN BLOOD BY AUTOMATED COUNT: 14.4 % (ref 10–15)
GFR SERPL CREATININE-BSD FRML MDRD: 63 ML/MIN/1.73M2
GLUCOSE SERPL-MCNC: 97 MG/DL (ref 70–99)
GLUCOSE UR STRIP-MCNC: NEGATIVE MG/DL
HCT VFR BLD AUTO: 39.6 % (ref 35–47)
HDLC SERPL-MCNC: 50 MG/DL
HGB BLD-MCNC: 12.9 G/DL (ref 11.7–15.7)
HGB UR QL STRIP: ABNORMAL
INR PPP: 0.99 (ref 0.85–1.15)
KETONES UR STRIP-MCNC: NEGATIVE MG/DL
LDLC SERPL CALC-MCNC: 102 MG/DL
LEUKOCYTE ESTERASE UR QL STRIP: NEGATIVE
MCH RBC QN AUTO: 29.4 PG (ref 26.5–33)
MCHC RBC AUTO-ENTMCNC: 32.6 G/DL (ref 31.5–36.5)
MCV RBC AUTO: 90 FL (ref 78–100)
MICROALBUMIN UR-MCNC: 378 MG/L
MICROALBUMIN/CREAT UR: 720 MG/G CR (ref 0–25)
NITRATE UR QL: NEGATIVE
NONHDLC SERPL-MCNC: 149 MG/DL
PH UR STRIP: 6 [PH] (ref 5–7)
PLATELET # BLD AUTO: 309 10E3/UL (ref 150–450)
POTASSIUM SERPL-SCNC: 4.4 MMOL/L (ref 3.4–5.3)
RBC # BLD AUTO: 4.39 10E6/UL (ref 3.8–5.2)
RBC #/AREA URNS AUTO: ABNORMAL /HPF
SODIUM SERPL-SCNC: 141 MMOL/L (ref 136–145)
SP GR UR STRIP: 1.01 (ref 1–1.03)
SQUAMOUS #/AREA URNS AUTO: ABNORMAL /LPF
TRIGL SERPL-MCNC: 237 MG/DL
UROBILINOGEN UR STRIP-ACNC: 0.2 E.U./DL
WBC # BLD AUTO: 8.8 10E3/UL (ref 4–11)
WBC #/AREA URNS AUTO: ABNORMAL /HPF

## 2023-08-24 PROCEDURE — 85610 PROTHROMBIN TIME: CPT | Performed by: FAMILY MEDICINE

## 2023-08-24 PROCEDURE — 82570 ASSAY OF URINE CREATININE: CPT | Performed by: FAMILY MEDICINE

## 2023-08-24 PROCEDURE — 80061 LIPID PANEL: CPT | Performed by: FAMILY MEDICINE

## 2023-08-24 PROCEDURE — 85027 COMPLETE CBC AUTOMATED: CPT | Performed by: FAMILY MEDICINE

## 2023-08-24 PROCEDURE — 82043 UR ALBUMIN QUANTITATIVE: CPT | Performed by: FAMILY MEDICINE

## 2023-08-24 PROCEDURE — 85730 THROMBOPLASTIN TIME PARTIAL: CPT | Performed by: FAMILY MEDICINE

## 2023-08-24 PROCEDURE — 71046 X-RAY EXAM CHEST 2 VIEWS: CPT | Mod: TC | Performed by: RADIOLOGY

## 2023-08-24 PROCEDURE — 99214 OFFICE O/P EST MOD 30 MIN: CPT | Performed by: FAMILY MEDICINE

## 2023-08-24 PROCEDURE — 80048 BASIC METABOLIC PNL TOTAL CA: CPT | Performed by: FAMILY MEDICINE

## 2023-08-24 PROCEDURE — 93000 ELECTROCARDIOGRAM COMPLETE: CPT | Performed by: FAMILY MEDICINE

## 2023-08-24 PROCEDURE — 36415 COLL VENOUS BLD VENIPUNCTURE: CPT | Performed by: FAMILY MEDICINE

## 2023-08-24 PROCEDURE — 81001 URINALYSIS AUTO W/SCOPE: CPT | Performed by: FAMILY MEDICINE

## 2023-08-24 RX ORDER — VITAMIN B COMPLEX
1000 TABLET ORAL
COMMUNITY

## 2023-08-24 NOTE — PROGRESS NOTES
[facility]  [address]  [place] [address]  Phone: [phone]  Primary Provider: Merrill Patten  Pre-op Performing Provider: DOROTHY TOVAR      PREOPERATIVE EVALUATION:  Today's date: 8/24/2023    Carolin Mcbride is a 88 year old female who presents for a preoperative evaluation.      8/24/2023    10:34 AM   Additional Questions   Roomed by zak       Surgical Information:  Surgery/Procedure: REVISION DECOMPRESSION LUMBER 3-4 WITH LUMBAR INTRABODY FUSION   Surgery Location: [facility]  Surgeon: Dr. Orozco  Surgery Date: 8/29/23  Time of Surgery: 12:30pm  Where patient plans to recover: At home with Home Care  Fax number for surgical facility: 1 [phone]     Assessment & Plan     The proposed surgical procedure is considered INTERMEDIATE risk.    .    ICD-10-CM    1. Preop general physical exam  Z01.818 BASIC METABOLIC PANEL     CBC with platelets     UA Macroscopic with reflex to Microscopic and Culture     INR     Partial thromboplastin time     EKG 12-lead complete w/read - Clinics     XR Chest 2 Views      2. Spinal stenosis of lumbar region with neurogenic claudication  M48.062       3. Hypertension goal BP (blood pressure) < 140/90  I10       4. Chronic kidney disease, stage 3a (H)  N18.31 Albumin Random Urine Quantitative with Creat Ratio      5. Coronary artery disease involving native coronary artery of native heart without angina pectoris  I25.10       6. Hyperlipidemia with target LDL less than 100  E78.5 Lipid panel reflex to direct LDL Non-fasting        She has not started holding her low-dose aspirin yet, but will do so now.  I also advised her to hold her losartan on the morning of surgery.  Her blood pressure is noted to be elevated today and has been recently, but she attributes that to the pain.  She is already on 3 blood pressure lowering agents, 2 of them at maximal dosing, so we will keep them going for now.     - No identified additional risk factors  other than previously addressed    Antiplatelet or Anticoagulation Medication Instructions:   - aspirin: Discontinue aspirin 7-10 days prior to procedure to reduce bleeding risk. It should be resumed postoperatively.     Additional Medication Instructions:  Patient is to take all scheduled medications on the day of surgery EXCEPT for modifications listed below:   - ACE/ARB: HOLD on day of surgery (minimum 11 hours for general anesthesia).    RECOMMENDATION:  APPROVAL GIVEN to proceed with proposed procedure, without further diagnostic evaluation.        Subjective       HPI related to upcoming procedure: 88-year-old female with a history of CAD has been having right low back and leg pain for the last month and a half.  Her right leg feels weak as well.  She is using a walker to get around.  She has been found to have lumbar spinal stenosis and is coming in now for surgical treatment of this.        8/24/2023    10:23 AM   Preop Questions   1. Have you ever had a heart attack or stroke? No   2. Have you ever had surgery on your heart or blood vessels, such as a stent placement, a coronary artery bypass, or surgery on an artery in your head, neck, heart, or legs? YES - she has had coronary artery stenting   3. Do you have chest pain with activity? No   4. Do you have a history of  heart failure? No   5. Do you currently have a cold, bronchitis or symptoms of other infection? No   6. Do you have a cough, shortness of breath, or wheezing? No   7. Do you or anyone in your family have previous history of blood clots? No   8. Do you or does anyone in your family have a serious bleeding problem such as prolonged bleeding following surgeries or cuts? No   9. Have you ever had problems with anemia or been told to take iron pills? No   10. Have you had any abnormal blood loss such as black, tarry or bloody stools, or abnormal vaginal bleeding? No   11. Have you ever had a blood transfusion? No   12. Are you willing to have a  blood transfusion if it is medically needed before, during, or after your surgery? Yes   13. Have you or any of your relatives ever had problems with anesthesia? No   14. Do you have sleep apnea, excessive snoring or daytime drowsiness? No   15. Do you have any artifical heart valves or other implanted medical devices like a pacemaker, defibrillator, or continuous glucose monitor? No   16. Do you have artificial joints? No   17. Are you allergic to latex? No       Status of Chronic Conditions:  See problem list for active medical problems.  Problems all longstanding and stable, except as noted/documented.  See ROS for pertinent symptoms related to these conditions.    Review of Systems  CONSTITUTIONAL: NEGATIVE for fever, chills, change in weight  INTEGUMENTARY/SKIN: NEGATIVE for worrisome rashes, moles or lesions  EYES: NEGATIVE for vision changes or irritation  ENT/MOUTH: NEGATIVE for ear, mouth and throat problems  RESP: NEGATIVE for significant cough or SOB  CV: NEGATIVE for chest pain, palpitations or peripheral edema  GI: NEGATIVE for nausea, abdominal pain, heartburn, or change in bowel habits  : NEGATIVE for frequency, dysuria, or hematuria  MUSCULOSKELETAL: See above for right leg pain  NEURO: See above for right leg pain and weakness  ENDOCRINE: NEGATIVE for temperature intolerance, skin/hair changes  HEME: NEGATIVE for bleeding problems  PSYCHIATRIC: NEGATIVE for changes in mood or affect    Patient Active Problem List    Diagnosis Date Noted    Dermatochalasis of both upper eyelids 09/14/2022     Priority: Medium     Added automatically from request for surgery [id number]      Involutional ptosis, acquired, bilateral 09/14/2022     Priority: Medium     Added automatically from request for surgery [id number]      Statin intolerance 08/10/2021     Priority: Medium    Status post coronary angiogram 12/08/2020     Priority: Medium    Dyspnea on exertion 11/24/2020     Priority: Medium     Added automatically  from request for surgery [id number]      Abnormal nuclear cardiac imaging test 11/24/2020     Priority: Medium     Added automatically from request for surgery [id number]      Microscopic hematuria 08/11/2020     Priority: Medium    Proteinuria, unspecified type 08/11/2020     Priority: Medium    Leg weakness, bilateral 04/13/2020     Priority: Medium    Recurrent and persistent hematuria 10/31/2019     Priority: Medium    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 minimally only for Tree pollens (all other environmental allergens NEGATIVE)      Diagnostic skin and sensitization tests (aka ALLERGENS)      Priority: Medium    CKD (chronic kidney disease) stage 3, GFR 30-59 ml/min (H) 05/22/2014     Priority: Medium    Hyperlipidemia with target LDL less than 100 04/08/2013     Priority: Medium     Intolerant to multiple statin medications. Zetia prescribed.       Osteoporosis      Priority: Medium     5 years Fosamax 1-1-2011 to 12-. Medication holiday recommended.        Hypertension goal BP (blood pressure) < 140/90 06/26/2012     Priority: Medium    Advance care planning 03/13/2012     Priority: Medium     Advance Care Planning 12/20/2016: ACP Review of Chart / Resources Provided:  Reviewed chart for advance care plan.  Carolin LIAT Mcbride has been provided information and resources to begin or update their advance care plan.  Advance Care Planning 03/13/2012: Discussed advance care planning with patient; information given to patient to review. 3/13/2012 . Lara Roque MA                 CAD (coronary artery disease)      Priority: Medium     Stenting of 2 arteries and balloon  angioplasty 12-8-2020. Dual antiplatelet therapy recommended for 2 years.       Pseudophakia OU c YAG OU 06/02/2011     Priority: Medium      Past Medical History:   Diagnosis Date    Adjustment reaction with anxiety and depression 01/01/2001    CAD (coronary artery disease) 01/01/2009    asymptomatic, on CT scan    Cyst, ovarian     Diagnostic skin and sensitization tests (aka ALLERGENS) 9/29/14 IgE tests pos. minimally only for Tree pollens (all other environmental allergens NEGATIVE)    HTN (hypertension)     Hyperlipidemia     Myositis 01/01/2001    related to past Baycol use- resolved    Osteoporosis     Seasonal allergic rhinitis     9/29/14 IgE tests pos. minimally only for Tree pollens (all other environmental allergens NEGATIVE)    Stented coronary artery      Past Surgical History:   Procedure Laterality Date    APPENDECTOMY      CHOLECYSTECTOMY, OPEN      COLONOSCOPY  2009    neg    CV CORONARY ANGIOGRAM N/A 12/8/2020    Procedure: CV CORONARY ANGIOGRAM;  Surgeon: Isra Weber MD;  Location:  [facility]    CV PCI STENT DRUG ELUTING N/A 12/8/2020    Procedure: Percutaneous Coronary Intervention Stent Drug Eluting;  Surgeon: Isra Weber MD;  Location:  [facility]    CYSTOCELE REPAIR  2003    TVT SPARC     Current Outpatient Medications   Medication Sig Dispense Refill    amLODIPine (NORVASC) 10 MG tablet Take 1 tablet (10 mg) by mouth daily 90 tablet 1    aspirin 81 MG tablet Take 1 tablet (81 mg) by mouth daily 90 tablet 3    citalopram (CELEXA) 10 MG tablet Take 1 tablet by mouth once daily 90 tablet 0    docusate sodium (COLACE) 100 MG capsule Take 1 capsule (100 mg) by mouth 3 times daily as needed for constipation 30 capsule 1    evolocumab (REPATHA) 140 MG/ML prefilled autoinjector Inject 1 mL (140 mg) Subcutaneous every 14 days 6 mL 3    HYDROcodone-acetaminophen (NORCO) 5-325 MG tablet Take 1 tablet by mouth every 6 hours as needed for  "pain 15 tablet 0    Hypromellose (ARTIFICIAL TEARS OP) Apply 1 drop to eye as needed Both eyes.      losartan (COZAAR) 100 MG tablet Take 1 tablet by mouth once daily 90 tablet 0    metoprolol succinate ER (TOPROL XL) 25 MG 24 hr tablet Take 1 tablet (25 mg) by mouth daily 90 tablet 3    nitroGLYcerin (NITROSTAT) 0.4 MG sublingual tablet One tablet under the tongue every 5 minutes if needed for chest pain. May repeat every 5 minutes for a maximum of 3 doses in 15 minutes\" 25 tablet 0    polyethylene glycol (MIRALAX) 17 GM/Dose powder Take 17 g (1 capful) by mouth daily 850 g 6    Vitamin D3 (CHOLECALCIFEROL) 25 mcg (1000 units) tablet Take 1,000 Units by mouth      gabapentin (NEURONTIN) 100 MG capsule Take 100 mg by mouth (Patient not taking: Reported on 8/24/2023)      STATIN NOT PRESCRIBED (INTENTIONAL) Please choose reason not prescribed from choices below. (Patient not taking: Reported on 7/13/2023)      tiZANidine (ZANAFLEX) 2 MG tablet Take 2 mg by mouth every 8 hours as needed (Patient not taking: Reported on 8/24/2023)      tiZANidine (ZANAFLEX) 4 MG capsule Take 1 capsule (4 mg) by mouth nightly as needed for muscle spasms (Patient not taking: Reported on 8/24/2023) 30 capsule 0    tiZANidine (ZANAFLEX) 4 MG tablet Take 1 tablet (4 mg) by mouth nightly as needed for muscle spasms (Patient not taking: Reported on 8/24/2023) 30 tablet 0       Allergies   Allergen Reactions    Baycol [Cerivastatin Sodium]       muscle mass loss      Crestor [Statins]      Also intolerant of simvastatin and atorvastatin with recurrent leg weakness and pain.    Darvocet [Acetaminophen]      Severe nausea    Macrobid [Nitrofuran Derivatives]      Hives        Social History     Tobacco Use    Smoking status: Never     Passive exposure: Never    Smokeless tobacco: Never    Tobacco comments:     Never smoked; nonsmoking household   Substance Use Topics    Alcohol use: No     Comment: very rare     Family History   Problem Relation " Age of Onset    Cancer Mother         ovarian     Heart Disease Sister         CABG x 3    Neurologic Disorder Sister         Fibromyalgia    Cancer Father         stomach/interstines     Musculoskeletal Disorder Brother         back     Heart Disease Brother 60        CABG x 4    Glaucoma No family hx of     Macular Degeneration No family hx of      History   Drug Use No     Comment: never         Objective     BP (!) 183/78 (BP Location: Left arm, Patient Position: Sitting, Cuff Size: Adult Regular)   Pulse 67   Temp 98.8  F (37.1  C) (Oral)   Wt 72.9 kg (160 lb 12.8 oz)   LMP  (LMP Unknown)   SpO2 98%   BMI 27.60 kg/m      Physical Exam    GENERAL APPEARANCE: alert, no distress, cooperative, and elderly     EYES: EOMI, PERRL     HENT: Grossly normal     NECK: no adenopathy, no asymmetry, masses, or scars and thyroid normal to palpation     RESP: lungs clear to auscultation - no rales, rhonchi or wheezes     CV: regular rates and rhythm, normal S1 S2, no S3 or S4, 2/6 systolic murmur     ABDOMEN:  soft, nontender, no HSM or masses      MS: no gross deformities of extremities noted, no evidence of inflammation in joints     SKIN: no suspicious lesions or rashes     NEURO: Mild right leg weakness, mentation intact and speech normal     PSYCH: mentation appears normal and affect normal/bright     LYMPHATICS: No cervical adenopathy    Recent Labs   Lab Test 09/19/22  0936 06/07/22  1604 02/01/22  0938 12/06/21  1544   HGB  --   --   --  13.0   PLT  --   --   --  300    143   < > 138   POTASSIUM 4.6 4.2   < > 4.3   CR 1.03 1.05*   < > 0.98    < > = values in this interval not displayed.        Diagnostics:  LABS:   Office Visit on 08/24/2023   Component Date Value Ref Range Status    Creatinine Urine mg/dL 08/24/2023 52.5  mg/dL Final    The reference ranges have not been established in urine creatinine. The results should be integrated into the clinical context for interpretation.    Albumin Urine mg/L  08/24/2023 378.0  mg/L Final    The reference ranges have not been established in urine albumin. The results should be integrated into the clinical context for interpretation.    Albumin Urine mg/g Cr 08/24/2023 720.00 (H)  0.00 - 25.00 mg/g Cr Final    Microalbuminuria is defined as an albumin:creatinine ratio of 17 to 299 for males and 25 to 299 for females. A ratio of albumin:creatinine of 300 or higher is indicative of overt proteinuria.  Due to biologic variability, positive results should be confirmed by a second, first-morning random or 24-hour timed urine specimen. If there is discrepancy, a third specimen is recommended. When 2 out of 3 results are in the microalbuminuria range, this is evidence for incipient nephropathy and warrants increased efforts at glucose control, blood pressure control, and institution of therapy with an angiotensin-converting-enzyme (ACE) inhibitor (if the patient can tolerate it).      Sodium 08/24/2023 141  136 - 145 mmol/L Final    Potassium 08/24/2023 4.4  3.4 - 5.3 mmol/L Final    Chloride 08/24/2023 105  98 - 107 mmol/L Final    Carbon Dioxide (CO2) 08/24/2023 24  22 - 29 mmol/L Final    Anion Gap 08/24/2023 12  7 - 15 mmol/L Final    Urea Nitrogen 08/24/2023 13.7  8.0 - 23.0 mg/dL Final    Creatinine 08/24/2023 0.88  0.51 - 0.95 mg/dL Final    Calcium 08/24/2023 9.7  8.8 - 10.2 mg/dL Final    Glucose 08/24/2023 97  70 - 99 mg/dL Final    GFR Estimate 08/24/2023 63  >60 mL/min/1.73m2 Final    Cholesterol 08/24/2023 199  <200 mg/dL Final    Triglycerides 08/24/2023 237 (H)  <150 mg/dL Final    Direct Measure HDL 08/24/2023 50  >=50 mg/dL Final    LDL Cholesterol Calculated 08/24/2023 102 (H)  <=100 mg/dL Final    Non HDL Cholesterol 08/24/2023 149 (H)  <130 mg/dL Final    WBC Count 08/24/2023 8.8  4.0 - 11.0 10e3/uL Final    RBC Count 08/24/2023 4.39  3.80 - 5.20 10e6/uL Final    Hemoglobin 08/24/2023 12.9  11.7 - 15.7 g/dL Final    Hematocrit 08/24/2023 39.6  35.0 - 47.0 %  Final    MCV 08/24/2023 90  78 - 100 fL Final    MCH 08/24/2023 29.4  26.5 - 33.0 pg Final    MCHC 08/24/2023 32.6  31.5 - 36.5 g/dL Final    RDW 08/24/2023 14.4  10.0 - 15.0 % Final    Platelet Count 08/24/2023 309  150 - 450 10e3/uL Final    Color Urine 08/24/2023 Yellow  Colorless, Straw, Light Yellow, Yellow Final    Appearance Urine 08/24/2023 Clear  Clear Final    Glucose Urine 08/24/2023 Negative  Negative mg/dL Final    Bilirubin Urine 08/24/2023 Negative  Negative Final    Ketones Urine 08/24/2023 Negative  Negative mg/dL Final    Specific Gravity Urine 08/24/2023 1.010  1.003 - 1.035 Final    Blood Urine 08/24/2023 Trace (A)  Negative Final    pH Urine 08/24/2023 6.0  5.0 - 7.0 Final    Protein Albumin Urine 08/24/2023 100 (A)  Negative mg/dL Final    Urobilinogen Urine 08/24/2023 0.2  0.2, 1.0 E.U./dL Final    Nitrite Urine 08/24/2023 Negative  Negative Final    Leukocyte Esterase Urine 08/24/2023 Negative  Negative Final    INR 08/24/2023 0.99  0.85 - 1.15 Final    aPTT 08/24/2023 26  22 - 38 Seconds Final    Bacteria Urine 08/24/2023 Few (A)  None Seen /HPF Final    RBC Urine 08/24/2023 2-5 (A)  0-2 /HPF /HPF Final    WBC Urine 08/24/2023 0-5  0-5 /HPF /HPF Final    Squamous Epithelials Urine 08/24/2023 Few (A)  None Seen /LPF Final     EKG: appears normal, NSR, normal axis, normal intervals, no acute ST/T changes c/w ischemia, no LVH by voltage criteria    Recent Results (from the past 744 hour(s))   XR Chest 2 Views    Narrative    XR CHEST 2 VIEWS 8/24/2023 12:00 PM    HISTORY: Routine preop chest x-ray prior to lumbar decompression for  spinal stenosis; Preop general physical exam    COMPARISON: 11/20/20      Impression    IMPRESSION: Mild interstitial opacities in the lung bases may be due  to mild pulmonary edema, pneumonia or pulmonary fibrosis. No pleural  effusion or pneumothorax. Stable mild cardiomegaly. Tortuous aortic  arch with atherosclerotic ossifications. Coronary stents.    LEAH NEGRON  MD KRISTINE         SYSTEM ID:  [id number]         Revised Cardiac Risk Index (RCRI):  The patient has the following serious cardiovascular risks for perioperative complications:   - Coronary Artery Disease (MI, positive stress test, angina, Qs on EKG) = 1 point     RCRI Interpretation: 1 point: Class II (low risk - 0.9% complication rate)         Signed Electronically by: Ethan Carter MD  Copy of this evaluation report is provided to requesting physician.

## 2023-08-24 NOTE — TELEPHONE ENCOUNTER
Patient Quality Outreach    Patient is due for the following:   Physical Annual Wellness Visit    Next Steps:   See below    Type of outreach:    Phone, spoke to patient/parent. Discussed HM due  and patient will schedule with there Primary.    Next Steps:  Reach out within 90 days via  done .    Max number of attempts reached: Yes. Will try again in 90 days if patient still on fail list.    Questions for provider review:    None           Cass Ferrari, [facility]  Chart routed to closing encounter.

## 2023-08-24 NOTE — LETTER
August 25, 2023    Carolin Mcbride  [address] [address]          Dear ,    We are writing to inform you of your test results.  Your lab results, EKG, and chest x-ray all look acceptable for your planned spine surgery this coming week.  Hope the surgery goes well for you!       Resulted Orders   Albumin Random Urine Quantitative with Creat Ratio   Result Value Ref Range    Creatinine Urine mg/dL 52.5 mg/dL      Comment:      The reference ranges have not been established in urine creatinine. The results should be integrated into the clinical context for interpretation.    Albumin Urine mg/L 378.0 mg/L      Comment:      The reference ranges have not been established in urine albumin. The results should be integrated into the clinical context for interpretation.    Albumin Urine mg/g Cr 720.00 (H) 0.00 - 25.00 mg/g Cr      Comment:      Microalbuminuria is defined as an albumin:creatinine ratio of 17 to 299 for males and 25 to 299 for females. A ratio of albumin:creatinine of 300 or higher is indicative of overt proteinuria.  Due to biologic variability, positive results should be confirmed by a second, first-morning random or 24-hour timed urine specimen. If there is discrepancy, a third specimen is recommended. When 2 out of 3 results are in the microalbuminuria range, this is evidence for incipient nephropathy and warrants increased efforts at glucose control, blood pressure control, and institution of therapy with an angiotensin-converting-enzyme (ACE) inhibitor (if the patient can tolerate it).     BASIC METABOLIC PANEL   Result Value Ref Range    Sodium 141 136 - 145 mmol/L    Potassium 4.4 3.4 - 5.3 mmol/L    Chloride 105 98 - 107 mmol/L    Carbon Dioxide (CO2) 24 22 - 29 mmol/L    Anion Gap 12 7 - 15 mmol/L    Urea Nitrogen 13.7 8.0 - 23.0 mg/dL    Creatinine 0.88 0.51 - 0.95 mg/dL    Calcium 9.7 8.8 - 10.2 mg/dL    Glucose 97 70 - 99 mg/dL    GFR Estimate 63 >60 mL/min/1.73m2   Lipid  panel reflex to direct LDL Non-fasting   Result Value Ref Range    Cholesterol 199 <200 mg/dL    Triglycerides 237 (H) <150 mg/dL    Direct Measure HDL 50 >=50 mg/dL    LDL Cholesterol Calculated 102 (H) <=100 mg/dL    Non HDL Cholesterol 149 (H) <130 mg/dL    Narrative    Cholesterol  Desirable:  <200 mg/dL    Triglycerides  Normal:  Less than 150 mg/dL  Borderline High:  150-199 mg/dL  High:  200-499 mg/dL  Very High:  Greater than or equal to 500 mg/dL    Direct Measure HDL  Female:  Greater than or equal to 50 mg/dL   Male:  Greater than or equal to 40 mg/dL    LDL Cholesterol  Desirable:  <100mg/dL  Above Desirable:  100-129 mg/dL   Borderline High:  130-159 mg/dL   High:  160-189 mg/dL   Very High:  >= 190 mg/dL    Non HDL Cholesterol  Desirable:  130 mg/dL  Above Desirable:  130-159 mg/dL  Borderline High:  160-189 mg/dL  High:  190-219 mg/dL  Very High:  Greater than or equal to 220 mg/dL   CBC with platelets   Result Value Ref Range    WBC Count 8.8 4.0 - 11.0 10e3/uL    RBC Count 4.39 3.80 - 5.20 10e6/uL    Hemoglobin 12.9 11.7 - 15.7 g/dL    Hematocrit 39.6 35.0 - 47.0 %    MCV 90 78 - 100 fL    MCH 29.4 26.5 - 33.0 pg    MCHC 32.6 31.5 - 36.5 g/dL    RDW 14.4 10.0 - 15.0 %    Platelet Count 309 150 - 450 10e3/uL   UA Macroscopic with reflex to Microscopic and Culture   Result Value Ref Range    Color Urine Yellow Colorless, Straw, Light Yellow, Yellow    Appearance Urine Clear Clear    Glucose Urine Negative Negative mg/dL    Bilirubin Urine Negative Negative    Ketones Urine Negative Negative mg/dL    Specific Gravity Urine 1.010 1.003 - 1.035    Blood Urine Trace (A) Negative    pH Urine 6.0 5.0 - 7.0    Protein Albumin Urine 100 (A) Negative mg/dL    Urobilinogen Urine 0.2 0.2, 1.0 E.U./dL    Nitrite Urine Negative Negative    Leukocyte Esterase Urine Negative Negative   INR   Result Value Ref Range    INR 0.99 0.85 - 1.15   Partial thromboplastin time   Result Value Ref Range    aPTT 26 22 - 38  Seconds   UA Microscopic with Reflex to Culture   Result Value Ref Range    Bacteria Urine Few (A) None Seen /HPF    RBC Urine 2-5 (A) 0-2 /HPF /HPF    WBC Urine 0-5 0-5 /HPF /HPF    Squamous Epithelials Urine Few (A) None Seen /LPF    Narrative    Urine Culture not indicated       If you have any questions or concerns, please call the clinic at the number listed above.       Sincerely,      Ethan Carter MD

## 2023-08-25 NOTE — RESULT ENCOUNTER NOTE
Carolin,  Your lab results, EKG, and chest x-ray all look acceptable for your planned spine surgery this coming week.  Hope the surgery goes well for you!    Ethan Carter MD

## 2023-09-06 ENCOUNTER — MEDICAL CORRESPONDENCE (OUTPATIENT)
Dept: HEALTH INFORMATION MANAGEMENT | Facility: CLINIC | Age: 88
End: 2023-09-06

## 2023-09-08 ENCOUNTER — MEDICAL CORRESPONDENCE (OUTPATIENT)
Dept: HEALTH INFORMATION MANAGEMENT | Facility: CLINIC | Age: 88
End: 2023-09-08
Payer: COMMERCIAL

## 2023-09-14 ENCOUNTER — TELEPHONE (OUTPATIENT)
Dept: FAMILY MEDICINE | Facility: CLINIC | Age: 88
End: 2023-09-14
Payer: COMMERCIAL

## 2023-09-14 ENCOUNTER — MEDICAL CORRESPONDENCE (OUTPATIENT)
Dept: HEALTH INFORMATION MANAGEMENT | Facility: CLINIC | Age: 88
End: 2023-09-14
Payer: COMMERCIAL

## 2023-09-14 DIAGNOSIS — F33.0 MAJOR DEPRESSIVE DISORDER, RECURRENT EPISODE, MILD (H): ICD-10-CM

## 2023-09-14 RX ORDER — CITALOPRAM HYDROBROMIDE 20 MG/1
20 TABLET ORAL DAILY
Qty: 90 TABLET | Refills: 1 | Status: SHIPPED | OUTPATIENT
Start: 2023-09-14 | End: 2023-12-07

## 2023-09-14 NOTE — TELEPHONE ENCOUNTER
RN called and relayed message from provider to YARA Wilcox.     She verbalized understanding and denies any further requests at this time.     Fatou Douglas, BSN, RN  [facility]  Nurse Triage, Family Practice

## 2023-09-14 NOTE — TELEPHONE ENCOUNTER
Please let RN know that citalopram was increased from 10 mg daily to 20 mg daily. New dose sent to pharmacy.    Merrill Patten MD

## 2023-09-14 NOTE — TELEPHONE ENCOUNTER
Jeri LIVINGSTON from [facility] calling on behalf of patient. Patient would like to ask if provider would approve increasing her Celexa from 10 mg once daily to 20 mg once daily.    May call Jeri LIVINGSTON at [phone] on confidential voicemail line.     Routing to provider to review and advise.     DL MontelongoN, RN, PHN  [facility]

## 2023-09-19 ENCOUNTER — MEDICAL CORRESPONDENCE (OUTPATIENT)
Dept: HEALTH INFORMATION MANAGEMENT | Facility: CLINIC | Age: 88
End: 2023-09-19
Payer: COMMERCIAL

## 2023-09-26 ENCOUNTER — OFFICE VISIT (OUTPATIENT)
Dept: FAMILY MEDICINE | Facility: CLINIC | Age: 88
End: 2023-09-26
Payer: COMMERCIAL

## 2023-09-26 ENCOUNTER — TELEPHONE (OUTPATIENT)
Dept: FAMILY MEDICINE | Facility: CLINIC | Age: 88
End: 2023-09-26

## 2023-09-26 VITALS
BODY MASS INDEX: 27.49 KG/M2 | SYSTOLIC BLOOD PRESSURE: 134 MMHG | WEIGHT: 161 LBS | RESPIRATION RATE: 14 BRPM | TEMPERATURE: 98.3 F | HEART RATE: 63 BPM | DIASTOLIC BLOOD PRESSURE: 72 MMHG | HEIGHT: 64 IN | OXYGEN SATURATION: 99 %

## 2023-09-26 DIAGNOSIS — Z23 ENCOUNTER FOR IMMUNIZATION: ICD-10-CM

## 2023-09-26 DIAGNOSIS — Z98.1 S/P LUMBAR FUSION: ICD-10-CM

## 2023-09-26 DIAGNOSIS — M54.16 LUMBAR RADICULOPATHY: Primary | ICD-10-CM

## 2023-09-26 PROCEDURE — G0008 ADMIN INFLUENZA VIRUS VAC: HCPCS | Performed by: FAMILY MEDICINE

## 2023-09-26 PROCEDURE — 90662 IIV NO PRSV INCREASED AG IM: CPT | Performed by: FAMILY MEDICINE

## 2023-09-26 PROCEDURE — 99214 OFFICE O/P EST MOD 30 MIN: CPT | Mod: 25 | Performed by: FAMILY MEDICINE

## 2023-09-26 RX ORDER — PREGABALIN 50 MG/1
50 CAPSULE ORAL 2 TIMES DAILY
Qty: 60 CAPSULE | Refills: 11 | Status: SHIPPED | OUTPATIENT
Start: 2023-09-26 | End: 2024-04-15

## 2023-09-26 ASSESSMENT — PAIN SCALES - GENERAL: PAINLEVEL: EXTREME PAIN (8)

## 2023-09-26 NOTE — TELEPHONE ENCOUNTER
Please let pharmacist know that patient stated she is not taking gabapentin. Okay to fill the Lyrica. Okay to take with Norco and tizanidine as needed. Please call patient to make sure she is not taking gabapentin.    Merrill Patten MD

## 2023-09-26 NOTE — TELEPHONE ENCOUNTER
Called pharmacist and relayed provider message below, pharmacist verbalized understanding, will fill Lyrica.    Also called patient and ensured she is not taking gabapentin - patient states she is NOT taking gabapentin and has no intention to. No further questions or concerns.      DL NavarreteN, RN  [facility]

## 2023-09-26 NOTE — PATIENT INSTRUCTIONS
At [facility], we strive to deliver an exceptional experience to you, every time we see you. If you receive a survey, please complete it as we do value your feedback.  If you have MyChart, you can expect to receive results automatically within 24 hours of their completion.  Your provider will send a note interpreting your results as well.   If you do not have MyChart, you should receive your results in about a week by mail.    Your care team:                            Family Medicine Internal Medicine   MD Edgardo Wilhelm MD Shantel Branch-Fleming, MD Srinivasa Vaka, MD Katya Belousova, PANICK Patten, MD Pediatrics   Kris Meyers, PAMD Pakr Romero MD Amelia Massimini APRN CNP   DALTON Camacho CNP, MD Charanya Pasupathi, MD Kathleen Widmer, NP coming October 2023 Same-Day (No follow up visit)    KYRA Howell PA coming Oct 2023     Clinic hours: Monday - Thursday 7 am-6 pm; Fridays 7 am-5 pm.   Urgent care: Monday - Friday 10 am- 8 pm; Saturday and Sunday 9 am-5 pm.    Clinic: [phone]       [facility]: Monday - Thursday 8 am - 7 pm; Friday 8 am - 6 pm  [facility]: [phone]

## 2023-09-26 NOTE — PROGRESS NOTES
"    Lata Coe is a 89 year old, presenting for the following health issues:  Hospital F/U        9/26/2023     1:22 PM   Additional Questions   Roomed by Gaurang   Accompanied by Spouse in waithing room       HPI     Hospital Follow-up Visit:    Hospital/Nursing Home/IP Rehab Facility:  [facility]  Date of Admission: 8/29/23 & 7/9/23  Date of Discharge: 9/2/23  Reason(s) for Admission: Back Surgery    Was your hospitalization related to COVID-19? No   Problems taking medications regularly:  None  Medication changes since discharge: None  Problems adhering to non-medication therapy:  None    Summary of hospitalization:  CareEverywhere information obtained and reviewed  Diagnostic Tests/Treatments reviewed.  Follow up needed: none  Other Healthcare Providers Involved in Patient s Care:         None  Update since discharge: right leg pain, \"prickly pain.\"     Plan of care communicated with patient         Review of Systems   Constitutional, HEENT, cardiovascular, pulmonary, GI, , musculoskeletal, neuro, skin, endocrine and psych systems are negative, except as otherwise noted.      Objective    /72 (BP Location: Left arm, Patient Position: Chair, Cuff Size: Adult Regular)   Pulse 63   Temp 98.3  F (36.8  C) (Oral)   Resp 14   Ht 1.626 m (5' 4\")   Wt 73 kg (161 lb)   LMP  (LMP Unknown)   SpO2 99%   BMI 27.64 kg/m    Body mass index is 27.64 kg/m .  Physical Exam   GENERAL: healthy, alert and no distress  NECK: no adenopathy, no asymmetry, masses, or scars and thyroid normal to palpation  RESP: lungs clear to auscultation - no rales, rhonchi or wheezes  CV: regular rate and rhythm, normal S1 S2, no S3 or S4, no murmur, click or rub, no peripheral edema and peripheral pulses strong  ABDOMEN: soft, nontender, no hepatosplenomegaly, no masses and bowel sounds normal  MS: no gross musculoskeletal defects noted, no edema    A/P:  (M54.16) Lumbar radiculopathy  (primary encounter diagnosis)  Comment: "   Plan: pregabalin (LYRICA) 50 MG capsule        Trial Lyrica 50 mg BID. If no improvements with symptoms, patient will follow up with Dr. Orozco.    (Z98.1) S/P lumbar fusion  Comment:   Plan: will follow up with Dr. Orozco. Currently doing physical therapy at [facility].    (Z23) Encounter for immunization  Comment:   Plan: INFLUENZA VACCINE 65+ (FLUZONE HD)            Merrill Patten MD

## 2023-09-26 NOTE — TELEPHONE ENCOUNTER
Reji Triana pharmacist, calling to follow up on prescription for Lyrica sent today.     Kamilah states that pt has been followed by [facility] and prescribed gabapentin, norco, and tizanidine.     Prior to dispensing Lyrica, Kamilah wants to verify the following information:  1. Provider is aware of these prescriptions from [facility]  2. Provider is aware of interaction between lyrica and meds above  3. Will provider be taking over management of pt's pain medications from [facility] provider?    Routing to provider.     Vilma Morales RN

## 2023-10-27 ENCOUNTER — NURSE TRIAGE (OUTPATIENT)
Dept: FAMILY MEDICINE | Facility: CLINIC | Age: 88
End: 2023-10-27

## 2023-10-27 ENCOUNTER — VIRTUAL VISIT (OUTPATIENT)
Dept: URGENT CARE | Facility: CLINIC | Age: 88
End: 2023-10-27
Payer: COMMERCIAL

## 2023-10-27 DIAGNOSIS — N39.0 ACUTE LOWER UTI: Primary | ICD-10-CM

## 2023-10-27 PROCEDURE — 99441 PR PHYSICIAN TELEPHONE EVALUATION 5-10 MIN: CPT | Mod: 95

## 2023-10-27 NOTE — PROGRESS NOTES
Assessment & Plan     Acute lower UTI  - cephALEXin (KEFLEX) 250 MG capsule; Take 1 capsule (250 mg) by mouth 3 times daily for 5 days    Carolin is unable to get into a clinic today to leave a urine sample.  We discussed that given her dysuria with sudden onset and no other concerning symptoms it is reasonable to start Keflex today.  If symptoms worsen or fail to improve, she will follow-up to be seen in person for further evaluation.    Return in about 3 days (around 10/30/2023) for visit with primary care provider or at urgent care if not improving.     Jenna Zaidi PA-C  [facility]    Subjective   Carolin Mcbride is a 89 year old who presents for the following health issues    HPI    Carolin presents via telephone for evaluation of a presumed urinary tract infection.  She is got burning with urination that began today.  No fever, nausea, vomiting, change in chronic back pain.  She is taking tizanidine but no other medications.    Review of Systems   ROS negative except as stated above.      Objective    Physical Exam   healthy, alert and no distress  PSYCH: Alert and oriented times 3; coherent speech, normal   rate and volume, able to articulate logical thoughts, able   to abstract reason, no tangential thoughts, no hallucinations   or delusions. Affect is normal and pleasant  RESP: No cough, no audible wheezing, able to talk in full sentences  Remainder of exam unable to be completed due to telephone visits    Call duration: 6 min  Provider location: offsite at home, [place]    No results found for any visits on 10/27/23.

## 2023-10-27 NOTE — TELEPHONE ENCOUNTER
Reason for call:  Patient reporting a symptom    Symptom or request: Bladder infection    Duration (how long have symptoms been present): just started    Have you been treated for this before? No    Additional comments: Burning with urination    Phone Number patient can be reached at:  Cell number on file:    Telephone Information:   Mobile [phone]       Best Time:  anytime    Can we leave a detailed message on this number:  YES    Call taken on 10/27/2023 at 12:03 PM by Karlene Barnard

## 2023-10-27 NOTE — TELEPHONE ENCOUNTER
"Patient calling in with moderate/severe pain with urination x1 episode.    Patient states that a few minutes ago today she went to go urinate and has moderate/severe pain with urination. Patient states it hurts during urination and then after urination, continued to have moderate/mild pain at rest. Felt more like a pressure now vs burning during urination. Patient denies any fever, blood in urine, retention, urgency or new flank pain. Not on any abx. Has not had UTI for \"many many\" years.    Patient requesting treatment for UTI.    Informed patient that in order to get treatment, does need a visit since it has been >1 month since she was last seen and these are new symptoms. Patient verbalized understanding. Unfortunately, no appts available today in dyad - would recommend UC. Patient states that she has prior commitments today and cannot physically make it in to UC, however can do virtual UC appt. V UC telephone appt made for later today, patient aware of appt time. No further questions or concerns.      DL NavarreteN, RN  [facility]     Reason for Disposition   Pain or burning with passing urine (urination) and female   Age > 50 years    Additional Information   Negative: Shock suspected (e.g., cold/pale/clammy skin, too weak to stand, low BP, rapid pulse)   Negative: Sounds like a life-threatening emergency to the triager   Negative: Shock suspected (e.g., cold/pale/clammy skin, too weak to stand, low BP, rapid pulse)   Negative: Sounds like a life-threatening emergency to the triager   Negative: Taking antibiotic for urinary tract infection (UTI)   Negative: Unable to urinate (or only a few drops) and bladder feels very full   Negative: Vomiting   Negative: Patient sounds very sick or weak to the triager   Negative: SEVERE pain with urination   Negative: Fever > 100.4 F (38.0 C)   Negative: Side (flank) or lower back pain present   Negative: Taking antibiotic > 24 hours for UTI and " "fever persists   Negative: Taking antibiotic > 3 days for UTI and painful urination not improved   Negative: Unusual vaginal discharge   Negative: > 2 UTIs in last year   Negative: Patient is worried they have a sexually transmitted infection (STI)    Answer Assessment - Initial Assessment Questions  1. SYMPTOM: \"What's the main symptom you're concerned about?\" (e.g., frequency, incontinence)      Burning with urination  2. ONSET: \"When did the  burning  start?\"      Today   3. PAIN: \"Is there any pain?\" If Yes, ask: \"How bad is it?\" (Scale: 1-10; mild, moderate, severe)      Yes, moderate/severe  4. CAUSE: \"What do you think is causing the symptoms?\"      UTI  5. OTHER SYMPTOMS: \"Do you have any other symptoms?\" (e.g., blood in urine, fever, flank pain, pain with urination)      None  6. PREGNANCY: \"Is there any chance you are pregnant?\" \"When was your last menstrual period?\"      Not asked    Answer Assessment - Initial Assessment Questions  1. SEVERITY: \"How bad is the pain?\"  (e.g., Scale 1-10; mild, moderate, or severe)    - MILD (1-3): complains slightly about urination hurting    - MODERATE (4-7): interferes with normal activities      - SEVERE (8-10): excruciating, unwilling or unable to urinate because of the pain       Moderate/severe  2. FREQUENCY: \"How many times have you had painful urination today?\"       1 time  3. PATTERN: \"Is pain present every time you urinate or just sometimes?\"       Only happened once but hasn't urinated since  4. ONSET: \"When did the painful urination start?\"       Today  5. FEVER: \"Do you have a fever?\" If Yes, ask: \"What is your temperature, how was it measured, and when did it start?\"      No  6. PAST UTI: \"Have you had a urine infection before?\" If Yes, ask: \"When was the last time?\" and \"What happened that time?\"       Many years ago  7. CAUSE: \"What do you think is causing the painful urination?\"  (e.g., UTI, scratch, Herpes sore)      UTI  8. OTHER SYMPTOMS: \"Do you have " "any other symptoms?\" (e.g., blood in urine, flank pain, genital sores, urgency, vaginal discharge)      No other symptoms, recent back surgery so not sure about back pain  9. PREGNANCY: \"Is there any chance you are pregnant?\" \"When was your last menstrual period?\"      Not asked    Protocols used: Urinary Symptoms-A-OH, Urination Pain - Female-A-OH    "

## 2023-10-31 DIAGNOSIS — I10 HYPERTENSION, BENIGN ESSENTIAL, GOAL BELOW 140/90: ICD-10-CM

## 2023-10-31 RX ORDER — LOSARTAN POTASSIUM 100 MG/1
TABLET ORAL
Qty: 90 TABLET | Refills: 0 | Status: SHIPPED | OUTPATIENT
Start: 2023-10-31 | End: 2023-12-07

## 2023-11-06 DIAGNOSIS — E78.5 HYPERLIPIDEMIA WITH TARGET LDL LESS THAN 70: ICD-10-CM

## 2023-11-09 NOTE — TELEPHONE ENCOUNTER
evolocumab (REPATHA) 140 MG/ML prefilled autoinjector     6 mL 3 9/26/2022     Last Office Visit : 1-  Future Office visit:  1-

## 2023-11-14 NOTE — TELEPHONE ENCOUNTER
Name from pharmacy: Repatha SureClick 140 MG/ML Subcutaneous Solution Auto-injector          Will file in chart as: evolocumab (REPATHA SURECLICK) 140 MG/ML prefilled autoinjector    Sig: Inject 1 mL (140 mg) Subcutaneous every 14 days - Subcutaneous    Original sig: INJECT 1 ML (140 MG) SUBCUTANEOUSLY EVERY 14 DAYS    Disp: 6 mL    Refills: Not specified (Pharmacy requested: 0)    Start: 11/9/2023    Class: E-Prescribe    For: Hyperlipidemia with target LDL less than 70    Last ordered: 1 year ago (9/26/2022) by Eben Gomez MD    Last refill: 8/21/2023    Rx #: [id number]       To be filled at: [address] [address] [facility]       Patient saw Dr. Peña on 1/19/23. Patient to follow up in 1 year. Patient no longer following with Dr. Gomez. No refill protocol provided. Prescription routed to provider to review.    Lois Saul, RN, BSN  Cardiology RN Care Coordinator   [place]   Phone: [phone]  Fax: [phone] ([place]) [phone] ([place])

## 2023-11-15 RX ORDER — EVOLOCUMAB 140 MG/ML
INJECTION, SOLUTION SUBCUTANEOUS
Qty: 6 ML | Refills: 3 | Status: SHIPPED | OUTPATIENT
Start: 2023-11-15 | End: 2024-04-30

## 2023-12-04 ENCOUNTER — TELEPHONE (OUTPATIENT)
Dept: FAMILY MEDICINE | Facility: CLINIC | Age: 88
End: 2023-12-04
Payer: COMMERCIAL

## 2023-12-04 NOTE — TELEPHONE ENCOUNTER
Patient Returning Call    Reason for call:  Ok to use a Same Day appt per Provider    Information relayed to patient:  CB to schedule an appt.    Patient has additional questions:  No      Okay to leave a detailed message?: Yes at Cell number on file:    Telephone Information:   Mobile [phone]

## 2023-12-04 NOTE — TELEPHONE ENCOUNTER
Reason for Call:  Appointment Request    Patient requesting this type of appt:  Preventive     Requested provider: Merrill Patten    Reason patient unable to be scheduled: Not within requested timeframe    When does patient want to be seen/preferred time: sometime this year if possible    Comments: Carolin would like to schedule her annual wellness check with pcp for sometime this year. First available appt per central scheduling won't be until 2/28 and she would like to get an appt for sometime in December if possible. Clinic will have to call pt to schedule an appt.    Okay to leave a detailed message?: Yes at Cell number on file:    Telephone Information:   Mobile [phone]       Call taken on 12/4/2023 at 10:48 AM by Erin Stinson

## 2023-12-07 ENCOUNTER — OFFICE VISIT (OUTPATIENT)
Dept: FAMILY MEDICINE | Facility: CLINIC | Age: 88
End: 2023-12-07
Payer: COMMERCIAL

## 2023-12-07 VITALS
HEIGHT: 64 IN | HEART RATE: 76 BPM | OXYGEN SATURATION: 97 % | TEMPERATURE: 98.2 F | SYSTOLIC BLOOD PRESSURE: 136 MMHG | RESPIRATION RATE: 22 BRPM | DIASTOLIC BLOOD PRESSURE: 70 MMHG | WEIGHT: 165 LBS | BODY MASS INDEX: 28.17 KG/M2

## 2023-12-07 DIAGNOSIS — F33.0 MAJOR DEPRESSIVE DISORDER, RECURRENT EPISODE, MILD (H): ICD-10-CM

## 2023-12-07 DIAGNOSIS — I10 HYPERTENSION, BENIGN ESSENTIAL, GOAL BELOW 140/90: ICD-10-CM

## 2023-12-07 DIAGNOSIS — I25.10 CORONARY ARTERY DISEASE INVOLVING NATIVE CORONARY ARTERY OF NATIVE HEART WITHOUT ANGINA PECTORIS: ICD-10-CM

## 2023-12-07 DIAGNOSIS — Z29.11 NEED FOR VACCINATION AGAINST RESPIRATORY SYNCYTIAL VIRUS: ICD-10-CM

## 2023-12-07 DIAGNOSIS — N18.31 CHRONIC KIDNEY DISEASE, STAGE 3A (H): ICD-10-CM

## 2023-12-07 DIAGNOSIS — E78.5 HYPERLIPIDEMIA WITH TARGET LDL LESS THAN 100: ICD-10-CM

## 2023-12-07 DIAGNOSIS — Z00.00 ENCOUNTER FOR MEDICARE ANNUAL WELLNESS EXAM: Primary | ICD-10-CM

## 2023-12-07 DIAGNOSIS — I47.10 PAROXYSMAL SUPRAVENTRICULAR TACHYCARDIA (H): ICD-10-CM

## 2023-12-07 DIAGNOSIS — I10 HYPERTENSION GOAL BP (BLOOD PRESSURE) < 140/90: ICD-10-CM

## 2023-12-07 PROCEDURE — 82043 UR ALBUMIN QUANTITATIVE: CPT | Performed by: FAMILY MEDICINE

## 2023-12-07 PROCEDURE — 80053 COMPREHEN METABOLIC PANEL: CPT | Performed by: FAMILY MEDICINE

## 2023-12-07 PROCEDURE — G0439 PPPS, SUBSEQ VISIT: HCPCS | Performed by: FAMILY MEDICINE

## 2023-12-07 PROCEDURE — 36415 COLL VENOUS BLD VENIPUNCTURE: CPT | Performed by: FAMILY MEDICINE

## 2023-12-07 PROCEDURE — 99214 OFFICE O/P EST MOD 30 MIN: CPT | Mod: 25 | Performed by: FAMILY MEDICINE

## 2023-12-07 PROCEDURE — 82570 ASSAY OF URINE CREATININE: CPT | Performed by: FAMILY MEDICINE

## 2023-12-07 PROCEDURE — 80061 LIPID PANEL: CPT | Performed by: FAMILY MEDICINE

## 2023-12-07 RX ORDER — CITALOPRAM HYDROBROMIDE 10 MG/1
10 TABLET ORAL DAILY
Qty: 90 TABLET | Refills: 3 | Status: SHIPPED | OUTPATIENT
Start: 2023-12-07

## 2023-12-07 RX ORDER — LOSARTAN POTASSIUM 100 MG/1
100 TABLET ORAL DAILY
Qty: 90 TABLET | Refills: 3 | Status: SHIPPED | OUTPATIENT
Start: 2023-12-07

## 2023-12-07 RX ORDER — RESPIRATORY SYNCYTIAL VIRUS VACCINE 120MCG/0.5
0.5 KIT INTRAMUSCULAR ONCE
Qty: 1 EACH | Refills: 0 | Status: CANCELLED | OUTPATIENT
Start: 2023-12-07 | End: 2023-12-07

## 2023-12-07 RX ORDER — AMLODIPINE BESYLATE 10 MG/1
10 TABLET ORAL DAILY
Qty: 90 TABLET | Refills: 3 | Status: SHIPPED | OUTPATIENT
Start: 2023-12-07

## 2023-12-07 RX ORDER — METOPROLOL SUCCINATE 25 MG/1
25 TABLET, EXTENDED RELEASE ORAL DAILY
Qty: 90 TABLET | Refills: 3 | Status: SHIPPED | OUTPATIENT
Start: 2023-12-07

## 2023-12-07 ASSESSMENT — ACTIVITIES OF DAILY LIVING (ADL): CURRENT_FUNCTION: NO ASSISTANCE NEEDED

## 2023-12-07 ASSESSMENT — ENCOUNTER SYMPTOMS
CHILLS: 0
WEAKNESS: 0
JOINT SWELLING: 0
DIZZINESS: 0
NERVOUS/ANXIOUS: 0
COUGH: 0
EYE PAIN: 0
DYSURIA: 0
HEADACHES: 0
FEVER: 0
NAUSEA: 0
MYALGIAS: 1
CONSTIPATION: 0
HEMATURIA: 1
SHORTNESS OF BREATH: 0
SORE THROAT: 0
ARTHRALGIAS: 0
PARESTHESIAS: 0
HEMATOCHEZIA: 0
DIARRHEA: 0
HEARTBURN: 0
FREQUENCY: 0
PALPITATIONS: 0
ABDOMINAL PAIN: 0
BREAST MASS: 0

## 2023-12-07 ASSESSMENT — PATIENT HEALTH QUESTIONNAIRE - PHQ9
SUM OF ALL RESPONSES TO PHQ QUESTIONS 1-9: 0
SUM OF ALL RESPONSES TO PHQ QUESTIONS 1-9: 0
10. IF YOU CHECKED OFF ANY PROBLEMS, HOW DIFFICULT HAVE THESE PROBLEMS MADE IT FOR YOU TO DO YOUR WORK, TAKE CARE OF THINGS AT HOME, OR GET ALONG WITH OTHER PEOPLE: NOT DIFFICULT AT ALL

## 2023-12-07 ASSESSMENT — PAIN SCALES - GENERAL: PAINLEVEL: MODERATE PAIN (5)

## 2023-12-07 NOTE — PROGRESS NOTES
"SUBJECTIVE:   Carolin is a 89 year old, presenting for the following:  Physical        12/7/2023     1:32 PM   Additional Questions   Roomed by Gaurang   Accompanied by Self       Are you in the first 12 months of your Medicare coverage?  No    Healthy Habits:     In general, how would you rate your overall health?  Good    Frequency of exercise:  6-7 days/week    Duration of exercise:  30-45 minutes    Do you usually eat at least 4 servings of fruit and vegetables a day, include whole grains    & fiber and avoid regularly eating high fat or \"junk\" foods?  Yes    Taking medications regularly:  Yes    Medication side effects:  None    Ability to successfully perform activities of daily living:  No assistance needed    Home Safety:  No safety concerns identified    Hearing Impairment:  No hearing concerns    In the past 6 months, have you been bothered by leaking of urine?  No    In general, how would you rate your overall mental or emotional health?  Good    Additional concerns today:  No      Today's PHQ-9 Score:       12/7/2023     1:37 PM   PHQ-9 SCORE   PHQ-9 Total Score MyChart 0   PHQ-9 Total Score 0           Have you ever done Advance Care Planning? (For example, a Health Directive, POLST, or a discussion with a medical provider or your loved ones about your wishes): No, advance care planning information given to patient to review.  Patient plans to discuss their wishes with loved ones or provider.         Fall risk  Fallen 2 or more times in the past year?: No  Any fall with injury in the past year?: No    Cognitive Screening   1) Repeat 3 items (Leader, Season, Table)  YES  2) Clock draw: NORMAL  3) 3 item recall: Recalls 1 object   Results: NORMAL clock, 1-2 items recalled: COGNITIVE IMPAIRMENT LESS LIKELY    Mini-CogTM Copyright S Lupe. Licensed by the author for use in [facility]; reprinted with permission ([email or web address]@.[email or web address]). All rights reserved.      Do you have sleep apnea, excessive snoring " or daytime drowsiness? : no    Reviewed and updated as needed this visit by clinical staff   Tobacco  Allergies  Meds  Problems  Med Hx  Surg Hx           Reviewed and updated as needed this visit by Provider                 Social History     Tobacco Use    Smoking status: Never     Passive exposure: Never    Smokeless tobacco: Never    Tobacco comments:     Never smoked; nonsmoking household   Substance Use Topics    Alcohol use: No     Comment: very rare             12/7/2023     1:36 PM   Alcohol Use   Prescreen: >3 drinks/day or >7 drinks/week? No     Do you have a current opioid prescription? No  Do you use any other controlled substances or medications that are not prescribed by a provider? None      Current providers sharing in care for this patient include:   Patient Care Team:  Merrill Patten MD as PCP - General (Family Medicine)  Merrill Patten MD as Assigned PCP  John Figueroa MD as MD (Ophthalmology)  Radha Maurice, RN as Specialty Care Coordinator (Cardiology)  Ethan Chapa DO as Assigned Musculoskeletal Provider  VAISHALI Juan MD as Assigned Heart and Vascular Provider  Nevin Ricci MD as MD (Ophthalmology)  John Figueroa MD as Assigned Surgical Provider    The following health maintenance items are reviewed in Epic and correct as of today:  Health Maintenance   Topic Date Due    RSV VACCINE (Pregnancy & 60+) (1 - 1-dose 60+ series) Never done    MEDICARE ANNUAL WELLNESS VISIT  12/06/2022    EYE EXAM  08/02/2023    ANNUAL REVIEW OF HM ORDERS  12/13/2023    PHQ-9  06/07/2024    BMP  08/24/2024    LIPID  08/24/2024    MICROALBUMIN  08/24/2024    HEMOGLOBIN  08/24/2024    FALL RISK ASSESSMENT  12/07/2024    ADVANCE CARE PLANNING  12/07/2026    DTAP/TDAP/TD IMMUNIZATION (3 - Td or Tdap) 12/13/2032    DEPRESSION ACTION PLAN  Completed    INFLUENZA VACCINE  Completed    Pneumococcal Vaccine: 65+ Years  Completed    URINALYSIS  Completed    ZOSTER IMMUNIZATION  Completed     COVID-19 Vaccine  Completed    IPV IMMUNIZATION  Aged Out    HPV IMMUNIZATION  Aged Out    MENINGITIS IMMUNIZATION  Aged Out    RSV MONOCLONAL ANTIBODY  Aged Out    MAMMO SCREENING  Discontinued     Lab work is in process  Labs reviewed in EPIC  BP Readings from Last 3 Encounters:   12/07/23 136/70   09/26/23 134/72   08/24/23 (!) 183/78    Wt Readings from Last 3 Encounters:   12/07/23 74.8 kg (165 lb)   09/26/23 73 kg (161 lb)   08/24/23 72.9 kg (160 lb 12.8 oz)                  Patient Active Problem List   Diagnosis    Pseudophakia OU c YAG OU    CAD (coronary artery disease)    Advance care planning    Hypertension goal BP (blood pressure) < 140/90    Hyperlipidemia with target LDL less than 100    Osteoporosis    CKD (chronic kidney disease) stage 3, GFR 30-59 ml/min (H)    Seasonal allergic rhinitis    Diagnostic skin and sensitization tests (aka ALLERGENS)    Pastrana's neuroma    Strabismic amblyopia    Anterior basement membrane dystrophy, OU    PVD (posterior vitreous detachment) OU    Granuloma annulare    Recurrent and persistent hematuria    Leg weakness, bilateral    Microscopic hematuria    Proteinuria, unspecified type    Dyspnea on exertion    Abnormal nuclear cardiac imaging test    Status post coronary angiogram    Statin intolerance    Dermatochalasis of both upper eyelids    Involutional ptosis, acquired, bilateral     Past Surgical History:   Procedure Laterality Date    APPENDECTOMY      CHOLECYSTECTOMY, OPEN      COLONOSCOPY  2009    neg    CV CORONARY ANGIOGRAM N/A 12/8/2020    Procedure: CV CORONARY ANGIOGRAM;  Surgeon: Isra Weber MD;  Location:  [facility]    CV PCI STENT DRUG ELUTING N/A 12/8/2020    Procedure: Percutaneous Coronary Intervention Stent Drug Eluting;  Surgeon: Isra Weber MD;  Location:  [facility]    CYSTOCELE REPAIR  2003    TVT SPARC       Social History     Tobacco Use    Smoking status: Never     Passive  "exposure: Never    Smokeless tobacco: Never    Tobacco comments:     Never smoked; nonsmoking household   Substance Use Topics    Alcohol use: No     Comment: very rare     Family History   Problem Relation Age of Onset    Cancer Mother         ovarian     Heart Disease Sister         CABG x 3    Neurologic Disorder Sister         Fibromyalgia    Cancer Father         stomach/interstines     Musculoskeletal Disorder Brother         back     Heart Disease Brother 60        CABG x 4    Glaucoma No family hx of     Macular Degeneration No family hx of          Current Outpatient Medications   Medication Sig Dispense Refill    amLODIPine (NORVASC) 10 MG tablet Take 1 tablet (10 mg) by mouth daily 90 tablet 3    aspirin 81 MG tablet Take 1 tablet (81 mg) by mouth daily 90 tablet 3    citalopram (CELEXA) 10 MG tablet Take 1 tablet (10 mg) by mouth daily 90 tablet 3    docusate sodium (COLACE) 100 MG capsule Take 1 capsule (100 mg) by mouth 3 times daily as needed for constipation 30 capsule 1    evolocumab (REPATHA SURECLICK) 140 MG/ML prefilled autoinjector Inject 1 mL (140 mg) Subcutaneous every 14 days - Subcutaneous 6 mL 3    HYDROcodone-acetaminophen (NORCO) 5-325 MG tablet Take 1 tablet by mouth every 6 hours as needed for pain 15 tablet 0    Hypromellose (ARTIFICIAL TEARS OP) Apply 1 drop to eye as needed Both eyes.      losartan (COZAAR) 100 MG tablet Take 1 tablet (100 mg) by mouth daily 90 tablet 3    metoprolol succinate ER (TOPROL XL) 25 MG 24 hr tablet Take 1 tablet (25 mg) by mouth daily 90 tablet 3    nitroGLYcerin (NITROSTAT) 0.4 MG sublingual tablet One tablet under the tongue every 5 minutes if needed for chest pain. May repeat every 5 minutes for a maximum of 3 doses in 15 minutes\" 25 tablet 0    polyethylene glycol (MIRALAX) 17 GM/Dose powder Take 17 g (1 capful) by mouth daily 850 g 6    pregabalin (LYRICA) 50 MG capsule Take 1 capsule (50 mg) by mouth 2 times daily 60 capsule 11    STATIN NOT " "PRESCRIBED (INTENTIONAL) Please choose reason not prescribed from choices below.      tiZANidine (ZANAFLEX) 4 MG tablet Take 1 tablet (4 mg) by mouth nightly as needed for muscle spasms 30 tablet 0    Vitamin D3 (CHOLECALCIFEROL) 25 mcg (1000 units) tablet Take 1,000 Units by mouth       Allergies   Allergen Reactions    Baycol [Cerivastatin Sodium]       muscle mass loss      Crestor [Statins]      Also intolerant of simvastatin and atorvastatin with recurrent leg weakness and pain.    Darvocet [Acetaminophen]      Severe nausea    Macrobid [Nitrofuran Derivatives]      Hives       Mammogram Screening - Patient over age 75, has elected to discontinue screenings.  Pertinent mammograms are reviewed under the imaging tab.    Review of Systems   Constitutional:  Negative for chills and fever.   HENT:  Negative for congestion, ear pain, hearing loss and sore throat.    Eyes:  Negative for pain and visual disturbance.   Respiratory:  Negative for cough and shortness of breath.    Cardiovascular:  Negative for chest pain, palpitations and peripheral edema.   Gastrointestinal:  Negative for abdominal pain, constipation, diarrhea, heartburn, hematochezia and nausea.   Breasts:  Negative for tenderness, breast mass and discharge.   Genitourinary:  Positive for hematuria. Negative for dysuria, frequency, genital sores, pelvic pain, urgency, vaginal bleeding and vaginal discharge.   Musculoskeletal:  Positive for myalgias. Negative for arthralgias and joint swelling.   Skin:  Negative for rash.   Neurological:  Negative for dizziness, weakness, headaches and paresthesias.   Psychiatric/Behavioral:  Negative for mood changes. The patient is not nervous/anxious.      Constitutional, HEENT, cardiovascular, pulmonary, GI, , musculoskeletal, neuro, skin, endocrine and psych systems are negative, except as otherwise noted.    OBJECTIVE:   /70   Pulse 76   Temp 98.2  F (36.8  C) (Oral)   Resp 22   Ht 1.626 m (5' 4\")   Wt " "74.8 kg (165 lb)   LMP  (LMP Unknown)   SpO2 97%   BMI 28.32 kg/m   Estimated body mass index is 28.32 kg/m  as calculated from the following:    Height as of this encounter: 1.626 m (5' 4\").    Weight as of this encounter: 74.8 kg (165 lb).  Physical Exam  GENERAL: healthy, alert and no distress  NECK: no adenopathy, no asymmetry, masses, or scars and thyroid normal to palpation  RESP: lungs clear to auscultation - no rales, rhonchi or wheezes  CV: regular rate and rhythm, normal S1 S2, no S3 or S4, no murmur, click or rub, no peripheral edema and peripheral pulses strong  ABDOMEN: soft, nontender, no hepatosplenomegaly, no masses and bowel sounds normal  MS: no gross musculoskeletal defects noted, no edema    Diagnostic Test Results:  Labs reviewed in Epic    ASSESSMENT / PLAN:   (Z00.00) Encounter for Medicare annual wellness exam  (primary encounter diagnosis)  Comment:   Plan: as below.    (Z29.11) Need for vaccination against respiratory syncytial virus  Comment:   Plan: patient will get this at the pharmacy.    (I10) Hypertension goal BP (blood pressure) < 140/90  Comment:   Plan: Comprehensive metabolic panel (BMP + Alb, Alk         Phos, ALT, AST, Total. Bili, TP), Albumin         Random Urine Quantitative with Creat Ratio        Controlled.    (E78.5) Hyperlipidemia with target LDL less than 100  Comment:   Plan: Comprehensive metabolic panel (BMP + Alb, Alk         Phos, ALT, AST, Total. Bili, TP), Lipid panel         reflex to direct LDL Non-fasting        Near goal.    (I25.10) Coronary artery disease involving native coronary artery of native heart without angina pectoris  Comment:   Plan: Comprehensive metabolic panel (BMP + Alb, Alk         Phos, ALT, AST, Total. Bili, TP)        Following with Cardiology.    (N18.31) Chronic kidney disease, stage 3a (H)  Comment:   Plan: Comprehensive metabolic panel (BMP + Alb, Alk         Phos, ALT, AST, Total. Bili, TP)            (F33.0) Major depressive " disorder, recurrent episode, mild (H24)  Comment:   Plan: citalopram (CELEXA) 10 MG tablet            (I10) Hypertension, benign essential, goal below 140/90  Comment:   Plan: amLODIPine (NORVASC) 10 MG tablet, losartan         (COZAAR) 100 MG tablet            (I47.10) Paroxysmal supraventricular tachycardia  Comment:   Plan: metoprolol succinate ER (TOPROL XL) 25 MG 24 hr        tablet            Patient has been advised of split billing requirements and indicates understanding: Yes      COUNSELING:  Reviewed preventive health counseling, as reflected in patient instructions       Consider AAA screening for ages 65-75 and smoking history       Regular exercise       Healthy diet/nutrition        She reports that she has never smoked. She has never been exposed to tobacco smoke. She has never used smokeless tobacco.      Appropriate preventive services were discussed with this patient, including applicable screening as appropriate for fall prevention, nutrition, physical activity, Tobacco-use cessation, weight loss and cognition.  Checklist reviewing preventive services available has been given to the patient.    Reviewed patients plan of care and provided an AVS. The Basic Care Plan (routine screening as documented in Health Maintenance) for Carolin meets the Care Plan requirement. This Care Plan has been established and reviewed with the Patient.        Merrill Patten MD, MD  [facility]    Identified Health Risks:  I have reviewed Opioid Use Disorder and Substance Use Disorder risk factors and made any needed referrals.     Answers submitted by the patient for this visit:  Patient Health Questionnaire (Submitted on 12/7/2023)  If you checked off any problems, how difficult have these problems made it for you to do your work, take care of things at home, or get along with other people?: Not difficult at all  PHQ9 TOTAL SCORE: 0

## 2023-12-07 NOTE — LETTER
December 8, 2023      Carolin JOSUE Reed  [address] [address]        Dear ,    We are writing to inform you of your test results.    Your kidney, liver, electrolyte and cholesterol tests were normal for you. The proteins in the urine has decreased. Please continue with your current medications. Please follow up in 1 year for routine Medicare Wellness visit for recheck.     Resulted Orders   Comprehensive metabolic panel (BMP + Alb, Alk Phos, ALT, AST, Total. Bili, TP)   Result Value Ref Range    Sodium 139 135 - 145 mmol/L      Comment:      Reference intervals for this test were updated on 09/26/2023 to more accurately reflect our healthy population. There may be differences in the flagging of prior results with similar values performed with this method. Interpretation of those prior results can be made in the context of the updated reference intervals.     Potassium 4.4 3.4 - 5.3 mmol/L    Carbon Dioxide (CO2) 25 22 - 29 mmol/L    Anion Gap 9 7 - 15 mmol/L    Urea Nitrogen 20.3 8.0 - 23.0 mg/dL    Creatinine 0.92 0.51 - 0.95 mg/dL    GFR Estimate 59 (L) >60 mL/min/1.73m2    Calcium 9.5 8.8 - 10.2 mg/dL    Chloride 105 98 - 107 mmol/L    Glucose 101 (H) 70 - 99 mg/dL    Alkaline Phosphatase 110 40 - 150 U/L      Comment:      Reference intervals for this test were updated on 11/14/2023 to more accurately reflect our healthy population. There may be differences in the flagging of prior results with similar values performed with this method. Interpretation of those prior results can be made in the context of the updated reference intervals.    AST 20 0 - 45 U/L      Comment:      Reference intervals for this test were updated on 6/12/2023 to more accurately reflect our healthy population. There may be differences in the flagging of prior results with similar values performed with this method. Interpretation of those prior results can be made in the context of the updated reference intervals.    ALT  19 0 - 50 U/L      Comment:      Reference intervals for this test were updated on 6/12/2023 to more accurately reflect our healthy population. There may be differences in the flagging of prior results with similar values performed with this method. Interpretation of those prior results can be made in the context of the updated reference intervals.      Protein Total 7.1 6.4 - 8.3 g/dL    Albumin 4.1 3.5 - 5.2 g/dL    Bilirubin Total 0.3 <=1.2 mg/dL   Lipid panel reflex to direct LDL Non-fasting   Result Value Ref Range    Cholesterol 151 <200 mg/dL    Triglycerides 143 <150 mg/dL    Direct Measure HDL 50 >=50 mg/dL    LDL Cholesterol Calculated 72 <=100 mg/dL    Non HDL Cholesterol 101 <130 mg/dL    Patient Fasting > 8hrs? No     Narrative    Cholesterol  Desirable:  <200 mg/dL    Triglycerides  Normal:  Less than 150 mg/dL  Borderline High:  150-199 mg/dL  High:  200-499 mg/dL  Very High:  Greater than or equal to 500 mg/dL    Direct Measure HDL  Female:  Greater than or equal to 50 mg/dL   Male:  Greater than or equal to 40 mg/dL    LDL Cholesterol  Desirable:  <100mg/dL  Above Desirable:  100-129 mg/dL   Borderline High:  130-159 mg/dL   High:  160-189 mg/dL   Very High:  >= 190 mg/dL    Non HDL Cholesterol  Desirable:  130 mg/dL  Above Desirable:  130-159 mg/dL  Borderline High:  160-189 mg/dL  High:  190-219 mg/dL  Very High:  Greater than or equal to 220 mg/dL   Albumin Random Urine Quantitative with Creat Ratio   Result Value Ref Range    Creatinine Urine mg/dL 110.0 mg/dL      Comment:      The reference ranges have not been established in urine creatinine. The results should be integrated into the clinical context for interpretation.    Albumin Urine mg/L 460.0 mg/L      Comment:      The reference ranges have not been established in urine albumin. The results should be integrated into the clinical context for interpretation.    Albumin Urine mg/g Cr 418.18 (H) 0.00 - 25.00 mg/g Cr      Comment:       Microalbuminuria is defined as an albumin:creatinine ratio of 17 to 299 for males and 25 to 299 for females. A ratio of albumin:creatinine of 300 or higher is indicative of overt proteinuria.  Due to biologic variability, positive results should be confirmed by a second, first-morning random or 24-hour timed urine specimen. If there is discrepancy, a third specimen is recommended. When 2 out of 3 results are in the microalbuminuria range, this is evidence for incipient nephropathy and warrants increased efforts at glucose control, blood pressure control, and institution of therapy with an angiotensin-converting-enzyme (ACE) inhibitor (if the patient can tolerate it).         If you have any questions or concerns, please call the clinic at the number listed above.       Sincerely,      Merrill Patten MD

## 2023-12-07 NOTE — PATIENT INSTRUCTIONS
At [facility], we strive to deliver an exceptional experience to you, every time we see you. If you receive a survey, please complete it as we do value your feedback.  If you have MyChart, you can expect to receive results automatically within 24 hours of their completion.  Your provider will send a note interpreting your results as well.   If you do not have MyChart, you should receive your results in about a week by mail.    Your care team:                            Family Medicine Internal Medicine   MD Edgardo Wilhelm, MD Teetee Tomlinson, MD Augusta Tapia, PANICK Patten, MD Pediatrics   Kris Meyers, PANICK Dodge, MD Rebecca Acuna CNP   DALTON Camacho CNP, MD Nadiya De Los Santos, NP coming October 2023 Same-Day (No follow up visit)    KYRA Howell PA coming Oct 2023     Clinic hours: Monday - Thursday 7 am-6 pm; Fridays 7 am-5 pm.   Urgent care: Monday - Friday 10 am- 8 pm; Saturday and Sunday 9 am-5 pm.    Clinic: [phone]       [facility]: Monday - Thursday 8 am - 7 pm; Friday 8 am - 6 pm  [facility]: [phone]       Patient Education   Personalized Prevention Plan  You are due for the preventive services outlined below.  Your care team is available to assist you in scheduling these services.  If you have already completed any of these items, please share that information with your care team to update in your medical record.  Health Maintenance Due   Topic Date Due    RSV VACCINE (Pregnancy & 60+) (1 - 1-dose 60+ series) Never done    Annual Wellness Visit  12/06/2022    Eye Exam  08/02/2023    ANNUAL REVIEW OF HM ORDERS  12/13/2023    FALL RISK ASSESSMENT  12/13/2023

## 2023-12-08 LAB
ALBUMIN SERPL BCG-MCNC: 4.1 G/DL (ref 3.5–5.2)
ALP SERPL-CCNC: 110 U/L (ref 40–150)
ALT SERPL W P-5'-P-CCNC: 19 U/L (ref 0–50)
ANION GAP SERPL CALCULATED.3IONS-SCNC: 9 MMOL/L (ref 7–15)
AST SERPL W P-5'-P-CCNC: 20 U/L (ref 0–45)
BILIRUB SERPL-MCNC: 0.3 MG/DL
BUN SERPL-MCNC: 20.3 MG/DL (ref 8–23)
CALCIUM SERPL-MCNC: 9.5 MG/DL (ref 8.8–10.2)
CHLORIDE SERPL-SCNC: 105 MMOL/L (ref 98–107)
CHOLEST SERPL-MCNC: 151 MG/DL
CREAT SERPL-MCNC: 0.92 MG/DL (ref 0.51–0.95)
CREAT UR-MCNC: 110 MG/DL
DEPRECATED HCO3 PLAS-SCNC: 25 MMOL/L (ref 22–29)
EGFRCR SERPLBLD CKD-EPI 2021: 59 ML/MIN/1.73M2
FASTING STATUS PATIENT QL REPORTED: NO
GLUCOSE SERPL-MCNC: 101 MG/DL (ref 70–99)
HDLC SERPL-MCNC: 50 MG/DL
LDLC SERPL CALC-MCNC: 72 MG/DL
MICROALBUMIN UR-MCNC: 460 MG/L
MICROALBUMIN/CREAT UR: 418.18 MG/G CR (ref 0–25)
NONHDLC SERPL-MCNC: 101 MG/DL
POTASSIUM SERPL-SCNC: 4.4 MMOL/L (ref 3.4–5.3)
PROT SERPL-MCNC: 7.1 G/DL (ref 6.4–8.3)
SODIUM SERPL-SCNC: 139 MMOL/L (ref 135–145)
TRIGL SERPL-MCNC: 143 MG/DL

## 2023-12-22 ENCOUNTER — OFFICE VISIT (OUTPATIENT)
Dept: OPHTHALMOLOGY | Facility: CLINIC | Age: 88
End: 2023-12-22
Payer: COMMERCIAL

## 2023-12-22 DIAGNOSIS — H52.13 MYOPIA OF BOTH EYES WITH REGULAR ASTIGMATISM AND PRESBYOPIA: ICD-10-CM

## 2023-12-22 DIAGNOSIS — H02.834 DERMATOCHALASIS OF BOTH UPPER EYELIDS: ICD-10-CM

## 2023-12-22 DIAGNOSIS — H52.223 MYOPIA OF BOTH EYES WITH REGULAR ASTIGMATISM AND PRESBYOPIA: ICD-10-CM

## 2023-12-22 DIAGNOSIS — H18.523 ANTERIOR BASEMENT MEMBRANE DYSTROPHY OF BOTH EYES: ICD-10-CM

## 2023-12-22 DIAGNOSIS — H53.039 STRABISMIC AMBLYOPIA, UNSPECIFIED LATERALITY: ICD-10-CM

## 2023-12-22 DIAGNOSIS — H52.4 MYOPIA OF BOTH EYES WITH REGULAR ASTIGMATISM AND PRESBYOPIA: ICD-10-CM

## 2023-12-22 DIAGNOSIS — H02.831 DERMATOCHALASIS OF BOTH UPPER EYELIDS: ICD-10-CM

## 2023-12-22 DIAGNOSIS — H02.403 INVOLUTIONAL PTOSIS, ACQUIRED, BILATERAL: ICD-10-CM

## 2023-12-22 DIAGNOSIS — Z96.1 PSEUDOPHAKIA: Primary | ICD-10-CM

## 2023-12-22 DIAGNOSIS — H43.813 PVD (POSTERIOR VITREOUS DETACHMENT), BILATERAL: ICD-10-CM

## 2023-12-22 PROCEDURE — 92015 DETERMINE REFRACTIVE STATE: CPT | Performed by: STUDENT IN AN ORGANIZED HEALTH CARE EDUCATION/TRAINING PROGRAM

## 2023-12-22 PROCEDURE — 92014 COMPRE OPH EXAM EST PT 1/>: CPT | Performed by: STUDENT IN AN ORGANIZED HEALTH CARE EDUCATION/TRAINING PROGRAM

## 2023-12-22 ASSESSMENT — CUP TO DISC RATIO
OS_RATIO: 0.4
OD_RATIO: 0.5

## 2023-12-22 ASSESSMENT — REFRACTION_WEARINGRX
OD_ADD: +3.00
OS_CYLINDER: SPHERE
OD_AXIS: 104
OD_CYLINDER: +1.25
OS_ADD: +3.00
SPECS_TYPE: PAL
OD_SPHERE: PLANO
OS_SPHERE: -0.25

## 2023-12-22 ASSESSMENT — VISUAL ACUITY
OD_CC+: -2
OD_CC: 20/40
OS_CC: 20/80
METHOD: SNELLEN - LINEAR
OS_CC+: -1
CORRECTION_TYPE: GLASSES

## 2023-12-22 ASSESSMENT — TONOMETRY
IOP_METHOD: APPLANATION
OS_IOP_MMHG: 16
OD_IOP_MMHG: 16

## 2023-12-22 ASSESSMENT — CONF VISUAL FIELD
OD_SUPERIOR_NASAL_RESTRICTION: 0
OS_SUPERIOR_TEMPORAL_RESTRICTION: 0
OD_INFERIOR_NASAL_RESTRICTION: 0
OS_INFERIOR_NASAL_RESTRICTION: 0
OD_INFERIOR_TEMPORAL_RESTRICTION: 0
METHOD: COUNTING FINGERS
OD_NORMAL: 1
OD_SUPERIOR_TEMPORAL_RESTRICTION: 0
OS_INFERIOR_TEMPORAL_RESTRICTION: 0
OS_SUPERIOR_NASAL_RESTRICTION: 0
OS_NORMAL: 1

## 2023-12-22 ASSESSMENT — REFRACTION_MANIFEST
OD_CYLINDER: +1.25
OD_AXIS: 091
OS_CYLINDER: +0.50
OD_SPHERE: -0.25
OS_ADD: +3.00
OS_AXIS: 115
OS_SPHERE: PLANO
OD_ADD: +3.00

## 2023-12-22 ASSESSMENT — EXTERNAL EXAM - LEFT EYE: OS_EXAM: BROW PTOSIS, WITH FRONTALIS RELAXED, BROW IS BELOW SUPERIOR ORBITAL RIM AND LATERALLY INLINE WITH UPPER LASHES

## 2023-12-22 ASSESSMENT — EXTERNAL EXAM - RIGHT EYE: OD_EXAM: NORMAL

## 2023-12-22 NOTE — LETTER
"    12/22/2023         RE: Carolin Mcbride  [address] [address]        Dear Colleague,    Thank you for referring your patient, Carolin Mcbride, to the [facility]. Please see a copy of my visit note below.     Current Eye Medications:  none     Subjective:  comprehensive eye exam - noticing more trouble with near vision, looking to update her glasses this year - \"feels as if I am looking through a film.\"  Notices some mattering upon awakening, denies discomfort or issues during the day.     Recently had two back surgeries - had to cancel blepharoplasty with Dr. Figueroa. He was also waiting for her to be off Plavix.      Objective:  See Ophthalmology Exam.       Assessment:  Carolin Mcbride is a 89 year old female who presents with:   Encounter Diagnoses   Name Primary?     Pseudophakia OU c YAG OU Yes     Strabismic amblyopia OS      Anterior basement membrane dystrophy of both eyes Recommend using artificial tears again     PVD (posterior vitreous detachment), bilateral      Dermatochalasis of both upper eyelids      Involutional ptosis, acquired, bilateral      Myopia of both eyes with regular astigmatism and presbyopia        Plan:  Glasses prescription given - optional     Use artificial tears up to four times a day (Refresh Optive, Systane Balance, or TheraTears. Avoid generic artificial tears or \"get the red out\" drops).      eNvin Ricci MD  [phone]       Again, thank you for allowing me to participate in the care of your patient.        Sincerely,        Nevin Ricci MD  "

## 2023-12-22 NOTE — PROGRESS NOTES
" Current Eye Medications:  none     Subjective:  comprehensive eye exam - noticing more trouble with near vision, looking to update her glasses this year - \"feels as if I am looking through a film.\"  Notices some mattering upon awakening, denies discomfort or issues during the day.     Recently had two back surgeries - had to cancel blepharoplasty with Dr. Figueroa. He was also waiting for her to be off Plavix.      Objective:  See Ophthalmology Exam.       Assessment:  Carolin Mcbride is a 89 year old female who presents with:   Encounter Diagnoses   Name Primary?    Pseudophakia OU c YAG OU Yes    Strabismic amblyopia OS     Anterior basement membrane dystrophy of both eyes Recommend using artificial tears again    PVD (posterior vitreous detachment), bilateral     Dermatochalasis of both upper eyelids     Involutional ptosis, acquired, bilateral     Myopia of both eyes with regular astigmatism and presbyopia        Plan:  Glasses prescription given - optional     Use artificial tears up to four times a day (Refresh Optive, Systane Balance, or TheraTears. Avoid generic artificial tears or \"get the red out\" drops).      Nevin Ricci MD  [phone]     "

## 2024-02-01 ENCOUNTER — OFFICE VISIT (OUTPATIENT)
Dept: CARDIOLOGY | Facility: CLINIC | Age: 89
End: 2024-02-01
Attending: INTERNAL MEDICINE
Payer: COMMERCIAL

## 2024-02-01 VITALS
SYSTOLIC BLOOD PRESSURE: 133 MMHG | BODY MASS INDEX: 29.01 KG/M2 | DIASTOLIC BLOOD PRESSURE: 64 MMHG | WEIGHT: 169 LBS | OXYGEN SATURATION: 98 % | HEART RATE: 72 BPM

## 2024-02-01 DIAGNOSIS — Z98.61 STATUS POST PERCUTANEOUS TRANSLUMINAL CORONARY ANGIOPLASTY: Primary | ICD-10-CM

## 2024-02-01 DIAGNOSIS — I25.5 ISCHEMIC CARDIOMYOPATHY: ICD-10-CM

## 2024-02-01 PROCEDURE — 99213 OFFICE O/P EST LOW 20 MIN: CPT | Performed by: INTERNAL MEDICINE

## 2024-02-01 NOTE — NURSING NOTE
"Chief Complaint   Patient presents with    RECHECK     Return general cardiology for annual return       Initial /64 (BP Location: Left arm, Patient Position: Chair, Cuff Size: Adult Regular)   Pulse 72   Wt 76.7 kg (169 lb)   LMP  (LMP Unknown)   SpO2 98%   BMI 29.01 kg/m   Estimated body mass index is 29.01 kg/m  as calculated from the following:    Height as of 12/7/23: 1.626 m (5' 4\").    Weight as of this encounter: 76.7 kg (169 lb)..  BP completed using cuff size: regular    EVERTON Sorensen    "

## 2024-02-01 NOTE — PATIENT INSTRUCTIONS
Thank you for coming to the [facility] @ [place] [facility]; please note the following instructions:    1. Dr. Peña recommends to follow up in 1 year.  The cardiology team will contact you to schedule when the time gets closer.          If you have any questions regarding your visit please contact your care team:     Cardiology  Telephone Number   Calista HETAL., RN  Lois WU, RN  Faith MORENO, RN  Valeria HARTMANN, RMA  Raquel SERRATO, EVERTON SIEGEL, Visit Facilitator  Lauryn DALAL [facility] [phone] (option 1)   For scheduling appts:     [phone] (select option 1)       For the Device Clinic (Pacemakers and ICD's)  RN's :  Arin Cantu   During business hours: [phone]    *After business hours:  [phone] (select option 4)      Normal test result notifications will be released via [employer] or mailed within 7 business days.  All other test results, will be communicated via telephone once reviewed by your cardiologist.    If you need a medication refill please contact your pharmacy.  Please allow 3 business days for your refill to be completed.    As always, thank you for trusting us with your health care needs!

## 2024-02-01 NOTE — LETTER
2/1/2024      RE: Carolin Mcbride  [address] [address]       Dear Colleague,    Thank you for the opportunity to participate in the care of your patient, Carolin Mcbride, at the [facility]. Please see a copy of my visit note below.       SUBJECTIVE:  Carolin Mcbride is a 89 year old female who presents for yearly follow-up.  Patient had 2 stent to LAD, 1 stent torPL branch andPOBA of D1 on 12/8/2020.     Initially patient presented for the management of hyperlipidemia.  Her LDL was over 200.  Patient had a TIA and she was initially managed on clopidogrel for a short course.  Her CT MRI and echo were normal.  Also had a Zio patch which did not show significant arrhythmia or atrial fibrillation.  Patient was very reluctant to take statin as she had severe myalgia with Baycol.  She was started on 20 mg of Lipitor which she discontinued due to some nonspecific muscle ache.  Patient was restarted on Lipitor 20 mg and then subsequently it was increased to 40 mg daily.  Patient is tolerating this dose very well.  As part of CAD evaluation patient had a nuclear scan.  She had an apical perfusion defect which was fixed.  The question was whether it was an artifact or real.  Subsequently patient had an echocardiogram which showed apical wall motion abnormality.  Subsequent angiogram showed LAD D1 disease and RPL disease for which she had intervention.      Patient had some myalgia on 40 mg of Lipitor.  Her CK was normal medication was discontinued and he was referred to lipid clinic for further management.  Today patient had no cardiac complaints.  She is doing well from this aspect.  Still complaining of myalgia and muscle weakness.   The lipid clinic she was started on Zetia 10 mg daily.  Latest lipid profile LDL is 182.      Subsequently patient was started on Repatha and this is controlling her LDL well.  Patient had no  cardiac complaints today.  Patient had 2 recent back surgeries and is recovering from this.  Patient Active Problem List    Diagnosis Date Noted     Dermatochalasis of both upper eyelids 09/14/2022     Priority: Medium     Added automatically from request for surgery [id number]       Involutional ptosis, acquired, bilateral 09/14/2022     Priority: Medium     Added automatically from request for surgery [id number]       Statin intolerance 08/10/2021     Priority: Medium     Status post coronary angiogram 12/08/2020     Priority: Medium     Dyspnea on exertion 11/24/2020     Priority: Medium     Added automatically from request for surgery [id number]       Abnormal nuclear cardiac imaging test 11/24/2020     Priority: Medium     Added automatically from request for surgery [id number]       Microscopic hematuria 08/11/2020     Priority: Medium     Proteinuria, unspecified type 08/11/2020     Priority: Medium     Leg weakness, bilateral 04/13/2020     Priority: Medium     Recurrent and persistent hematuria 10/31/2019     Priority: Medium     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 minimally only for Tree pollens (all other environmental allergens NEGATIVE)       Diagnostic skin and sensitization tests (aka ALLERGENS)      Priority: Medium     CKD (chronic kidney disease) stage 3, GFR 30-59 ml/min (H) 05/22/2014     Priority: Medium     Hyperlipidemia with target LDL less than 100 04/08/2013     Priority: Medium     Intolerant to multiple statin medications. Zetia prescribed.        Osteoporosis      Priority: Medium     5 years Fosamax 1-1-2011 to 12-. Medication holiday  recommended.         Hypertension goal BP (blood pressure) < 140/90 06/26/2012     Priority: Medium     Advance care planning 03/13/2012     Priority: Medium     Advance Care Planning 12/20/2016: ACP Review of Chart / Resources Provided:  Reviewed chart for advance care plan.  Carolin Mcbride has been provided information and resources to begin or update their advance care plan.  Advance Care Planning 03/13/2012: Discussed advance care planning with patient; information given to patient to review. 3/13/2012 . Lara Roque MA                  CAD (coronary artery disease)      Priority: Medium     Stenting of 2 arteries and balloon angioplasty 12-8-2020. Dual antiplatelet therapy recommended for 2 years.        Pseudophakia OU c YAG OU 06/02/2011     Priority: Medium    .  Current Outpatient Medications   Medication Sig     amLODIPine (NORVASC) 10 MG tablet Take 1 tablet (10 mg) by mouth daily     aspirin 81 MG tablet Take 1 tablet (81 mg) by mouth daily     citalopram (CELEXA) 10 MG tablet Take 1 tablet (10 mg) by mouth daily     docusate sodium (COLACE) 100 MG capsule Take 1 capsule (100 mg) by mouth 3 times daily as needed for constipation     evolocumab (REPATHA SURECLICK) 140 MG/ML prefilled autoinjector Inject 1 mL (140 mg) Subcutaneous every 14 days - Subcutaneous     Hypromellose (ARTIFICIAL TEARS OP) Apply 1 drop to eye as needed Both eyes.     losartan (COZAAR) 100 MG tablet Take 1 tablet (100 mg) by mouth daily     metoprolol succinate ER (TOPROL XL) 25 MG 24 hr tablet Take 1 tablet (25 mg) by mouth daily     polyethylene glycol (MIRALAX) 17 GM/Dose powder Take 17 g (1 capful) by mouth daily     tiZANidine (ZANAFLEX) 4 MG tablet Take 1 tablet (4 mg) by mouth nightly as needed for muscle spasms     Vitamin D3 (CHOLECALCIFEROL) 25 mcg (1000 units) tablet Take 1,000 Units by mouth     HYDROcodone-acetaminophen (NORCO) 5-325 MG tablet Take 1 tablet by mouth every 6 hours as needed for pain (Patient not  "taking: Reported on 2/1/2024)     nitroGLYcerin (NITROSTAT) 0.4 MG sublingual tablet One tablet under the tongue every 5 minutes if needed for chest pain. May repeat every 5 minutes for a maximum of 3 doses in 15 minutes\"     pregabalin (LYRICA) 50 MG capsule Take 1 capsule (50 mg) by mouth 2 times daily (Patient not taking: Reported on 2/1/2024)     STATIN NOT PRESCRIBED (INTENTIONAL) Please choose reason not prescribed from choices below.     No current facility-administered medications for this visit.     Past Medical History:   Diagnosis Date     Adjustment reaction with anxiety and depression 01/01/2001     CAD (coronary artery disease) 01/01/2009    asymptomatic, on CT scan     Cyst, ovarian      Diagnostic skin and sensitization tests (aka ALLERGENS) 9/29/14 IgE tests pos. minimally only for Tree pollens (all other environmental allergens NEGATIVE)     HTN (hypertension)      Hyperlipidemia      Myositis 01/01/2001    related to past Baycol use- resolved     Osteoporosis      Seasonal allergic rhinitis     9/29/14 IgE tests pos. minimally only for Tree pollens (all other environmental allergens NEGATIVE)     Stented coronary artery      Past Surgical History:   Procedure Laterality Date     APPENDECTOMY       CHOLECYSTECTOMY, OPEN       COLONOSCOPY  2009    neg     CV CORONARY ANGIOGRAM N/A 12/8/2020    Procedure: CV CORONARY ANGIOGRAM;  Surgeon: Isra Weber MD;  Location: [facility] CARDIAC CATH LAB     CV PCI STENT DRUG ELUTING N/A 12/8/2020    Procedure: Percutaneous Coronary Intervention Stent Drug Eluting;  Surgeon: Isra Weber MD;  Location:  HEART CARDIAC CATH LAB     CYSTOCELE REPAIR  2003    TVT SPARC     Allergies   Allergen Reactions     Baycol [Cerivastatin Sodium]       muscle mass loss       Crestor [Statins]      Also intolerant of simvastatin and atorvastatin with recurrent leg weakness and pain.     Darvocet [Acetaminophen]      Severe nausea     Macrobid " [Nitrofuran Derivatives]      Hives     Social History     Socioeconomic History     Marital status:      Spouse name: Shashank     Number of children: 3     Years of education: Not on file     Highest education level: Not on file   Occupational History     Employer: RETIRED   Tobacco Use     Smoking status: Never     Passive exposure: Never     Smokeless tobacco: Never     Tobacco comments:     Never smoked; nonsmoking household   Vaping Use     Vaping Use: Never used   Substance and Sexual Activity     Alcohol use: No     Comment: very rare     Drug use: No     Comment: never     Sexual activity: Yes     Partners: Male     Birth control/protection: None   Other Topics Concern     Parent/sibling w/ CABG, MI or angioplasty before 65F 55M? No      Service No     Blood Transfusions No     Caffeine Concern No     Occupational Exposure No     Hobby Hazards No     Sleep Concern No     Stress Concern No     Weight Concern Yes     Special Diet No     Back Care No     Exercise Yes     Comment: Curves     Bike Helmet No     Seat Belt Yes     Self-Exams Yes   Social History Narrative     Not on file     Social Determinants of Health     Financial Resource Strain: Unknown (12/7/2023)    Financial Resource Strain      Within the past 12 months, have you or your family members you live with been unable to get utilities (heat, electricity) when it was really needed?: Patient refused   Food Insecurity: Unknown (12/7/2023)    Food Insecurity      Within the past 12 months, did you worry that your food would run out before you got money to buy more?: Patient refused      Within the past 12 months, did the food you bought just not last and you didn t have money to get more?: Patient refused   Transportation Needs: Unknown (12/7/2023)    Transportation Needs      Within the past 12 months, has lack of transportation kept you from medical appointments, getting your medicines, non-medical meetings or appointments, work, or  from getting things that you need?: Patient refused   Physical Activity: Not on file   Stress: Not on file   Social Connections: Not on file   Interpersonal Safety: Low Risk  (12/7/2023)    Interpersonal Safety      Do you feel physically and emotionally safe where you currently live?: Yes      Within the past 12 months, have you been hit, slapped, kicked or otherwise physically hurt by someone?: No      Within the past 12 months, have you been humiliated or emotionally abused in other ways by your partner or ex-partner?: No   Housing Stability: Unknown (12/7/2023)    Housing Stability      Do you have housing? : Patient refused      Are you worried about losing your housing?: Patient refused     Family History   Problem Relation Age of Onset     Cancer Mother         ovarian      Heart Disease Sister         CABG x 3     Neurologic Disorder Sister         Fibromyalgia     Cancer Father         stomach/interstines      Musculoskeletal Disorder Brother         back      Heart Disease Brother 60        CABG x 4     Glaucoma No family hx of      Macular Degeneration No family hx of           REVIEW OF SYSTEMS:  General: negative, fever, chills, night sweats  Skin: negative, acne, rash, and scaling  Eyes: negative, double vision, eye pain, and photophobia  Ears/Nose/Throat: negative, nasal congestion, and purulent rhinorrhea  Respiratory: No dyspnea on exertion, No cough, and negative  Cardiovascular: negative, palpitations, tachycardia, irregular heart beat, chest pain, exertional chest pain or pressure, paroxysmal nocturnal dyspnea, dyspnea on exertion, and orthopnea       OBJECTIVE:  Blood pressure 133/64, pulse 72, weight 76.7 kg (169 lb), SpO2 98%, not currently breastfeeding.  General Appearance: healthy, alert, active, and no distress  Head: Normocephalic. No masses, lesions, tenderness or abnormalities  Eyes: conjuctiva clear, PERRL, EOM intact  Ears: External ears normal. Canals clear. TM's normal.  Nose: Nares  normal  Mouth: normal  Neck: Supple, no cervical adenopathy, no thyromegaly  Lungs: clear to auscultation  Cardiac: regular rate and rhythm, normal S1 and S2, no murmur       ASSESSMENT/PLAN:  Patient here for follow-up.  Coronary artery disease.  Status post PCI to LAD.  Branch and POBA to D1.  Since then patient do not have any anginal symptoms.  She had 1 syncopal episode where she passed out while sitting on the toilet and passing urine.  Sustained significant trauma to the face and eyebrows.  Evaluations were negative.  Since then no recurrence of syncope.  According to her she is to pass out in summertime due to dehydration.  Overall patient is doing very well from cardiac standpoint.  She is intolerant to statin with severe myalgia.  Currently on Repatha which is controlling her lipids very well.  Her blood pressure is also well-controlled.  Recovering from 2 recent back surgeries.  Had no cardiac complaints today.  As she is doing well she is advised to continue the current medications and keep an yearly follow-up.  Her last echocardiogram was normal with EF of 55 to 60%.  EKG shows normal sinus rhythm normal EKG.  Patient is advised to continue her current medications and keep an yearly follow-up.  Total visit duration 20 minutes.  This included face-to-face interview, physical exam, chart review, review of EKG echocardiogram and documentation.      Please do not hesitate to contact me if you have any questions/concerns.     Sincerely,     VAISHALI Juan MD

## 2024-02-01 NOTE — PROGRESS NOTES
SUBJECTIVE:  Carolin Mcbride is a 89 year old female who presents for yearly follow-up.  Patient had 2 stent to LAD, 1 stent torPL branch andPOBA of D1 on 12/8/2020.     Initially patient presented for the management of hyperlipidemia.  Her LDL was over 200.  Patient had a TIA and she was initially managed on clopidogrel for a short course.  Her CT MRI and echo were normal.  Also had a Zio patch which did not show significant arrhythmia or atrial fibrillation.  Patient was very reluctant to take statin as she had severe myalgia with Baycol.  She was started on 20 mg of Lipitor which she discontinued due to some nonspecific muscle ache.  Patient was restarted on Lipitor 20 mg and then subsequently it was increased to 40 mg daily.  Patient is tolerating this dose very well.  As part of CAD evaluation patient had a nuclear scan.  She had an apical perfusion defect which was fixed.  The question was whether it was an artifact or real.  Subsequently patient had an echocardiogram which showed apical wall motion abnormality.  Subsequent angiogram showed LAD D1 disease and RPL disease for which she had intervention.      Patient had some myalgia on 40 mg of Lipitor.  Her CK was normal medication was discontinued and he was referred to lipid clinic for further management.  Today patient had no cardiac complaints.  She is doing well from this aspect.  Still complaining of myalgia and muscle weakness.   The lipid clinic she was started on Zetia 10 mg daily.  Latest lipid profile LDL is 182.      Subsequently patient was started on Repatha and this is controlling her LDL well.  Patient had no cardiac complaints today.  Patient had 2 recent back surgeries and is recovering from this.  Patient Active Problem List    Diagnosis Date Noted    Dermatochalasis of both upper eyelids 09/14/2022     Priority: Medium     Added automatically from request for surgery [id number]      Involutional ptosis, acquired, bilateral 09/14/2022      Priority: Medium     Added automatically from request for surgery [id number]      Statin intolerance 08/10/2021     Priority: Medium    Status post coronary angiogram 12/08/2020     Priority: Medium    Dyspnea on exertion 11/24/2020     Priority: Medium     Added automatically from request for surgery [id number]      Abnormal nuclear cardiac imaging test 11/24/2020     Priority: Medium     Added automatically from request for surgery [id number]      Microscopic hematuria 08/11/2020     Priority: Medium    Proteinuria, unspecified type 08/11/2020     Priority: Medium    Leg weakness, bilateral 04/13/2020     Priority: Medium    Recurrent and persistent hematuria 10/31/2019     Priority: Medium    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 minimally only for Tree pollens (all other environmental allergens NEGATIVE)      Diagnostic skin and sensitization tests (aka ALLERGENS)      Priority: Medium    CKD (chronic kidney disease) stage 3, GFR 30-59 ml/min (H) 05/22/2014     Priority: Medium    Hyperlipidemia with target LDL less than 100 04/08/2013     Priority: Medium     Intolerant to multiple statin medications. Zetia prescribed.       Osteoporosis      Priority: Medium     5 years Fosamax 1-1-2011 to 12-. Medication holiday recommended.        Hypertension goal BP (blood pressure) < 140/90 06/26/2012     Priority: Medium    Advance care planning 03/13/2012     Priority: Medium     Advance Care Planning 12/20/2016: ACP Review of Chart / Resources Provided:  Reviewed chart for advance care plan.  Carolin Mcbride has been provided information and resources to begin or update their  "advance care plan.  Advance Care Planning 03/13/2012: Discussed advance care planning with patient; information given to patient to review. 3/13/2012 . Lara Roque MA                 CAD (coronary artery disease)      Priority: Medium     Stenting of 2 arteries and balloon angioplasty 12-8-2020. Dual antiplatelet therapy recommended for 2 years.       Pseudophakia OU c YAG OU 06/02/2011     Priority: Medium    .  Current Outpatient Medications   Medication Sig    amLODIPine (NORVASC) 10 MG tablet Take 1 tablet (10 mg) by mouth daily    aspirin 81 MG tablet Take 1 tablet (81 mg) by mouth daily    citalopram (CELEXA) 10 MG tablet Take 1 tablet (10 mg) by mouth daily    docusate sodium (COLACE) 100 MG capsule Take 1 capsule (100 mg) by mouth 3 times daily as needed for constipation    evolocumab (REPATHA SURECLICK) 140 MG/ML prefilled autoinjector Inject 1 mL (140 mg) Subcutaneous every 14 days - Subcutaneous    Hypromellose (ARTIFICIAL TEARS OP) Apply 1 drop to eye as needed Both eyes.    losartan (COZAAR) 100 MG tablet Take 1 tablet (100 mg) by mouth daily    metoprolol succinate ER (TOPROL XL) 25 MG 24 hr tablet Take 1 tablet (25 mg) by mouth daily    polyethylene glycol (MIRALAX) 17 GM/Dose powder Take 17 g (1 capful) by mouth daily    tiZANidine (ZANAFLEX) 4 MG tablet Take 1 tablet (4 mg) by mouth nightly as needed for muscle spasms    Vitamin D3 (CHOLECALCIFEROL) 25 mcg (1000 units) tablet Take 1,000 Units by mouth    HYDROcodone-acetaminophen (NORCO) 5-325 MG tablet Take 1 tablet by mouth every 6 hours as needed for pain (Patient not taking: Reported on 2/1/2024)    nitroGLYcerin (NITROSTAT) 0.4 MG sublingual tablet One tablet under the tongue every 5 minutes if needed for chest pain. May repeat every 5 minutes for a maximum of 3 doses in 15 minutes\"    pregabalin (LYRICA) 50 MG capsule Take 1 capsule (50 mg) by mouth 2 times daily (Patient not taking: Reported on 2/1/2024)    STATIN NOT PRESCRIBED " (INTENTIONAL) Please choose reason not prescribed from choices below.     No current facility-administered medications for this visit.     Past Medical History:   Diagnosis Date    Adjustment reaction with anxiety and depression 01/01/2001    CAD (coronary artery disease) 01/01/2009    asymptomatic, on CT scan    Cyst, ovarian     Diagnostic skin and sensitization tests (aka ALLERGENS) 9/29/14 IgE tests pos. minimally only for Tree pollens (all other environmental allergens NEGATIVE)    HTN (hypertension)     Hyperlipidemia     Myositis 01/01/2001    related to past Baycol use- resolved    Osteoporosis     Seasonal allergic rhinitis     9/29/14 IgE tests pos. minimally only for Tree pollens (all other environmental allergens NEGATIVE)    Stented coronary artery      Past Surgical History:   Procedure Laterality Date    APPENDECTOMY      CHOLECYSTECTOMY, OPEN      COLONOSCOPY  2009    neg    CV CORONARY ANGIOGRAM N/A 12/8/2020    Procedure: CV CORONARY ANGIOGRAM;  Surgeon: Isra Weber MD;  Location: [facility] CARDIAC CATH LAB    CV PCI STENT DRUG ELUTING N/A 12/8/2020    Procedure: Percutaneous Coronary Intervention Stent Drug Eluting;  Surgeon: Isra Weber MD;  Location:  [facility] CARDIAC CATH LAB    CYSTOCELE REPAIR  2003    TVT SPARC     Allergies   Allergen Reactions    Baycol [Cerivastatin Sodium]       muscle mass loss      Crestor [Statins]      Also intolerant of simvastatin and atorvastatin with recurrent leg weakness and pain.    Darvocet [Acetaminophen]      Severe nausea    Macrobid [Nitrofuran Derivatives]      Hives     Social History     Socioeconomic History    Marital status:      Spouse name: Shashank    Number of children: 3    Years of education: Not on file    Highest education level: Not on file   Occupational History     Employer: RETIRED   Tobacco Use    Smoking status: Never     Passive exposure: Never    Smokeless tobacco: Never    Tobacco comments:      Never smoked; nonsmoking household   Vaping Use    Vaping Use: Never used   Substance and Sexual Activity    Alcohol use: No     Comment: very rare    Drug use: No     Comment: never    Sexual activity: Yes     Partners: Male     Birth control/protection: None   Other Topics Concern    Parent/sibling w/ CABG, MI or angioplasty before 65F 55M? No     Service No    Blood Transfusions No    Caffeine Concern No    Occupational Exposure No    Hobby Hazards No    Sleep Concern No    Stress Concern No    Weight Concern Yes    Special Diet No    Back Care No    Exercise Yes     Comment: Curves    Bike Helmet No    Seat Belt Yes    Self-Exams Yes   Social History Narrative    Not on file     Social Determinants of Health     Financial Resource Strain: Unknown (12/7/2023)    Financial Resource Strain     Within the past 12 months, have you or your family members you live with been unable to get utilities (heat, electricity) when it was really needed?: Patient refused   Food Insecurity: Unknown (12/7/2023)    Food Insecurity     Within the past 12 months, did you worry that your food would run out before you got money to buy more?: Patient refused     Within the past 12 months, did the food you bought just not last and you didn t have money to get more?: Patient refused   Transportation Needs: Unknown (12/7/2023)    Transportation Needs     Within the past 12 months, has lack of transportation kept you from medical appointments, getting your medicines, non-medical meetings or appointments, work, or from getting things that you need?: Patient refused   Physical Activity: Not on file   Stress: Not on file   Social Connections: Not on file   Interpersonal Safety: Low Risk  (12/7/2023)    Interpersonal Safety     Do you feel physically and emotionally safe where you currently live?: Yes     Within the past 12 months, have you been hit, slapped, kicked or otherwise physically hurt by someone?: No     Within the past 12  months, have you been humiliated or emotionally abused in other ways by your partner or ex-partner?: No   Housing Stability: Unknown (12/7/2023)    Housing Stability     Do you have housing? : Patient refused     Are you worried about losing your housing?: Patient refused     Family History   Problem Relation Age of Onset    Cancer Mother         ovarian     Heart Disease Sister         CABG x 3    Neurologic Disorder Sister         Fibromyalgia    Cancer Father         stomach/interstines     Musculoskeletal Disorder Brother         back     Heart Disease Brother 60        CABG x 4    Glaucoma No family hx of     Macular Degeneration No family hx of           REVIEW OF SYSTEMS:  General: negative, fever, chills, night sweats  Skin: negative, acne, rash, and scaling  Eyes: negative, double vision, eye pain, and photophobia  Ears/Nose/Throat: negative, nasal congestion, and purulent rhinorrhea  Respiratory: No dyspnea on exertion, No cough, and negative  Cardiovascular: negative, palpitations, tachycardia, irregular heart beat, chest pain, exertional chest pain or pressure, paroxysmal nocturnal dyspnea, dyspnea on exertion, and orthopnea       OBJECTIVE:  Blood pressure 133/64, pulse 72, weight 76.7 kg (169 lb), SpO2 98%, not currently breastfeeding.  General Appearance: healthy, alert, active, and no distress  Head: Normocephalic. No masses, lesions, tenderness or abnormalities  Eyes: conjuctiva clear, PERRL, EOM intact  Ears: External ears normal. Canals clear. TM's normal.  Nose: Nares normal  Mouth: normal  Neck: Supple, no cervical adenopathy, no thyromegaly  Lungs: clear to auscultation  Cardiac: regular rate and rhythm, normal S1 and S2, no murmur       ASSESSMENT/PLAN:  Patient here for follow-up.  Coronary artery disease.  Status post PCI to LAD.  Branch and POBA to D1.  Since then patient do not have any anginal symptoms.  She had 1 syncopal episode where she passed out while sitting on the toilet and  passing urine.  Sustained significant trauma to the face and eyebrows.  Evaluations were negative.  Since then no recurrence of syncope.  According to her she is to pass out in summertime due to dehydration.  Overall patient is doing very well from cardiac standpoint.  She is intolerant to statin with severe myalgia.  Currently on Repatha which is controlling her lipids very well.  Her blood pressure is also well-controlled.  Recovering from 2 recent back surgeries.  Had no cardiac complaints today.  As she is doing well she is advised to continue the current medications and keep an yearly follow-up.  Her last echocardiogram was normal with EF of 55 to 60%.  EKG shows normal sinus rhythm normal EKG.  Patient is advised to continue her current medications and keep an yearly follow-up.  Total visit duration 20 minutes.  This included face-to-face interview, physical exam, chart review, review of EKG echocardiogram and documentation.

## 2024-03-08 ENCOUNTER — TELEPHONE (OUTPATIENT)
Dept: FAMILY MEDICINE | Facility: CLINIC | Age: 89
End: 2024-03-08
Payer: COMMERCIAL

## 2024-03-08 NOTE — TELEPHONE ENCOUNTER
Pt calling because she wants to know if she sill needs to take pregabalin (LYRICA) 50 MG capsule. States that she is almost out of med and that she does not know if it is making a difference.    Routing to provider.  Katelyn Negro RN  [facility]

## 2024-03-08 NOTE — TELEPHONE ENCOUNTER
Please let patient know that it is okay to discontinue the Lyrica if no improvements with the pain. Patient should follow up with Dr. Orozco who did her back surgery if no improvements.    Merrill Patten MD

## 2024-03-08 NOTE — TELEPHONE ENCOUNTER
Called and spoke to pt. Relayed provider's note.    No other questions at this time.    Katelyn Negro RN  [facility]

## 2024-04-15 DIAGNOSIS — M54.16 LUMBAR RADICULOPATHY: ICD-10-CM

## 2024-04-15 RX ORDER — PREGABALIN 50 MG/1
50 CAPSULE ORAL 2 TIMES DAILY
Qty: 180 CAPSULE | Refills: 3 | Status: SHIPPED | OUTPATIENT
Start: 2024-04-15 | End: 2024-07-16

## 2024-04-30 ENCOUNTER — TELEPHONE (OUTPATIENT)
Dept: FAMILY MEDICINE | Facility: CLINIC | Age: 89
End: 2024-04-30
Payer: COMMERCIAL

## 2024-04-30 ENCOUNTER — TELEPHONE (OUTPATIENT)
Dept: CARDIOLOGY | Facility: CLINIC | Age: 89
End: 2024-04-30
Payer: COMMERCIAL

## 2024-04-30 DIAGNOSIS — E78.5 HYPERLIPIDEMIA WITH TARGET LDL LESS THAN 70: ICD-10-CM

## 2024-04-30 RX ORDER — EVOLOCUMAB 140 MG/ML
INJECTION, SOLUTION SUBCUTANEOUS
Qty: 6 ML | Refills: 3 | Status: SHIPPED | OUTPATIENT
Start: 2024-04-30

## 2024-04-30 NOTE — TELEPHONE ENCOUNTER
"RN spoke with pt regarding request. Pt states she was hoping to get another karin to help with cost of Repatha, thought she should speak with PCP first. RN advised pt to discuss with prescribing provider. Pt states she previously receive \"$2,500 karin\" from Dr. Juan. Medication costs $300/month without. Pt will call Dr. Juan.     Vilma Morales RN  "

## 2024-04-30 NOTE — TELEPHONE ENCOUNTER
Please get more info as to why pt requesting primary care provider to fill med that is prescribed by cards

## 2024-04-30 NOTE — TELEPHONE ENCOUNTER
Medication Question or Refill        What medication are you calling about (include dose and sig)?: Repatha, injectable    Preferred Pharmacy:   [id number]  [place], [place] - [address] [address]  [address] [address]  [place] [address]  Phone: [phone] Fax: [phone]      Controlled Substance Agreement on file:   CSA -- Patient Level:    CSA: None found at the patient level.       Who prescribed the medication?: Cardiologist    Do you need a refill? Yes    When did you use the medication last? 4/22/24    Patient offered an appointment? No    Do you have any questions or concerns?  Yes: patient would like to discuss this medication with pcp, and she would like help with a karin to get help with this medication.      Okay to leave a detailed message?: Yes at Cell number on file:    Telephone Information:   Mobile [phone]

## 2024-04-30 NOTE — TELEPHONE ENCOUNTER
Infectious Disease [facility] Call Center    Phone Message    May a detailed message be left on voicemail: yes     Reason for Call: Medication Question or concern regarding medication   Prescription Clarification  Name of Medication: olocumab (REPATHA SURECLICK) 140 MG/ML prefilled autoinjector   Prescribing Provider: Dr Juan   [facility] [address] [address] [facility]     What on the order needs clarification? The patient said that what ever program she was on for this medication to help reduce the cost has now  and she would like to get re-instated.       Action Taken: Other: Cardiology    Travel Screening: Not Applicable    Thank you!  [facility]

## 2024-05-06 NOTE — TELEPHONE ENCOUNTER
Spoke to patient.  Patient states the Rx was affordable and she is happy.    She is also c/o continued right upper leg soreness.  This has been ongoing.  She has hx of back surgery with Dr. Orozco.  I asked the patient if she felt her symptoms were related to Repatha, she said no.  Writer and patient discussed that it would be best to reach out to Dr. Orozco and address her concerns.  Patient verbalized understanding.    Calista Li RN  Cardiology Care Coordinator  [facility]  [phone] option 1

## 2024-06-29 ENCOUNTER — OFFICE VISIT (OUTPATIENT)
Dept: URGENT CARE | Facility: URGENT CARE | Age: 89
End: 2024-06-29
Payer: COMMERCIAL

## 2024-06-29 ENCOUNTER — NURSE TRIAGE (OUTPATIENT)
Dept: NURSING | Facility: CLINIC | Age: 89
End: 2024-06-29
Payer: COMMERCIAL

## 2024-06-29 VITALS
RESPIRATION RATE: 18 BRPM | BODY MASS INDEX: 29.83 KG/M2 | WEIGHT: 173.8 LBS | SYSTOLIC BLOOD PRESSURE: 147 MMHG | HEART RATE: 59 BPM | OXYGEN SATURATION: 99 % | DIASTOLIC BLOOD PRESSURE: 65 MMHG | TEMPERATURE: 98.1 F

## 2024-06-29 DIAGNOSIS — N30.01 ACUTE CYSTITIS WITH HEMATURIA: Primary | ICD-10-CM

## 2024-06-29 DIAGNOSIS — R30.0 DYSURIA: ICD-10-CM

## 2024-06-29 LAB
ALBUMIN UR-MCNC: ABNORMAL MG/DL
APPEARANCE UR: ABNORMAL
BACTERIA #/AREA URNS HPF: ABNORMAL /HPF
BILIRUB UR QL STRIP: NEGATIVE
COLOR UR AUTO: YELLOW
GLUCOSE UR STRIP-MCNC: NEGATIVE MG/DL
HGB UR QL STRIP: ABNORMAL
KETONES UR STRIP-MCNC: NEGATIVE MG/DL
LEUKOCYTE ESTERASE UR QL STRIP: ABNORMAL
NITRATE UR QL: NEGATIVE
PH UR STRIP: 5.5 [PH] (ref 5–7)
RBC #/AREA URNS AUTO: ABNORMAL /HPF
SP GR UR STRIP: <=1.005 (ref 1–1.03)
SQUAMOUS #/AREA URNS AUTO: ABNORMAL /LPF
UROBILINOGEN UR STRIP-ACNC: 0.2 E.U./DL
WBC #/AREA URNS AUTO: ABNORMAL /HPF

## 2024-06-29 PROCEDURE — 81001 URINALYSIS AUTO W/SCOPE: CPT | Performed by: FAMILY MEDICINE

## 2024-06-29 PROCEDURE — 99213 OFFICE O/P EST LOW 20 MIN: CPT | Performed by: FAMILY MEDICINE

## 2024-06-29 PROCEDURE — 87086 URINE CULTURE/COLONY COUNT: CPT | Performed by: FAMILY MEDICINE

## 2024-06-29 RX ORDER — SULFAMETHOXAZOLE/TRIMETHOPRIM 800-160 MG
1 TABLET ORAL 2 TIMES DAILY
Qty: 14 TABLET | Refills: 0 | Status: SHIPPED | OUTPATIENT
Start: 2024-06-29 | End: 2024-07-06

## 2024-06-29 NOTE — TELEPHONE ENCOUNTER
Nurse Triage SBAR    Is this a 2nd Level Triage? YES, LICENSED PRACTITIONER REVIEW IS REQUIRED    Situation: The patient reports symptoms of burning with urination and blood in her urine  Symptoms started today on 06/29/24    Background: The patient reports she has had previous urinary tract infections    Assessment: The patient denies any fever or side/back pain    Protocol Recommended Disposition:   See HCP Within 4 Hours (Or PCP Triage)    Recommendation: Continue with care advice, wait for providers response     Paged to provider and Davi    Does the patient meet one of the following criteria for ADS visit consideration? 16+ years old, with an [facility] PCP     TIP  Providers, please consider if this condition is appropriate for management at one of our Acute and Diagnostic Services sites.     If patient is a good candidate, please use dotphrase <dot>triageresponse and select Refer to ADS to document.    Provider Recommendation Follow Up:   Reached patient/caregiver. Informed of provider's recommendations to go to  for evaluation of symptoms. Patient verbalized understanding and agrees with the plan.       Reason for Disposition   [1] Pain or burning with passing urine (urination) AND [2] female   [1] SEVERE pain with urination (e.g., excruciating) AND [2] not improved after 2 hours of pain medicine and Sitz bath    Additional Information   Negative: Shock suspected (e.g., cold/pale/clammy skin, too weak to stand, low BP, rapid pulse)   Negative: Sounds like a life-threatening emergency to the triager   Negative: Shock suspected (e.g., cold/pale/clammy skin, too weak to stand, low BP, rapid pulse)   Negative: Sounds like a life-threatening emergency to the triager   Negative: Followed a genital area injury (e.g., vagina, vulva)   Negative: Taking antibiotic for urinary tract infection (UTI)   Negative: Pregnant   Negative: Postpartum (from 0 to 6 weeks after delivery)   Negative: [1] Unable to urinate  (or only a few drops) > 4 hours AND [2] bladder feels very full (e.g., palpable bladder or strong urge to urinate)   Negative: Vomiting   Negative: Patient sounds very sick or weak to the triager    Protocols used: Urinary Symptoms-A-AH, Urination Pain - Female-A-AH

## 2024-06-29 NOTE — PROGRESS NOTES
Assessment & Plan     Acute cystitis with hematuria  Differentials discussed in detail and symptoms likely secondary to acute cystitis.  Bactrim prescribed and urine culture ordered.  Recommended to continue well hydration and to follow-up if symptoms persist or worsen.  All questions answered.  - sulfamethoxazole-trimethoprim (BACTRIM DS) 800-160 MG tablet; Take 1 tablet by mouth 2 times daily for 7 days    Dysuria  - UA Macroscopic with reflex to Microscopic and Culture - Lab Collect; Future  - UA Macroscopic with reflex to Microscopic and Culture - Lab Collect  - Urine Microscopic Exam  - Urine Culture      Lata Coe is a 89 year old, presenting for the following health issues:  UTI (Burning & blood in urine-started this morning )    HPI     Concern -   Onset: this morning  Description: dysuria, hematuria  Intensity: moderate  Progression of Symptoms:  same  Accompanying Signs & Symptoms: no fever, chills, abdominal pain or other relevant systemic symptoms   Therapies tried and outcome: hydration      Review of Systems  Constitutional, HEENT, cardiovascular, pulmonary, gi and gu systems are negative, except as otherwise noted.      Objective    BP (!) 147/65   Pulse 59   Temp 98.1  F (36.7  C) (Oral)   Resp 18   Wt 78.8 kg (173 lb 12.8 oz)   LMP  (LMP Unknown)   SpO2 99%   BMI 29.83 kg/m    Body mass index is 29.83 kg/m .  Physical Exam   GENERAL: alert and no distress  NECK: no adenopathy, no asymmetry, masses, or scars  RESP: no wheezes  ABDOMEN: soft, nontender, no hepatosplenomegaly, no masses  MS: no gross musculoskeletal defects noted, no edema  NEURO: Normal strength and tone, mentation intact and speech normal  PSYCH: mentation appears normal, affect normal/bright      Results for orders placed or performed in visit on 06/29/24   UA Macroscopic with reflex to Microscopic and Culture - Lab Collect     Status: Abnormal    Specimen: Urine, Clean Catch   Result Value Ref Range    Color  Urine Yellow Colorless, Straw, Light Yellow, Yellow    Appearance Urine Slightly Cloudy (A) Clear    Glucose Urine Negative Negative mg/dL    Bilirubin Urine Negative Negative    Ketones Urine Negative Negative mg/dL    Specific Gravity Urine <=1.005 1.003 - 1.035    Blood Urine Large (A) Negative    pH Urine 5.5 5.0 - 7.0    Protein Albumin Urine Trace (A) Negative mg/dL    Urobilinogen Urine 0.2 0.2, 1.0 E.U./dL    Nitrite Urine Negative Negative    Leukocyte Esterase Urine Large (A) Negative   Urine Microscopic Exam     Status: Abnormal   Result Value Ref Range    Bacteria Urine Moderate (A) None Seen /HPF    RBC Urine 5-10 (A) 0-2 /HPF /HPF    WBC Urine  (A) 0-5 /HPF /HPF    Squamous Epithelials Urine Few (A) None Seen /LPF           Signed Electronically by: Alin Lao MD

## 2024-06-30 LAB — BACTERIA UR CULT: NORMAL

## 2024-07-15 ENCOUNTER — TELEPHONE (OUTPATIENT)
Dept: FAMILY MEDICINE | Facility: CLINIC | Age: 89
End: 2024-07-15
Payer: COMMERCIAL

## 2024-07-15 NOTE — TELEPHONE ENCOUNTER
Reason for Call:  Appointment Request    Patient requesting this type of appt: Procedure: back surgery    Requested provider: Merrill Patten    Reason patient unable to be scheduled: Not within requested timeframe    When does patient want to be seen/preferred time:  preferred Monday or Tuesday as soon as possible    Comments:     Okay to leave a detailed message?: Yes at Cell number on file:    Telephone Information:   Mobile [phone]       Call taken on 7/15/2024 at 11:24 AM by Irma Harris

## 2024-07-15 NOTE — TELEPHONE ENCOUNTER
Called and spoke to the patient and scheduled the appointment for 7/16/2024.  Priya Soriano MA  [facility]   Primary Care

## 2024-07-16 ENCOUNTER — OFFICE VISIT (OUTPATIENT)
Dept: FAMILY MEDICINE | Facility: CLINIC | Age: 89
End: 2024-07-16
Payer: COMMERCIAL

## 2024-07-16 VITALS
HEART RATE: 69 BPM | OXYGEN SATURATION: 97 % | WEIGHT: 174 LBS | DIASTOLIC BLOOD PRESSURE: 70 MMHG | RESPIRATION RATE: 20 BRPM | TEMPERATURE: 97.8 F | HEIGHT: 64 IN | BODY MASS INDEX: 29.71 KG/M2 | SYSTOLIC BLOOD PRESSURE: 124 MMHG

## 2024-07-16 DIAGNOSIS — M54.16 LUMBAR RADICULOPATHY: Primary | ICD-10-CM

## 2024-07-16 PROCEDURE — G2211 COMPLEX E/M VISIT ADD ON: HCPCS | Performed by: FAMILY MEDICINE

## 2024-07-16 PROCEDURE — 99213 OFFICE O/P EST LOW 20 MIN: CPT | Performed by: FAMILY MEDICINE

## 2024-07-16 RX ORDER — PREGABALIN 50 MG/1
50 CAPSULE ORAL 3 TIMES DAILY
Qty: 270 CAPSULE | Refills: 3 | Status: SHIPPED | OUTPATIENT
Start: 2024-07-16

## 2024-07-16 ASSESSMENT — ENCOUNTER SYMPTOMS: BACK PAIN: 1

## 2024-07-16 NOTE — PROGRESS NOTES
"    Lata Coe is a 89 year old, presenting for the following health issues:  Back Pain      7/16/2024    10:55 AM   Additional Questions   Roomed by edgardo   Accompanied by self         7/16/2024    10:55 AM   Patient Reported Additional Medications   Patient reports taking the following new medications no     Back Pain          Pain History:  When did you first notice your pain? 07/2023   Have you seen this provider for your pain in the past? Yes   Where in your body do you have pain? back  Are you seeing anyone else for your pain? Yes, was seeing Dr. Orozco but not recommended to see primary. Still having pain and numbness of the leg.         7/13/2023    12:18 PM 12/7/2023     1:37 PM 7/16/2024    10:59 AM   PHQ-9 SCORE   PHQ-9 Total Score MyChart 0 0 0   PHQ-9 Total Score 0 0 0         PDMP Review         Value Time User    State PDMP site checked  Yes 4/15/2024 12:20 PM Merrill Patten MD          Last CSA Agreement:   CSA -- Patient Level:    CSA: None found at the patient level.           Review of Systems  Constitutional, HEENT, cardiovascular, pulmonary, GI, , musculoskeletal, neuro, skin, endocrine and psych systems are negative, except as otherwise noted.      Objective    /70 (BP Location: Left arm, Patient Position: Sitting, Cuff Size: Adult Regular)   Pulse 69   Temp 97.8  F (36.6  C) (Oral)   Resp 20   Ht 1.626 m (5' 4\")   Wt 78.9 kg (174 lb)   LMP  (LMP Unknown)   SpO2 97%   BMI 29.87 kg/m    Body mass index is 29.87 kg/m .  Physical Exam   GENERAL: alert and no distress  NECK: no adenopathy, no asymmetry, masses, or scars  RESP: lungs clear to auscultation - no rales, rhonchi or wheezes  CV: regular rate and rhythm, normal S1 S2, no S3 or S4, no murmur, click or rub, no peripheral edema  ABDOMEN: soft, nontender, no hepatosplenomegaly, no masses and bowel sounds normal    A/P:    (M54.16) Lumbar radiculopathy  (primary encounter diagnosis)  Comment:   Plan: pregabalin (LYRICA) 50 " MG capsule       Increase dose from 50 mg BID to 50 mg TID. If no improvements, patient will let us know.            Signed Electronically by: Merrill Patten MD, MD    Answers submitted by the patient for this visit:  Patient Health Questionnaire (Submitted on 7/16/2024)  If you checked off any problems, how difficult have these problems made it for you to do your work, take care of things at home, or get along with other people?: Not difficult at all  PHQ9 TOTAL SCORE: 0

## 2024-11-12 ENCOUNTER — TELEPHONE (OUTPATIENT)
Dept: FAMILY MEDICINE | Facility: CLINIC | Age: 89
End: 2024-11-12
Payer: COMMERCIAL

## 2024-11-12 DIAGNOSIS — M54.16 LUMBAR RADICULOPATHY: Primary | ICD-10-CM

## 2024-11-12 DIAGNOSIS — Z98.1 S/P LUMBAR FUSION: ICD-10-CM

## 2024-11-12 DIAGNOSIS — R29.898 LEG WEAKNESS, BILATERAL: ICD-10-CM

## 2024-11-12 NOTE — TELEPHONE ENCOUNTER
Called patient re: referral request. Patient states that since her 2nd back surgery in August by Dr. Orozco, she did go to [facility] for PT and thought this was very helpful. Has been doing the 10 or so home exercises that they recommended daily, but doesn't feel like her leg issues have improved a whole lot. Denies pain per se, but states it's more like a discomfort. Is able to complete ADLs, but R leg gets more fatigued.    Writer offered patient sooner appt, however patient prefers message be sent to PCP to advise.    Routing to PCP to advise - patient asking if she should be seen sooner for her leg to be assessed (states Dr. Orozco has signed off and wants all concerns to go through PCP), or if patient can keep appt next month for AWV and get PT referral prior to appt. PT referral pended if appropriate.      Leslie Perez, RN, BSN  [facility]

## 2024-11-12 NOTE — TELEPHONE ENCOUNTER
Order/Referral Request    Who is requesting: patient    Orders being requested: physical therapy      Reason service is needed/diagnosis: sciatica pain, discomfort in leg    When are orders needed by: ASAP    Has this been discussed with Provider: No    Does patient have a preference on a Group/Provider/Facility? [facility] in [place]    Does patient have an appointment scheduled?: No    Where to send orders: Fax to facility above.    Okay to leave a detailed message?: Yes at Cell number on file:    Telephone Information:   [facility] [phone]

## 2024-11-13 NOTE — TELEPHONE ENCOUNTER
This writer attempted to contact patient on 11/13/24      Reason for call referral and left message.      If patient calls back:   Registered Nurse called.       Katelyn James, DLN, RN  [facility]

## 2024-11-13 NOTE — TELEPHONE ENCOUNTER
Please let patient know that Physical Therapy referral was ordered for evaluation and treatment. Patient can call [phone] to schedule.    Merrill Patten MD

## 2024-12-10 ENCOUNTER — OFFICE VISIT (OUTPATIENT)
Dept: FAMILY MEDICINE | Facility: CLINIC | Age: 89
End: 2024-12-10
Payer: COMMERCIAL

## 2024-12-10 VITALS
BODY MASS INDEX: 31.01 KG/M2 | HEART RATE: 73 BPM | HEIGHT: 63 IN | TEMPERATURE: 98.2 F | SYSTOLIC BLOOD PRESSURE: 120 MMHG | RESPIRATION RATE: 20 BRPM | OXYGEN SATURATION: 94 % | DIASTOLIC BLOOD PRESSURE: 70 MMHG | WEIGHT: 175 LBS

## 2024-12-10 DIAGNOSIS — I10 HYPERTENSION GOAL BP (BLOOD PRESSURE) < 140/90: ICD-10-CM

## 2024-12-10 DIAGNOSIS — I47.10 PAROXYSMAL SUPRAVENTRICULAR TACHYCARDIA (H): ICD-10-CM

## 2024-12-10 DIAGNOSIS — R09.89 SYMPTOMS OF UPPER RESPIRATORY INFECTION (URI): ICD-10-CM

## 2024-12-10 DIAGNOSIS — Z00.00 ENCOUNTER FOR MEDICARE ANNUAL WELLNESS EXAM: Primary | ICD-10-CM

## 2024-12-10 DIAGNOSIS — N18.31 CHRONIC KIDNEY DISEASE, STAGE 3A (H): ICD-10-CM

## 2024-12-10 DIAGNOSIS — E78.5 HYPERLIPIDEMIA WITH TARGET LDL LESS THAN 70: ICD-10-CM

## 2024-12-10 DIAGNOSIS — M54.16 LUMBAR RADICULOPATHY: ICD-10-CM

## 2024-12-10 DIAGNOSIS — F33.0 MAJOR DEPRESSIVE DISORDER, RECURRENT EPISODE, MILD (H): ICD-10-CM

## 2024-12-10 DIAGNOSIS — I25.10 CORONARY ARTERY DISEASE INVOLVING NATIVE CORONARY ARTERY OF NATIVE HEART WITHOUT ANGINA PECTORIS: ICD-10-CM

## 2024-12-10 LAB
ALBUMIN SERPL BCG-MCNC: 4.1 G/DL (ref 3.5–5.2)
ALP SERPL-CCNC: 94 U/L (ref 40–150)
ALT SERPL W P-5'-P-CCNC: 16 U/L (ref 0–50)
ANION GAP SERPL CALCULATED.3IONS-SCNC: 12 MMOL/L (ref 7–15)
AST SERPL W P-5'-P-CCNC: 21 U/L (ref 0–45)
BILIRUB SERPL-MCNC: 0.2 MG/DL
BUN SERPL-MCNC: 26 MG/DL (ref 8–23)
CALCIUM SERPL-MCNC: 9.7 MG/DL (ref 8.8–10.4)
CHLORIDE SERPL-SCNC: 106 MMOL/L (ref 98–107)
CHOLEST SERPL-MCNC: 186 MG/DL
CREAT SERPL-MCNC: 1.27 MG/DL (ref 0.51–0.95)
CREAT UR-MCNC: 136 MG/DL
EGFRCR SERPLBLD CKD-EPI 2021: 40 ML/MIN/1.73M2
FASTING STATUS PATIENT QL REPORTED: YES
FASTING STATUS PATIENT QL REPORTED: YES
FLUAV AG SPEC QL IA: NEGATIVE
FLUBV AG SPEC QL IA: NEGATIVE
GLUCOSE SERPL-MCNC: 131 MG/DL (ref 70–99)
HCO3 SERPL-SCNC: 26 MMOL/L (ref 22–29)
HDLC SERPL-MCNC: 58 MG/DL
HGB BLD-MCNC: 12.7 G/DL (ref 11.7–15.7)
LDLC SERPL CALC-MCNC: 98 MG/DL
MICROALBUMIN UR-MCNC: 495 MG/L
MICROALBUMIN/CREAT UR: 363.97 MG/G CR (ref 0–25)
NONHDLC SERPL-MCNC: 128 MG/DL
POTASSIUM SERPL-SCNC: 4.1 MMOL/L (ref 3.4–5.3)
PROT SERPL-MCNC: 6.9 G/DL (ref 6.4–8.3)
SARS-COV-2 RNA RESP QL NAA+PROBE: NEGATIVE
SODIUM SERPL-SCNC: 144 MMOL/L (ref 135–145)
TRIGL SERPL-MCNC: 149 MG/DL

## 2024-12-10 PROCEDURE — G0439 PPPS, SUBSEQ VISIT: HCPCS | Performed by: FAMILY MEDICINE

## 2024-12-10 PROCEDURE — 87635 SARS-COV-2 COVID-19 AMP PRB: CPT | Performed by: FAMILY MEDICINE

## 2024-12-10 PROCEDURE — 82570 ASSAY OF URINE CREATININE: CPT | Performed by: FAMILY MEDICINE

## 2024-12-10 PROCEDURE — 80061 LIPID PANEL: CPT | Performed by: FAMILY MEDICINE

## 2024-12-10 PROCEDURE — 99000 SPECIMEN HANDLING OFFICE-LAB: CPT | Performed by: FAMILY MEDICINE

## 2024-12-10 PROCEDURE — 36415 COLL VENOUS BLD VENIPUNCTURE: CPT | Performed by: FAMILY MEDICINE

## 2024-12-10 PROCEDURE — 84244 ASSAY OF RENIN: CPT | Mod: 90 | Performed by: FAMILY MEDICINE

## 2024-12-10 PROCEDURE — 82043 UR ALBUMIN QUANTITATIVE: CPT | Performed by: FAMILY MEDICINE

## 2024-12-10 PROCEDURE — 85018 HEMOGLOBIN: CPT | Performed by: FAMILY MEDICINE

## 2024-12-10 PROCEDURE — 80053 COMPREHEN METABOLIC PANEL: CPT | Performed by: FAMILY MEDICINE

## 2024-12-10 PROCEDURE — 99214 OFFICE O/P EST MOD 30 MIN: CPT | Mod: 25 | Performed by: FAMILY MEDICINE

## 2024-12-10 PROCEDURE — 87804 INFLUENZA ASSAY W/OPTIC: CPT | Performed by: FAMILY MEDICINE

## 2024-12-10 RX ORDER — AMLODIPINE BESYLATE 10 MG/1
10 TABLET ORAL DAILY
Qty: 90 TABLET | Refills: 3 | Status: SHIPPED | OUTPATIENT
Start: 2024-12-10

## 2024-12-10 RX ORDER — LOSARTAN POTASSIUM 100 MG/1
100 TABLET ORAL DAILY
Qty: 90 TABLET | Refills: 3 | Status: SHIPPED | OUTPATIENT
Start: 2024-12-10

## 2024-12-10 RX ORDER — CITALOPRAM HYDROBROMIDE 10 MG/1
10 TABLET ORAL DAILY
Qty: 90 TABLET | Refills: 3 | Status: CANCELLED | OUTPATIENT
Start: 2024-12-10

## 2024-12-10 RX ORDER — DULOXETIN HYDROCHLORIDE 20 MG/1
20 CAPSULE, DELAYED RELEASE ORAL 2 TIMES DAILY
Qty: 180 CAPSULE | Refills: 3 | Status: SHIPPED | OUTPATIENT
Start: 2024-12-10

## 2024-12-10 RX ORDER — PREGABALIN 50 MG/1
50 CAPSULE ORAL 3 TIMES DAILY
Qty: 270 CAPSULE | Refills: 3 | Status: SHIPPED | OUTPATIENT
Start: 2024-12-10

## 2024-12-10 SDOH — HEALTH STABILITY: PHYSICAL HEALTH: ON AVERAGE, HOW MANY DAYS PER WEEK DO YOU ENGAGE IN MODERATE TO STRENUOUS EXERCISE (LIKE A BRISK WALK)?: 4 DAYS

## 2024-12-10 ASSESSMENT — PATIENT HEALTH QUESTIONNAIRE - PHQ9
10. IF YOU CHECKED OFF ANY PROBLEMS, HOW DIFFICULT HAVE THESE PROBLEMS MADE IT FOR YOU TO DO YOUR WORK, TAKE CARE OF THINGS AT HOME, OR GET ALONG WITH OTHER PEOPLE: NOT DIFFICULT AT ALL
SUM OF ALL RESPONSES TO PHQ QUESTIONS 1-9: 3
SUM OF ALL RESPONSES TO PHQ QUESTIONS 1-9: 3

## 2024-12-10 ASSESSMENT — PAIN SCALES - GENERAL
PAINLEVEL_OUTOF10: NO PAIN (1)
PAINLEVEL_OUTOF10: NO PAIN (1)

## 2024-12-10 ASSESSMENT — SOCIAL DETERMINANTS OF HEALTH (SDOH): HOW OFTEN DO YOU GET TOGETHER WITH FRIENDS OR RELATIVES?: ONCE A WEEK

## 2024-12-10 NOTE — PATIENT INSTRUCTIONS
At [facility], we strive to deliver an exceptional experience to you, every time we see you. If you receive a survey, please let us know what we are doing well and/or what we could improve upon, as we do value your feedback.  If you have MyChart, you can expect to receive results automatically within 24 hours of their completion.  Your provider will send a note interpreting your results as well.   If you do not have MyChart, you should receive your results in about a week by mail.    Your care team:                            Family Medicine Internal Medicine   MD Edgardo Wilhelm, MD Teetee Tomlinson, MD Felipe Epps, MD Augusta Clay, PAEthelC    Merrill Patten, MD Pediatrics   Alena Dogde, MD Park Dominguez, MD Sneha Ramos, APRN CNP Rebecca Curran APRN CNP   MD Soumya Velasquez, MD Nadiya Morley, CNP     Daniel Orozco, CNP Same-Day Provider (No follow-up visits)   DALTON Macario, DNP Nanda Gillespie, DALTON Mckinley, FNP, BC CHERY LirianoC     Clinic hours: Monday - Thursday 7 am-6 pm; Fridays 7 am-5 pm.   Urgent care: Monday - Friday 10 am- 8 pm; Saturday and Sunday 9 am-5 pm.    Clinic: [phone]       [facility]: Monday - Thursday 8 am - 7 pm; Friday 8 am - 6 pm  [facility]: [phone]

## 2024-12-10 NOTE — PROGRESS NOTES
Preventive Care Visit  [facility]  Merrill Patten MD, MD, Family Medicine  Dec 10, 2024      Lata Coe is a 89 year old, presenting for the following:  Physical        12/10/2024     7:40 AM   Additional Questions   Roomed by janeth   Accompanied by self       HPI    Patient feeling ill this morning. Patient has runny nose, cough.  had similar symptoms. No sob, cp. No f/c. No n/v/d.    Health Care Directive  Patient does not have a Health Care Directive: Discussed advance care planning with patient; information given to patient to review.      12/10/2024   General Health   How would you rate your overall physical health? Good   Feel stress (tense, anxious, or unable to sleep) Not at all            12/10/2024   Nutrition   Diet: Regular (no restrictions)    I don't know       Multiple values from one day are sorted in reverse-chronological order         12/10/2024   Exercise   Days per week of moderate/strenous exercise 4 days            12/10/2024   Social Factors   Frequency of gathering with friends or relatives Once a week   Worry food won't last until get money to buy more No   Food not last or not have enough money for food? No   Do you have housing? (Housing is defined as stable permanent housing and does not include staying ouside in a car, in a tent, in an abandoned building, in an overnight shelter, or couch-surfing.) Yes   Are you worried about losing your housing? No   Lack of transportation? No   Unable to get utilities (heat,electricity)? No            12/10/2024   Fall Risk   Fallen 2 or more times in the past year? No    Trouble with walking or balance? No        Patient-reported          12/10/2024   Activities of Daily Living- Home Safety   Needs help with the following daily activites None of the above   Safety concerns in the home None of the above            12/10/2024   Dental   Dentist two times every year? Yes            12/10/2024   Hearing Screening    Hearing concerns? None of the above            12/10/2024   Driving Risk Screening   Patient/family members have concerns about driving No            12/10/2024   General Alertness/Fatigue Screening   Have you been more tired than usual lately? No            12/10/2024   Urinary Incontinence Screening   Bothered by leaking urine in past 6 months Yes            12/10/2024   TB Screening   Were you born outside of the [place]? No          Today's PHQ-9 Score:       12/10/2024     7:32 AM   PHQ-9 SCORE   PHQ-9 Total Score MyChart 3 (Minimal depression)   PHQ-9 Total Score 3        Patient-reported         12/10/2024   Substance Use   Alcohol more than 3/day or more than 7/wk No   Do you have a current opioid prescription? No   How severe/bad is pain from 1 to 10? 1/10   Do you use any other substances recreationally? No        Social History     Tobacco Use    Smoking status: Never     Passive exposure: Never    Smokeless tobacco: Never    Tobacco comments:     Never smoked; nonsmoking household   Vaping Use    Vaping status: Never Used   Substance Use Topics    Alcohol use: No     Comment: very rare    Drug use: No     Comment: never           9/27/2022   LAST FHS-7 RESULTS   1st degree relative breast or ovarian cancer No   Any relative bilateral breast cancer No   Any male have breast cancer No   Any ONE woman have BOTH breast AND ovarian cancer No   Any woman with breast cancer before 50yrs No   2 or more relatives with breast AND/OR ovarian cancer No   2 or more relatives with breast AND/OR bowel cancer No        Mammogram Screening - After age 74- determine frequency with patient based on health status, life expectancy and patient goals          Reviewed and updated as needed this visit by Provider                    Past Medical History:   Diagnosis Date    Adjustment reaction with anxiety and depression 01/01/2001    CAD (coronary artery disease) 01/01/2009    asymptomatic, on CT scan    Cyst, ovarian      Diagnostic skin and sensitization tests (aka ALLERGENS) 14 IgE tests pos. minimally only for Tree pollens (all other environmental allergens NEGATIVE)    HTN (hypertension)     Hyperlipidemia     Myositis 2001    related to past Baycol use- resolved    Osteoporosis     Seasonal allergic rhinitis     14 IgE tests pos. minimally only for Tree pollens (all other environmental allergens NEGATIVE)    Stented coronary artery      Past Surgical History:   Procedure Laterality Date    APPENDECTOMY      CHOLECYSTECTOMY, OPEN      COLONOSCOPY      neg    CV CORONARY ANGIOGRAM N/A 2020    Procedure: CV CORONARY ANGIOGRAM;  Surgeon: Isra Weber MD;  Location:  [facility]    CV PCI STENT DRUG ELUTING N/A 2020    Procedure: Percutaneous Coronary Intervention Stent Drug Eluting;  Surgeon: Isra Weber MD;  Location:  [facility]ATH [facility]    CYSTOCELE REPAIR      TVT SPARC     OB History    Para Term  AB Living   3 3 0 0 0 0   SAB IAB Ectopic Multiple Live Births   0 0 0 0 1      # Outcome Date GA Lbr [facility]/2nd Weight Sex Type Anes PTL Lv   3 Para     M       2 Para     M       1 Para     M    DEC     Lab work is in process  Labs reviewed in EPIC  BP Readings from Last 3 Encounters:   12/10/24 120/70   24 124/70   24 (!) 147/65    Wt Readings from Last 3 Encounters:   12/10/24 79.4 kg (175 lb)   24 78.9 kg (174 lb)   24 78.8 kg (173 lb 12.8 oz)                  Patient Active Problem List   Diagnosis    Pseudophakia OU c YAG OU    CAD (coronary artery disease)    Advance care planning    Hypertension goal BP (blood pressure) < 140/90    Hyperlipidemia with target LDL less than 100    Osteoporosis    CKD (chronic kidney disease) stage 3, GFR 30-59 ml/min (H)    Seasonal allergic rhinitis    Diagnostic skin and sensitization tests (aka ALLERGENS)    Pastrana's neuroma    Strabismic amblyopia     Anterior basement membrane dystrophy, OU    PVD (posterior vitreous detachment) OU    Granuloma annulare    Recurrent and persistent hematuria    Leg weakness, bilateral    Microscopic hematuria    Proteinuria, unspecified type    Dyspnea on exertion    Abnormal nuclear cardiac imaging test    Status post coronary angiogram    Statin intolerance    Dermatochalasis of both upper eyelids    Involutional ptosis, acquired, bilateral    Paroxysmal supraventricular tachycardia (H)     Past Surgical History:   Procedure Laterality Date    APPENDECTOMY      CHOLECYSTECTOMY, OPEN      COLONOSCOPY  2009    neg    CV CORONARY ANGIOGRAM N/A 12/8/2020    Procedure: CV CORONARY ANGIOGRAM;  Surgeon: Isra Weber MD;  Location: [facility] CARDIAC CATH LAB    CV PCI STENT DRUG ELUTING N/A 12/8/2020    Procedure: Percutaneous Coronary Intervention Stent Drug Eluting;  Surgeon: Isra Weber MD;  Location: [facility] CARDIAC CATH LAB    CYSTOCELE REPAIR  2003    TVT SPARC       Social History     Tobacco Use    Smoking status: Never     Passive exposure: Never    Smokeless tobacco: Never    Tobacco comments:     Never smoked; nonsmoking household   Substance Use Topics    Alcohol use: No     Comment: very rare     Family History   Problem Relation Age of Onset    Cancer Mother         ovarian     Heart Disease Sister         CABG x 3    Neurologic Disorder Sister         Fibromyalgia    Cancer Father         stomach/interstines     Musculoskeletal Disorder Brother         back     Heart Disease Brother 60        CABG x 4    Glaucoma No family hx of     Macular Degeneration No family hx of          Current Outpatient Medications   Medication Sig Dispense Refill    amLODIPine (NORVASC) 10 MG tablet Take 1 tablet (10 mg) by mouth daily. 90 tablet 3    aspirin 81 MG tablet Take 1 tablet (81 mg) by mouth daily 90 tablet 3    docusate sodium (COLACE) 100 MG capsule Take 1 capsule (100 mg) by mouth 3 times  "daily as needed for constipation 30 capsule 1    DULoxetine (CYMBALTA) 20 MG capsule Take 1 capsule (20 mg) by mouth 2 times daily. 180 capsule 3    evolocumab (REPATHA SURECLICK) 140 MG/ML prefilled autoinjector Inject 1 mL (140 mg) Subcutaneous every 14 days - Subcutaneous 6 mL 3    Hypromellose (ARTIFICIAL TEARS OP) Apply 1 drop to eye as needed Both eyes.      losartan (COZAAR) 100 MG tablet Take 1 tablet (100 mg) by mouth daily. 90 tablet 3    metoprolol succinate ER (TOPROL XL) 25 MG 24 hr tablet Take 1 tablet (25 mg) by mouth daily 90 tablet 3    nitroGLYcerin (NITROSTAT) 0.4 MG sublingual tablet One tablet under the tongue every 5 minutes if needed for chest pain. May repeat every 5 minutes for a maximum of 3 doses in 15 minutes\" 25 tablet 0    polyethylene glycol (MIRALAX) 17 GM/Dose powder Take 17 g (1 capful) by mouth daily 850 g 6    pregabalin (LYRICA) 50 MG capsule Take 1 capsule (50 mg) by mouth 3 times daily. 270 capsule 3    STATIN NOT PRESCRIBED (INTENTIONAL) Please choose reason not prescribed from choices below.      tiZANidine (ZANAFLEX) 4 MG tablet Take 1 tablet (4 mg) by mouth nightly as needed for muscle spasms 30 tablet 0    Vitamin D3 (CHOLECALCIFEROL) 25 mcg (1000 units) tablet Take 1,000 Units by mouth       Allergies   Allergen Reactions    Dextroamphetamine Hives    Baycol [Cerivastatin Sodium]       muscle mass loss      Crestor [Statins]      Also intolerant of simvastatin and atorvastatin with recurrent leg weakness and pain.    Darvocet [Acetaminophen]      Severe nausea    Macrobid [Nitrofuran Derivatives]      Hives     Recent Labs   Lab Test 12/07/23  1408 08/24/23  1154 09/19/22  0936 06/07/22  1604 08/31/21  0950 04/12/21  1308 12/08/20  0745 10/19/20  1203 08/10/20  1506   LDL 72 102* 118*  113* 90   < > 142*  --    < > 241*   HDL 50 50 51 52   < > 47*  --    < > 51   TRIG 143 237* 138 177*   < > 227*  --    < > 217*   ALT 19  --  18 23   < > 30  --    < > 26   CR 0.92 0.88 " 1.03 1.05*   < > 1.12* 0.96  --  1.01   GFRESTIMATED 59* 63 52* 51*   < > 44* 53*  --  50*   GFRESTBLACK  --   --   --   --   --  51* 62  --  58*   POTASSIUM 4.4 4.4 4.6 4.2   < > 4.6 3.9  --  4.1   TSH  --   --   --   --   --   --   --   --  1.43    < > = values in this interval not displayed.      Current providers sharing in care for this patient include:  Patient Care Team:  Merrill Patten MD as PCP - General (Family Medicine)  Merrill Patten MD as Assigned PCP  John Figueroa MD as MD (Ophthalmology)  Radha Maurice RN as Specialty Care Coordinator (Cardiology)  Ethan Chapa DO as Assigned Musculoskeletal Provider  VAISHALI Juan MD as Assigned Heart and Vascular Provider  Nevin Ricci MD as MD (Ophthalmology)  Nevin Ricci MD as Assigned Surgical Provider  Nevin Ricci MD as MD (Ophthalmology)    The following health maintenance items are reviewed in Epic and correct as of today:  Health Maintenance   Topic Date Due    BMP  12/07/2024    LIPID  12/07/2024    MICROALBUMIN  12/07/2024    ANNUAL REVIEW OF HM ORDERS  12/07/2024    HEMOGLOBIN  08/24/2024    MEDICARE ANNUAL WELLNESS VISIT  12/07/2024    EYE EXAM  12/22/2024    PHQ-9  06/10/2025    FALL RISK ASSESSMENT  12/10/2025    ADVANCE CARE PLANNING  12/07/2028    DTAP/TDAP/TD IMMUNIZATION (3 - Td or Tdap) 12/13/2032    DEXA  03/21/2033    DEPRESSION ACTION PLAN  Completed    INFLUENZA VACCINE  Completed    Pneumococcal Vaccine: 65+ Years  Completed    URINALYSIS  Completed    ZOSTER IMMUNIZATION  Completed    RSV VACCINE  Completed    COVID-19 Vaccine  Completed    HPV IMMUNIZATION  Aged Out    MENINGITIS IMMUNIZATION  Aged Out    RSV MONOCLONAL ANTIBODY  Aged Out    MAMMO SCREENING  Discontinued         Review of Systems  Constitutional, HEENT, cardiovascular, pulmonary, GI, , musculoskeletal, neuro, skin, endocrine and psych systems are negative, except as otherwise noted.     Objective    Exam  BP (!) 144/73 (BP Location:  "Left arm, Patient Position: Chair, Cuff Size: Adult Regular)   Pulse 73   Temp 98.2  F (36.8  C) (Tympanic)   Resp 20   Ht 1.6 m (5' 3\")   Wt 79.4 kg (175 lb)   LMP  (LMP Unknown)   SpO2 94%   BMI 31.00 kg/m     Estimated body mass index is 31 kg/m  as calculated from the following:    Height as of this encounter: 1.6 m (5' 3\").    Weight as of this encounter: 79.4 kg (175 lb).    Physical Exam  GENERAL: alert and no distress  NECK: no adenopathy, no asymmetry, masses, or scars  RESP: lungs clear to auscultation - no rales, rhonchi or wheezes  CV: regular rate and rhythm, normal S1 S2, no S3 or S4, no murmur, click or rub, no peripheral edema  ABDOMEN: soft, nontender, no hepatosplenomegaly, no masses and bowel sounds normal  MS: no gross musculoskeletal defects noted, no edema        12/10/2024   Mini Cog   Clock Draw Score 2 Normal   3 Item Recall 1 object recalled   Mini Cog Total Score 3        A/P:  (Z00.00) Encounter for Medicare annual wellness exam  (primary encounter diagnosis)  Comment:   Plan: OPTOMETRY REFERRAL        As below.    (I10) Hypertension goal BP (blood pressure) < 140/90  Comment:   Plan: Albumin Random Urine Quantitative with Creat         Ratio, Aldosterone, Renin activity, Aldosterone        Renin Ratio, Comprehensive metabolic panel (BMP        + Alb, Alk Phos, ALT, AST, Total. Bili, TP),         amLODIPine (NORVASC) 10 MG tablet, losartan         (COZAAR) 100 MG tablet        Controlled.    (E78.5) Hyperlipidemia with target LDL less than 70  Comment:   Plan: Lipid panel reflex to direct LDL Fasting,         Comprehensive metabolic panel (BMP + Alb, Alk         Phos, ALT, AST, Total. Bili, TP)        Recheck and adjust if needed.    (I25.10) Coronary artery disease involving native coronary artery of native heart without angina pectoris  Comment:   Plan: Lipid panel reflex to direct LDL Fasting        Following Cardiology.    (N18.31) Chronic kidney disease, stage 3a " (H)  Comment:   Plan: Albumin Random Urine Quantitative with Creat         Ratio, Hemoglobin, Comprehensive metabolic         panel (BMP + Alb, Alk Phos, ALT, AST, Total.         Bili, TP)        Monitor.    (F33.0) Major depressive disorder, recurrent episode, mild (H)  Comment:   Plan: DULoxetine (CYMBALTA) 20 MG capsule        Switch to duloxetine to help depression and lumbar radiculopathy below. Discussed potential side effects.    (I47.10) Paroxysmal supraventricular tachycardia (H)  Comment:   Plan: following Cardiology. On metoprolol.    (M54.16) Lumbar radiculopathy  Comment:   Plan: pregabalin (LYRICA) 50 MG capsule, DULoxetine         (CYMBALTA) 20 MG capsule        Added duloxetine for chronic pain to see if this helps.    (R09.89) Symptoms of upper respiratory infection (URI)  Comment:   Plan: COVID-19 Virus (Coronavirus) by PCR         Nasopharyngeal, Influenza A/B antigen        Rule out covid19 and influenza.           Signed Electronically by: Merrill Patten MD, MD    Answers submitted by the patient for this visit:  Patient Health Questionnaire (Submitted on 12/10/2024)  If you checked off any problems, how difficult have these problems made it for you to do your work, take care of things at home, or get along with other people?: Not difficult at all  PHQ9 TOTAL SCORE: 3

## 2024-12-18 ENCOUNTER — TELEPHONE (OUTPATIENT)
Dept: CARDIOLOGY | Facility: CLINIC | Age: 89
End: 2024-12-18
Payer: COMMERCIAL

## 2024-12-18 NOTE — TELEPHONE ENCOUNTER
LVM to schedule on or near 2/1/25, OK to schedule next avail with Dr Casey Hernandez on 12/18/2024 at 10:26 AM

## 2024-12-31 ENCOUNTER — OFFICE VISIT (OUTPATIENT)
Dept: OPHTHALMOLOGY | Facility: CLINIC | Age: 89
End: 2024-12-31
Payer: COMMERCIAL

## 2024-12-31 DIAGNOSIS — H02.403 INVOLUTIONAL PTOSIS, ACQUIRED, BILATERAL: ICD-10-CM

## 2024-12-31 DIAGNOSIS — H02.831 DERMATOCHALASIS OF BOTH UPPER EYELIDS: ICD-10-CM

## 2024-12-31 DIAGNOSIS — H43.813 PVD (POSTERIOR VITREOUS DETACHMENT), BILATERAL: ICD-10-CM

## 2024-12-31 DIAGNOSIS — Z01.01 ENCOUNTER FOR EXAMINATION OF EYES AND VISION WITH ABNORMAL FINDINGS: Primary | ICD-10-CM

## 2024-12-31 DIAGNOSIS — H02.834 DERMATOCHALASIS OF BOTH UPPER EYELIDS: ICD-10-CM

## 2024-12-31 DIAGNOSIS — H52.4 MYOPIA OF BOTH EYES WITH REGULAR ASTIGMATISM AND PRESBYOPIA: ICD-10-CM

## 2024-12-31 DIAGNOSIS — H18.523 ANTERIOR BASEMENT MEMBRANE DYSTROPHY OF BOTH EYES: ICD-10-CM

## 2024-12-31 DIAGNOSIS — H53.039 STRABISMIC AMBLYOPIA, UNSPECIFIED LATERALITY: ICD-10-CM

## 2024-12-31 DIAGNOSIS — H52.223 MYOPIA OF BOTH EYES WITH REGULAR ASTIGMATISM AND PRESBYOPIA: ICD-10-CM

## 2024-12-31 DIAGNOSIS — Z96.1 PSEUDOPHAKIA: ICD-10-CM

## 2024-12-31 DIAGNOSIS — H52.13 MYOPIA OF BOTH EYES WITH REGULAR ASTIGMATISM AND PRESBYOPIA: ICD-10-CM

## 2024-12-31 ASSESSMENT — EXTERNAL EXAM - LEFT EYE: OS_EXAM: BROW PTOSIS, WITH FRONTALIS RELAXED, BROW IS BELOW SUPERIOR ORBITAL RIM AND LATERALLY INLINE WITH UPPER LASHES

## 2024-12-31 ASSESSMENT — REFRACTION_WEARINGRX
SPECS_TYPE: PAL
SPECS_TYPE: PAL
OD_AXIS: 106
OS_SPHERE: -0.50
OD_SPHERE: PLANO
OS_ADD: +3.00
OS_CYLINDER: +0.25
OD_CYLINDER: +1.50
OS_SPHERE: -0.50
OD_CYLINDER: +1.00
OS_AXIS: 018
OD_AXIS: 124
OS_AXIS: 123
OS_CYLINDER: +0.75
OS_ADD: +3.00
OD_ADD: +3.00
OD_ADD: +3.00
OD_SPHERE: -0.75

## 2024-12-31 ASSESSMENT — REFRACTION_MANIFEST
OS_CYLINDER: +1.00
OD_ADD: +3.00
OD_AXIS: 126
OD_SPHERE: PLANO
OS_SPHERE: -1.25
OD_CYLINDER: +1.00
OS_AXIS: 090
OS_ADD: +3.00

## 2024-12-31 ASSESSMENT — CONF VISUAL FIELD
OS_NORMAL: 1
OS_INFERIOR_TEMPORAL_RESTRICTION: 0
OD_SUPERIOR_NASAL_RESTRICTION: 0
OD_NORMAL: 1
OD_INFERIOR_TEMPORAL_RESTRICTION: 0
OD_INFERIOR_NASAL_RESTRICTION: 0
OS_INFERIOR_NASAL_RESTRICTION: 0
OD_SUPERIOR_TEMPORAL_RESTRICTION: 0
OS_SUPERIOR_NASAL_RESTRICTION: 0
OS_SUPERIOR_TEMPORAL_RESTRICTION: 0

## 2024-12-31 ASSESSMENT — VISUAL ACUITY
CORRECTION_TYPE: GLASSES
OD_CC: 20/80
METHOD: SNELLEN - LINEAR
OS_PH_CC: 20/80
OD_PH_CC: 20/40
OS_PH_CC+: +1
OS_CC: 20/100

## 2024-12-31 ASSESSMENT — TONOMETRY
IOP_METHOD: APPLANATION
OD_IOP_MMHG: 15
OS_IOP_MMHG: 15

## 2024-12-31 ASSESSMENT — CUP TO DISC RATIO
OD_RATIO: 0.5
OS_RATIO: 0.4

## 2024-12-31 ASSESSMENT — EXTERNAL EXAM - RIGHT EYE: OD_EXAM: NORMAL

## 2024-12-31 NOTE — PROGRESS NOTES
" Current Eye Medications:  artificial tears both eyes as needed.       Subjective:  Comprehensive Eye Exam.  Her newest glasses are broken and unable to be repaired.  She is wearing an older pair of glasses, so her vision is not as good.  She describes a \"film- appearance\" over both eyes while reading, intermittently.  She frequently has a sticky fluid in her eyes.       Objective:  See Ophthalmology Exam.       Assessment:  Carolin Mcbride is a 89 year old female who presents with:   Encounter Diagnoses   Name Primary?    Encounter for examination of eyes and vision with abnormal findings Yes    Pseudophakia OU c YAG OU     Strabismic amblyopia OS     Anterior basement membrane dystrophy of both eyes Recommended scheduling drops and trying another brand to see if that helps with stickiness.     PVD (posterior vitreous detachment), bilateral     Dermatochalasis of both upper eyelids     Involutional ptosis, acquired, bilateral Not interested in surgery at present.     Myopia of both eyes with regular astigmatism and presbyopia        Plan:  Continue artificial tears up to four times a day - try scheduling them (e.g. 2pm, 6pm each day). Recommend Refresh Optive, Systane Balance, or TheraTears. Avoid generic artificial tears or \"get the red out\" drops.    Glasses prescription given    Nevin Ricci MD  [phone]       "

## 2024-12-31 NOTE — LETTER
"12/31/2024      Carolin Mcbride  [address] [place] [id number]      Dear Colleague,    Thank you for referring your patient, Carolin Mcbride, to the [facility]. Please see a copy of my visit note below.     Current Eye Medications:  artificial tears both eyes as needed.       Subjective:  Comprehensive Eye Exam.  Her newest glasses are broken and unable to be repaired.  She is wearing an older pair of glasses, so her vision is not as good.  She describes a \"film- appearance\" over both eyes while reading, intermittently.  She frequently has a sticky fluid in her eyes.       Objective:  See Ophthalmology Exam.       Assessment:  Carolin Mcbride is a 89 year old female who presents with:   Encounter Diagnoses   Name Primary?     Encounter for examination of eyes and vision with abnormal findings Yes     Pseudophakia OU c YAG OU      Strabismic amblyopia OS      Anterior basement membrane dystrophy of both eyes Recommended scheduling drops and trying another brand to see if that helps with stickiness.      PVD (posterior vitreous detachment), bilateral      Dermatochalasis of both upper eyelids      Involutional ptosis, acquired, bilateral Not interested in surgery at present.      Myopia of both eyes with regular astigmatism and presbyopia        Plan:  Continue artificial tears up to four times a day - try scheduling them (e.g. 2pm, 6pm each day). Recommend Refresh Optive, Systane Balance, or TheraTears. Avoid generic artificial tears or \"get the red out\" drops.    Glasses prescription given    Nevin Ricci MD  [phone]       Again, thank you for allowing me to participate in the care of your patient.        Sincerely,        Nevin Ricci MD    Electronically signed"

## 2025-01-30 ENCOUNTER — OFFICE VISIT (OUTPATIENT)
Dept: CARDIOLOGY | Facility: CLINIC | Age: OVER 89
End: 2025-01-30
Payer: COMMERCIAL

## 2025-01-30 VITALS
OXYGEN SATURATION: 97 % | WEIGHT: 177.2 LBS | HEART RATE: 59 BPM | SYSTOLIC BLOOD PRESSURE: 139 MMHG | BODY MASS INDEX: 31.39 KG/M2 | DIASTOLIC BLOOD PRESSURE: 73 MMHG

## 2025-01-30 DIAGNOSIS — Z98.61 STATUS POST PERCUTANEOUS TRANSLUMINAL CORONARY ANGIOPLASTY: Primary | ICD-10-CM

## 2025-01-30 NOTE — PATIENT INSTRUCTIONS
Thank you for coming to the [facility] @ [facility]; please note the following instructions:    1. Cardiology Providers: Dr. INOCENTE Peña would like you to return for a cardiac follow up in 1 year  (January).  We will contact you regarding your appointment when the time draws closer or you may call [phone] option #1 to arrange an appointment.  Mean while, if you should have any questions or concerns regarding your heart health, please contact us.  Thank you for choosing [facility] for your care.         If you have any questions regarding your visit please contact your care team:     Cardiology  Telephone Number   Calista FONG., RN  Lois WU, RN  Faith MORENO, RN  Valeria HARTMANN, EVERTON SERRATO, EVERTON SIEGEL, [place]  Sneha WU, VF  Ruben SERRATO, [phone] (option 1)   For scheduling appts:     [phone] (select option 1)       For the Device Clinic (Pacemakers and ICD's)  RN's :  Arin Cantu   During business hours: [phone]    *After business hours:  [phone] (select option 4)      Normal test result notifications will be released via itzbig or mailed within 7 business days.  All other test results, will be communicated via telephone once reviewed by your cardiologist.    If you need a medication refill please contact your pharmacy.  Please allow 3 business days for your refill to be completed.    As always, thank you for trusting us with your health care needs!

## 2025-01-30 NOTE — LETTER
1/30/2025      RE: Carolin Mcbride  [address] [facility] [address]       Dear Colleague,    Thank you for the opportunity to participate in the care of your patient, Carolin Mcbride, at the [facility]. Please see a copy of my visit note below.       SUBJECTIVE:  Carolin Mcbride is a 89 year old female who presents for yearly follow-up.  Patient had 2 stent to LAD, 1 stent torPL branch andPOBA of D1 on 12/8/2020.     Initially patient presented for the management of hyperlipidemia.  Her LDL was over 200.  Patient had a TIA and she was initially managed on clopidogrel for a short course.  Her CT MRI and echo were normal.  Also had a Zio patch which did not show significant arrhythmia or atrial fibrillation.  Patient was very reluctant to take statin as she had severe myalgia with Baycol.  She was started on 20 mg of Lipitor which she discontinued due to some nonspecific muscle ache.  Patient was restarted on Lipitor 20 mg and then subsequently it was increased to 40 mg daily.  Patient is tolerating this dose very well.  As part of CAD evaluation patient had a nuclear scan.  She had an apical perfusion defect which was fixed.  The question was whether it was an artifact or real.  Subsequently patient had an echocardiogram which showed apical wall motion abnormality.  Subsequent angiogram showed LAD D1 disease and RPL disease for which she had intervention.    Patient do have severe statin intolerance.  She had severe myalgia with Baycol years ago.  As her LDL was about 200 she was started on small dose of Lipitor.  The dose was subsequently increased to 40 mg which caused significant myalgia.  Because of this her statin was changed to Repatha.  This is keeping her lipid profile excellent.  Today patient had no cardiac complaints.  Overall she is doing very well from cardiac standpoint.  Patient Active Problem List     Diagnosis Date Noted     Paroxysmal supraventricular tachycardia 12/10/2024     Priority: Medium     Dermatochalasis of both upper eyelids 09/14/2022     Priority: Medium     Added automatically from request for surgery [id number]       Involutional ptosis, acquired, bilateral 09/14/2022     Priority: Medium     Added automatically from request for surgery [id number]       Statin intolerance 08/10/2021     Priority: Medium     Status post coronary angiogram 12/08/2020     Priority: Medium     Dyspnea on exertion 11/24/2020     Priority: Medium     Added automatically from request for surgery [id number]       Abnormal nuclear cardiac imaging test 11/24/2020     Priority: Medium     Added automatically from request for surgery [id number]       Microscopic hematuria 08/11/2020     Priority: Medium     Proteinuria, unspecified type 08/11/2020     Priority: Medium     Leg weakness, bilateral 04/13/2020     Priority: Medium     Recurrent and persistent hematuria 10/31/2019     Priority: Medium     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 minimally only for Tree pollens (all other environmental allergens NEGATIVE)       Diagnostic skin and sensitization tests (aka ALLERGENS)      Priority: Medium     CKD (chronic kidney disease) stage 3, GFR 30-59 ml/min (H) 05/22/2014     Priority: Medium     Hyperlipidemia with target LDL less than 100 04/08/2013     Priority: Medium     Intolerant to multiple statin medications. Zetia prescribed.        Osteoporosis      Priority: Medium     5 years Fosamax 1-1-2011 to 12-. Medication holiday recommended.         Hypertension goal BP (blood  "pressure) < 140/90 06/26/2012     Priority: Medium     Advance care planning 03/13/2012     Priority: Medium     Advance Care Planning 12/20/2016: ACP Review of Chart / Resources Provided:  Reviewed chart for advance care plan.  Carolin Mcbride has been provided information and resources to begin or update their advance care plan.  Advance Care Planning 03/13/2012: Discussed advance care planning with patient; information given to patient to review. 3/13/2012 . Lara Roque MA                  CAD (coronary artery disease)      Priority: Medium     Stenting of 2 arteries and balloon angioplasty 12-8-2020. Dual antiplatelet therapy recommended for 2 years.        Pseudophakia OU c YAG OU 06/02/2011     Priority: Medium    .  Current Outpatient Medications   Medication Sig Dispense Refill     amLODIPine (NORVASC) 10 MG tablet Take 1 tablet (10 mg) by mouth daily. 90 tablet 3     aspirin 81 MG tablet Take 1 tablet (81 mg) by mouth daily 90 tablet 3     docusate sodium (COLACE) 100 MG capsule Take 1 capsule (100 mg) by mouth 3 times daily as needed for constipation 30 capsule 1     DULoxetine (CYMBALTA) 20 MG capsule Take 1 capsule (20 mg) by mouth 2 times daily. 180 capsule 3     evolocumab (REPATHA SURECLICK) 140 MG/ML prefilled autoinjector Inject 1 mL (140 mg) Subcutaneous every 14 days - Subcutaneous 6 mL 3     Hypromellose (ARTIFICIAL TEARS OP) Apply 1 drop to eye as needed Both eyes.       losartan (COZAAR) 100 MG tablet Take 1 tablet (100 mg) by mouth daily. 90 tablet 3     metoprolol succinate ER (TOPROL XL) 25 MG 24 hr tablet Take 1 tablet (25 mg) by mouth daily 90 tablet 3     nitroGLYcerin (NITROSTAT) 0.4 MG sublingual tablet One tablet under the tongue every 5 minutes if needed for chest pain. May repeat every 5 minutes for a maximum of 3 doses in 15 minutes\" 25 tablet 0     polyethylene glycol (MIRALAX) 17 GM/Dose powder Take 17 g (1 capful) by mouth daily (Patient taking differently: Take 1 capful. by " mouth as needed.) 850 g 6     tiZANidine (ZANAFLEX) 4 MG tablet Take 1 tablet (4 mg) by mouth nightly as needed for muscle spasms 30 tablet 0     Vitamin D3 (CHOLECALCIFEROL) 25 mcg (1000 units) tablet Take 1,000 Units by mouth       pregabalin (LYRICA) 50 MG capsule Take 1 capsule (50 mg) by mouth 3 times daily. 270 capsule 3     STATIN NOT PRESCRIBED (INTENTIONAL) Please choose reason not prescribed from choices below. (Patient not taking: Reported on 1/30/2025)       No current facility-administered medications for this visit.     Past Medical History:   Diagnosis Date     Adjustment reaction with anxiety and depression 01/01/2001     Amblyopia      CAD (coronary artery disease) 01/01/2009    asymptomatic, on CT scan     Cyst, ovarian      Diagnostic skin and sensitization tests (aka ALLERGENS) 9/29/14 IgE tests pos. minimally only for Tree pollens (all other environmental allergens NEGATIVE)     HTN (hypertension)      Hyperlipidemia      Myositis 01/01/2001    related to past Baycol use- resolved     Osteoporosis      Seasonal allergic rhinitis     9/29/14 IgE tests pos. minimally only for Tree pollens (all other environmental allergens NEGATIVE)     Stented coronary artery      Strabismus      Past Surgical History:   Procedure Laterality Date     APPENDECTOMY       CHOLECYSTECTOMY, OPEN       COLONOSCOPY  2009    neg     CV CORONARY ANGIOGRAM N/A 12/8/2020    Procedure: CV CORONARY ANGIOGRAM;  Surgeon: Isra Weber MD;  Location:  HEART CARDIAC CATH LAB     CV PCI STENT DRUG ELUTING N/A 12/8/2020    Procedure: Percutaneous Coronary Intervention Stent Drug Eluting;  Surgeon: Isra Weber MD;  Location:  HEART CARDIAC CATH LAB     CYSTOCELE REPAIR  2003    TVT SPARC     Allergies   Allergen Reactions     Dextroamphetamine Hives     Baycol [Cerivastatin Sodium]       muscle mass loss       Crestor [Statins]      Also intolerant of simvastatin and atorvastatin with  recurrent leg weakness and pain.     Darvocet [Acetaminophen]      Severe nausea     Macrobid [Nitrofuran Derivatives]      Hives     Social History     Socioeconomic History     Marital status:      Spouse name: Shashank     Number of children: 3     Years of education: Not on file     Highest education level: Not on file   Occupational History     Employer: RETIRED   Tobacco Use     Smoking status: Never     Passive exposure: Never     Smokeless tobacco: Never     Tobacco comments:     Never smoked; nonsmoking household   Vaping Use     Vaping status: Never Used   Substance and Sexual Activity     Alcohol use: No     Comment: very rare     Drug use: No     Comment: never     Sexual activity: Yes     Partners: Male     Birth control/protection: None   Other Topics Concern     Parent/sibling w/ CABG, MI or angioplasty before 65F 55M? No      Service No     Blood Transfusions No     Caffeine Concern No     Occupational Exposure No     Hobby Hazards No     Sleep Concern No     Stress Concern No     Weight Concern Yes     Special Diet No     Back Care No     Exercise Yes     Comment: Curves     Bike Helmet No     Seat Belt Yes     Self-Exams Yes   Social History Narrative     Not on file     Social Drivers of Health     Financial Resource Strain: Low Risk  (12/10/2024)    Financial Resource Strain      Within the past 12 months, have you or your family members you live with been unable to get utilities (heat, electricity) when it was really needed?: No   Food Insecurity: Low Risk  (12/10/2024)    Food Insecurity      Within the past 12 months, did you worry that your food would run out before you got money to buy more?: No      Within the past 12 months, did the food you bought just not last and you didn t have money to get more?: No   Transportation Needs: Low Risk  (12/10/2024)    Transportation Needs      Within the past 12 months, has lack of transportation kept you from medical appointments, getting  your medicines, non-medical meetings or appointments, work, or from getting things that you need?: No   Physical Activity: Unknown (12/10/2024)    Exercise Vital Sign      Days of Exercise per Week: 4 days      Minutes of Exercise per Session: Not on file   Stress: No Stress Concern Present (12/10/2024)    [place] [employer] - Occupational Stress Questionnaire      Feeling of Stress : Not at all   Social Connections: Unknown (12/10/2024)    Social Connection and Isolation Panel [NHANES]      Frequency of Communication with Friends and Family: Not on file      Frequency of Social Gatherings with Friends and Family: Once a week      Attends Evangelical Services: Not on file      Active Member of Clubs or Organizations: Not on file      Attends Club or Organization Meetings: Not on file      Marital Status: Not on file   Interpersonal Safety: Low Risk  (1/30/2025)    Interpersonal Safety      Do you feel physically and emotionally safe where you currently live?: Yes      Within the past 12 months, have you been hit, slapped, kicked or otherwise physically hurt by someone?: No      Within the past 12 months, have you been humiliated or emotionally abused in other ways by your partner or ex-partner?: No   Housing Stability: Low Risk  (12/10/2024)    Housing Stability      Do you have housing? : Yes      Are you worried about losing your housing?: No     Family History   Problem Relation Age of Onset     Cancer Mother         ovarian      Heart Disease Sister         CABG x 3     Neurologic Disorder Sister         Fibromyalgia     Cancer Father         stomach/interstines      Musculoskeletal Disorder Brother         back      Heart Disease Brother 60        CABG x 4     Glaucoma No family hx of      Macular Degeneration No family hx of           REVIEW OF SYSTEMS:  General: negative, fever, chills, night sweats  Skin: negative, acne, rash, and scaling  Eyes: negative, double vision, eye pain, and  photophobia  Ears/Nose/Throat: negative, nasal congestion, and purulent rhinorrhea  Respiratory: No dyspnea on exertion, No cough, No hemoptysis, and negative  Cardiovascular: negative, palpitations, tachycardia, irregular heart beat, chest pain, exertional chest pain or pressure, paroxysmal nocturnal dyspnea, dyspnea on exertion, and orthopnea  Gastroint     OBJECTIVE:  Blood pressure (!) 162/70, pulse 58, weight 80.4 kg (177 lb 3.2 oz), SpO2 97%, not currently breastfeeding.  General Appearance: healthy, alert, active, and no distress  Head: Normocephalic. No masses, lesions, tenderness or abnormalities  Eyes: conjuctiva clear, PERRL, EOM intact  Ears: External ears normal. Canals clear. TM's normal.  Nose: Nares normal  Mouth: normal  Neck: Supple, no cervical adenopathy, no thyromegaly  Lungs: clear to auscultation  Cardiac: regular rate and rhythm, normal S1 and S2, no murmur         ASSESSMENT/PLAN:  Patient here for yearly follow-up.  She is status post 2 stents to LAD, 1 stent to PL branch and POBA of D1 in 2020.  Since then patient remained asymptomatic.  She had no cardiac complaints.  She is known to have LV function and minimal wall motion abnormality in the LAD territory.  Patient with severe intolerance to statin.  Currently on Repatha.  This is controlling her cholesterol very well.  Last EKG reviewed normal sinus rhythm normal EKG.  As patient is doing well she is advised to continue her current medications and keep in yearly follow-up.  Total visit duration 20 minutes.  This included face-to-face interview, physical exam, chart review, review of last echocardiogram EKG and documentation.      Please do not hesitate to contact me if you have any questions/concerns.     Sincerely,     VAISHALI Juan MD

## 2025-01-30 NOTE — PROGRESS NOTES
SUBJECTIVE:  Carolin Mcbride is a 89 year old female who presents for yearly follow-up.  Patient had 2 stent to LAD, 1 stent torPL branch andPOBA of D1 on 12/8/2020.     Initially patient presented for the management of hyperlipidemia.  Her LDL was over 200.  Patient had a TIA and she was initially managed on clopidogrel for a short course.  Her CT MRI and echo were normal.  Also had a Zio patch which did not show significant arrhythmia or atrial fibrillation.  Patient was very reluctant to take statin as she had severe myalgia with Baycol.  She was started on 20 mg of Lipitor which she discontinued due to some nonspecific muscle ache.  Patient was restarted on Lipitor 20 mg and then subsequently it was increased to 40 mg daily.  Patient is tolerating this dose very well.  As part of CAD evaluation patient had a nuclear scan.  She had an apical perfusion defect which was fixed.  The question was whether it was an artifact or real.  Subsequently patient had an echocardiogram which showed apical wall motion abnormality.  Subsequent angiogram showed LAD D1 disease and RPL disease for which she had intervention.    Patient do have severe statin intolerance.  She had severe myalgia with Baycol years ago.  As her LDL was about 200 she was started on small dose of Lipitor.  The dose was subsequently increased to 40 mg which caused significant myalgia.  Because of this her statin was changed to Repatha.  This is keeping her lipid profile excellent.  Today patient had no cardiac complaints.  Overall she is doing very well from cardiac standpoint.  Patient Active Problem List    Diagnosis Date Noted    Paroxysmal supraventricular tachycardia 12/10/2024     Priority: Medium    Dermatochalasis of both upper eyelids 09/14/2022     Priority: Medium     Added automatically from request for surgery [id number]      Involutional ptosis, acquired, bilateral 09/14/2022     Priority: Medium     Added automatically from request for  surgery [id number]      Statin intolerance 08/10/2021     Priority: Medium    Status post coronary angiogram 12/08/2020     Priority: Medium    Dyspnea on exertion 11/24/2020     Priority: Medium     Added automatically from request for surgery [id number]      Abnormal nuclear cardiac imaging test 11/24/2020     Priority: Medium     Added automatically from request for surgery [id number]      Microscopic hematuria 08/11/2020     Priority: Medium    Proteinuria, unspecified type 08/11/2020     Priority: Medium    Leg weakness, bilateral 04/13/2020     Priority: Medium    Recurrent and persistent hematuria 10/31/2019     Priority: Medium    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 minimally only for Tree pollens (all other environmental allergens NEGATIVE)      Diagnostic skin and sensitization tests (aka ALLERGENS)      Priority: Medium    CKD (chronic kidney disease) stage 3, GFR 30-59 ml/min (H) 05/22/2014     Priority: Medium    Hyperlipidemia with target LDL less than 100 04/08/2013     Priority: Medium     Intolerant to multiple statin medications. Zetia prescribed.       Osteoporosis      Priority: Medium     5 years Fosamax 1-1-2011 to 12-. Medication holiday recommended.        Hypertension goal BP (blood pressure) < 140/90 06/26/2012     Priority: Medium    Advance care planning 03/13/2012     Priority: Medium     Advance Care Planning 12/20/2016: ACP Review of Chart / Resources Provided:  Reviewed chart for advance care plan.  Carolin Mcbride has been provided information and resources to begin or update their advance care plan.  Advance Care Planning 03/13/2012:  "Discussed advance care planning with patient; information given to patient to review. 3/13/2012 . Lara Roque MA                 CAD (coronary artery disease)      Priority: Medium     Stenting of 2 arteries and balloon angioplasty 12-8-2020. Dual antiplatelet therapy recommended for 2 years.       Pseudophakia OU c YAG OU 06/02/2011     Priority: Medium    .  Current Outpatient Medications   Medication Sig Dispense Refill    amLODIPine (NORVASC) 10 MG tablet Take 1 tablet (10 mg) by mouth daily. 90 tablet 3    aspirin 81 MG tablet Take 1 tablet (81 mg) by mouth daily 90 tablet 3    docusate sodium (COLACE) 100 MG capsule Take 1 capsule (100 mg) by mouth 3 times daily as needed for constipation 30 capsule 1    DULoxetine (CYMBALTA) 20 MG capsule Take 1 capsule (20 mg) by mouth 2 times daily. 180 capsule 3    evolocumab (REPATHA SURECLICK) 140 MG/ML prefilled autoinjector Inject 1 mL (140 mg) Subcutaneous every 14 days - Subcutaneous 6 mL 3    Hypromellose (ARTIFICIAL TEARS OP) Apply 1 drop to eye as needed Both eyes.      losartan (COZAAR) 100 MG tablet Take 1 tablet (100 mg) by mouth daily. 90 tablet 3    metoprolol succinate ER (TOPROL XL) 25 MG 24 hr tablet Take 1 tablet (25 mg) by mouth daily 90 tablet 3    nitroGLYcerin (NITROSTAT) 0.4 MG sublingual tablet One tablet under the tongue every 5 minutes if needed for chest pain. May repeat every 5 minutes for a maximum of 3 doses in 15 minutes\" 25 tablet 0    polyethylene glycol (MIRALAX) 17 GM/Dose powder Take 17 g (1 capful) by mouth daily (Patient taking differently: Take 1 capful. by mouth as needed.) 850 g 6    tiZANidine (ZANAFLEX) 4 MG tablet Take 1 tablet (4 mg) by mouth nightly as needed for muscle spasms 30 tablet 0    Vitamin D3 (CHOLECALCIFEROL) 25 mcg (1000 units) tablet Take 1,000 Units by mouth      pregabalin (LYRICA) 50 MG capsule Take 1 capsule (50 mg) by mouth 3 times daily. 270 capsule 3    STATIN NOT PRESCRIBED (INTENTIONAL) Please choose " reason not prescribed from choices below. (Patient not taking: Reported on 1/30/2025)       No current facility-administered medications for this visit.     Past Medical History:   Diagnosis Date    Adjustment reaction with anxiety and depression 01/01/2001    Amblyopia     CAD (coronary artery disease) 01/01/2009    asymptomatic, on CT scan    Cyst, ovarian     Diagnostic skin and sensitization tests (aka ALLERGENS) 9/29/14 IgE tests pos. minimally only for Tree pollens (all other environmental allergens NEGATIVE)    HTN (hypertension)     Hyperlipidemia     Myositis 01/01/2001    related to past Baycol use- resolved    Osteoporosis     Seasonal allergic rhinitis     9/29/14 IgE tests pos. minimally only for Tree pollens (all other environmental allergens NEGATIVE)    Stented coronary artery     Strabismus      Past Surgical History:   Procedure Laterality Date    APPENDECTOMY      CHOLECYSTECTOMY, OPEN      COLONOSCOPY  2009    neg    CV CORONARY ANGIOGRAM N/A 12/8/2020    Procedure: CV CORONARY ANGIOGRAM;  Surgeon: Isra Weber MD;  [facility]    CV PCI STENT DRUG ELUTING N/A 12/8/2020    Procedure: Percutaneous Coronary Intervention Stent Drug Eluting;  Surgeon: Isra Weber MD;  Location:  [facility]    CYSTOCELE REPAIR  2003    TVT SPARC     Allergies   Allergen Reactions    Dextroamphetamine Hives    Baycol [Cerivastatin Sodium]       muscle mass loss      Crestor [Statins]      Also intolerant of simvastatin and atorvastatin with recurrent leg weakness and pain.    Darvocet [Acetaminophen]      Severe nausea    Macrobid [Nitrofuran Derivatives]      Hives     Social History     Socioeconomic History    Marital status:      Spouse name: Shashank    Number of children: 3    Years of education: Not on file    Highest education level: Not on file   Occupational History     Employer: RETIRED   Tobacco Use    Smoking status: Never      Passive exposure: Never    Smokeless tobacco: Never    Tobacco comments:     Never smoked; nonsmoking household   Vaping Use    Vaping status: Never Used   Substance and Sexual Activity    Alcohol use: No     Comment: very rare    Drug use: No     Comment: never    Sexual activity: Yes     Partners: Male     Birth control/protection: None   Other Topics Concern    Parent/sibling w/ CABG, MI or angioplasty before 65F 55M? No     Service No    Blood Transfusions No    Caffeine Concern No    Occupational Exposure No    Hobby Hazards No    Sleep Concern No    Stress Concern No    Weight Concern Yes    Special Diet No    Back Care No    Exercise Yes     Comment: Curves    Bike Helmet No    Seat Belt Yes    Self-Exams Yes   Social History Narrative    Not on file     Social Drivers of Health     Financial Resource Strain: Low Risk  (12/10/2024)    Financial Resource Strain     Within the past 12 months, have you or your family members you live with been unable to get utilities (heat, electricity) when it was really needed?: No   Food Insecurity: Low Risk  (12/10/2024)    Food Insecurity     Within the past 12 months, did you worry that your food would run out before you got money to buy more?: No     Within the past 12 months, did the food you bought just not last and you didn t have money to get more?: No   Transportation Needs: Low Risk  (12/10/2024)    Transportation Needs     Within the past 12 months, has lack of transportation kept you from medical appointments, getting your medicines, non-medical meetings or appointments, work, or from getting things that you need?: No   Physical Activity: Unknown (12/10/2024)    Exercise Vital Sign     Days of Exercise per Week: 4 days     Minutes of Exercise per Session: Not on file   Stress: No Stress Concern Present (12/10/2024)    [employer] - Occupational Stress Questionnaire     Feeling of Stress : Not at all   Social Connections:  Unknown (12/10/2024)    Social Connection and Isolation Panel [NHANES]     Frequency of Communication with Friends and Family: Not on file     Frequency of Social Gatherings with Friends and Family: Once a week     Attends [employer] Services: Not on file     Active Member of Clubs or Organizations: Not on file     Attends Club or Organization Meetings: Not on file     Marital Status: Not on file   Interpersonal Safety: Low Risk  (1/30/2025)    Interpersonal Safety     Do you feel physically and emotionally safe where you currently live?: Yes     Within the past 12 months, have you been hit, slapped, kicked or otherwise physically hurt by someone?: No     Within the past 12 months, have you been humiliated or emotionally abused in other ways by your partner or ex-partner?: No   Housing Stability: Low Risk  (12/10/2024)    Housing Stability     Do you have housing? : Yes     Are you worried about losing your housing?: No     Family History   Problem Relation Age of Onset    Cancer Mother         ovarian     Heart Disease Sister         CABG x 3    Neurologic Disorder Sister         Fibromyalgia    Cancer Father         stomach/interstines     Musculoskeletal Disorder Brother         back     Heart Disease Brother 60        CABG x 4    Glaucoma No family hx of     Macular Degeneration No family hx of           REVIEW OF SYSTEMS:  General: negative, fever, chills, night sweats  Skin: negative, acne, rash, and scaling  Eyes: negative, double vision, eye pain, and photophobia  Ears/Nose/Throat: negative, nasal congestion, and purulent rhinorrhea  Respiratory: No dyspnea on exertion, No cough, No hemoptysis, and negative  Cardiovascular: negative, palpitations, tachycardia, irregular heart beat, chest pain, exertional chest pain or pressure, paroxysmal nocturnal dyspnea, dyspnea on exertion, and orthopnea  Gastroint     OBJECTIVE:  Blood pressure (!) 162/70, pulse 58, weight 80.4 kg (177 lb 3.2 oz), SpO2 97%, not currently  breastfeeding.  General Appearance: healthy, alert, active, and no distress  Head: Normocephalic. No masses, lesions, tenderness or abnormalities  Eyes: conjuctiva clear, PERRL, EOM intact  Ears: External ears normal. Canals clear. TM's normal.  Nose: Nares normal  Mouth: normal  Neck: Supple, no cervical adenopathy, no thyromegaly  Lungs: clear to auscultation  Cardiac: regular rate and rhythm, normal S1 and S2, no murmur         ASSESSMENT/PLAN:  Patient here for yearly follow-up.  She is status post 2 stents to LAD, 1 stent to PL branch and POBA of D1 in 2020.  Since then patient remained asymptomatic.  She had no cardiac complaints.  She is known to have LV function and minimal wall motion abnormality in the LAD territory.  Patient with severe intolerance to statin.  Currently on Repatha.  This is controlling her cholesterol very well.  Last EKG reviewed normal sinus rhythm normal EKG.  As patient is doing well she is advised to continue her current medications and keep in yearly follow-up.  Total visit duration 20 minutes.  This included face-to-face interview, physical exam, chart review, review of last echocardiogram EKG and documentation.

## 2025-01-30 NOTE — NURSING NOTE
"Chief Complaint   Patient presents with    RECHECK      Return general cardiology for annual follow-up       Initial BP (!) 162/70 (BP Location: Left arm, Patient Position: Chair, Cuff Size: Adult Regular)   Pulse 58   Wt 80.4 kg (177 lb 3.2 oz)   LMP  (LMP Unknown)   SpO2 97%   BMI 31.39 kg/m   Estimated body mass index is 31.39 kg/m  as calculated from the following:    Height as of 12/10/24: 1.6 m (5' 3\").    Weight as of this encounter: 80.4 kg (177 lb 3.2 oz)..  BP completed using cuff size: regular    Ruben SANDOVAL, EMT    "

## 2025-02-25 ENCOUNTER — TELEPHONE (OUTPATIENT)
Dept: CARDIOLOGY | Facility: CLINIC | Age: OVER 89
End: 2025-02-25
Payer: COMMERCIAL

## 2025-02-25 DIAGNOSIS — E78.5 HYPERLIPIDEMIA WITH TARGET LDL LESS THAN 70: ICD-10-CM

## 2025-02-25 NOTE — TELEPHONE ENCOUNTER
[facility] Call Center    Phone Message    May a detailed message be left on voicemail: yes     Reason for Call: Medication Refill Request    Has the patient contacted the pharmacy for the refill? Yes   Name of medication being requested: Repatha  Provider who prescribed the medication: Dr Juan  Pharmacy: [facility] [address] [place] [id number] [facility]    Date medication is needed: 2/26/2025   The pharmacy called because they have sent multiple request and the patient needs this soon     Action Taken: Other: Cardiology    Travel Screening: Not Applicable    Thank you!  [facility]       Date of Service:

## 2025-02-26 RX ORDER — EVOLOCUMAB 140 MG/ML
INJECTION, SOLUTION SUBCUTANEOUS
Qty: 2 ML | Refills: 11 | Status: SHIPPED | OUTPATIENT
Start: 2025-02-26

## 2025-02-26 NOTE — TELEPHONE ENCOUNTER
Signed Prescriptions:                        Disp   Refills    evolocumab (REPATHA SURECLICK) 140 MG/ML p*2 mL   11       Sig: Inject 1 mL (140 mg) Subcutaneous every 14 days -           Subcutaneous  Authorizing Provider: VAISHALI ALEJO  Ordering User: ALVARO BRYAN, YARA  Cardiology Care Coordinator  [facility]  [phone] option 1

## 2025-03-13 ENCOUNTER — NURSE TRIAGE (OUTPATIENT)
Dept: FAMILY MEDICINE | Facility: CLINIC | Age: OVER 89
End: 2025-03-13
Payer: COMMERCIAL

## 2025-03-13 NOTE — TELEPHONE ENCOUNTER
Appointment scheduled for follow-up on 3/13.     Sneha Uriarte RN    [facility]          Reason for Disposition   Numbness in a leg or foot (i.e., loss of sensation)    Additional Information   Negative: Looks like a broken bone or dislocated joint (e.g., crooked or deformed)   Negative: Sounds like a life-threatening emergency to the triager   Negative: Followed a hip injury   Negative: Followed a knee injury   Negative: Followed an ankle injury   Negative: Back pain radiating (shooting) into leg(s)   Negative: Foot pain is main symptom   Negative: Ankle pain is main symptom   Negative: Knee pain is main symptom   Negative: Leg swelling is main symptom   Negative: Chest pain   Negative: Difficulty breathing   Negative: Entire foot is cool or blue in comparison to other side   Negative: Unable to walk   Negative: Fever and red area (or area very tender to touch)   Negative: Swollen joint and fever   Negative: Thigh or calf pain in only one leg and present > 1 hour   Negative: Thigh, calf, or ankle swelling in only one leg   Negative: Thigh, calf, or ankle swelling in both legs, but one side is definitely more swollen   Negative: History of prior 'blood clot' in leg or lungs (i.e., deep vein thrombosis, pulmonary embolism)   Negative: History of inherited increased risk of blood clots (e.g., factor 5 Leiden, antithrombin 3, protein C or protein S deficiency, prothrombin mutation)   Negative: Major surgery in past month   Negative: Hip or leg fracture (broken bone) in past month (or had cast on leg or ankle in past month)   Negative: Illness requiring prolonged bedrest in past month (e.g., immobilization, long hospital stay)   Negative: Long-distance travel in past month (e.g., car, bus, train, plane; with trip lasting 6 or more hours)   Negative: Cancer treatment in past six months (or has cancer now)   Negative: Patient sounds very sick or weak to the triager   Negative: SEVERE pain (e.g., excruciating, unable  "to do any normal activities)   Negative: Cast on leg or ankle and now has increasing pain   Negative: Red area or streak > 2 inches (or 5 cm)   Negative: Painful rash with multiple small blisters grouped together (i.e., dermatomal distribution or 'band' or 'stripe')   Negative: Looks like a boil, infected sore, deep ulcer, or other infected rash (spreading redness, pus)   Negative: Localized rash is very painful (no fever)    Answer Assessment - Initial Assessment Questions  1. ONSET: \"When did the pain start?\"       January  2. LOCATION: \"Where is the pain located?\"       Right leg  3. PAIN: \"How bad is the pain?\"    (Scale 1-10; or mild, moderate, severe)      Pregablin 50 MG and Duloxetine- has been taking since last time saw Dr. Patten  4. WORK OR EXERCISE: \"Has there been any recent work or exercise that involved this part of the body?\"       denies  5. CAUSE: \"What do you think is causing the leg pain?\"      sciatica  6. OTHER SYMPTOMS: \"Do you have any other symptoms?\" (e.g., chest pain, back pain, breathing difficulty, swelling, rash, fever, numbness, weakness)    Denies back pain, chest pain, difficulty breathing, and fever      Right knee swelling and  bottom of leg is numb.  States the right hip and right knee pain is the worse. Pt has swelling as well.    Protocols used: Leg Pain-A-OH    "

## 2025-03-14 ENCOUNTER — ANCILLARY PROCEDURE (OUTPATIENT)
Dept: GENERAL RADIOLOGY | Facility: CLINIC | Age: OVER 89
End: 2025-03-14
Attending: FAMILY MEDICINE
Payer: COMMERCIAL

## 2025-03-14 DIAGNOSIS — M25.561 RIGHT KNEE PAIN, UNSPECIFIED CHRONICITY: ICD-10-CM

## 2025-03-14 PROCEDURE — 73562 X-RAY EXAM OF KNEE 3: CPT | Mod: TC | Performed by: RADIOLOGY

## 2025-03-20 ENCOUNTER — OFFICE VISIT (OUTPATIENT)
Dept: ORTHOPEDICS | Facility: CLINIC | Age: OVER 89
End: 2025-03-20
Payer: COMMERCIAL

## 2025-03-20 VITALS
DIASTOLIC BLOOD PRESSURE: 69 MMHG | SYSTOLIC BLOOD PRESSURE: 118 MMHG | OXYGEN SATURATION: 97 % | BODY MASS INDEX: 30.12 KG/M2 | HEIGHT: 63 IN | WEIGHT: 170 LBS | HEART RATE: 77 BPM

## 2025-03-20 DIAGNOSIS — M25.561 RIGHT KNEE PAIN, UNSPECIFIED CHRONICITY: ICD-10-CM

## 2025-03-20 DIAGNOSIS — M70.61 TROCHANTERIC BURSITIS OF RIGHT HIP: Primary | ICD-10-CM

## 2025-03-20 DIAGNOSIS — M25.551 HIP PAIN, RIGHT: ICD-10-CM

## 2025-03-20 DIAGNOSIS — M70.51 PES ANSERINUS BURSITIS OF RIGHT KNEE: ICD-10-CM

## 2025-03-20 NOTE — PROGRESS NOTES
"HISTORY OF PRESENT ILLNESS    Carolin Mcbride is a 89 year old female who is seen in consultation at the request of Dr. Patten for evaluation of  right knee pain that has been present approximately {NUMBER:034496} {DAYS:100229}.      ***No known injury.    Present symptoms:   Swelling 1st noted a week ago.  No pain, but discomfort.   Pain down medial lower leg, and hip/buttock pain.  Sometimes low back pain.    Treatments tried to this point: muscle relaxer, and a ?2nd medication given recently by Dr. Patten  Ice and heat to hip.  Orthopedic PMH: history of previous lumbar surgeries in 2023.    Other PMH:  has a past medical history of Adjustment reaction with anxiety and depression (01/01/2001), Amblyopia, CAD (coronary artery disease) (01/01/2009), Cyst, ovarian, Diagnostic skin and sensitization tests (aka ALLERGENS) (9/29/14 IgE tests pos. minimally only for Tree pollens (all other environmental allergens NEGATIVE)), HTN (hypertension), Hyperlipidemia, Myositis (01/01/2001), Osteoporosis, Seasonal allergic rhinitis, Stented coronary artery, and Strabismus.    Surgical:  has a past surgical history that includes appendectomy; cholecystectomy, open; cystocele repair (2003); colonoscopy (2009); Coronary Angiogram (N/A, 12/8/2020); and Percutaneous Coronary Intervention Stent (N/A, 12/8/2020).    Family Hx:  family history includes Cancer in her father and mother; Heart Disease in her sister; Heart Disease (age of onset: 60) in her brother; Musculoskeletal Disorder in her brother; Neurologic Disorder in her sister.    Social Hx:  reports that she has never smoked. She has never been exposed to tobacco smoke. She has never used smokeless tobacco. She reports that she does not drink alcohol and does not use drugs.    REVIEW OF SYSTEMS:    CONSTITUTIONAL:  {[facility]ANNA CONSTITUTIONAL:081680::\"NEGATIVE for fever, chills, change in weight\"}  INTEGUMENTARY/SKIN:  {ALANNA SKIN:980811::\"NEGATIVE for worrisome rashes, moles or " "lesions\"}  EYES:  {[facility] EYES:609568::\"NEGATIVE for vision changes or irritation\"}  ENT/MOUTH:  {ALANNA ENT:781649::\"NEGATIVE for ear, mouth and throat problems\"}  RESP:  {[facility] RESP ROS:051409::\"NEGATIVE for significant cough or SOB\"}  BREAST:  {ALANNA BREASTS:666795::\"NEGATIVE for masses, tenderness or discharge\"}  CV:  {ALANNA CV:617749::\"NEGATIVE for chest pain, palpitations or peripheral edema\"}  GI:  {[facility] GI ROS:168118::\"NEGATIVE for nausea, abdominal pain, heartburn, or change in bowel habits\"}  :  Negative   MUSCULOSKELETAL:  See HPI above  NEURO:  {[facility] NEURO ROS:994460::\"NEGATIVE for weakness, dizziness or paresthesias\"}  ENDOCRINE:  {[facility] ENDO:396233::\"NEGATIVE for temperature intolerance, skin/hair changes\"}  HEME/ALLERGY/IMMUNE:  {[facility] HEME ROS:533109::\"NEGATIVE for bleeding problems\"}  PSYCHIATRIC:  {[facility] PSYCH ROS:371245::\"NEGATIVE for changes in mood or affect\"}    PHYSICAL EXAM:  LMP  (LMP Unknown)    GENERAL APPEARANCE: {[facility] GENERAL APPEARANCE:50::\"healthy\",\"alert\",\"no distress\"}   SKIN: {[facility] EXAM CPE - SKIN:956600::\"no suspicious lesions or rashes\"}  NEURO: {[facility] EXAM CPE - NEURO:884495::\"Normal strength and tone\",\"mentation intact\",\"speech normal\"}  VASCULAR: *** good pulses, and cappillary refill   LYMPH: no lymphadenopathy   PSYCH:  {ALANNA EXAM CPE - PSYCH:512733::\"mentation appears normal\",\"affect normal/bright\"}  RESP: no increased work of breathing     KNEE EXAM:   Gait: {ORTHOGAIT:857195}  Alignment: mild varus    Patellofemoral joint: mild crepitations in the patellofemoral joint.  Effusion: none. But pes bursal swelling  ROM: full  Tender: medial joint line and pes anserine  Masses: none  Ligaments:  Lachman's Stable, Anterior and posterior drawer stable, stable to varus and valgus stress.  {MILD MOD:998082} pain with Varus/Valgus stress testing.    McMurrays: {NE}  Lateral retinaculum {tightness:660633}  Facet Tenderness: {MEDIAL:665660}  Apprehension: negative  Q-angle: ***  Tubercle-sulcus " angle: ***  Debby's: ***    EXAM: XR KNEE RIGHT 3 VIEWS  LOCATION: [facility]  DATE: 3/14/2025   Bones are demineralized. There is no evidence of an acute fracture or joint effusion. Mild patellofemoral compartment arthrosis. Atherosclerotic vascular calcifications.    EXAM: CT SPINE LUMBAR WO   LOCATION: [facility]   DATE: 7/6/2023   1. There is transitional anatomy at the lumbosacral junction with partial sacralization of L5 on the right.     2.  At L3-L4, there is severe bilateral facet arthropathy. There is moderate to possibly severe spinal canal stenosis.     3.  At L4-L5, there is severe bilateral facet arthropathy.      Impression: {ORTHO KNEE IMPRESSION:668840}    Plan:  {JBRKNEEplan:241807}    Return to clinic {RTC WHEN:832702}    LORENE Herrera MD  Dept. Orthopedic Surgery  [facility]

## 2025-03-27 ENCOUNTER — TELEPHONE (OUTPATIENT)
Dept: FAMILY MEDICINE | Facility: CLINIC | Age: OVER 89
End: 2025-03-27
Payer: COMMERCIAL

## 2025-03-27 NOTE — TELEPHONE ENCOUNTER
Order/Referral Request    Who is requesting: patient     Orders being requested: Physical Therapy. She wants to go where she went previously.     Reason service is needed/diagnosis: Right knee and hip pain    When are orders needed by: ASAP    Has this been discussed with Provider: Yes    Does patient have a preference on a Group/Provider/Facility? [facility] in [place] fax # [phone], Central scheduling phone # [phone]    Does patient have an appointment scheduled?: No    Where to send orders:  Send to the above facility.     Okay to leave a detailed message?: Yes at Cell number on file:    Telephone Information:   Mobile [phone]

## 2025-04-14 ENCOUNTER — NURSE TRIAGE (OUTPATIENT)
Dept: FAMILY MEDICINE | Facility: CLINIC | Age: OVER 89
End: 2025-04-14
Payer: COMMERCIAL

## 2025-04-14 NOTE — TELEPHONE ENCOUNTER
S-(situation): pt states she had a stomach bug 1 week ago that she is recovering from.     B-(background): She reports vomiting last week and feeling lightheaded. Has not vomited for about 7 days.     A-(assessment): She states her mouth feels dry and yesterday she felt lightheaded. Denies lightheadedness or weakness today. Appetite is improving.     Denies feeling feverish, excessive perspiration, or feeling clammy. She denies blood in emesis, abdominal pain, change in bowel movements or urination. She states she had to cancel her PT this morning. Slept for about 11 hours yesterday and overall is feeling better.     R-(recommendations):Triage disposition is for pt to manage symptoms at home.  She requests writer inform PCP in case he would like to prescribe something.     Discussed the importance of drinking clear fluids to avoid dehydration. Also discussed drinking Gatorade or Powerade and consuming a bland diet. Pt states she feels as though she is on the mend however she wanted to update her doctor. Writer advised that this information will be routed to PCP for any further recommendations.     Reason for Disposition   Unexplained nausea    Additional Information   Negative: Shock suspected (e.g., cold/pale/clammy skin, too weak to stand, low BP, rapid pulse)   Negative: Sounds like a life-threatening emergency to the triager   Negative: Nausea or vomiting and pregnancy < 20 weeks   Negative: Menstrual Period - Missed or Late (i.e., pregnancy suspected)   Negative: Heat exhaustion suspected (i.e., dehydration from heat exposure)   Negative: Anxiety or stress suspected (i.e., nausea with anxiety attacks or stressful situations)   Negative: Traumatic Brain Injury (TBI) suspected   Negative: Vomiting occurs   Negative: Other symptom is present, see that guideline.  (e.g., chest pain, headache, dizziness, abdominal pain, colds, sore throat, etc.).   Negative: Unable to walk, or can only walk with assistance (e.g.,  "requires support)   Negative: Difficulty breathing   Negative: Insulin-dependent diabetes (Type I) and glucose > 400 mg/dL (22 mmol/L)   Negative: Drinking very little and dehydration suspected (e.g., no urine > 12 hours, very dry mouth, very lightheaded)   Negative: Patient sounds very sick or weak to the triager   Negative: Fever > 104 F  (40 C)   Negative: Fever > 101 F  (38.3 C) and over 60 years of age   Negative: Fever > 100.0 F  (37.8 C) and bedridden (e.g., CVA, chronic illness, recovering from surgery)   Negative: Fever > 100.0 F  (37.8 C) and diabetes mellitus or weak immune system (e.g., HIV positive, cancer chemo, splenectomy, chronic steroids)   Negative: Taking any of the following medications: digoxin (Lanoxin), lithium, theophylline, phenytoin (Dilantin)   Negative: Yellowish color of the skin or white of the eye (i.e., jaundice)   Negative: Fever present > 3 days (72 hours)   Negative: Patient wants to be seen    Answer Assessment - Initial Assessment Questions  1. NAUSEA SEVERITY: \"How bad is the nausea?\" (e.g., mild, moderate, severe; dehydration, weight loss)    - MILD: loss of appetite without change in eating habits    - MODERATE: decreased oral intake without significant weight loss, dehydration, or malnutrition    - SEVERE: inadequate caloric or fluid intake, significant weight loss, symptoms of dehydration      Dry mouth   2. ONSET: \"When did the nausea begin?\"      7 days ago  3. VOMITING: \"Any vomiting?\" If Yes, ask: \"How many times today?\"      No not for  7 days   4. RECURRENT SYMPTOM: \"Have you had nausea before?\" If Yes, ask: \"When was the last time?\" \"What happened that time?\"      no  5. CAUSE: \"What do you think is causing the nausea?\"      Unsure bug  6. PREGNANCY: \"Is there any chance you are pregnant?\" (e.g., unprotected intercourse, missed birth control pill, broken condom)      N/A  Felt lightheaded yesterday, not today    Protocols used: Nausea-A-OH    " vomiting since yesterday

## 2025-04-14 NOTE — TELEPHONE ENCOUNTER
Agree with advice. No need for any medications since patient is feeling better and no red flags by history. Likely viral infection that is improving.    Merrill Patten MD

## 2025-04-16 NOTE — TELEPHONE ENCOUNTER
Please have patient follow up in clinic for evaluation. Need to check vitals and possibly some labs as well. Okay to schedule patient in same-day slot.    Merrill Patten MD

## 2025-04-16 NOTE — TELEPHONE ENCOUNTER
Patient calling back. She is wanting Dr. Patten to be updated. Pt reports she feels her symptoms overall have improved since 4/14 phone call with triage RN.   However, pt states she just does not feel herself. RN asked pt what her biggest complaint is now. She states it is mild lightheadedness. She states it is on and off and has been present since her symptoms started about 1.5 weeks ago.     She denies any vomiting, diarrhea, abnormal bleeding (blood in stools or urine), passing out or feeling like she will pass out, chest pain, SOB/difficulty breathing, and fever.   She states she has been drinking lots of fluids/electrolytes as advise. She denies any dehydration signs and symptoms.     She is requesting a medication be prescribed to make her feel better/back to her baseline. RN advise an appointment for further evaluation. Pt declines, she wants message sent to Dr. Patten to advise on next steps.     Pt verbalized good understanding on reasons for call back and ER/911 precautions.       Routing to Dr. Patten for further review and advise. Thanks!      Sneha Uriarte RN    [facility]

## 2025-04-16 NOTE — TELEPHONE ENCOUNTER
Pt notified of below provider message. She wants to see Dr. Patten, but his availability does not work with her scheduled. Scheduled for tomorrow 4/17 with Catrachita. She also mentioned that she laid down for a bit and states she has no lightheadedness, but just feels off. Advise she continue to drink lots of fluids and call clinic if any new or worsening symptoms. Reasons for call back and ER precautions reviewed. No further questions/concerns. Pt verbalized understanding and agrees with plan.         Sneha Uriarte RN    [facility]

## 2025-04-17 ENCOUNTER — OFFICE VISIT (OUTPATIENT)
Dept: FAMILY MEDICINE | Facility: CLINIC | Age: OVER 89
End: 2025-04-17
Payer: COMMERCIAL

## 2025-04-17 VITALS
WEIGHT: 166.8 LBS | RESPIRATION RATE: 20 BRPM | OXYGEN SATURATION: 95 % | TEMPERATURE: 97.8 F | SYSTOLIC BLOOD PRESSURE: 123 MMHG | HEART RATE: 74 BPM | DIASTOLIC BLOOD PRESSURE: 74 MMHG | HEIGHT: 64 IN | BODY MASS INDEX: 28.48 KG/M2

## 2025-04-17 DIAGNOSIS — R53.83 OTHER FATIGUE: ICD-10-CM

## 2025-04-17 DIAGNOSIS — R42 DIZZINESS: Primary | ICD-10-CM

## 2025-04-17 DIAGNOSIS — N18.31 STAGE 3A CHRONIC KIDNEY DISEASE (H): ICD-10-CM

## 2025-04-17 DIAGNOSIS — R00.1 SINUS BRADYCARDIA: ICD-10-CM

## 2025-04-17 DIAGNOSIS — I10 HYPERTENSION GOAL BP (BLOOD PRESSURE) < 140/90: ICD-10-CM

## 2025-04-17 LAB
ALBUMIN SERPL BCG-MCNC: 4.3 G/DL (ref 3.5–5.2)
ALBUMIN UR-MCNC: 100 MG/DL
ALP SERPL-CCNC: 96 U/L (ref 40–150)
ALT SERPL W P-5'-P-CCNC: 26 U/L (ref 0–50)
ANION GAP SERPL CALCULATED.3IONS-SCNC: 10 MMOL/L (ref 7–15)
APPEARANCE UR: CLEAR
AST SERPL W P-5'-P-CCNC: 24 U/L (ref 0–45)
BACTERIA #/AREA URNS HPF: ABNORMAL /HPF
BILIRUB SERPL-MCNC: 0.2 MG/DL
BILIRUB UR QL STRIP: NEGATIVE
BUN SERPL-MCNC: 19.5 MG/DL (ref 8–23)
CALCIUM SERPL-MCNC: 9.6 MG/DL (ref 8.8–10.4)
CHLORIDE SERPL-SCNC: 105 MMOL/L (ref 98–107)
COLOR UR AUTO: YELLOW
CREAT SERPL-MCNC: 1 MG/DL (ref 0.51–0.95)
EGFRCR SERPLBLD CKD-EPI 2021: 53 ML/MIN/1.73M2
ERYTHROCYTE [DISTWIDTH] IN BLOOD BY AUTOMATED COUNT: 12.9 % (ref 10–15)
GLUCOSE SERPL-MCNC: 109 MG/DL (ref 70–99)
GLUCOSE UR STRIP-MCNC: NEGATIVE MG/DL
HCO3 SERPL-SCNC: 25 MMOL/L (ref 22–29)
HCT VFR BLD AUTO: 40.2 % (ref 35–47)
HGB BLD-MCNC: 13.2 G/DL (ref 11.7–15.7)
HGB UR QL STRIP: ABNORMAL
KETONES UR STRIP-MCNC: NEGATIVE MG/DL
LEUKOCYTE ESTERASE UR QL STRIP: NEGATIVE
MCH RBC QN AUTO: 28.4 PG (ref 26.5–33)
MCHC RBC AUTO-ENTMCNC: 32.8 G/DL (ref 31.5–36.5)
MCV RBC AUTO: 87 FL (ref 78–100)
MUCOUS THREADS #/AREA URNS LPF: PRESENT /LPF
NITRATE UR QL: NEGATIVE
PH UR STRIP: 6 [PH] (ref 5–7)
PLATELET # BLD AUTO: 304 10E3/UL (ref 150–450)
POTASSIUM SERPL-SCNC: 4.1 MMOL/L (ref 3.4–5.3)
PROT SERPL-MCNC: 6.9 G/DL (ref 6.4–8.3)
RBC # BLD AUTO: 4.65 10E6/UL (ref 3.8–5.2)
RBC #/AREA URNS AUTO: ABNORMAL /HPF
SODIUM SERPL-SCNC: 140 MMOL/L (ref 135–145)
SP GR UR STRIP: 1.01 (ref 1–1.03)
SQUAMOUS #/AREA URNS AUTO: ABNORMAL /LPF
UROBILINOGEN UR STRIP-ACNC: 0.2 E.U./DL
WBC # BLD AUTO: 6.7 10E3/UL (ref 4–11)
WBC #/AREA URNS AUTO: ABNORMAL /HPF

## 2025-04-17 NOTE — PROGRESS NOTES
Assessment & Plan     Dizziness  One episode of dizziness yesterday.  Denies nausea or vomiting, feeling faint, HA, CP,SOB, numbness or weakness. She is on BP meds and episode occurred a few hours after taking BP meds. Denies hypotension.  - EKG 12-lead complete w/read - Clinics  - CBC with platelets; Future  - Comprehensive metabolic panel (BMP + Alb, Alk Phos, ALT, AST, Total. Bili, TP); Future  - UA Macroscopic with reflex to Microscopic and Culture - Lab Collect; Future    Other fatigue  She reports one episode of emesis last week Monday. Since the she reports feeling tired. Denies nausea, abdominal pain, urinary symptoms, diarrhea, SOB or CP.   - CBC with platelets; Future  - Comprehensive metabolic panel (BMP + Alb, Alk Phos, ALT, AST, Total. Bili, TP); Future  - UA Macroscopic with reflex to Microscopic and Culture - Lab Collect; Future    Stage 3a chronic kidney disease (H)  - Comprehensive metabolic panel (BMP + Alb, Alk Phos, ALT, AST, Total. Bili, TP); Future    Hypertension goal BP (blood pressure) < 140/90   Sinus bradycardia - HR 48 on EKG  On 3 antihypertensives; amlodipine, losartan and metoprolol. /74, she has not taken any of her antihypertensives today.   She reports SBP is usually in the 110's.at home  - Discontinue metoprolol, increase fluid intake.  -Follow up with PCP in 3 weeks.        Lata Coe is a 89 year old, presenting for the following health issues:  Dizziness      4/17/2025    10:44 AM   Additional Questions   Roomed by Sadaf     History of Present Illness       Reason for visit:  Lightheadedness  Symptom onset:  1-2 weeks ago  Symptoms include:  Feeling lightheaded  Symptom intensity:  Moderate  Symptom progression:  Improving  Had these symptoms before:  No   She is taking medications regularly.                  Review of Systems  Constitutional, HEENT, cardiovascular, pulmonary, gi and gu systems are negative, except as otherwise noted.      Objective    BP  "123/74   Pulse 74   Temp 97.8  F (36.6  C) (Oral)   Resp 20   Ht 1.626 m (5' 4\")   Wt 75.7 kg (166 lb 12.8 oz)   LMP  (LMP Unknown)   SpO2 95%   BMI 28.63 kg/m    Body mass index is 28.63 kg/m .  Physical Exam   GENERAL: alert and no distress  RESP: lungs clear to auscultation - no rales, rhonchi or wheezes  CV: regular rates and rhythm, + murmur, click or rub, and no peripheral edema  ABDOMEN: soft, nontender, no hepatosplenomegaly, no masses and bowel sounds normal  MS: no gross musculoskeletal defects noted, no edema  NEURO: Normal strength and tone, mentation intact and speech normal  PSYCH: mentation appears normal, affect normal/bright            Signed Electronically by: DALTON Macario CNP    "

## 2025-05-13 ENCOUNTER — OFFICE VISIT (OUTPATIENT)
Dept: FAMILY MEDICINE | Facility: CLINIC | Age: OVER 89
End: 2025-05-13
Payer: COMMERCIAL

## 2025-05-13 VITALS
WEIGHT: 167 LBS | HEART RATE: 86 BPM | HEIGHT: 64 IN | BODY MASS INDEX: 28.51 KG/M2 | OXYGEN SATURATION: 96 % | TEMPERATURE: 97.6 F | RESPIRATION RATE: 16 BRPM | DIASTOLIC BLOOD PRESSURE: 66 MMHG | SYSTOLIC BLOOD PRESSURE: 118 MMHG

## 2025-05-13 DIAGNOSIS — R00.1 SINUS BRADYCARDIA: Primary | ICD-10-CM

## 2025-05-13 DIAGNOSIS — I10 HYPERTENSION GOAL BP (BLOOD PRESSURE) < 140/90: ICD-10-CM

## 2025-05-13 PROCEDURE — 99214 OFFICE O/P EST MOD 30 MIN: CPT | Performed by: FAMILY MEDICINE

## 2025-05-13 PROCEDURE — 3074F SYST BP LT 130 MM HG: CPT | Performed by: FAMILY MEDICINE

## 2025-05-13 PROCEDURE — 3078F DIAST BP <80 MM HG: CPT | Performed by: FAMILY MEDICINE

## 2025-05-13 NOTE — PROGRESS NOTES
"Answers submitted by the patient for this visit:  Patient Health Questionnaire (Submitted on 5/13/2025)  If you checked off any problems, how difficult have these problems made it for you to do your work, take care of things at home, or get along with other people?: Not difficult at all  PHQ9 TOTAL SCORE: 0    Assessment & Plan     (R00.1) Sinus bradycardia  (primary encounter diagnosis)  Comment:   Plan: resolved with discontinuation of metoprolol. Patient feels better. No symptoms of dizziness or fatigue at this time.    (I10) Hypertension goal BP (blood pressure) < 140/90  Comment:   Plan: at goal after discontinue metoprolol. Continue with amlodipine and losartan.        Follow-up   No follow-ups on file.        Lata Coe is a 89 year old, presenting for the following health issues:  right leg pain         5/13/2025    10:58 AM   Additional Questions   Roomed by Pari     [facility]        Hypertension Follow-up    Do you check your blood pressure regularly outside of the clinic? Yes   Are you following a low salt diet? Yes  Are your blood pressures ever more than 140 on the top number (systolic) OR more   than 90 on the bottom number (diastolic), for example 140/90? No heart rate 80's.    BP Readings from Last 2 Encounters:   05/13/25 118/66   04/17/25 123/74           Review of Systems  Constitutional, HEENT, cardiovascular, pulmonary, GI, , musculoskeletal, neuro, skin, endocrine and psych systems are negative, except as otherwise noted.      Objective    /66   Pulse 86   Temp 97.6  F (36.4  C)   Resp 16   Ht 1.626 m (5' 4\")   Wt 75.8 kg (167 lb)   LMP  (LMP Unknown)   SpO2 96%   BMI 28.67 kg/m    Body mass index is 28.67 kg/m .  Physical Exam   GENERAL: alert and no distress  NECK: no adenopathy, no asymmetry, masses, or scars  RESP: lungs clear to auscultation - no rales, rhonchi or wheezes  CV: regular rate and rhythm, normal S1 S2, no S3 or S4, no murmur, click or rub, no peripheral " edema  ABDOMEN: soft, nontender, no hepatosplenomegaly, no masses and bowel sounds normal          Signed Electronically by: Merrill Patten MD, MD

## 2025-05-29 ENCOUNTER — OFFICE VISIT (OUTPATIENT)
Dept: ORTHOPEDICS | Facility: CLINIC | Age: OVER 89
End: 2025-05-29
Payer: COMMERCIAL

## 2025-05-29 VITALS
OXYGEN SATURATION: 97 % | SYSTOLIC BLOOD PRESSURE: 98 MMHG | HEIGHT: 64 IN | DIASTOLIC BLOOD PRESSURE: 61 MMHG | WEIGHT: 167 LBS | BODY MASS INDEX: 28.51 KG/M2 | HEART RATE: 98 BPM

## 2025-05-29 DIAGNOSIS — M70.51 PES ANSERINUS BURSITIS OF RIGHT KNEE: ICD-10-CM

## 2025-05-29 DIAGNOSIS — M70.61 TROCHANTERIC BURSITIS OF RIGHT HIP: Primary | ICD-10-CM

## 2025-05-29 RX ORDER — METHYLPREDNISOLONE ACETATE 80 MG/ML
80 INJECTION, SUSPENSION INTRA-ARTICULAR; INTRALESIONAL; INTRAMUSCULAR; SOFT TISSUE
Status: COMPLETED | OUTPATIENT
Start: 2025-05-29 | End: 2025-05-29

## 2025-05-29 RX ORDER — LIDOCAINE HYDROCHLORIDE 10 MG/ML
2 INJECTION, SOLUTION INFILTRATION; PERINEURAL
Status: COMPLETED | OUTPATIENT
Start: 2025-05-29 | End: 2025-05-29

## 2025-05-29 RX ADMIN — LIDOCAINE HYDROCHLORIDE 2 ML: 10 INJECTION, SOLUTION INFILTRATION; PERINEURAL at 13:20

## 2025-05-29 RX ADMIN — METHYLPREDNISOLONE ACETATE 80 MG: 80 INJECTION, SUSPENSION INTRA-ARTICULAR; INTRALESIONAL; INTRAMUSCULAR; SOFT TISSUE at 13:20

## 2025-05-29 ASSESSMENT — PAIN SCALES - GENERAL: PAINLEVEL_OUTOF10: MODERATE PAIN (5)

## 2025-05-29 NOTE — PROGRESS NOTES
"SUBJECTIVE:   Carolin Mcbride is here in follow up of    Pes anserinus bursitis of right knee      Right knee pain, unspecified chronicity Yes    Hip pain, right      Trochanteric bursitis of right hip      Bilateral low back pain with right-sided sciatica, unspecified chronicity      Last time (3/20/25):  Plan:  since she doesn't have much pain in the knee itself, we discussed treatment for bursitis and possible lumbar origin  We discussed corticosteroid injections but she doesn't think the pain is bad enough for that    I suggested Voltaren topical, and physical therapy for the back hip and knee. Strengthening and modalities. Eval and treatment. # of visits per therapist's discretion     She went to physical therapy and is helping    Present symptoms: reports that the inner shin is numb since the last back surgery. Wonders if there's something I can do about that.  Pain is mild at the knee but was told to have the inflammation taken care of  Pain lateral right hip and anteromedial pain at the right knee.    Review of Systems:  Constitutional/General: Negative for fever, chills, change in weight  Integumentary/Skin: Negative for worrisome rashes, moles, or lesions  Neuro: Negative for weakness, dizziness, or paresthesias   Psychiatric: negative for changes in mood or affect    OBJECTIVE:  Physical Exam:  BP 98/61 (BP Location: Left arm, Patient Position: Sitting, Cuff Size: Adult Regular)   Pulse 98   Ht 1.626 m (5' 4\")   Wt 75.8 kg (167 lb)   LMP  (LMP Unknown)   SpO2 97%   BMI 28.67 kg/m    General Appearance: healthy, alert and no distress   Skin: no suspicious lesions or rashes  Neuro: Normal strength and tone, mentation intact and speech normal  Vascular: good pulses, and capillary refill   Lymph: no lymphadenopathy   Psych:  mentation appears normal and affect normal/bright  Resp: no increased work of breathing    Right Knee Exam:  Some fullness of the pes bursa, but not very tender  Good range of " motion of the knee.   Tender over the medial joint line anteriorly.    Right hip: tender over lateral hip.  No weakness of abduction.  Hip range of motion not painful    X-rays:    EXAM: XR KNEE RIGHT 3 VIEWS  LOCATION: [facility]  DATE: 3/14/2025   Bones are demineralized. There is no evidence of an acute fracture or joint effusion. Mild patellofemoral compartment arthrosis. Atherosclerotic vascular calcifications.     EXAM: CT SPINE LUMBAR WO   LOCATION: [facility]   DATE: 7/6/2023   1. There is transitional anatomy at the lumbosacral junction with partial sacralization of L5 on the right.     2.  At L3-L4, there is severe bilateral facet arthropathy. There is moderate to possibly severe spinal canal stenosis.     3.  At L4-L5, there is severe bilateral facet arthropathy.     ASSESSMENT:   Greater trochanteric bursitis   Mild knee osteoarthritis and minimally symptomatic pes bursitis    PLAN:   Since she isn't having much pain in the knee, injection doesn't make much sense at this time.  I did suggest a greater trochanteric bursal injection today  Continue physical therapy     Procedure Note:   Informed consent obtained. Risks, benefits and complications of the injection were discussed with the patient and the patient elected to proceed. Right hip injected in the greater trochanteric bursa with 80mg of Depomedrol and 2cc of local anesthetic after sterile prep.    Return to clinic: as needed     LORENE Herrera MD  Dept. Orthopedic Surgery  [facility]

## 2025-05-29 NOTE — PATIENT INSTRUCTIONS
AFTER VISIT SUMMARY    [facility] CORTISONE Injection Discharge Instructions    You may shower, however avoid swimming, tub baths or hot tubs for 24 hours following your procedure    You may have a mild to moderate increase in pain for several days following the injection.    It may take up to 14 days for the steroid medication to start working although you may feel the effect as early as a few days after the procedure.    You may use ice packs for 10-15 minutes, 3 to 4 times a day at the injection site for comfort    You may use anti-inflammatory medications (such as Ibuprofen or Aleve or Advil) or Tylenol for pain control if necessary    If you were fasting, you may resume your normal diet and medications after the procedure    If you have diabetes, check your blood sugar more frequently than usual as your blood sugar may be higher than normal for 10-14 days following a steroid injection. Contact your doctor who manages your diabetes if your blood sugar is higher than usual      If you experience any of the following, call [facility] [phone]  -Fever over 100 degree F  -Swelling, bleeding, redness, drainage, warmth at the injection site  - New or worsening pain

## 2025-05-29 NOTE — PROGRESS NOTES
Large Joint Injection/Arthocentesis    Date/Time: 5/29/2025 1:20 PM    Performed by: Kennedy Herrera MD  Authorized by: Kennedy Herrera MD    Indications:  Osteoarthritis  Needle Size:  22 G  Guidance: landmark guided    Approach:  Lateral      Medications:  2 mL lidocaine 1 %; 80 mg methylPREDNISolone 80 MG/ML  Procedure discussed: discussed risks, benefits, and alternatives    Consent Given by:  Patient  Timeout: timeout called immediately prior to procedure    Prep: patient was prepped and draped in usual sterile fashion

## 2025-05-29 NOTE — LETTER
"5/29/2025      Carolin Mcbride  [address] [place] [id number]      Dear Colleague,    Thank you for referring your patient, Carolin Mcbride, to the [facility]. Please see a copy of my visit note below.    SUBJECTIVE:   Carolin Mcbride is here in follow up of     Pes anserinus bursitis of right knee       Right knee pain, unspecified chronicity Yes     Hip pain, right       Trochanteric bursitis of right hip       Bilateral low back pain with right-sided sciatica, unspecified chronicity      Last time (3/20/25):  Plan:  since she doesn't have much pain in the knee itself, we discussed treatment for bursitis and possible lumbar origin  We discussed corticosteroid injections but she doesn't think the pain is bad enough for that    I suggested Voltaren topical, and physical therapy for the back hip and knee. Strengthening and modalities. Eval and treatment. # of visits per therapist's discretion     She went to physical therapy and is helping    Present symptoms: reports that the inner shin is numb since the last back surgery. Wonders if there's something I can do about that.  Pain is mild at the knee but was told to have the inflammation taken care of  Pain lateral right hip and anteromedial pain at the right knee.    Review of Systems:  Constitutional/General: Negative for fever, chills, change in weight  Integumentary/Skin: Negative for worrisome rashes, moles, or lesions  Neuro: Negative for weakness, dizziness, or paresthesias   Psychiatric: negative for changes in mood or affect    OBJECTIVE:  Physical Exam:  BP 98/61 (BP Location: Left arm, Patient Position: Sitting, Cuff Size: Adult Regular)   Pulse 98   Ht 1.626 m (5' 4\")   Wt 75.8 kg (167 lb)   LMP  (LMP Unknown)   SpO2 97%   BMI 28.67 kg/m    General Appearance: healthy, alert and no distress   Skin: no suspicious lesions or rashes  Neuro: Normal strength and tone, mentation intact and speech normal  Vascular: good pulses, " and capillary refill   Lymph: no lymphadenopathy   Psych:  mentation appears normal and affect normal/bright  Resp: no increased work of breathing    Right Knee Exam:  Some fullness of the pes bursa, but not very tender  Good range of motion of the knee.   Tender over the medial joint line anteriorly.    Right hip: tender over lateral hip.  No weakness of abduction.  Hip range of motion not painful    X-rays:    EXAM: XR KNEE RIGHT 3 VIEWS  LOCATION: [facility]  DATE: 3/14/2025   Bones are demineralized. There is no evidence of an acute fracture or joint effusion. Mild patellofemoral compartment arthrosis. Atherosclerotic vascular calcifications.     EXAM: CT SPINE LUMBAR WO   LOCATION: [facility]   DATE: 7/6/2023   1. There is transitional anatomy at the lumbosacral junction with partial sacralization of L5 on the right.     2.  At L3-L4, there is severe bilateral facet arthropathy. There is moderate to possibly severe spinal canal stenosis.     3.  At L4-L5, there is severe bilateral facet arthropathy.     ASSESSMENT:   Greater trochanteric bursitis   Mild knee osteoarthritis and minimally symptomatic pes bursitis    PLAN:   Since she isn't having much pain in the knee, injection doesn't make much sense at this time.  I did suggest a greater trochanteric bursal injection today  Continue physical therapy     Procedure Note:   Informed consent obtained. Risks, benefits and complications of the injection were discussed with the patient and the patient elected to proceed. Right hip injected in the greater trochanteric bursa with 80mg of Depomedrol and 2cc of local anesthetic after sterile prep.    Return to clinic: as needed     LORENE Herrera MD  Dept. Orthopedic Surgery  [facility]          Large Joint Injection/Arthocentesis    Date/Time: 5/29/2025 1:20 PM    Performed by: Kennedy Herrera MD  Authorized by: Kennedy Herrera MD    Indications:   Osteoarthritis  Needle Size:  22 G  Guidance: landmark guided    Approach:  Lateral      Medications:  2 mL lidocaine 1 %; 80 mg methylPREDNISolone 80 MG/ML  Procedure discussed: discussed risks, benefits, and alternatives    Consent Given by:  Patient  Timeout: timeout called immediately prior to procedure    Prep: patient was prepped and draped in usual sterile fashion          Again, thank you for allowing me to participate in the care of your patient.        Sincerely,        Kennedy Herrera MD    Electronically signed

## 2025-08-12 ENCOUNTER — NURSE TRIAGE (OUTPATIENT)
Dept: FAMILY MEDICINE | Facility: CLINIC | Age: OVER 89
End: 2025-08-12

## 2025-08-12 ENCOUNTER — OFFICE VISIT (OUTPATIENT)
Dept: ORTHOPEDICS | Facility: CLINIC | Age: OVER 89
End: 2025-08-12
Payer: COMMERCIAL

## 2025-08-12 VITALS — HEART RATE: 93 BPM | SYSTOLIC BLOOD PRESSURE: 127 MMHG | OXYGEN SATURATION: 98 % | DIASTOLIC BLOOD PRESSURE: 67 MMHG

## 2025-08-12 DIAGNOSIS — M70.61 TROCHANTERIC BURSITIS OF RIGHT HIP: Primary | ICD-10-CM

## 2025-08-12 DIAGNOSIS — M70.51 PES ANSERINUS BURSITIS OF RIGHT KNEE: ICD-10-CM

## 2025-08-12 PROCEDURE — 20610 DRAIN/INJ JOINT/BURSA W/O US: CPT | Mod: RT | Performed by: ORTHOPAEDIC SURGERY

## 2025-08-12 PROCEDURE — 3078F DIAST BP <80 MM HG: CPT | Performed by: ORTHOPAEDIC SURGERY

## 2025-08-12 PROCEDURE — 3074F SYST BP LT 130 MM HG: CPT | Performed by: ORTHOPAEDIC SURGERY

## 2025-08-12 PROCEDURE — 20610 DRAIN/INJ JOINT/BURSA W/O US: CPT | Mod: 51 | Performed by: ORTHOPAEDIC SURGERY

## 2025-08-12 RX ORDER — LIDOCAINE HYDROCHLORIDE 10 MG/ML
2 INJECTION, SOLUTION INFILTRATION; PERINEURAL
Status: COMPLETED | OUTPATIENT
Start: 2025-08-12 | End: 2025-08-12

## 2025-08-12 RX ORDER — METHYLPREDNISOLONE ACETATE 80 MG/ML
80 INJECTION, SUSPENSION INTRA-ARTICULAR; INTRALESIONAL; INTRAMUSCULAR; SOFT TISSUE
Status: COMPLETED | OUTPATIENT
Start: 2025-08-12 | End: 2025-08-12

## 2025-08-12 RX ORDER — LIDOCAINE HYDROCHLORIDE 10 MG/ML
1 INJECTION, SOLUTION INFILTRATION; PERINEURAL
Status: COMPLETED | OUTPATIENT
Start: 2025-08-12 | End: 2025-08-12

## 2025-08-12 RX ADMIN — METHYLPREDNISOLONE ACETATE 80 MG: 80 INJECTION, SUSPENSION INTRA-ARTICULAR; INTRALESIONAL; INTRAMUSCULAR; SOFT TISSUE at 11:24

## 2025-08-12 RX ADMIN — METHYLPREDNISOLONE ACETATE 80 MG: 80 INJECTION, SUSPENSION INTRA-ARTICULAR; INTRALESIONAL; INTRAMUSCULAR; SOFT TISSUE at 11:25

## 2025-08-12 RX ADMIN — LIDOCAINE HYDROCHLORIDE 2 ML: 10 INJECTION, SOLUTION INFILTRATION; PERINEURAL at 11:25

## 2025-08-12 RX ADMIN — LIDOCAINE HYDROCHLORIDE 1 ML: 10 INJECTION, SOLUTION INFILTRATION; PERINEURAL at 11:24

## 2025-08-25 ENCOUNTER — TELEPHONE (OUTPATIENT)
Dept: FAMILY MEDICINE | Facility: CLINIC | Age: OVER 89
End: 2025-08-25
Payer: COMMERCIAL

## 2025-09-02 ENCOUNTER — OFFICE VISIT (OUTPATIENT)
Dept: FAMILY MEDICINE | Facility: CLINIC | Age: OVER 89
End: 2025-09-02
Payer: COMMERCIAL

## 2025-09-02 VITALS
BODY MASS INDEX: 26.63 KG/M2 | HEART RATE: 99 BPM | TEMPERATURE: 97.3 F | WEIGHT: 156 LBS | OXYGEN SATURATION: 98 % | DIASTOLIC BLOOD PRESSURE: 75 MMHG | RESPIRATION RATE: 20 BRPM | HEIGHT: 64 IN | SYSTOLIC BLOOD PRESSURE: 142 MMHG

## 2025-09-02 DIAGNOSIS — M54.16 LUMBAR RADICULOPATHY: ICD-10-CM

## 2025-09-02 DIAGNOSIS — K59.00 CONSTIPATION, UNSPECIFIED CONSTIPATION TYPE: Primary | ICD-10-CM

## 2025-09-02 DIAGNOSIS — R53.83 FATIGUE, UNSPECIFIED TYPE: ICD-10-CM

## 2025-09-02 DIAGNOSIS — R42 LIGHTHEADEDNESS: ICD-10-CM

## 2025-09-02 LAB
BASOPHILS # BLD AUTO: <0.04 10E3/UL (ref 0–0.2)
BASOPHILS NFR BLD AUTO: 0.2 %
EOSINOPHIL # BLD AUTO: 0.29 10E3/UL (ref 0–0.7)
EOSINOPHIL NFR BLD AUTO: 2.7 %
ERYTHROCYTE [DISTWIDTH] IN BLOOD BY AUTOMATED COUNT: 13.3 % (ref 10–15)
HCT VFR BLD AUTO: 44.1 % (ref 35–47)
HGB BLD-MCNC: 14.7 G/DL (ref 11.7–15.7)
IMM GRANULOCYTES # BLD: <0.04 10E3/UL
IMM GRANULOCYTES NFR BLD: 0.3 %
LYMPHOCYTES # BLD AUTO: 1.62 10E3/UL (ref 0.8–5.3)
LYMPHOCYTES NFR BLD AUTO: 15 %
MCH RBC QN AUTO: 29.2 PG (ref 26.5–33)
MCHC RBC AUTO-ENTMCNC: 33.3 G/DL (ref 31.5–36.5)
MCV RBC AUTO: 87.5 FL (ref 78–100)
MONOCYTES # BLD AUTO: 0.94 10E3/UL (ref 0–1.3)
MONOCYTES NFR BLD AUTO: 8.7 %
NEUTROPHILS # BLD AUTO: 7.9 10E3/UL (ref 1.6–8.3)
NEUTROPHILS NFR BLD AUTO: 73.1 %
PLATELET # BLD AUTO: 288 10E3/UL (ref 150–450)
RBC # BLD AUTO: 5.04 10E6/UL (ref 3.8–5.2)
WBC # BLD AUTO: 10.8 10E3/UL (ref 4–11)

## 2025-09-02 PROCEDURE — 3077F SYST BP >= 140 MM HG: CPT | Performed by: FAMILY MEDICINE

## 2025-09-02 PROCEDURE — 36415 COLL VENOUS BLD VENIPUNCTURE: CPT | Performed by: FAMILY MEDICINE

## 2025-09-02 PROCEDURE — 84443 ASSAY THYROID STIM HORMONE: CPT | Performed by: FAMILY MEDICINE

## 2025-09-02 PROCEDURE — 80053 COMPREHEN METABOLIC PANEL: CPT | Performed by: FAMILY MEDICINE

## 2025-09-02 PROCEDURE — 99214 OFFICE O/P EST MOD 30 MIN: CPT | Performed by: FAMILY MEDICINE

## 2025-09-02 PROCEDURE — 85025 COMPLETE CBC W/AUTO DIFF WBC: CPT | Performed by: FAMILY MEDICINE

## 2025-09-02 PROCEDURE — 3078F DIAST BP <80 MM HG: CPT | Performed by: FAMILY MEDICINE

## 2025-09-02 RX ORDER — PREGABALIN 50 MG/1
50 CAPSULE ORAL 3 TIMES DAILY
Qty: 270 CAPSULE | Refills: 3 | Status: SHIPPED | OUTPATIENT
Start: 2025-09-02

## 2025-09-03 LAB
ALBUMIN SERPL BCG-MCNC: 4.6 G/DL (ref 3.5–5.2)
ALP SERPL-CCNC: 82 U/L (ref 40–150)
ALT SERPL W P-5'-P-CCNC: 23 U/L (ref 0–50)
ANION GAP SERPL CALCULATED.3IONS-SCNC: 14 MMOL/L (ref 7–15)
AST SERPL W P-5'-P-CCNC: 21 U/L (ref 0–45)
BILIRUB SERPL-MCNC: 0.4 MG/DL
BUN SERPL-MCNC: 17.3 MG/DL (ref 8–23)
CALCIUM SERPL-MCNC: 10.9 MG/DL (ref 8.8–10.4)
CHLORIDE SERPL-SCNC: 100 MMOL/L (ref 98–107)
CREAT SERPL-MCNC: 1.05 MG/DL (ref 0.51–0.95)
EGFRCR SERPLBLD CKD-EPI 2021: 50 ML/MIN/1.73M2
GLUCOSE SERPL-MCNC: 89 MG/DL (ref 70–99)
HCO3 SERPL-SCNC: 25 MMOL/L (ref 22–29)
POTASSIUM SERPL-SCNC: 4 MMOL/L (ref 3.4–5.3)
PROT SERPL-MCNC: 7.3 G/DL (ref 6.4–8.3)
SODIUM SERPL-SCNC: 139 MMOL/L (ref 135–145)
TSH SERPL DL<=0.005 MIU/L-ACNC: 1.21 UIU/ML (ref 0.3–4.2)

## (undated) DEVICE — CATH BALLOON NC EMERGE 2.75X12MM H7493926712270

## (undated) DEVICE — CATH GUIDING BLUE YELLOW PTFE XB3 6FRX100CM 67005200

## (undated) DEVICE — SLEEVE TR BAND RADIAL COMPRESSION DEVICE 24CM TRB24-REG

## (undated) DEVICE — 0.035IN X 260CM, EMERALD DIAGNOSTIC GUIDEWIRE, FIXED-CORE PTFE COATED, STANDARD, 3MM EXCHANGE J-TIP (EA/1)

## (undated) DEVICE — CATH ANGIO SUPERTORQUE PLUS JL4 6FRX100CM 533620

## (undated) DEVICE — GUIDEWIRE VASC 0.014INX180CM RUNTHROUGH 25-1011

## (undated) DEVICE — KIT HAND CONTROL ACIST 014644 AR-P54

## (undated) DEVICE — PACK HEART LEFT CUSTOM

## (undated) DEVICE — TUBING PRESSURE 30"

## (undated) DEVICE — INTRO GLIDESHEATH SLENDER 6FR 10X45CM 60-1060

## (undated) DEVICE — VALVE HEMOSTASIS .096" COPILOT MECH 1003331

## (undated) DEVICE — Device

## (undated) DEVICE — KIT ACCESSORY INTRO INFLATION SYS 20/30 PRIORITY 1000186-115

## (undated) DEVICE — CATH GUIDING 100CM 6FR .070IN VSTBR

## (undated) DEVICE — CATH BALLOON EMERGE 2.5X8MM H7493918908250

## (undated) DEVICE — CATH BALLOON EMERGE 2.5X12MM H7493918912250

## (undated) DEVICE — GLIDEWIRE TERUMO .035X180CM 1.5,, J-TIP GR3525

## (undated) DEVICE — GW 0.014IN X 180CM ASAHI FIELD

## (undated) DEVICE — CATH BALLOON NC EMERGE 3.00X15MM H7493926715300

## (undated) DEVICE — CATH BALLOON NC EMERGE 3.50X20MM H7493926720350

## (undated) DEVICE — FASTENER CATH BALLOON CLAMPX2 STATLOCK 0684-00-493

## (undated) DEVICE — MANIFOLD KIT ANGIO AUTOMATED 014613

## (undated) DEVICE — CATH BALLOON EMERGE 2.5X20MM H7493918920250

## (undated) RX ORDER — FENTANYL CITRATE 50 UG/ML
INJECTION, SOLUTION INTRAMUSCULAR; INTRAVENOUS
Status: DISPENSED
Start: 2020-12-08

## (undated) RX ORDER — NICARDIPINE HYDROCHLORIDE 2.5 MG/ML
INJECTION INTRAVENOUS
Status: DISPENSED
Start: 2020-12-08

## (undated) RX ORDER — HEPARIN SODIUM 1000 [USP'U]/ML
INJECTION, SOLUTION INTRAVENOUS; SUBCUTANEOUS
Status: DISPENSED
Start: 2020-12-08

## (undated) RX ORDER — BETAMETHASONE SODIUM PHOSPHATE AND BETAMETHASONE ACETATE 3; 3 MG/ML; MG/ML
INJECTION, SUSPENSION INTRA-ARTICULAR; INTRALESIONAL; INTRAMUSCULAR; SOFT TISSUE
Status: DISPENSED
Start: 2023-06-22

## (undated) RX ORDER — LIDOCAINE HYDROCHLORIDE 10 MG/ML
INJECTION, SOLUTION EPIDURAL; INFILTRATION; INTRACAUDAL; PERINEURAL
Status: DISPENSED
Start: 2023-06-22

## (undated) RX ORDER — SODIUM CHLORIDE 9 MG/ML
INJECTION, SOLUTION INTRAVENOUS
Status: DISPENSED
Start: 2020-12-08

## (undated) RX ORDER — NITROGLYCERIN 5 MG/ML
VIAL (ML) INTRAVENOUS
Status: DISPENSED
Start: 2020-12-08